# Patient Record
Sex: FEMALE | Race: BLACK OR AFRICAN AMERICAN | Employment: FULL TIME | URBAN - METROPOLITAN AREA
[De-identification: names, ages, dates, MRNs, and addresses within clinical notes are randomized per-mention and may not be internally consistent; named-entity substitution may affect disease eponyms.]

---

## 2017-07-15 ENCOUNTER — HOSPITAL ENCOUNTER (EMERGENCY)
Age: 52
Discharge: HOME | End: 2017-07-15
Attending: EMERGENCY MEDICINE
Payer: COMMERCIAL

## 2017-07-15 ENCOUNTER — APPOINTMENT (OUTPATIENT)
Dept: GENERAL RADIOLOGY | Age: 52
End: 2017-07-15
Attending: EMERGENCY MEDICINE
Payer: COMMERCIAL

## 2017-07-15 VITALS
TEMPERATURE: 98 F | HEIGHT: 60 IN | WEIGHT: 130 LBS | RESPIRATION RATE: 18 BRPM | DIASTOLIC BLOOD PRESSURE: 79 MMHG | HEART RATE: 79 BPM | OXYGEN SATURATION: 98 % | BODY MASS INDEX: 25.52 KG/M2 | SYSTOLIC BLOOD PRESSURE: 125 MMHG

## 2017-07-15 DIAGNOSIS — M19.079 ANKLE ARTHRITIS: Primary | ICD-10-CM

## 2017-07-15 LAB
BASOPHILS # BLD AUTO: 0 K/UL (ref 0–0.2)
BASOPHILS # BLD: 1 % (ref 0–2)
CRP SERPL-MCNC: 0.5 MG/DL (ref 0–0.9)
DIFFERENTIAL METHOD BLD: ABNORMAL
EOSINOPHIL # BLD: 0.4 K/UL (ref 0–0.8)
EOSINOPHIL NFR BLD: 8 % (ref 0.5–7.8)
ERYTHROCYTE [DISTWIDTH] IN BLOOD BY AUTOMATED COUNT: 13.8 % (ref 11.9–14.6)
ERYTHROCYTE [SEDIMENTATION RATE] IN BLOOD: 31 MM/HR (ref 0–30)
HCT VFR BLD AUTO: 41.5 % (ref 35.8–46.3)
HGB BLD-MCNC: 13.6 G/DL (ref 11.7–15.4)
IMM GRANULOCYTES # BLD: 0 K/UL (ref 0–0.5)
IMM GRANULOCYTES NFR BLD AUTO: 0 % (ref 0–5)
LYMPHOCYTES # BLD AUTO: 46 % (ref 13–44)
LYMPHOCYTES # BLD: 2.5 K/UL (ref 0.5–4.6)
MCH RBC QN AUTO: 27.4 PG (ref 26.1–32.9)
MCHC RBC AUTO-ENTMCNC: 32.8 G/DL (ref 31.4–35)
MCV RBC AUTO: 83.5 FL (ref 79.6–97.8)
MONOCYTES # BLD: 0.5 K/UL (ref 0.1–1.3)
MONOCYTES NFR BLD AUTO: 9 % (ref 4–12)
NEUTS SEG # BLD: 1.9 K/UL (ref 1.7–8.2)
NEUTS SEG NFR BLD AUTO: 36 % (ref 43–78)
PLATELET # BLD AUTO: 221 K/UL (ref 150–450)
PMV BLD AUTO: 10.9 FL (ref 10.8–14.1)
RBC # BLD AUTO: 4.97 M/UL (ref 4.05–5.25)
WBC # BLD AUTO: 5.4 K/UL (ref 4.3–11.1)

## 2017-07-15 PROCEDURE — 85652 RBC SED RATE AUTOMATED: CPT | Performed by: EMERGENCY MEDICINE

## 2017-07-15 PROCEDURE — 73610 X-RAY EXAM OF ANKLE: CPT

## 2017-07-15 PROCEDURE — 85025 COMPLETE CBC W/AUTO DIFF WBC: CPT | Performed by: EMERGENCY MEDICINE

## 2017-07-15 PROCEDURE — 86140 C-REACTIVE PROTEIN: CPT | Performed by: EMERGENCY MEDICINE

## 2017-07-15 PROCEDURE — 99283 EMERGENCY DEPT VISIT LOW MDM: CPT | Performed by: EMERGENCY MEDICINE

## 2017-07-15 PROCEDURE — 96374 THER/PROPH/DIAG INJ IV PUSH: CPT | Performed by: EMERGENCY MEDICINE

## 2017-07-15 PROCEDURE — 74011250636 HC RX REV CODE- 250/636: Performed by: EMERGENCY MEDICINE

## 2017-07-15 RX ORDER — KETOROLAC TROMETHAMINE 30 MG/ML
30 INJECTION, SOLUTION INTRAMUSCULAR; INTRAVENOUS
Status: COMPLETED | OUTPATIENT
Start: 2017-07-15 | End: 2017-07-15

## 2017-07-15 RX ORDER — SODIUM CHLORIDE 0.9 % (FLUSH) 0.9 %
5-10 SYRINGE (ML) INJECTION EVERY 8 HOURS
Status: DISCONTINUED | OUTPATIENT
Start: 2017-07-15 | End: 2017-07-16 | Stop reason: HOSPADM

## 2017-07-15 RX ORDER — SODIUM CHLORIDE 0.9 % (FLUSH) 0.9 %
5-10 SYRINGE (ML) INJECTION AS NEEDED
Status: DISCONTINUED | OUTPATIENT
Start: 2017-07-15 | End: 2017-07-16 | Stop reason: HOSPADM

## 2017-07-15 RX ORDER — NAPROXEN SODIUM 550 MG/1
550 TABLET ORAL 2 TIMES DAILY WITH MEALS
Qty: 10 TAB | Refills: 0 | Status: SHIPPED | OUTPATIENT
Start: 2017-07-15 | End: 2018-02-27

## 2017-07-15 RX ADMIN — KETOROLAC TROMETHAMINE 30 MG: 30 INJECTION, SOLUTION INTRAMUSCULAR at 21:59

## 2017-07-15 NOTE — LETTER
[address] [facility] EMERGENCY DEPT 
[facility] [address] [facility] [address] 
[phone] Work/School Note Date: 7/15/2017 To Whom It May concern: Livia Garcia was seen and treated today in the emergency room by the following provider(s): 
Attending Provider: Ariana Sanders MD. Livia Garcia may return to work on 7/17. Sincerely, Ariana Sanders MD

## 2017-07-16 NOTE — ED PROVIDER NOTES
Patient is a 50 y.o. female presenting with ankle pain. The history is provided by the patient. Ankle Pain    This is a new problem. The current episode started more than 1 week ago. The problem occurs constantly. The problem has been gradually worsening. Pain location: ankle, left. The pain is moderate. Pertinent negatives include no numbness, no tingling, no back pain and no neck pain. The symptoms are aggravated by movement and contact. She has tried nothing for the symptoms. There has been no history of extremity trauma. Past Medical History:   Diagnosis Date    Ill-defined condition     pancreatitis       Past Surgical History:   Procedure Laterality Date    HX ORTHOPAEDIC           History reviewed. No pertinent family history. Social History     Social History    Marital status: SINGLE     Spouse name: N/A    Number of children: N/A    Years of education: N/A     Occupational History    Not on file. Social History Main Topics    Smoking status: Former Smoker    Smokeless tobacco: Not on file    Alcohol use No    Drug use: Yes     Special: Marijuana    Sexual activity: No     Other Topics Concern    Not on file     Social History Narrative         ALLERGIES: Review of patient's allergies indicates no known allergies. Review of Systems   Constitutional: Negative for chills and fever. Gastrointestinal: Negative for nausea and vomiting. Musculoskeletal: Negative for arthralgias, back pain, myalgias and neck pain. Neurological: Negative for tingling, weakness and numbness. Vitals:    07/15/17 2045   BP: 111/77   Pulse: 82   Resp: 20   Temp: 98 °F (36.7 °C)   SpO2: 99%   Weight: 59 kg (130 lb)   Height: 5' (1.524 m)            Physical Exam   Constitutional: She appears well-developed and well-nourished. Cardiovascular: Normal rate, regular rhythm and normal heart sounds.     Pulmonary/Chest: Effort normal and breath sounds normal.   Musculoskeletal: She exhibits edema and tenderness. Neurovascularly intact left lower extremity. Mild lateral swelling of the ankle with tenderness in this area and distal fibula area. Mild warmth noted initially however the opposing ankle feels similar. No overlying erythema. Most pain is with palpation to even light touch and less so with movement of the ankle joint. Nursing note and vitals reviewed. MDM  Number of Diagnoses or Management Options  Diagnosis management comments: Swelling for over a week. History of previous open reduction and internal fixation. Patient has been on her feet at work quite a bit recently. Probably arthritis and inflammation secondary to increased activity and old injury. No fever or leukocytosis or elevated CRP or sedimentation rate to suggest septic joint. No vaginal discharge and patient has not had intercourse in over 2 years so doubt septic joint from gonorrhea.          Amount and/or Complexity of Data Reviewed  Clinical lab tests: ordered and reviewed (Results for orders placed or performed during the hospital encounter of 07/15/17  -C REACTIVE PROTEIN, QT       Result                                            Value                         Ref Range                       C-Reactive protein                                0.5                           0.0 - 0.9 mg/dL            -SED RATE, AUTOMATED       Result                                            Value                         Ref Range                       Sed rate, automated                               31 (H)                        0 - 30 mm/hr               -CBC WITH AUTOMATED DIFF       Result                                            Value                         Ref Range                       WBC                                               5.4                           4.3 - 11.1 K/uL                 RBC                                               4.97                          4.05 - 5.25 M/uL                HGB 13.6                          11.7 - 15.4 g/dL                HCT                                               41.5                          35.8 - 46.3 %                   MCV                                               83.5                          79.6 - 97.8 FL                  MCH                                               27.4                          26.1 - 32.9 PG                  MCHC                                              32.8                          31.4 - 35.0 g/dL                RDW                                               13.8                          11.9 - 14.6 %                   PLATELET                                          221                           150 - 450 K/uL                  MPV                                               10.9                          10.8 - 14.1 FL                  DF                                                AUTOMATED                                                     NEUTROPHILS                                       36 (L)                        43 - 78 %                       LYMPHOCYTES                                       46 (H)                        13 - 44 %                       MONOCYTES                                         9                             4.0 - 12.0 %                    EOSINOPHILS                                       8 (H)                         0.5 - 7.8 %                     BASOPHILS                                         1                             0.0 - 2.0 %                     IMMATURE GRANULOCYTES                             0.0                           0.0 - 5.0 %                     ABS. NEUTROPHILS                                  1.9                           1.7 - 8.2 K/UL                  ABS.  LYMPHOCYTES                                  2.5                           0.5 - 4.6 K/UL                  ABS. MONOCYTES                                    0.5 0.1 - 1.3 K/UL                  ABS. EOSINOPHILS                                  0.4                           0.0 - 0.8 K/UL                  ABS. BASOPHILS                                    0.0                           0.0 - 0.2 K/UL                  ABS. IMM. GRANS.                                  0.0                           0.0 - 0.5 K/UL           '  )  Tests in the radiology section of CPT®: ordered and reviewed (Xr Ankle Lt Min 3 V    Result Date: 7/15/2017  EXAM:  XR ANKLE LT MIN 3 V INDICATION:  pain swelling COMPARISON: None. FINDINGS: Three views of the left ankle demonstrate a prior ORIF with cortical plate and screws of the distal fibula. The fracture is healed. There is no evidence of acute fractures. There is mild lateral soft tissue swelling. .  No other osseous, articular or soft tissue abnormalities are identified. IMPRESSION: Lateral soft tissue swelling without evidence of an acute fracture. Status post ORIF of a distal fibular fracture.      )      ED Course       Procedures

## 2017-07-16 NOTE — DISCHARGE INSTRUCTIONS
Arthritis: Care Instructions  Your Care Instructions  Arthritis, also called osteoarthritis, is a breakdown of the cartilage that cushions your joints. When the cartilage wears down, your bones rub against each other. This causes pain and stiffness. Many people have some arthritis as they age. Arthritis most often affects the joints of the spine, hands, hips, knees, or feet. You can take simple measures to protect your joints, ease your pain, and help you stay active. Follow-up care is a key part of your treatment and safety. Be sure to make and go to all appointments, and call your doctor if you are having problems. It's also a good idea to know your test results and keep a list of the medicines you take. How can you care for yourself at home? · Stay at a healthy weight. Being overweight puts extra strain on your joints. · Talk to your doctor or physical therapist about exercises that will help ease joint pain. ¨ Stretch. You may enjoy gentle forms of yoga to help keep your joints and muscles flexible. ¨ Walk instead of jog. Other types of exercise that are less stressful on the joints include riding a bicycle, swimming, radha chi, or water exercise. ¨ Lift weights. Strong muscles help reduce stress on your joints. Stronger thigh muscles, for example, take some of the stress off of the knees and hips. Learn the right way to lift weights so you do not make joint pain worse. · Take your medicines exactly as prescribed. Call your doctor if you think you are having a problem with your medicine. · Take pain medicines exactly as directed. ¨ If the doctor gave you a prescription medicine for pain, take it as prescribed. ¨ If you are not taking a prescription pain medicine, ask your doctor if you can take an over-the-counter medicine. · Use a cane, crutch, walker, or another device if you need help to get around. These can help rest your joints.  You also can use other things to make life easier, such as a higher toilet seat and padded handles on kitchen utensils. · Do not sit in low chairs, which can make it hard to get up. · Put heat or cold on your sore joints as needed. Use whichever helps you most. You also can take turns with hot and cold packs. ¨ Apply heat 2 or 3 times a day for 20 to 30 minutes--using a heating pad, hot shower, or hot pack--to relieve pain and stiffness. ¨ Put ice or a cold pack on your sore joint for 10 to 20 minutes at a time. Put a thin cloth between the ice and your skin. When should you call for help? Call your doctor now or seek immediate medical care if:  · You have sudden swelling, warmth, or pain in any joint. · You have joint pain and a fever or rash. · You have such bad pain that you cannot use a joint. Watch closely for changes in your health, and be sure to contact your doctor if:  · You have mild joint symptoms that continue even with more than 6 weeks of care at home. · You have stomach pain or other problems with your medicine. Where can you learn more? Go to http://abdirashidKreditechchetna.info/. Enter Y684 in the search box to learn more about \"Arthritis: Care Instructions. \"  Current as of: November 28, 2016  Content Version: 11.3  © 2688-4915 [employer]. Care instructions adapted under license by [employer] (which disclaims liability or warranty for this information). If you have questions about a medical condition or this instruction, always ask your healthcare professional. [place] [address] any warranty or liability for your use of this information. Foot Arthritis: Exercises  Your Care Instructions  Here are some examples of exercises for foot arthritis. Start each exercise slowly. Ease off the exercise if you start to have pain. Your doctor or physical therapist will tell you when you can start these exercises and which ones will work best for you.   How to do the exercises  Calf stretch (knees straight)    1. Place a book on the floor a few inches from a wall or countertop, and put the balls of your feet on it. Your heels should be on the floor. The book needs to be thick enough so that you can feel a gentle stretch in your calf. If you are not steady on your feet, hold on to a chair, counter, or wall while you do this stretch. 2. Keep your knees straight, and lean forward until you feel a stretch in your calf. 3. To get more stretch, add another book or use a thicker book, such as a phone book, a dictionary, or an encyclopedia. 4. Hold the stretch for at least 15 to 30 seconds. 5. Repeat 2 to 4 times. Calf stretch (knees bent)    1. Place a book on the floor a few inches from a wall or countertop, and put the balls of your feet on it. Your heels should be on the floor. The book needs to be thick enough so that you can feel a gentle stretch in your calf. If you are not steady on your feet, hold on to a chair, counter, or wall while you do this stretch. 2. Bend your knees, and lean forward until you feel a stretch in your calf. 3. To get more stretch, add another book or use a thicker book, such as a phone book, a dictionary, or an encyclopedia. 4. Hold the stretch for at least 15 to 30 seconds. 5. Repeat 2 to 4 times. Great toe extension stretch    1. Sit in a chair, and put your affected foot across your other knee. 2. Grasp your heel with one hand and then slowly pull your big toe back with your other hand. Pull your toe back toward your ankle until you feel a stretch along the bottom of your foot. 3. Hold the stretch for at least 15 to 30 seconds. 4. Repeat 2 to 4 times. 5. Switch feet and repeat steps 1 through 4, even if only one foot is sore. Great toe flexion stretch    1. Sit in a chair, and put your affected foot across your other knee. 2. Grasp your heel with one hand and then slowly push your big toe down with your other hand.  Push your toe down and away from your ankle until you feel a stretch along the top of your foot. 3. Hold the stretch for at least 15 to 30 seconds. 4. Repeat 2 to 4 times. 5. Switch feet and repeat steps 1 through 4, even if only one foot is sore. Ankle alphabet    1. Sit in a chair with your feet flat on the floor. (You can also do this exercise lying on your back with your affected leg propped up on a pillow). 2. Lift the heel of your sore foot off the floor, and slowly trace the letters of the alphabet. 3. Switch feet and repeat steps 1 through 2, even if only one foot is sore. Resisted ankle dorsiflexion    1. Tie the ends of an exercise band together to form a loop. Attach one end of the loop to a secure object or shut a door on it to hold it in place. (Or you can have someone hold one end of the loop to provide resistance.)  2. While sitting on the floor or in a chair, loop the other end of the band over the top of your affected foot. 3. Keeping your knee and leg straight, slowly flex your foot to pull back on the exercise band, and then slowly relax. 4. Repeat 8 to 12 times. 5. Switch feet and repeat steps 1 through 4, even if only one foot is sore. Towel curl    1. While sitting, place your affected foot on a towel on the floor, and scrunch the towel toward you with your toes. 2. Then use your toes to push the towel away from you. 3. Repeat 8 to 12 times. 4. Switch feet and repeat steps 1 through 3, even if only one foot is sore. Resisted ankle eversion    1. Sit on the floor with your legs straight. 2. Hold both ends of an exercise band and loop the band around the outside of your affected foot. Then press your other foot against the band. 3. Keeping your leg straight, slowly push your affected foot outward against the band and away from your other foot without letting your leg rotate. Then slowly relax. 4. Repeat 8 to 12 times. 5. Switch feet and repeat steps 1 through 4, even if only one foot is sore.   Resisted ankle inversion    1. Sit on the floor with your good leg crossed over your other leg. 2. Hold both ends of an exercise band and loop the band around the inside of your affected foot. Then press your other foot against the band. 3. Keeping your legs crossed, slowly push your affected foot against the band so that foot moves away from your other foot. Then slowly relax. 4. Repeat 8 to 12 times. 5. Switch feet and repeat steps 1 through 4, even if only one foot is sore. Follow-up care is a key part of your treatment and safety. Be sure to make and go to all appointments, and call your doctor if you are having problems. It's also a good idea to know your test results and keep a list of the medicines you take. Where can you learn more? Go to http://abdirashid-chetna.info/. Enter K113 in the search box to learn more about \"Foot Arthritis: Exercises. \"  Current as of: March 21, 2017  Content Version: 11.3  © 4499-6037 [employer]. Care instructions adapted under license by [employer] (which disclaims liability or warranty for this information). If you have questions about a medical condition or this instruction, always ask your healthcare professional. [place] [address] any warranty or liability for your use of this information.

## 2018-02-27 ENCOUNTER — APPOINTMENT (OUTPATIENT)
Dept: GENERAL RADIOLOGY | Age: 53
DRG: 191 | End: 2018-02-27
Attending: EMERGENCY MEDICINE
Payer: COMMERCIAL

## 2018-02-27 ENCOUNTER — HOSPITAL ENCOUNTER (INPATIENT)
Age: 53
LOS: 3 days | Discharge: HOME | DRG: 191 | End: 2018-03-05
Attending: EMERGENCY MEDICINE | Admitting: FAMILY MEDICINE
Payer: COMMERCIAL

## 2018-02-27 DIAGNOSIS — R06.03 RESPIRATORY DISTRESS: Primary | ICD-10-CM

## 2018-02-27 DIAGNOSIS — J45.51 SEVERE PERSISTENT REACTIVE AIRWAY DISEASE WITH WHEEZING WITH ACUTE EXACERBATION: ICD-10-CM

## 2018-02-27 PROBLEM — J45.901 ASTHMA EXACERBATION: Status: ACTIVE | Noted: 2018-02-27

## 2018-02-27 LAB
ALBUMIN SERPL-MCNC: 3.6 G/DL (ref 3.5–5)
ALBUMIN/GLOB SERPL: 0.8 {RATIO} (ref 1.2–3.5)
ALP SERPL-CCNC: 125 U/L (ref 50–136)
ALT SERPL-CCNC: 21 U/L (ref 12–65)
ANION GAP SERPL CALC-SCNC: 13 MMOL/L (ref 7–16)
AST SERPL-CCNC: 26 U/L (ref 15–37)
BASOPHILS # BLD: 0 K/UL (ref 0–0.2)
BASOPHILS NFR BLD: 0 % (ref 0–2)
BILIRUB SERPL-MCNC: 0.6 MG/DL (ref 0.2–1.1)
BUN SERPL-MCNC: 7 MG/DL (ref 6–23)
CALCIUM SERPL-MCNC: 9.5 MG/DL (ref 8.3–10.4)
CHLORIDE SERPL-SCNC: 102 MMOL/L (ref 98–107)
CO2 SERPL-SCNC: 24 MMOL/L (ref 21–32)
CREAT SERPL-MCNC: 0.87 MG/DL (ref 0.6–1)
DIFFERENTIAL METHOD BLD: ABNORMAL
EOSINOPHIL # BLD: 0.5 K/UL (ref 0–0.8)
EOSINOPHIL NFR BLD: 6 % (ref 0.5–7.8)
ERYTHROCYTE [DISTWIDTH] IN BLOOD BY AUTOMATED COUNT: 13.9 % (ref 11.9–14.6)
FLUAV AG NPH QL IA: NEGATIVE
FLUBV AG NPH QL IA: NEGATIVE
GLOBULIN SER CALC-MCNC: 4.6 G/DL (ref 2.3–3.5)
GLUCOSE SERPL-MCNC: 109 MG/DL (ref 65–100)
HCT VFR BLD AUTO: 38.7 % (ref 35.8–46.3)
HGB BLD-MCNC: 12.7 G/DL (ref 11.7–15.4)
IMM GRANULOCYTES # BLD: 0 K/UL (ref 0–0.5)
IMM GRANULOCYTES NFR BLD AUTO: 0 % (ref 0–5)
LIPASE SERPL-CCNC: 173 U/L (ref 73–393)
LYMPHOCYTES # BLD: 2 K/UL (ref 0.5–4.6)
LYMPHOCYTES NFR BLD: 26 % (ref 13–44)
MCH RBC QN AUTO: 26.5 PG (ref 26.1–32.9)
MCHC RBC AUTO-ENTMCNC: 32.8 G/DL (ref 31.4–35)
MCV RBC AUTO: 80.6 FL (ref 79.6–97.8)
MONOCYTES # BLD: 0.5 K/UL (ref 0.1–1.3)
MONOCYTES NFR BLD: 7 % (ref 4–12)
NEUTS SEG # BLD: 4.8 K/UL (ref 1.7–8.2)
NEUTS SEG NFR BLD: 61 % (ref 43–78)
PLATELET # BLD AUTO: 269 K/UL (ref 150–450)
PMV BLD AUTO: 10.1 FL (ref 10.8–14.1)
POTASSIUM SERPL-SCNC: 3.6 MMOL/L (ref 3.5–5.1)
PROT SERPL-MCNC: 8.2 G/DL (ref 6.3–8.2)
RBC # BLD AUTO: 4.8 M/UL (ref 4.05–5.25)
SODIUM SERPL-SCNC: 139 MMOL/L (ref 136–145)
TROPONIN I BLD-MCNC: 0 NG/ML (ref 0.02–0.05)
WBC # BLD AUTO: 7.8 K/UL (ref 4.3–11.1)

## 2018-02-27 PROCEDURE — 96361 HYDRATE IV INFUSION ADD-ON: CPT | Performed by: EMERGENCY MEDICINE

## 2018-02-27 PROCEDURE — 84484 ASSAY OF TROPONIN QUANT: CPT

## 2018-02-27 PROCEDURE — 74011000250 HC RX REV CODE- 250: Performed by: EMERGENCY MEDICINE

## 2018-02-27 PROCEDURE — 94640 AIRWAY INHALATION TREATMENT: CPT

## 2018-02-27 PROCEDURE — 94664 DEMO&/EVAL PT USE INHALER: CPT

## 2018-02-27 PROCEDURE — 87804 INFLUENZA ASSAY W/OPTIC: CPT | Performed by: EMERGENCY MEDICINE

## 2018-02-27 PROCEDURE — 74011250636 HC RX REV CODE- 250/636: Performed by: EMERGENCY MEDICINE

## 2018-02-27 PROCEDURE — 74011000250 HC RX REV CODE- 250: Performed by: FAMILY MEDICINE

## 2018-02-27 PROCEDURE — 80053 COMPREHEN METABOLIC PANEL: CPT | Performed by: EMERGENCY MEDICINE

## 2018-02-27 PROCEDURE — 85025 COMPLETE CBC W/AUTO DIFF WBC: CPT | Performed by: EMERGENCY MEDICINE

## 2018-02-27 PROCEDURE — 93005 ELECTROCARDIOGRAM TRACING: CPT | Performed by: EMERGENCY MEDICINE

## 2018-02-27 PROCEDURE — 99285 EMERGENCY DEPT VISIT HI MDM: CPT | Performed by: EMERGENCY MEDICINE

## 2018-02-27 PROCEDURE — 99218 HC RM OBSERVATION: CPT

## 2018-02-27 PROCEDURE — 71045 X-RAY EXAM CHEST 1 VIEW: CPT

## 2018-02-27 PROCEDURE — 83690 ASSAY OF LIPASE: CPT | Performed by: EMERGENCY MEDICINE

## 2018-02-27 PROCEDURE — 96375 TX/PRO/DX INJ NEW DRUG ADDON: CPT | Performed by: EMERGENCY MEDICINE

## 2018-02-27 RX ORDER — IPRATROPIUM BROMIDE AND ALBUTEROL SULFATE 2.5; .5 MG/3ML; MG/3ML
3 SOLUTION RESPIRATORY (INHALATION)
Status: DISCONTINUED | OUTPATIENT
Start: 2018-02-28 | End: 2018-02-28

## 2018-02-27 RX ORDER — HYDROCODONE BITARTRATE AND HOMATROPINE METHYLBROMIDE 1.5; 5 MG/5ML; MG/5ML
5 SYRUP ORAL
Status: DISCONTINUED | OUTPATIENT
Start: 2018-02-27 | End: 2018-03-05 | Stop reason: HOSPADM

## 2018-02-27 RX ORDER — ONDANSETRON 2 MG/ML
4 INJECTION INTRAMUSCULAR; INTRAVENOUS
Status: DISCONTINUED | OUTPATIENT
Start: 2018-02-27 | End: 2018-02-28 | Stop reason: SDUPTHER

## 2018-02-27 RX ORDER — CALCIUM CARBONATE 200(500)MG
400 TABLET,CHEWABLE ORAL
Status: DISCONTINUED | OUTPATIENT
Start: 2018-02-27 | End: 2018-03-05 | Stop reason: HOSPADM

## 2018-02-27 RX ORDER — ACETAMINOPHEN 325 MG/1
650 TABLET ORAL
Status: DISCONTINUED | OUTPATIENT
Start: 2018-02-27 | End: 2018-02-28 | Stop reason: SDUPTHER

## 2018-02-27 RX ORDER — FLUTICASONE PROPIONATE AND SALMETEROL 250; 50 UG/1; UG/1
1 POWDER RESPIRATORY (INHALATION) EVERY 12 HOURS
COMMUNITY
End: 2018-10-29 | Stop reason: CLARIF

## 2018-02-27 RX ORDER — LORAZEPAM 2 MG/ML
0.5 INJECTION INTRAMUSCULAR
Status: COMPLETED | OUTPATIENT
Start: 2018-02-27 | End: 2018-02-27

## 2018-02-27 RX ORDER — GUAIFENESIN 600 MG/1
600 TABLET, EXTENDED RELEASE ORAL
Status: DISCONTINUED | OUTPATIENT
Start: 2018-02-27 | End: 2018-03-04

## 2018-02-27 RX ORDER — IPRATROPIUM BROMIDE AND ALBUTEROL SULFATE 2.5; .5 MG/3ML; MG/3ML
3 SOLUTION RESPIRATORY (INHALATION)
Status: COMPLETED | OUTPATIENT
Start: 2018-02-27 | End: 2018-02-27

## 2018-02-27 RX ADMIN — METHYLPREDNISOLONE SODIUM SUCCINATE 125 MG: 125 INJECTION, POWDER, FOR SOLUTION INTRAMUSCULAR; INTRAVENOUS at 19:56

## 2018-02-27 RX ADMIN — IPRATROPIUM BROMIDE AND ALBUTEROL SULFATE 3 ML: 2.5; .5 SOLUTION RESPIRATORY (INHALATION) at 23:55

## 2018-02-27 RX ADMIN — IPRATROPIUM BROMIDE AND ALBUTEROL SULFATE 3 ML: 2.5; .5 SOLUTION RESPIRATORY (INHALATION) at 19:49

## 2018-02-27 RX ADMIN — LORAZEPAM 0.5 MG: 2 INJECTION INTRAMUSCULAR; INTRAVENOUS at 19:55

## 2018-02-27 RX ADMIN — IPRATROPIUM BROMIDE AND ALBUTEROL SULFATE 3 ML: 2.5; .5 SOLUTION RESPIRATORY (INHALATION) at 19:18

## 2018-02-27 RX ADMIN — SODIUM CHLORIDE 1000 ML: 900 INJECTION, SOLUTION INTRAVENOUS at 19:56

## 2018-02-27 NOTE — LETTER
[address] [facility] EMERGENCY DEPT 
[address] [facility] [address] [place] [address] 
[phone] Work/School Note Date: 2/27/2018 To Whom It May concern: 
 
Bess Packer was in the emergency room with a family member this AM. Sincerely, Melchor Torres RN

## 2018-02-27 NOTE — IP AVS SNAPSHOT
[address] 
[phone] Patient: Keyshawn Carter MRN: [id number] :1965 About your hospitalization You were admitted on:  2018 You last received care in the:  [address] [place] [address] You were discharged on:  2018 Why you were hospitalized Your primary diagnosis was:  Copd Exacerbation (Hcc) Your diagnoses also included:  Asthma Exacerbation, Marijuana Use, Continuous, Occupational Exposure To Air Contaminants, Cough, Acute Bronchitis, Leukocytosis Follow-up Information Follow up With Details Comments Contact Info [address] [place] [address] On 3/13/2018 1:40PM [address] [facility] [address] 
[phone] Discharge Orders None A check cordelia indicates which time of day the medication should be taken. My Medications START taking these medications Instructions Each Dose to Equal  
 Morning Noon Evening Bedtime  
 azithromycin 500 mg Tab Commonly known as:  Rajani Nava Start taking on:  3/6/2018 Your next dose is:  TOMORROW Take 1 Tab by mouth daily for 2 days. Indications: COPD exacerbation 500 mg  
    
   
   
   
  
 benzonatate 100 mg capsule Commonly known as:  TESSALON Take 1 Cap by mouth three (3) times daily as needed for Cough for up to 10 days. Indications: Cough 100 mg  
    
   
   
   
  
 guaiFENesin 1,200 mg Ta12 ER tablet Commonly known as:  Andrey & Andrey Your next dose is:  TODAY Take 1 Tab by mouth every twelve (12) hours. Indications: Cough 1200 mg  
    
   
   
   
  
  
 predniSONE 10 mg tablet Commonly known as:  Aspen Better Start taking on:  3/6/2018 Taper:  4 tabs daily x 1 day, 3 tabs daily x 3 days, 2 tabs daily x 3 days, 1 tab daily x 3 days  Indications: Asthma Exacerbation CONTINUE taking these medications Instructions Each Dose to Equal  
 Morning Noon Evening Bedtime ADVAIR DISKUS 250-50 mcg/dose diskus inhaler Generic drug:  fluticasone-salmeterol Your next dose is:  TODAY Take 1 Puff by inhalation every twelve (12) hours. 1 Puff  
    
   
   
   
  
  
 albuterol 2.5 mg /3 mL (0.083 %) nebulizer solution Commonly known as:  PROVENTIL VENTOLIN  
   
 3 mL by Nebulization route every six (6) hours as needed for Wheezing or Shortness of Breath. Indications: Bronchospastic Pulmonary Disease 2.5 mg Where to Get Your Medications Information on where to get these meds will be given to you by the nurse or doctor. ! Ask your nurse or doctor about these medications  
  albuterol 2.5 mg /3 mL (0.083 %) nebulizer solution  
 azithromycin 500 mg Tab  
 benzonatate 100 mg capsule  
 guaiFENesin 1,200 mg Ta12 ER tablet  
 predniSONE 10 mg tablet Discharge Instructions None [facility] Announce[facility] We are excited to announce that we are making your provider's discharge notes available to you in [employer] You will see these notes when they are completed and signed by the physician that discharged you from your recent hospital stay. If you have any questions or concerns about any information you see in [employer] please call the Health Information Department where you were seen or reach out to your Primary Care Provider for more information about your plan of care. Introducing [facility]! Son Payne introduces [employer] patient portal. Now you can access parts of your medical record, email your doctor's office, and request medication refills online. 1. In your internet browser, go to [email or web address] 2. Click on the First Time User? Click Here link in the Sign In box. You will see the New Member Sign Up page. 3. Enter your [employer] Access Code exactly as it appears below.  You will not need to use this code after youve completed the sign-up process. If you do not sign up before the expiration date, you must request a new code. · [employer] Access Code: [email or web address] Expires: 6/3/2018  8:26 AM 
 
4. Enter the last four digits of your Social Security Number (xxxx) and Date of Birth (mm/dd/yyyy) as indicated and click Submit. You will be taken to the next sign-up page. 5. Create a [employer] ID. This will be your [employer] login ID and cannot be changed, so think of one that is secure and easy to remember. 6. Create a [employer] password. You can change your password at any time. 7. Enter your Password Reset Question and Answer. This can be used at a later time if you forget your password. 8. Enter your e-mail address. You will receive e-mail notification when new information is available in [address] 9. Click Sign Up. You can now view and download portions of your medical record. 10. Click the Download Summary menu link to download a portable copy of your medical information. If you have questions, please visit the Frequently Asked Questions section of the [employer] website. Remember, [employer] is NOT to be used for urgent needs. For medical emergencies, dial 911. Now available from your iPhone and Android! Providers Seen During Your Hospitalization Provider Specialty Primary office phone Torey Herndon MD Emergency Medicine [phone] Alejandra Forman MD [facility] [phone] Your Primary Care Physician (PCP) Primary Care Physician Office Phone Office Fax NONE ** None ** ** None ** You are allergic to the following No active allergies Recent Documentation Height Weight BMI OB Status Smoking Status 1.524 m 64.4 kg 27.73 kg/m2 Perimenopausal Former Smoker Emergency Contacts Name Discharge Info Relation Home Work Mobile [employer] [place] [1] [phone] Britton Sprague  Parent [1] [phone] [facility]  Life Partner [7] [phone] Patient Belongings The following personal items are in your possession at time of discharge: 
                   Clothing: With patient Please provide this summary of care documentation to your next provider. Signatures-by signing, you are acknowledging that this After Visit Summary has been reviewed with you and you have received a copy. Patient Signature:  ____________________________________________________________ Date:  ____________________________________________________________  
  
[facility]ennifermaria de [facility]esus [facility] Provider Signature:  ____________________________________________________________ Date:  ____________________________________________________________

## 2018-02-27 NOTE — LETTER
[address] [facility] EMERGENCY DEPT 
[address] [place] [id number] [phone] Work/School Note Date: 2/28/18 To Whom It May concern: 
 
Juliana Murphy was in the emergency room this morning. Sincerely, Liz Silverio RN

## 2018-02-27 NOTE — IP AVS SNAPSHOT
Summary of Care Report The Summary of Care report has been created to help improve care coordination. Users with access to [employer] or [address] (Web-based application) may access additional patient information including the Discharge Summary. If you are not currently a [employer] user and need more information, please call the number listed below in the [place] [address] section and ask to be connected with Medical Records. Facility Information Name Address Phone [id number] [place] [address] [address] [place] [phone] Patient Information Patient Name Sex  Samaria Szymanski ([id number]) Female 1965 Discharge Information Admitting Provider Service Area Unit Fredrick Magaña MD / [place] [address] Med Surg / [phone] Discharge Provider Discharge Date/Time Discharge Disposition Destination (none) 3/5/2018 (Pending) AHR (none) Patient Language Language ENGLISH [13] Hospital Problems as of 3/5/2018  Never Reviewed Class Noted - Resolved Last Modified POA Active Problems Asthma exacerbation  2018 - Present 3/2/2018 by Fredrick Magaña MD Yes Entered by Ruma Wheat MD  
  Marijuana use, continuous  2018 - Present 2018 by Ruma Wheat MD Yes Entered by Ruma Wheat MD  
  Occupational exposure to air contaminants  2018 - Present 2018 by Ruma Wheat MD Yes Entered by Ruma Wheat MD  
  Cough  2018 - Present 2018 by Ruma Wheat MD Yes Entered by Ruma Wheat MD  
  Acute bronchitis  2018 - Present 2018 by Olamide Hilton MD Yes Entered by Olamide Hilton MD  
  * (Principal)COPD exacerbation ([address]  2018 - Present 3/2/2018 by Fredrick Magaña MD Yes   Entered by Olamide Hilton MD  
 Leukocytosis  3/4/2018 - Present 3/4/2018 by Cristian Wesley. Divine Hennessy MD No  
  Entered by Jyothi Hennessy MD  
  
You are allergic to the following No active allergies Current Discharge Medication List  
  
START taking these medications Dose & Instructions Dispensing Information Comments  
 azithromycin 500 mg Tab Commonly known as:  Louie Haw Start taking on:  3/6/2018 Dose:  500 mg Take 1 Tab by mouth daily for 2 days. Indications: COPD exacerbation Quantity:  2 Tab Refills:  0  
   
 benzonatate 100 mg capsule Commonly known as:  TESSALON Dose:  100 mg Take 1 Cap by mouth three (3) times daily as needed for Cough for up to 10 days. Indications: Cough Quantity:  30 Cap Refills:  0  
   
 guaiFENesin 1,200 mg Ta12 ER tablet Commonly known as:  Andrey & Andrey Dose:  1200 mg Take 1 Tab by mouth every twelve (12) hours. Indications: Cough Quantity:  30 Tab Refills:  0  
   
 predniSONE 10 mg tablet Commonly known as:  Valma Belts Start taking on:  3/6/2018 Taper:  4 tabs daily x 1 day, 3 tabs daily x 3 days, 2 tabs daily x 3 days, 1 tab daily x 3 days  Indications: Asthma Exacerbation Quantity:  22 Tab Refills:  0 CONTINUE these medications which have NOT CHANGED Dose & Instructions Dispensing Information Comments ADVAIR DISKUS 250-50 mcg/dose diskus inhaler Generic drug:  fluticasone-salmeterol Dose:  1 Puff Take 1 Puff by inhalation every twelve (12) hours. Refills:  0  
   
 albuterol 2.5 mg /3 mL (0.083 %) nebulizer solution Commonly known as:  PROVENTIL VENTOLIN Dose:  2.5 mg  
3 mL by Nebulization route every six (6) hours as needed for Wheezing or Shortness of Breath. Indications: Bronchospastic Pulmonary Disease Quantity:  120 Each Refills:  0 Follow-up Information Follow up With Details Comments Contact Info [address] On 3/13/2018 1:40PM [address] [place] [address] 
[phone] Discharge Instructions None Chart Review Routing History No Routing History on File

## 2018-02-27 NOTE — LETTER
NOTIFICATION RETURN TO WORK / SCHOOL 
 
3/5/2018 8:19 AM 
 
Ms. Tavares Rodriges [address] [place] [id number] To Whom It May Concern: Tavares Rodriges is currently under the care of PASHA BARROSO. She will return to work/school on: 3/9/18 or sooner if she feels improved. If there are questions or concerns please have the patient contact our office. Sincerely, No name on file.

## 2018-02-27 NOTE — ED PROVIDER NOTES
Patient is a 48 y.o. female presenting with shortness of breath. The history is provided by the patient. Shortness of Breath   This is a recurrent problem. The average episode lasts 2 days. The problem occurs continuously. The current episode started 2 days ago. The problem has been gradually worsening. Associated symptoms include cough, sputum production, wheezing and chest pain. Pertinent negatives include no fever, no headaches, no rhinorrhea, no sore throat, no ear pain, no neck pain, no hemoptysis, no vomiting, no abdominal pain, no rash and no leg pain. It is unknown what precipitated the problem. She has tried beta-agonist inhalers for the symptoms. The treatment provided no relief. She has had prior hospitalizations. She has had prior ED visits. Associated medical issues comments: patient reports a history of reactive air disease. After an episode of bronchitis. She is actually prescribed a nebulizer and treatments. With this episode. .        Past Medical History:   Diagnosis Date    Ill-defined condition     pancreatitis       Past Surgical History:   Procedure Laterality Date    HX ORTHOPAEDIC           No family history on file. Social History     Social History    Marital status: SINGLE     Spouse name: N/A    Number of children: N/A    Years of education: N/A     Occupational History    Not on file. Social History Main Topics    Smoking status: Former Smoker    Smokeless tobacco: Not on file    Alcohol use No    Drug use: Yes     Special: Marijuana    Sexual activity: No     Other Topics Concern    Not on file     Social History Narrative         ALLERGIES: Review of patient's allergies indicates no known allergies. Review of Systems   Constitutional: Negative for chills and fever. HENT: Negative for congestion, ear pain, rhinorrhea and sore throat. Eyes: Negative for photophobia and discharge.    Respiratory: Positive for cough, sputum production, chest tightness, shortness of breath and wheezing. Negative for apnea, hemoptysis and choking. Cardiovascular: Positive for chest pain. Negative for palpitations. Gastrointestinal: Negative for abdominal pain, constipation, diarrhea and vomiting. Endocrine: Negative for cold intolerance and heat intolerance. Genitourinary: Negative for dysuria and flank pain. Musculoskeletal: Negative for arthralgias, myalgias and neck pain. Skin: Negative for rash and wound. Allergic/Immunologic: Negative for environmental allergies and food allergies. Neurological: Negative for syncope and headaches. Hematological: Negative for adenopathy. Does not bruise/bleed easily. Psychiatric/Behavioral: Negative for dysphoric mood. The patient is not nervous/anxious. All other systems reviewed and are negative. Vitals:    02/27/18 1834   BP: 138/83   Pulse: (!) 102   Resp: (!) 42   Temp: 99 °F (37.2 °C)   SpO2: 93%   Weight: 64.4 kg (142 lb)   Height: 5' (1.524 m)            Physical Exam   Constitutional: She is oriented to person, place, and time. She appears well-developed and well-nourished. She appears distressed. HENT:   Head: Normocephalic and atraumatic. Mouth/Throat: Oropharynx is clear and moist.   Eyes: Conjunctivae and EOM are normal. Pupils are equal, round, and reactive to light. Right eye exhibits no discharge. Left eye exhibits no discharge. No scleral icterus. Neck: Normal range of motion. Neck supple. No thyromegaly present. Cardiovascular: Normal rate, regular rhythm, normal heart sounds and intact distal pulses. Exam reveals no gallop and no friction rub. No murmur heard. Pulmonary/Chest: Tachypnea noted. She is in respiratory distress. She has decreased breath sounds. She has wheezes. She has no rhonchi. She has no rales. She exhibits no tenderness. Patient able to speak in short sentences. However, becomes dyspneic quite easily. Room air saturations remain greater than 94%   Abdominal: Soft. Bowel sounds are normal. She exhibits no distension. There is no hepatosplenomegaly. There is no tenderness. There is no rebound and no guarding. No hernia. Musculoskeletal: Normal range of motion. She exhibits no edema or tenderness. Neurological: She is alert and oriented to person, place, and time. No cranial nerve deficit. She exhibits normal muscle tone. Skin: Skin is warm and dry. No rash noted. She is not diaphoretic. No erythema. Psychiatric: Her speech is normal and behavior is normal. Judgment and thought content normal. Her mood appears anxious. Nursing note and vitals reviewed. MDM  Number of Diagnoses or Management Options  Respiratory distress: new and requires workup  Severe persistent reactive airway disease with wheezing with acute exacerbation: new and requires workup  Diagnosis management comments: EKG reviewed  Sinus rhythm  Normal intervals, normal axis, no ectopy  No acute ischemic changes    Differential diagnosis  Asthma exacerbation, pneumonia, bronchitis. Also, consider ACS/CHF    9:40 PM  Patient has improved after DuoNeb and steroids  Her wheezing is somewhat persistent. She still feels fairly dyspneic    Patient is tachycardic in the 130s after her nebulizer treatments    I discussed the case with the hospitalist service and they will do it for further care and monitoring. Amount and/or Complexity of Data Reviewed  Clinical lab tests: ordered and reviewed  Tests in the radiology section of CPT®: ordered and reviewed  Tests in the medicine section of CPT®: ordered and reviewed  Review and summarize past medical records: yes    Risk of Complications, Morbidity, and/or Mortality  Presenting problems: high  Diagnostic procedures: moderate  Management options: moderate  General comments: Elements of this note have been dictated via voice recognition software. Text and phrases may be limited by the accuracy of the software.   The chart has been reviewed, but errors may still be present.       Patient Progress  Patient progress: improved        ED Course       Procedures

## 2018-02-28 PROBLEM — Z57.39 OCCUPATIONAL EXPOSURE TO AIR CONTAMINANTS: Status: ACTIVE | Noted: 2018-02-28

## 2018-02-28 PROBLEM — J20.9 ACUTE BRONCHITIS: Status: ACTIVE | Noted: 2018-02-28

## 2018-02-28 PROBLEM — J44.1 COPD EXACERBATION (HCC): Status: ACTIVE | Noted: 2018-02-28

## 2018-02-28 PROBLEM — R05.9 COUGH: Status: ACTIVE | Noted: 2018-02-28

## 2018-02-28 PROBLEM — F12.90 MARIJUANA USE, CONTINUOUS: Status: ACTIVE | Noted: 2018-02-28

## 2018-02-28 LAB
ANION GAP SERPL CALC-SCNC: 17 MMOL/L (ref 7–16)
ATRIAL RATE: 89 BPM
BASOPHILS # BLD: 0 K/UL (ref 0–0.2)
BASOPHILS NFR BLD: 0 % (ref 0–2)
BUN SERPL-MCNC: 8 MG/DL (ref 6–23)
CALCIUM SERPL-MCNC: 9 MG/DL (ref 8.3–10.4)
CALCULATED P AXIS, ECG09: 76 DEGREES
CALCULATED R AXIS, ECG10: 73 DEGREES
CALCULATED T AXIS, ECG11: 78 DEGREES
CHLORIDE SERPL-SCNC: 104 MMOL/L (ref 98–107)
CO2 SERPL-SCNC: 19 MMOL/L (ref 21–32)
CREAT SERPL-MCNC: 0.9 MG/DL (ref 0.6–1)
DIAGNOSIS, 93000: NORMAL
DIFFERENTIAL METHOD BLD: ABNORMAL
EOSINOPHIL # BLD: 0 K/UL (ref 0–0.8)
EOSINOPHIL NFR BLD: 0 % (ref 0.5–7.8)
ERYTHROCYTE [DISTWIDTH] IN BLOOD BY AUTOMATED COUNT: 14.2 % (ref 11.9–14.6)
GLUCOSE SERPL-MCNC: 243 MG/DL (ref 65–100)
HCT VFR BLD AUTO: 34.6 % (ref 35.8–46.3)
HGB BLD-MCNC: 11.7 G/DL (ref 11.7–15.4)
IMM GRANULOCYTES # BLD: 0 K/UL (ref 0–0.5)
IMM GRANULOCYTES NFR BLD AUTO: 0 % (ref 0–5)
LYMPHOCYTES # BLD: 0.7 K/UL (ref 0.5–4.6)
LYMPHOCYTES NFR BLD: 10 % (ref 13–44)
MCH RBC QN AUTO: 27.3 PG (ref 26.1–32.9)
MCHC RBC AUTO-ENTMCNC: 33.8 G/DL (ref 31.4–35)
MCV RBC AUTO: 80.7 FL (ref 79.6–97.8)
MONOCYTES # BLD: 0.1 K/UL (ref 0.1–1.3)
MONOCYTES NFR BLD: 1 % (ref 4–12)
NEUTS SEG # BLD: 6.2 K/UL (ref 1.7–8.2)
NEUTS SEG NFR BLD: 89 % (ref 43–78)
P-R INTERVAL, ECG05: 140 MS
PLATELET # BLD AUTO: 270 K/UL (ref 150–450)
PMV BLD AUTO: 10.9 FL (ref 10.8–14.1)
POTASSIUM SERPL-SCNC: 3.7 MMOL/L (ref 3.5–5.1)
Q-T INTERVAL, ECG07: 362 MS
QRS DURATION, ECG06: 70 MS
QTC CALCULATION (BEZET), ECG08: 440 MS
RBC # BLD AUTO: 4.29 M/UL (ref 4.05–5.25)
SODIUM SERPL-SCNC: 140 MMOL/L (ref 136–145)
VENTRICULAR RATE, ECG03: 89 BPM
WBC # BLD AUTO: 7 K/UL (ref 4.3–11.1)

## 2018-02-28 PROCEDURE — 99218 HC RM OBSERVATION: CPT

## 2018-02-28 PROCEDURE — 77010033678 HC OXYGEN DAILY

## 2018-02-28 PROCEDURE — 96365 THER/PROPH/DIAG IV INF INIT: CPT | Performed by: EMERGENCY MEDICINE

## 2018-02-28 PROCEDURE — 74011250636 HC RX REV CODE- 250/636: Performed by: FAMILY MEDICINE

## 2018-02-28 PROCEDURE — 96361 HYDRATE IV INFUSION ADD-ON: CPT | Performed by: EMERGENCY MEDICINE

## 2018-02-28 PROCEDURE — 85025 COMPLETE CBC W/AUTO DIFF WBC: CPT | Performed by: FAMILY MEDICINE

## 2018-02-28 PROCEDURE — 74011000250 HC RX REV CODE- 250: Performed by: FAMILY MEDICINE

## 2018-02-28 PROCEDURE — 74011250637 HC RX REV CODE- 250/637: Performed by: FAMILY MEDICINE

## 2018-02-28 PROCEDURE — 96375 TX/PRO/DX INJ NEW DRUG ADDON: CPT | Performed by: EMERGENCY MEDICINE

## 2018-02-28 PROCEDURE — 94640 AIRWAY INHALATION TREATMENT: CPT

## 2018-02-28 PROCEDURE — 80048 BASIC METABOLIC PNL TOTAL CA: CPT | Performed by: FAMILY MEDICINE

## 2018-02-28 PROCEDURE — 96372 THER/PROPH/DIAG INJ SC/IM: CPT | Performed by: EMERGENCY MEDICINE

## 2018-02-28 RX ORDER — ENOXAPARIN SODIUM 100 MG/ML
40 INJECTION SUBCUTANEOUS EVERY 24 HOURS
Status: DISCONTINUED | OUTPATIENT
Start: 2018-02-28 | End: 2018-02-28 | Stop reason: SDUPTHER

## 2018-02-28 RX ORDER — IPRATROPIUM BROMIDE AND ALBUTEROL SULFATE 2.5; .5 MG/3ML; MG/3ML
3 SOLUTION RESPIRATORY (INHALATION)
Status: DISCONTINUED | OUTPATIENT
Start: 2018-03-01 | End: 2018-03-03

## 2018-02-28 RX ORDER — SODIUM CHLORIDE 9 MG/ML
75 INJECTION, SOLUTION INTRAVENOUS CONTINUOUS
Status: DISCONTINUED | OUTPATIENT
Start: 2018-02-28 | End: 2018-03-04

## 2018-02-28 RX ORDER — LEVALBUTEROL INHALATION SOLUTION 1.25 MG/3ML
1.25 SOLUTION RESPIRATORY (INHALATION)
Status: DISCONTINUED | OUTPATIENT
Start: 2018-02-28 | End: 2018-02-28 | Stop reason: SDUPTHER

## 2018-02-28 RX ORDER — FAMOTIDINE 20 MG/1
20 TABLET, FILM COATED ORAL 2 TIMES DAILY
Status: DISCONTINUED | OUTPATIENT
Start: 2018-02-28 | End: 2018-03-05 | Stop reason: HOSPADM

## 2018-02-28 RX ORDER — SODIUM CHLORIDE 0.9 % (FLUSH) 0.9 %
5-10 SYRINGE (ML) INJECTION AS NEEDED
Status: DISCONTINUED | OUTPATIENT
Start: 2018-02-28 | End: 2018-03-05 | Stop reason: HOSPADM

## 2018-02-28 RX ORDER — ADHESIVE BANDAGE
30 BANDAGE TOPICAL DAILY PRN
Status: DISCONTINUED | OUTPATIENT
Start: 2018-02-28 | End: 2018-03-05 | Stop reason: HOSPADM

## 2018-02-28 RX ORDER — IPRATROPIUM BROMIDE 0.5 MG/2.5ML
0.5 SOLUTION RESPIRATORY (INHALATION)
Status: DISCONTINUED | OUTPATIENT
Start: 2018-02-28 | End: 2018-02-28

## 2018-02-28 RX ORDER — SODIUM CHLORIDE 0.9 % (FLUSH) 0.9 %
5-10 SYRINGE (ML) INJECTION EVERY 8 HOURS
Status: DISCONTINUED | OUTPATIENT
Start: 2018-02-28 | End: 2018-03-05 | Stop reason: HOSPADM

## 2018-02-28 RX ORDER — ALBUTEROL SULFATE 0.83 MG/ML
1.25 SOLUTION RESPIRATORY (INHALATION)
Status: DISCONTINUED | OUTPATIENT
Start: 2018-02-28 | End: 2018-02-28

## 2018-02-28 RX ORDER — ENOXAPARIN SODIUM 100 MG/ML
40 INJECTION SUBCUTANEOUS EVERY 24 HOURS
Status: DISCONTINUED | OUTPATIENT
Start: 2018-02-28 | End: 2018-03-05 | Stop reason: HOSPADM

## 2018-02-28 RX ORDER — ACETAMINOPHEN 325 MG/1
650 TABLET ORAL
Status: DISCONTINUED | OUTPATIENT
Start: 2018-02-28 | End: 2018-03-05 | Stop reason: HOSPADM

## 2018-02-28 RX ORDER — ONDANSETRON 2 MG/ML
4 INJECTION INTRAMUSCULAR; INTRAVENOUS
Status: DISCONTINUED | OUTPATIENT
Start: 2018-02-28 | End: 2018-03-05 | Stop reason: HOSPADM

## 2018-02-28 RX ORDER — BUDESONIDE 0.5 MG/2ML
500 INHALANT ORAL
Status: DISCONTINUED | OUTPATIENT
Start: 2018-02-28 | End: 2018-03-05 | Stop reason: HOSPADM

## 2018-02-28 RX ADMIN — BUDESONIDE 500 MCG: 0.5 INHALANT RESPIRATORY (INHALATION) at 07:52

## 2018-02-28 RX ADMIN — IPRATROPIUM BROMIDE 0.5 MG: 0.5 SOLUTION RESPIRATORY (INHALATION) at 12:39

## 2018-02-28 RX ADMIN — ONDANSETRON 4 MG: 2 INJECTION INTRAMUSCULAR; INTRAVENOUS at 09:26

## 2018-02-28 RX ADMIN — ENOXAPARIN SODIUM 40 MG: 40 INJECTION SUBCUTANEOUS at 09:01

## 2018-02-28 RX ADMIN — ALBUTEROL SULFATE 2.5 MG: 2.5 SOLUTION RESPIRATORY (INHALATION) at 16:25

## 2018-02-28 RX ADMIN — FAMOTIDINE 20 MG: 20 TABLET, FILM COATED ORAL at 17:38

## 2018-02-28 RX ADMIN — IPRATROPIUM BROMIDE AND ALBUTEROL SULFATE 3 ML: 2.5; .5 SOLUTION RESPIRATORY (INHALATION) at 23:24

## 2018-02-28 RX ADMIN — FAMOTIDINE 20 MG: 20 TABLET, FILM COATED ORAL at 09:01

## 2018-02-28 RX ADMIN — IPRATROPIUM BROMIDE 0.5 MG: 0.5 SOLUTION RESPIRATORY (INHALATION) at 16:24

## 2018-02-28 RX ADMIN — AZITHROMYCIN DIHYDRATE 500 MG: 500 INJECTION, POWDER, LYOPHILIZED, FOR SOLUTION INTRAVENOUS at 09:19

## 2018-02-28 RX ADMIN — HYDROCODONE BITARTRATE AND HOMATROPINE METHYLBROMIDE 5 ML: 5; 1.5 SOLUTION ORAL at 18:43

## 2018-02-28 RX ADMIN — SODIUM CHLORIDE 75 ML/HR: 900 INJECTION, SOLUTION INTRAVENOUS at 23:36

## 2018-02-28 RX ADMIN — METHYLPREDNISOLONE SODIUM SUCCINATE 60 MG: 125 INJECTION, POWDER, FOR SOLUTION INTRAMUSCULAR; INTRAVENOUS at 15:15

## 2018-02-28 RX ADMIN — METHYLPREDNISOLONE SODIUM SUCCINATE 60 MG: 125 INJECTION, POWDER, FOR SOLUTION INTRAMUSCULAR; INTRAVENOUS at 09:01

## 2018-02-28 RX ADMIN — IPRATROPIUM BROMIDE 0.5 MG: 0.5 SOLUTION RESPIRATORY (INHALATION) at 19:26

## 2018-02-28 RX ADMIN — BUDESONIDE 500 MCG: 0.5 INHALANT RESPIRATORY (INHALATION) at 19:26

## 2018-02-28 RX ADMIN — IPRATROPIUM BROMIDE AND ALBUTEROL SULFATE 3 ML: 2.5; .5 SOLUTION RESPIRATORY (INHALATION) at 04:13

## 2018-02-28 RX ADMIN — SODIUM CHLORIDE 75 ML/HR: 900 INJECTION, SOLUTION INTRAVENOUS at 05:06

## 2018-02-28 RX ADMIN — METHYLPREDNISOLONE SODIUM SUCCINATE 60 MG: 125 INJECTION, POWDER, FOR SOLUTION INTRAMUSCULAR; INTRAVENOUS at 21:48

## 2018-02-28 RX ADMIN — Medication 10 ML: at 21:48

## 2018-02-28 RX ADMIN — IPRATROPIUM BROMIDE AND ALBUTEROL SULFATE 3 ML: 2.5; .5 SOLUTION RESPIRATORY (INHALATION) at 07:52

## 2018-02-28 RX ADMIN — ALBUTEROL SULFATE 2.5 MG: 2.5 SOLUTION RESPIRATORY (INHALATION) at 12:39

## 2018-02-28 RX ADMIN — ALBUTEROL SULFATE 2.5 MG: 2.5 SOLUTION RESPIRATORY (INHALATION) at 19:26

## 2018-02-28 NOTE — PROGRESS NOTES
TRANSFER - IN REPORT:    Verbal report received from Ankit Velazquez (name) on [facility]  being [facility] for routine progression of care      Report consisted of patients Situation, Background, Assessment and   Recommendations(SBAR). Information from the following report(s) SBAR, Kardex, ED Summary and MAR was reviewed with the receiving nurse. Opportunity for questions and clarification was provided. Assessment completed upon patients arrival to unit and care assumed.

## 2018-02-28 NOTE — ED NOTES
One side rail up. Bed in low position, bed brakes on, call light within reach. Pt resting in bed, resp. easy, VSS, belongings in reach.

## 2018-02-28 NOTE — ED NOTES
Consulted Dr Claudia Guardado about sinus tachycardia. Pt has had multiple albuterol treatments and Dr Claudia Guardado would like to monitor patient at this time.

## 2018-02-28 NOTE — PROGRESS NOTES
Admission Assessment. Pt. A/O X4. Respirations even and slightly labored. Lungs coarse, expiratory wheezing noted. Abd. Soft and non tender. Pt. Dyspneic with exertion. Dual skin assessment with luis merino rn. Skin intact. Pt. Has no complaints at this time. Call light within reach. Oriented to room and call light.

## 2018-02-28 NOTE — H&P
HOSPITALIST INITIAL HISTORY AND PHYSICAL    NAME:  Heather Topete   Age:  48 y.o.  :   1965   MRN:   [id number]  PCP: None  Consulting MD:  Treatment Team: Attending Provider: Ronn Kayser, MD; Primary Nurse: Sulma Melo    CHIEF COMPLAINT: wheezing, coughing    HISTORY OF PRESENT ILLNESS:   Heather Topete is a 48y.o. year-old female with a past medical history of asthma on advair/albuterol nebulizers at home who presents to the ER with complaint of about 3 days of worsening wheezing, cough, and phlegm productions. She reports having problems with shortness of breath on a routine basis, particurlarly worsened when exposed to certain chemicals used at there workplace. However, over the past 3 days she has begun having worsening head and chest congestion, cough, and wheeze that has not gotten better by using her nebulizer machine at home. Admits to subjective fever yesterday, denies chills, nausea, vomiting, constipation, diarrhea, abdominal pain. Admits to rib pain when coughing. REVIEW OF SYSTEMS: Comprehensive ROS performed and negative except as stated in HPI. Past Medical History:   Diagnosis Date    Ill-defined condition     pancreatitis        Past Surgical History:   Procedure Laterality Date    HX ORTHOPAEDIC         Prior to Admission Medications   Prescriptions Last Dose Informant Patient Reported? Taking?   fluticasone-salmeterol (ADVAIR DISKUS) 250-50 mcg/dose diskus inhaler   Yes Yes   Sig: Take 1 Puff by inhalation every twelve (12) hours. Facility-Administered Medications: None       No Known Allergies    FAMILY HISTORY: Reviewed. Negative except No family history on file.     Social History   Substance Use Topics    Smoking status: Former Smoker    Smokeless tobacco: Not on file    Alcohol use No         Objective:     Visit Vitals    /62    Pulse (!) 119    Temp 99 °F (37.2 °C)    Resp 25    Ht 5' (1.524 m)    Wt 64.4 kg (142 lb)    SpO2 95%    BMI 27.73 kg/m2 Temp (24hrs), Av °F (37.2 °C), Min:99 °F (37.2 °C), Max:99 °F (37.2 °C)    Oxygen Therapy  O2 Sat (%): 95 % (18)  Pulse via Oximetry: 110 beats per minute (18)  O2 Device: Nasal cannula (18)  O2 Flow Rate (L/min): 2 l/min (18)  Physical Exam:  General:    The patient is a pleasant middle aged female appearing older than stated age in no acute distress. Head:   Normocephalic/atraumatic. Eyes:  No palpebral pallor or scleral icterus. ENT:  External auricular and nasal exam within normal limits. Mucous membranes are moist.  Neck:  Supple, non-tender, no JVD. Lungs:   Bilateral end-expiratory wheezes present throughout all lung fields, no crackles. No respiratory distress or accessory muscle use. Heart:   Regular rate and rhythm, without murmurs, rubs, or gallops. Abdomen:   Soft, non-tender, non-distended with normoactive bowel sounds. Genitourinary: No tenderness over the bladder or bilateral CVAs. Extremities: Without clubbing, cyanosis, or edema. Skin:     Normal color, texture, and turgor. No rashes, lesions, or jaundice. Pulses: Radial and dorsalis pedis pulses present 2+ bilaterally. Capillary refill <2s. Neurologic: CN II-XII grossly intact and symmetrical.     Moving all four extremities well with no focal deficits. Psychiatric: Pleasant demeanor, appropriate affect.  Alert and oriented x 3    Data Review:   Recent Results (from the past 24 hour(s))   EKG, 12 LEAD, INITIAL    Collection Time: 18  6:48 PM   Result Value Ref Range    Ventricular Rate 89 BPM    Atrial Rate 89 BPM    P-R Interval 140 ms    QRS Duration 70 ms    Q-T Interval 362 ms    QTC Calculation (Bezet) 440 ms    Calculated P Axis 76 degrees    Calculated R Axis 73 degrees    Calculated T Axis 78 degrees    Diagnosis       Normal sinus rhythm with sinus arrhythmia  Normal ECG  No previous ECGs available     CBC WITH AUTOMATED DIFF    Collection Time: 18 6:49 PM   Result Value Ref Range    WBC 7.8 4.3 - 11.1 K/uL    RBC 4.80 4.05 - 5.25 M/uL    HGB 12.7 11.7 - 15.4 g/dL    HCT 38.7 35.8 - 46.3 %    MCV 80.6 79.6 - 97.8 FL    MCH 26.5 26.1 - 32.9 PG    MCHC 32.8 31.4 - 35.0 g/dL    RDW 13.9 11.9 - 14.6 %    PLATELET 315 187 - 663 K/uL    MPV 10.1 (L) 10.8 - 14.1 FL    DF AUTOMATED      NEUTROPHILS 61 43 - 78 %    LYMPHOCYTES 26 13 - 44 %    MONOCYTES 7 4.0 - 12.0 %    EOSINOPHILS 6 0.5 - 7.8 %    BASOPHILS 0 0.0 - 2.0 %    IMMATURE GRANULOCYTES 0 0.0 - 5.0 %    ABS. NEUTROPHILS 4.8 1.7 - 8.2 K/UL    ABS. LYMPHOCYTES 2.0 0.5 - 4.6 K/UL    ABS. MONOCYTES 0.5 0.1 - 1.3 K/UL    ABS. EOSINOPHILS 0.5 0.0 - 0.8 K/UL    ABS. BASOPHILS 0.0 0.0 - 0.2 K/UL    ABS. IMM. GRANS. 0.0 0.0 - 0.5 K/UL   METABOLIC PANEL, COMPREHENSIVE    Collection Time: 02/27/18  6:49 PM   Result Value Ref Range    Sodium 139 136 - 145 mmol/L    Potassium 3.6 3.5 - 5.1 mmol/L    Chloride 102 98 - 107 mmol/L    CO2 24 21 - 32 mmol/L    Anion gap 13 7 - 16 mmol/L    Glucose 109 (H) 65 - 100 mg/dL    BUN 7 6 - 23 MG/DL    Creatinine 0.87 0.6 - 1.0 MG/DL    GFR est AA >60 >60 ml/min/1.73m2    GFR est non-AA >60 >60 ml/min/1.73m2    Calcium 9.5 8.3 - 10.4 MG/DL    Bilirubin, total 0.6 0.2 - 1.1 MG/DL    ALT (SGPT) 21 12 - 65 U/L    AST (SGOT) 26 15 - 37 U/L    Alk.  phosphatase 125 50 - 136 U/L    Protein, total 8.2 6.3 - 8.2 g/dL    Albumin 3.6 3.5 - 5.0 g/dL    Globulin 4.6 (H) 2.3 - 3.5 g/dL    A-G Ratio 0.8 (L) 1.2 - 3.5     LIPASE    Collection Time: 02/27/18  6:49 PM   Result Value Ref Range    Lipase 173 73 - 393 U/L   POC TROPONIN-I    Collection Time: 02/27/18  6:56 PM   Result Value Ref Range    Troponin-I (POC) 0 (L) 0.02 - 0.05 ng/ml   INFLUENZA A & B AG (RAPID TEST)    Collection Time: 02/27/18 10:18 PM   Result Value Ref Range    Influenza A Ag NEGATIVE  NEG      Influenza B Ag NEGATIVE  NEG         Imaging Lavone Splinter /Studies:  Xr Chest Port    Result Date: 2/27/2018  Impression:  No significant interval change. Assessment and Plan: Active Problems:    Asthma exacerbation (2/27/2018)    May also be component of COPD given history of cigarette and marijuana smoking and workplace exposure. Duonebs, solumedrol. Consider pulmonary consult. No hypoxia currently. Marijuana use, continuous (2/28/2018)    Before every meal. Counseled on negative effects of smoking on respiratory health. Occupational exposure to air contaminants (2/28/2018)    Per above. Cough (2/28/2018)    Per above. DVT Prophylaxis: Lovenox. Code Status: FULL CODE      Disposition: Observe on medical for evaluation and treatment as per above.       Anticipated discharge: < 2 midnights     Signed By: Greg Cho MD     February 28, 2018

## 2018-02-28 NOTE — PROGRESS NOTES
Hospitalist Progress Note     Admit Date:  2018  6:39 PM   Name:  Tejas Chiu   Age:  48 y.o.  :  1965   MRN:  [id number]   PCP:  None  Treatment Team: Primary Nurse: Campos Cornejo RN    Subjective: Tejas Chiu is a 48y.o. year-old female with a past medical history of asthma on advair/albuterol nebulizers at home who presents to the ER with complaint of about 3 days of worsening wheezing, cough, and phlegm productions. She reports having problems with shortness of breath on a routine basis, particurlarly worsened when exposed to certain chemicals used at there workplace. However, over the past 3 days she has begun having worsening head and chest congestion, cough, and wheeze that has not gotten better by using her nebulizer machine at home. Admits to subjective fever yesterday, denies chills, nausea, vomiting, constipation, diarrhea, abdominal pain. Admits to rib pain when coughing.     18  boyfriend at bedside  Says sob mildly better- but still sob  cough      Objective:   Patient Vitals for the past 24 hrs:   Temp Pulse Resp BP SpO2   18 0754 - - - - 99 %   18 0730 - 99 - 129/70 99 %   18 0600 - (!) 111 27 104/67 100 %   18 0500 - (!) 119 26 104/58 100 %   18 0414 - - - - 96 %   18 0400 - (!) 104 25 118/64 96 %   18 0330 - (!) 112 (!) 31 111/58 94 %   18 0230 - (!) 123 23 109/59 100 %   18 0130 - (!) 124 26 103/57 95 %   18 0030 - (!) 127 25 97/60 96 %   18 2356 - - - - 95 %   18 2330 - (!) 116 25 106/59 93 %   18 2230 - (!) 118 22 111/59 93 %   18 2130 - (!) 129 25 108/59 (!) 83 %   18 2100 - (!) 119 25 118/62 100 %   18 - - - 118/77 99 %   18 - (!) 105 18 - 99 %   18 - - - - 100 %   18 - - - - 100 %   18 1834 99 °F (37.2 °C) (!) 102 (!) 42 138/83 93 %     Oxygen Therapy  O2 Sat (%): 99 % (18 0754)  Pulse via Oximetry: 102 beats per minute (02/28/18 0730)  O2 Device: Nasal cannula (02/28/18 0754)  O2 Flow Rate (L/min): 2 l/min (02/28/18 0750)  No intake or output data in the 24 hours ending 02/28/18 0957      General:    Well nourished. Alert. Oriented x3,mild resp distress  HEENT- normal  CV:   Tachycardia  No murmur, rub, or gallop. Lungs:   Bilateral wheezing,corse breath sounds  Abdomen:   Soft, nontender, nondistended. Cns- no focal neurological deficits  Extremities: Warm and dry. No cyanosis or edema. Skin:     No rashes or jaundice. Data Review:  I have reviewed all labs, meds, telemetry events, and studies from the last 24 hours. Recent Results (from the past 24 hour(s))   EKG, 12 LEAD, INITIAL    Collection Time: 02/27/18  6:48 PM   Result Value Ref Range    Ventricular Rate 89 BPM    Atrial Rate 89 BPM    P-R Interval 140 ms    QRS Duration 70 ms    Q-T Interval 362 ms    QTC Calculation (Bezet) 440 ms    Calculated P Axis 76 degrees    Calculated R Axis 73 degrees    Calculated T Axis 78 degrees    Diagnosis       Normal sinus rhythm with sinus arrhythmia  Normal ECG  No previous ECGs available  Confirmed by ROSALVA JASSO (), TEODORO PINEDA ([id number]) on 2/28/2018 6:40:12 AM     CBC WITH AUTOMATED DIFF    Collection Time: 02/27/18  6:49 PM   Result Value Ref Range    WBC 7.8 4.3 - 11.1 K/uL    RBC 4.80 4.05 - 5.25 M/uL    HGB 12.7 11.7 - 15.4 g/dL    HCT 38.7 35.8 - 46.3 %    MCV 80.6 79.6 - 97.8 FL    MCH 26.5 26.1 - 32.9 PG    MCHC 32.8 31.4 - 35.0 g/dL    RDW 13.9 11.9 - 14.6 %    PLATELET 079 589 - 962 K/uL    MPV 10.1 (L) 10.8 - 14.1 FL    DF AUTOMATED      NEUTROPHILS 61 43 - 78 %    LYMPHOCYTES 26 13 - 44 %    MONOCYTES 7 4.0 - 12.0 %    EOSINOPHILS 6 0.5 - 7.8 %    BASOPHILS 0 0.0 - 2.0 %    IMMATURE GRANULOCYTES 0 0.0 - 5.0 %    ABS. NEUTROPHILS 4.8 1.7 - 8.2 K/UL    ABS. LYMPHOCYTES 2.0 0.5 - 4.6 K/UL    ABS. MONOCYTES 0.5 0.1 - 1.3 K/UL    ABS. EOSINOPHILS 0.5 0.0 - 0.8 K/UL    ABS. BASOPHILS 0.0 0.0 - 0.2 K/UL    ABS. IMM. GRANS. 0.0 0.0 - 0.5 K/UL   METABOLIC PANEL, COMPREHENSIVE    Collection Time: 02/27/18  6:49 PM   Result Value Ref Range    Sodium 139 136 - 145 mmol/L    Potassium 3.6 3.5 - 5.1 mmol/L    Chloride 102 98 - 107 mmol/L    CO2 24 21 - 32 mmol/L    Anion gap 13 7 - 16 mmol/L    Glucose 109 (H) 65 - 100 mg/dL    BUN 7 6 - 23 MG/DL    Creatinine 0.87 0.6 - 1.0 MG/DL    GFR est AA >60 >60 ml/min/1.73m2    GFR est non-AA >60 >60 ml/min/1.73m2    Calcium 9.5 8.3 - 10.4 MG/DL    Bilirubin, total 0.6 0.2 - 1.1 MG/DL    ALT (SGPT) 21 12 - 65 U/L    AST (SGOT) 26 15 - 37 U/L    Alk.  phosphatase 125 50 - 136 U/L    Protein, total 8.2 6.3 - 8.2 g/dL    Albumin 3.6 3.5 - 5.0 g/dL    Globulin 4.6 (H) 2.3 - 3.5 g/dL    A-G Ratio 0.8 (L) 1.2 - 3.5     LIPASE    Collection Time: 02/27/18  6:49 PM   Result Value Ref Range    Lipase 173 73 - 393 U/L   POC TROPONIN-I    Collection Time: 02/27/18  6:56 PM   Result Value Ref Range    Troponin-I (POC) 0 (L) 0.02 - 0.05 ng/ml   INFLUENZA A & B AG (RAPID TEST)    Collection Time: 02/27/18 10:18 PM   Result Value Ref Range    Influenza A Ag NEGATIVE  NEG      Influenza B Ag NEGATIVE  NEG     METABOLIC PANEL, BASIC    Collection Time: 02/28/18  5:07 AM   Result Value Ref Range    Sodium 140 136 - 145 mmol/L    Potassium 3.7 3.5 - 5.1 mmol/L    Chloride 104 98 - 107 mmol/L    CO2 19 (L) 21 - 32 mmol/L    Anion gap 17 (H) 7 - 16 mmol/L    Glucose 243 (H) 65 - 100 mg/dL    BUN 8 6 - 23 MG/DL    Creatinine 0.90 0.6 - 1.0 MG/DL    GFR est AA >60 >60 ml/min/1.73m2    GFR est non-AA >60 >60 ml/min/1.73m2    Calcium 9.0 8.3 - 10.4 MG/DL   CBC WITH AUTOMATED DIFF    Collection Time: 02/28/18  5:07 AM   Result Value Ref Range    WBC 7.0 4.3 - 11.1 K/uL    RBC 4.29 4.05 - 5.25 M/uL    HGB 11.7 11.7 - 15.4 g/dL    HCT 34.6 (L) 35.8 - 46.3 %    MCV 80.7 79.6 - 97.8 FL    MCH 27.3 26.1 - 32.9 PG    MCHC 33.8 31.4 - 35.0 g/dL    RDW 14.2 11.9 - 14.6 %    PLATELET 264 028 - 624 K/uL    MPV 10.9 10.8 - 14.1 FL DF AUTOMATED      NEUTROPHILS 89 (H) 43 - 78 %    LYMPHOCYTES 10 (L) 13 - 44 %    MONOCYTES 1 (L) 4.0 - 12.0 %    EOSINOPHILS 0 (L) 0.5 - 7.8 %    BASOPHILS 0 0.0 - 2.0 %    IMMATURE GRANULOCYTES 0 0.0 - 5.0 %    ABS. NEUTROPHILS 6.2 1.7 - 8.2 K/UL    ABS. LYMPHOCYTES 0.7 0.5 - 4.6 K/UL    ABS. MONOCYTES 0.1 0.1 - 1.3 K/UL    ABS. EOSINOPHILS 0.0 0.0 - 0.8 K/UL    ABS. BASOPHILS 0.0 0.0 - 0.2 K/UL    ABS. IMM. GRANS. 0.0 0.0 - 0.5 K/UL        All Micro Results     Procedure Component Value Units Date/Time    INFLUENZA A & B AG (RAPID TEST) [[id number]] Collected:  02/27/18 2218    Order Status:  Completed Specimen:  Nasopharyngeal from Nasal washing Updated:  02/27/18 2248     Influenza A Ag NEGATIVE          NEGATIVE FOR THE PRESENCE OF INFLUENZA A ANTIGEN  INFECTION DUE TO INFLUENZA A CANNOT BE RULED OUT. BECAUSE THE ANTIGEN PRESENT IN THE SAMPLE MAY BE BELOW  THE DETECTION LIMIT OF THE TEST. A NEGATIVE TEST IS PRESUMPTIVE AND IT IS RECOMMENDED THAT THESE RESULTS BE CONFIRMED BY VIRAL CULTURE OR AN FDA-CLEARED INFLUENZA A AND B MOLECULAR ASSAY. Influenza B Ag NEGATIVE          NEGATIVE FOR THE PRESENCE OF INFLUENZA B ANTIGEN  INFECTION DUE TO INFLUENZA B CANNOT BE RULED OUT. BECAUSE THE ANTIGEN PRESENT IN THE SAMPLE MAY BE BELOW  THE DETECTION LIMIT OF THE TEST. A NEGATIVE TEST IS PRESUMPTIVE AND IT IS RECOMMENDED THAT THESE RESULTS BE CONFIRMED BY VIRAL CULTURE OR AN FDA-CLEARED INFLUENZA A AND B MOLECULAR ASSAY.                Current Meds:  Current Facility-Administered Medications   Medication Dose Route Frequency    sodium chloride (NS) flush 5-10 mL  5-10 mL IntraVENous Q8H    sodium chloride (NS) flush 5-10 mL  5-10 mL IntraVENous PRN    acetaminophen (TYLENOL) tablet 650 mg  650 mg Oral Q4H PRN    ondansetron (ZOFRAN) injection 4 mg  4 mg IntraVENous Q4H PRN    magnesium hydroxide (MILK OF MAGNESIA) 400 mg/5 mL oral suspension 30 mL  30 mL Oral DAILY PRN    0.9% sodium chloride infusion 75 mL/hr IntraVENous CONTINUOUS    enoxaparin (LOVENOX) injection 40 mg  40 mg SubCUTAneous Q24H    budesonide (PULMICORT) 500 mcg/2 ml nebulizer suspension  500 mcg Nebulization BID RT    azithromycin (ZITHROMAX) 500 mg in  mL  500 mg IntraVENous Q24H    famotidine (PEPCID) tablet 20 mg  20 mg Oral BID    ipratropium (ATROVENT) 0.02 % nebulizer solution 0.5 mg  0.5 mg Nebulization Q4H RT    methylPREDNISolone (PF) (SOLU-MEDROL) injection 60 mg  60 mg IntraVENous Q8H    albuterol (PROVENTIL VENTOLIN) nebulizer solution 1.25 mg  1.25 mg Nebulization Q4H RT    calcium carbonate (TUMS) chewable tablet 400 mg [elemental]  400 mg Oral TID PRN    promethazine (PHENERGAN) with saline injection 12.5 mg  12.5 mg IntraVENous Q4H PRN    guaiFENesin ER (MUCINEX) tablet 600 mg  600 mg Oral Q12H PRN    HYDROcodone-homatropine (HYCODAN) 5-1.5 mg/5 mL (5 mL) syrup 5 mL  5 mL Oral Q4H PRN     Current Outpatient Prescriptions   Medication Sig    fluticasone-salmeterol (ADVAIR DISKUS) 250-50 mcg/dose diskus inhaler Take 1 Puff by inhalation every twelve (12) hours. Other Studies (last 24 hours):  Xr Chest Port    Result Date: 2/27/2018  AP chest radiograph History: Acute shortness of breath, 53 years Female Comparison: Chest radiograph March 07, 2016 Findings:   Normal cardiomediastinal silhouette. Mild subsegmental atelectasis bilateral lung bases. No evidence of pneumothorax, pleural effusion, or air space consolidation. Visualized soft tissue and osseous structures otherwise unremarkable. Impression:  No significant interval change.        Assessment and Plan:     Hospital Problems as of 2/28/2018  Never Reviewed          Codes Class Noted - Resolved POA    Marijuana use, continuous ICD-10-CM: F12.90  ICD-9-CM: 305.21  2/28/2018 - Present Yes        Occupational exposure to air contaminants ICD-10-CM: Z57.39  ICD-9-CM: V15.89  2/28/2018 - Present Yes        Cough ICD-10-CM: R05  ICD-9-CM: 786.2 2/28/2018 - Present Yes        Acute bronchitis ICD-10-CM: J20.9  ICD-9-CM: 466.0  2/28/2018 - Present Yes        Asthma exacerbation ICD-10-CM: J45.901  ICD-9-CM: 493.92  2/27/2018 - Present Yes              PLAN:    Acute copd exa-cont nebs,increase frequency of steroids to q 8hr  Acute bronchitis-zithromax  Tachycardia- prob secondary to nebs- changed albuterol to xopenex  Marijuana use- advised on cessation.     DC planning/Dispo:    DVT ppx:  lovenox    Signed:  Olamide Hilton MD

## 2018-02-28 NOTE — ED NOTES
TRANSFER - OUT REPORT:    Verbal report given to Tina(name) on [facility]  being transferred to [facility](unit) for routine progression of care       Report consisted of patients Situation, Background, Assessment and   Recommendations(SBAR). Information from the following report(s) Kardex and ED Summary was reviewed with the receiving nurse. Lines:   Peripheral IV 02/27/18 Right Antecubital (Active)   Site Assessment Clean, dry, & intact 2/27/2018  6:45 PM   Phlebitis Assessment 0 2/27/2018  6:45 PM   Infiltration Assessment 0 2/27/2018  6:45 PM        Opportunity for questions and clarification was provided.       Patient transported with:   O2 @ 2 liters

## 2018-02-28 NOTE — ED NOTES
Pt states she started with productive cough clear sputum and wheezing yesterday. Pt states she believes she had a fever yesterday but unsure how high. Pt denies chest pain, dizziness, syncope, abdominal pain, headache, n/v/d, constipation, urinary complaints, congestion. nonlabored breathing. Wheezing upon inspiration. nontender abdomen. +bowel sounds.

## 2018-03-01 LAB
ANION GAP SERPL CALC-SCNC: 12 MMOL/L (ref 7–16)
BASOPHILS # BLD: 0 K/UL (ref 0–0.2)
BASOPHILS NFR BLD: 0 % (ref 0–2)
BUN SERPL-MCNC: 10 MG/DL (ref 6–23)
CALCIUM SERPL-MCNC: 9.5 MG/DL (ref 8.3–10.4)
CHLORIDE SERPL-SCNC: 106 MMOL/L (ref 98–107)
CO2 SERPL-SCNC: 23 MMOL/L (ref 21–32)
CREAT SERPL-MCNC: 0.86 MG/DL (ref 0.6–1)
DIFFERENTIAL METHOD BLD: ABNORMAL
EOSINOPHIL # BLD: 0 K/UL (ref 0–0.8)
EOSINOPHIL NFR BLD: 0 % (ref 0.5–7.8)
ERYTHROCYTE [DISTWIDTH] IN BLOOD BY AUTOMATED COUNT: 14.5 % (ref 11.9–14.6)
GLUCOSE SERPL-MCNC: 138 MG/DL (ref 65–100)
HCT VFR BLD AUTO: 35 % (ref 35.8–46.3)
HGB BLD-MCNC: 11.5 G/DL (ref 11.7–15.4)
IMM GRANULOCYTES # BLD: 0.1 K/UL (ref 0–0.5)
IMM GRANULOCYTES NFR BLD AUTO: 0 % (ref 0–5)
LYMPHOCYTES # BLD: 1.5 K/UL (ref 0.5–4.6)
LYMPHOCYTES NFR BLD: 8 % (ref 13–44)
MCH RBC QN AUTO: 26.4 PG (ref 26.1–32.9)
MCHC RBC AUTO-ENTMCNC: 32.9 G/DL (ref 31.4–35)
MCV RBC AUTO: 80.3 FL (ref 79.6–97.8)
MONOCYTES # BLD: 0.4 K/UL (ref 0.1–1.3)
MONOCYTES NFR BLD: 2 % (ref 4–12)
NEUTS SEG # BLD: 17.8 K/UL (ref 1.7–8.2)
NEUTS SEG NFR BLD: 90 % (ref 43–78)
PLATELET # BLD AUTO: 288 K/UL (ref 150–450)
PMV BLD AUTO: 10.4 FL (ref 10.8–14.1)
POTASSIUM SERPL-SCNC: 4.2 MMOL/L (ref 3.5–5.1)
RBC # BLD AUTO: 4.36 M/UL (ref 4.05–5.25)
SODIUM SERPL-SCNC: 141 MMOL/L (ref 136–145)
WBC # BLD AUTO: 19.8 K/UL (ref 4.3–11.1)

## 2018-03-01 PROCEDURE — 74011250637 HC RX REV CODE- 250/637: Performed by: FAMILY MEDICINE

## 2018-03-01 PROCEDURE — 74011000250 HC RX REV CODE- 250: Performed by: FAMILY MEDICINE

## 2018-03-01 PROCEDURE — 36415 COLL VENOUS BLD VENIPUNCTURE: CPT | Performed by: FAMILY MEDICINE

## 2018-03-01 PROCEDURE — 94640 AIRWAY INHALATION TREATMENT: CPT

## 2018-03-01 PROCEDURE — 94760 N-INVAS EAR/PLS OXIMETRY 1: CPT

## 2018-03-01 PROCEDURE — 80048 BASIC METABOLIC PNL TOTAL CA: CPT | Performed by: FAMILY MEDICINE

## 2018-03-01 PROCEDURE — 99218 HC RM OBSERVATION: CPT

## 2018-03-01 PROCEDURE — 85025 COMPLETE CBC W/AUTO DIFF WBC: CPT | Performed by: FAMILY MEDICINE

## 2018-03-01 PROCEDURE — 77010033678 HC OXYGEN DAILY

## 2018-03-01 PROCEDURE — 74011250637 HC RX REV CODE- 250/637: Performed by: HOSPITALIST

## 2018-03-01 PROCEDURE — 74011250636 HC RX REV CODE- 250/636: Performed by: FAMILY MEDICINE

## 2018-03-01 RX ORDER — BISACODYL 5 MG
10 TABLET, DELAYED RELEASE (ENTERIC COATED) ORAL
Status: COMPLETED | OUTPATIENT
Start: 2018-03-01 | End: 2018-03-01

## 2018-03-01 RX ORDER — DOCUSATE SODIUM 100 MG/1
100 CAPSULE, LIQUID FILLED ORAL DAILY
Status: DISCONTINUED | OUTPATIENT
Start: 2018-03-02 | End: 2018-03-05 | Stop reason: HOSPADM

## 2018-03-01 RX ADMIN — Medication 10 ML: at 06:18

## 2018-03-01 RX ADMIN — ENOXAPARIN SODIUM 40 MG: 40 INJECTION SUBCUTANEOUS at 09:28

## 2018-03-01 RX ADMIN — BISACODYL 10 MG: 5 TABLET, COATED ORAL at 22:06

## 2018-03-01 RX ADMIN — METHYLPREDNISOLONE SODIUM SUCCINATE: 125 INJECTION, POWDER, FOR SOLUTION INTRAMUSCULAR; INTRAVENOUS at 17:38

## 2018-03-01 RX ADMIN — BUDESONIDE 500 MCG: 0.5 INHALANT RESPIRATORY (INHALATION) at 07:38

## 2018-03-01 RX ADMIN — METHYLPREDNISOLONE SODIUM SUCCINATE 60 MG: 125 INJECTION, POWDER, FOR SOLUTION INTRAMUSCULAR; INTRAVENOUS at 22:06

## 2018-03-01 RX ADMIN — HYDROCODONE BITARTRATE AND HOMATROPINE METHYLBROMIDE 5 ML: 5; 1.5 SOLUTION ORAL at 06:12

## 2018-03-01 RX ADMIN — IPRATROPIUM BROMIDE AND ALBUTEROL SULFATE 3 ML: 2.5; .5 SOLUTION RESPIRATORY (INHALATION) at 15:40

## 2018-03-01 RX ADMIN — FAMOTIDINE 20 MG: 20 TABLET, FILM COATED ORAL at 17:39

## 2018-03-01 RX ADMIN — Medication 5 ML: at 17:45

## 2018-03-01 RX ADMIN — IPRATROPIUM BROMIDE AND ALBUTEROL SULFATE 3 ML: 2.5; .5 SOLUTION RESPIRATORY (INHALATION) at 11:04

## 2018-03-01 RX ADMIN — HYDROCODONE BITARTRATE AND HOMATROPINE METHYLBROMIDE 5 ML: 5; 1.5 SOLUTION ORAL at 17:45

## 2018-03-01 RX ADMIN — BUDESONIDE 500 MCG: 0.5 INHALANT RESPIRATORY (INHALATION) at 19:54

## 2018-03-01 RX ADMIN — SODIUM CHLORIDE 75 ML/HR: 900 INJECTION, SOLUTION INTRAVENOUS at 12:33

## 2018-03-01 RX ADMIN — ACETAMINOPHEN 650 MG: 325 TABLET ORAL at 06:12

## 2018-03-01 RX ADMIN — IPRATROPIUM BROMIDE AND ALBUTEROL SULFATE 3 ML: 2.5; .5 SOLUTION RESPIRATORY (INHALATION) at 07:38

## 2018-03-01 RX ADMIN — SODIUM CHLORIDE 75 ML/HR: 900 INJECTION, SOLUTION INTRAVENOUS at 12:32

## 2018-03-01 RX ADMIN — IPRATROPIUM BROMIDE AND ALBUTEROL SULFATE 3 ML: 2.5; .5 SOLUTION RESPIRATORY (INHALATION) at 03:01

## 2018-03-01 RX ADMIN — FAMOTIDINE 20 MG: 20 TABLET, FILM COATED ORAL at 09:28

## 2018-03-01 RX ADMIN — AZITHROMYCIN MONOHYDRATE 500 MG: 500 INJECTION, POWDER, LYOPHILIZED, FOR SOLUTION INTRAVENOUS at 12:32

## 2018-03-01 RX ADMIN — METHYLPREDNISOLONE SODIUM SUCCINATE 60 MG: 125 INJECTION, POWDER, FOR SOLUTION INTRAMUSCULAR; INTRAVENOUS at 06:12

## 2018-03-01 RX ADMIN — IPRATROPIUM BROMIDE AND ALBUTEROL SULFATE 3 ML: 2.5; .5 SOLUTION RESPIRATORY (INHALATION) at 23:56

## 2018-03-01 RX ADMIN — IPRATROPIUM BROMIDE AND ALBUTEROL SULFATE 3 ML: 2.5; .5 SOLUTION RESPIRATORY (INHALATION) at 19:54

## 2018-03-01 NOTE — PROGRESS NOTES
Hospitalist Progress Note     Admit Date:  2018  6:39 PM   Name:  Alyson Elias   Age:  48 y.o.  :  1965   MRN:  [id number]   PCP:  None  Treatment Team: Attending Provider: Marcie Lambert MD; Utilization Review: Jose Luis Chavez RN    Subjective: Alyson Elias is a 48y.o. year-old female with a past medical history of asthma on advair/albuterol nebulizers at home who presents to the ER with complaint of about 3 days of worsening wheezing, cough, and phlegm productions. She reports having problems with shortness of breath on a routine basis, particurlarly worsened when exposed to certain chemicals used at there workplace. However, over the past 3 days she has begun having worsening head and chest congestion, cough, and wheeze that has not gotten better by using her nebulizer machine at home. Admits to subjective fever yesterday, denies chills, nausea, vomiting, constipation, diarrhea, abdominal pain. Admits to rib pain when coughing. 18  boyfriend at bedside  Says sob mildly better- but still sob  Cough    3/1/18  Pt does feel somewhat better, but now has persistent cough. Still SOB and requiring supplemental O2.       Objective:     Patient Vitals for the past 24 hrs:   Temp Pulse Resp BP SpO2   18 1541 - - - - 99 %   18 1445 98.1 °F (36.7 °C) (!) 108 20 114/72 96 %   18 1104 - - - - 98 %   18 1100 96.5 °F (35.8 °C) (!) 107 20 119/75 99 %   18 0739 - - - - 98 %   18 0305 96.6 °F (35.9 °C) (!) 116 20 121/71 96 %   18 0248 - - - - 95 %   18 2324 - - - - 96 %   18 2232 97.3 °F (36.3 °C) (!) 103 18 118/79 97 %   18 1927 - - - - 96 %   18 1839 97.9 °F (36.6 °C) (!) 116 17 123/68 97 %   18 1627 - - - - 96 %     Oxygen Therapy  O2 Sat (%): 99 % (18)  Pulse via Oximetry: 110 beats per minute (18)  O2 Device: Nasal cannula (18)  O2 Flow Rate (L/min): 1 l/min (18)    Intake/Output Summary (Last 24 hours) at 03/01/18 1623  Last data filed at 03/01/18 0305   Gross per 24 hour   Intake                0 ml   Output              500 ml   Net             -500 ml         General:    Well nourished. Alert. Oriented x3,mild resp distress  HEENT- normal  CV:   Tachycardia  No murmur, rub, or gallop. Lungs:   Bilateral wheezing,corse breath sounds  Abdomen:   Soft, nontender, nondistended. Cns- no focal neurological deficits  Extremities: Warm and dry. No cyanosis or edema. Skin:     No rashes or jaundice. Data Review:  I have reviewed all labs, meds, telemetry events, and studies from the last 24 hours. Recent Results (from the past 24 hour(s))   METABOLIC PANEL, BASIC    Collection Time: 03/01/18  6:54 AM   Result Value Ref Range    Sodium 141 136 - 145 mmol/L    Potassium 4.2 3.5 - 5.1 mmol/L    Chloride 106 98 - 107 mmol/L    CO2 23 21 - 32 mmol/L    Anion gap 12 7 - 16 mmol/L    Glucose 138 (H) 65 - 100 mg/dL    BUN 10 6 - 23 MG/DL    Creatinine 0.86 0.6 - 1.0 MG/DL    GFR est AA >60 >60 ml/min/1.73m2    GFR est non-AA >60 >60 ml/min/1.73m2    Calcium 9.5 8.3 - 10.4 MG/DL   CBC WITH AUTOMATED DIFF    Collection Time: 03/01/18  6:54 AM   Result Value Ref Range    WBC 19.8 (H) 4.3 - 11.1 K/uL    RBC 4.36 4.05 - 5.25 M/uL    HGB 11.5 (L) 11.7 - 15.4 g/dL    HCT 35.0 (L) 35.8 - 46.3 %    MCV 80.3 79.6 - 97.8 FL    MCH 26.4 26.1 - 32.9 PG    MCHC 32.9 31.4 - 35.0 g/dL    RDW 14.5 11.9 - 14.6 %    PLATELET 259 475 - 929 K/uL    MPV 10.4 (L) 10.8 - 14.1 FL    DF AUTOMATED      NEUTROPHILS 90 (H) 43 - 78 %    LYMPHOCYTES 8 (L) 13 - 44 %    MONOCYTES 2 (L) 4.0 - 12.0 %    EOSINOPHILS 0 (L) 0.5 - 7.8 %    BASOPHILS 0 0.0 - 2.0 %    IMMATURE GRANULOCYTES 0 0.0 - 5.0 %    ABS. NEUTROPHILS 17.8 (H) 1.7 - 8.2 K/UL    ABS. LYMPHOCYTES 1.5 0.5 - 4.6 K/UL    ABS. MONOCYTES 0.4 0.1 - 1.3 K/UL    ABS. EOSINOPHILS 0.0 0.0 - 0.8 K/UL    ABS. BASOPHILS 0.0 0.0 - 0.2 K/UL    ABS. IMM.  GRANS. 0.1 0.0 - 0.5 K/UL        All Micro Results     Procedure Component Value Units Date/Time    INFLUENZA A & B AG (RAPID TEST) [[id number]] Collected:  02/27/18 2218    Order Status:  Completed Specimen:  Nasopharyngeal from Nasal washing Updated:  02/27/18 2248     Influenza A Ag NEGATIVE          NEGATIVE FOR THE PRESENCE OF INFLUENZA A ANTIGEN  INFECTION DUE TO INFLUENZA A CANNOT BE RULED OUT. BECAUSE THE ANTIGEN PRESENT IN THE SAMPLE MAY BE BELOW  THE DETECTION LIMIT OF THE TEST. A NEGATIVE TEST IS PRESUMPTIVE AND IT IS RECOMMENDED THAT THESE RESULTS BE CONFIRMED BY VIRAL CULTURE OR AN FDA-CLEARED INFLUENZA A AND B MOLECULAR ASSAY. Influenza B Ag NEGATIVE          NEGATIVE FOR THE PRESENCE OF INFLUENZA B ANTIGEN  INFECTION DUE TO INFLUENZA B CANNOT BE RULED OUT. BECAUSE THE ANTIGEN PRESENT IN THE SAMPLE MAY BE BELOW  THE DETECTION LIMIT OF THE TEST. A NEGATIVE TEST IS PRESUMPTIVE AND IT IS RECOMMENDED THAT THESE RESULTS BE CONFIRMED BY VIRAL CULTURE OR AN FDA-CLEARED INFLUENZA A AND B MOLECULAR ASSAY.                Current Meds:  Current Facility-Administered Medications   Medication Dose Route Frequency    azithromycin (ZITHROMAX) 500 mg in 0.9% sodium chloride (MBP/ADV) 250 mL  500 mg IntraVENous Q24H    sodium chloride (NS) flush 5-10 mL  5-10 mL IntraVENous Q8H    sodium chloride (NS) flush 5-10 mL  5-10 mL IntraVENous PRN    acetaminophen (TYLENOL) tablet 650 mg  650 mg Oral Q4H PRN    ondansetron (ZOFRAN) injection 4 mg  4 mg IntraVENous Q4H PRN    magnesium hydroxide (MILK OF MAGNESIA) 400 mg/5 mL oral suspension 30 mL  30 mL Oral DAILY PRN    0.9% sodium chloride infusion  75 mL/hr IntraVENous CONTINUOUS    enoxaparin (LOVENOX) injection 40 mg  40 mg SubCUTAneous Q24H    budesonide (PULMICORT) 500 mcg/2 ml nebulizer suspension  500 mcg Nebulization BID RT    famotidine (PEPCID) tablet 20 mg  20 mg Oral BID    methylPREDNISolone (PF) (SOLU-MEDROL) injection 60 mg  60 mg IntraVENous Q8H    albuterol-ipratropium (DUO-NEB) 2.5 MG-0.5 MG/3 ML  3 mL Nebulization Q4H RT    calcium carbonate (TUMS) chewable tablet 400 mg [elemental]  400 mg Oral TID PRN    promethazine (PHENERGAN) with saline injection 12.5 mg  12.5 mg IntraVENous Q4H PRN    guaiFENesin ER (MUCINEX) tablet 600 mg  600 mg Oral Q12H PRN    HYDROcodone-homatropine (HYCODAN) 5-1.5 mg/5 mL (5 mL) syrup 5 mL  5 mL Oral Q4H PRN       Other Studies (last 24 hours):  No results found. Assessment and Plan:     Hospital Problems as of 3/1/2018  Never Reviewed          Codes Class Noted - Resolved POA    Marijuana use, continuous ICD-10-CM: F12.90  ICD-9-CM: 305.21  2/28/2018 - Present Yes        Occupational exposure to air contaminants ICD-10-CM: Z57.39  ICD-9-CM: V15.89  2/28/2018 - Present Yes        Cough ICD-10-CM: R05  ICD-9-CM: 786.2  2/28/2018 - Present Yes        Acute bronchitis ICD-10-CM: J20.9  ICD-9-CM: 466.0  2/28/2018 - Present Yes        COPD exacerbation ([facility] [address] ICD-10-CM: J44.1  ICD-9-CM: 491.21  2/28/2018 - Present Yes        Asthma exacerbation ICD-10-CM: J45.901  ICD-9-CM: 493.92  2/27/2018 - Present Yes              PLAN:    Acute copd exacerbation  - Continue nebulizer treatments  - Continue steroids  - Continue azithromycin  - Will need OP PFTs once back to baseline respiratory function in 1-2 months    Tachycardia  - Likely 2/2 nebulizer treatments  - Remain in sinus rhythm  - No chest pain    Marijuana use- advised on cessation.     DC planning/Dispo:    DVT ppx:  lovenox    Signed:  Zachary Romo MD

## 2018-03-01 NOTE — PROGRESS NOTES
Shift assessment complete. Respirations present. Even and unlabored. No s/s of distress. Zero c/o pain at this time. Call light within reach. Encouraged patient to call for assistance. Patient verbalizes understanding. See Doc Flowsheet for assessment details. Patient resting in bed. Getting respiratory treatments. Saline well intact to right antecubital with fluids infusing. Short of breath on exertion.

## 2018-03-01 NOTE — PROGRESS NOTES
Shift assessment complete. Patient alert and oriented x 4. Respirations even, regular, labored with dyspnea at rest and on exertion. Lung sound coarse and expiratory wheezing bilaterally. Bowel sounds active and abdomen soft and intact. Denies pain. IV fluids infusing well and without difficulty. Bed low, locked, and call light within reach.

## 2018-03-02 PROCEDURE — 94760 N-INVAS EAR/PLS OXIMETRY 1: CPT

## 2018-03-02 PROCEDURE — 99218 HC RM OBSERVATION: CPT

## 2018-03-02 PROCEDURE — 74011250637 HC RX REV CODE- 250/637: Performed by: FAMILY MEDICINE

## 2018-03-02 PROCEDURE — 74011250637 HC RX REV CODE- 250/637: Performed by: HOSPITALIST

## 2018-03-02 PROCEDURE — 74011000250 HC RX REV CODE- 250: Performed by: FAMILY MEDICINE

## 2018-03-02 PROCEDURE — 65270000029 HC RM PRIVATE

## 2018-03-02 PROCEDURE — 77010033678 HC OXYGEN DAILY

## 2018-03-02 PROCEDURE — 74011250636 HC RX REV CODE- 250/636: Performed by: FAMILY MEDICINE

## 2018-03-02 PROCEDURE — 94640 AIRWAY INHALATION TREATMENT: CPT

## 2018-03-02 RX ADMIN — DOCUSATE SODIUM 100 MG: 100 CAPSULE, LIQUID FILLED ORAL at 08:34

## 2018-03-02 RX ADMIN — HYDROCODONE BITARTRATE AND HOMATROPINE METHYLBROMIDE 5 ML: 5; 1.5 SOLUTION ORAL at 17:44

## 2018-03-02 RX ADMIN — BUDESONIDE 500 MCG: 0.5 INHALANT RESPIRATORY (INHALATION) at 20:10

## 2018-03-02 RX ADMIN — HYDROCODONE BITARTRATE AND HOMATROPINE METHYLBROMIDE 5 ML: 5; 1.5 SOLUTION ORAL at 08:33

## 2018-03-02 RX ADMIN — AZITHROMYCIN MONOHYDRATE 500 MG: 500 INJECTION, POWDER, LYOPHILIZED, FOR SOLUTION INTRAVENOUS at 15:27

## 2018-03-02 RX ADMIN — ONDANSETRON 4 MG: 2 INJECTION INTRAMUSCULAR; INTRAVENOUS at 15:20

## 2018-03-02 RX ADMIN — ENOXAPARIN SODIUM 40 MG: 40 INJECTION SUBCUTANEOUS at 08:34

## 2018-03-02 RX ADMIN — IPRATROPIUM BROMIDE AND ALBUTEROL SULFATE 3 ML: 2.5; .5 SOLUTION RESPIRATORY (INHALATION) at 15:46

## 2018-03-02 RX ADMIN — SODIUM CHLORIDE 75 ML/HR: 900 INJECTION, SOLUTION INTRAVENOUS at 15:26

## 2018-03-02 RX ADMIN — IPRATROPIUM BROMIDE AND ALBUTEROL SULFATE 3 ML: 2.5; .5 SOLUTION RESPIRATORY (INHALATION) at 11:04

## 2018-03-02 RX ADMIN — BUDESONIDE 500 MCG: 0.5 INHALANT RESPIRATORY (INHALATION) at 08:09

## 2018-03-02 RX ADMIN — ACETAMINOPHEN 650 MG: 325 TABLET ORAL at 06:29

## 2018-03-02 RX ADMIN — IPRATROPIUM BROMIDE AND ALBUTEROL SULFATE 3 ML: 2.5; .5 SOLUTION RESPIRATORY (INHALATION) at 20:10

## 2018-03-02 RX ADMIN — FAMOTIDINE 20 MG: 20 TABLET, FILM COATED ORAL at 17:44

## 2018-03-02 RX ADMIN — HYDROCODONE BITARTRATE AND HOMATROPINE METHYLBROMIDE 5 ML: 5; 1.5 SOLUTION ORAL at 22:07

## 2018-03-02 RX ADMIN — METHYLPREDNISOLONE SODIUM SUCCINATE 60 MG: 125 INJECTION, POWDER, FOR SOLUTION INTRAMUSCULAR; INTRAVENOUS at 22:06

## 2018-03-02 RX ADMIN — FAMOTIDINE 20 MG: 20 TABLET, FILM COATED ORAL at 08:34

## 2018-03-02 RX ADMIN — METHYLPREDNISOLONE SODIUM SUCCINATE 60 MG: 125 INJECTION, POWDER, FOR SOLUTION INTRAMUSCULAR; INTRAVENOUS at 15:20

## 2018-03-02 RX ADMIN — SODIUM CHLORIDE 75 ML/HR: 900 INJECTION, SOLUTION INTRAVENOUS at 14:50

## 2018-03-02 RX ADMIN — METHYLPREDNISOLONE SODIUM SUCCINATE 60 MG: 125 INJECTION, POWDER, FOR SOLUTION INTRAMUSCULAR; INTRAVENOUS at 06:22

## 2018-03-02 RX ADMIN — IPRATROPIUM BROMIDE AND ALBUTEROL SULFATE 3 ML: 2.5; .5 SOLUTION RESPIRATORY (INHALATION) at 08:09

## 2018-03-02 RX ADMIN — IPRATROPIUM BROMIDE AND ALBUTEROL SULFATE 3 ML: 2.5; .5 SOLUTION RESPIRATORY (INHALATION) at 03:10

## 2018-03-02 RX ADMIN — HYDROCODONE BITARTRATE AND HOMATROPINE METHYLBROMIDE 5 ML: 5; 1.5 SOLUTION ORAL at 12:27

## 2018-03-02 NOTE — PROGRESS NOTES
Spiritual Care Visit, initial visit. Visited with patient at bedside. Prayed for patient's healing and health. Visit by Artemio Junior, Staff .  Mary, Remigio.B., B.A.

## 2018-03-02 NOTE — PROGRESS NOTES
Hospitalist Progress Note     Admit Date:  2018  6:39 PM   Name:  Greg Mayers   Age:  48 y.o.  :  1965   MRN:  [id number]   PCP:  None  Treatment Team: Attending Provider: Christianne Varghese MD; Utilization Review: Kriss Unger RN    Subjective: Greg Mayers is a 48y.o. year-old female with a past medical history of asthma on advair/albuterol nebulizers at home who presents to the ER with complaint of about 3 days of worsening wheezing, cough, and phlegm productions. She reports having problems with shortness of breath on a routine basis, particurlarly worsened when exposed to certain chemicals used at there workplace. However, over the past 3 days she has begun having worsening head and chest congestion, cough, and wheeze that has not gotten better by using her nebulizer machine at home. Admits to subjective fever yesterday, denies chills, nausea, vomiting, constipation, diarrhea, abdominal pain. Admits to rib pain when coughing. 18  boyfriend at bedside  Says sob mildly better- but still sob  Cough    3/1/18  Pt does feel somewhat better, but now has persistent cough. Still SOB and requiring supplemental O2.    3/2/18  Noticing improvement. Still needing supplemental O2. Still SOB. Continues to cough. Reports mild headache associated with coughing.       Objective:     Patient Vitals for the past 24 hrs:   Temp Pulse Resp BP SpO2   18 1105 - - - - 100 %   18 0812 - - - - 99 %   18 0634 97.7 °F (36.5 °C) 98 16 133/80 98 %   18 0310 - - - - 97 %   18 0300 98 °F (36.7 °C) (!) 104 16 130/70 99 %   18 2356 - - - - 98 %   18 2245 97 °F (36.1 °C) (!) 109 18 122/76 98 %   18 1955 - - - - 98 %   18 1945 98.7 °F (37.1 °C) (!) 106 16 136/82 99 %   18 1541 - - - - 99 %   18 1445 98.1 °F (36.7 °C) (!) 108 20 114/72 96 %     Oxygen Therapy  O2 Sat (%): 100 % (18 1105)  Pulse via Oximetry: 115 beats per minute (18 1105)  O2 Device: Nasal cannula (03/02/18 1105)  O2 Flow Rate (L/min): 1 l/min (03/02/18 1105)  FIO2 (%): 24 % (03/01/18 1955)    Intake/Output Summary (Last 24 hours) at 03/02/18 1117  Last data filed at 03/02/18 0300   Gross per 24 hour   Intake                0 ml   Output              625 ml   Net             -625 ml         General:    Well nourished. Alert. Oriented x3,mild resp distress  HEENT- normal  CV:   Tachycardia  No murmur, rub, or gallop. Lungs:   Bilateral wheezing,corse breath sounds  Abdomen:   Soft, nontender, nondistended. Cns- no focal neurological deficits  Extremities: Warm and dry. No cyanosis or edema. Skin:     No rashes or jaundice. Data Review:  I have reviewed all labs, meds, telemetry events, and studies from the last 24 hours. No results found for this or any previous visit (from the past 24 hour(s)). All Micro Results     Procedure Component Value Units Date/Time    INFLUENZA A & B AG (RAPID TEST) [[id number]] Collected:  02/27/18 2218    Order Status:  Completed Specimen:  Nasopharyngeal from Nasal washing Updated:  02/27/18 2248     Influenza A Ag NEGATIVE          NEGATIVE FOR THE PRESENCE OF INFLUENZA A ANTIGEN  INFECTION DUE TO INFLUENZA A CANNOT BE RULED OUT. BECAUSE THE ANTIGEN PRESENT IN THE SAMPLE MAY BE BELOW  THE DETECTION LIMIT OF THE TEST. A NEGATIVE TEST IS PRESUMPTIVE AND IT IS RECOMMENDED THAT THESE RESULTS BE CONFIRMED BY VIRAL CULTURE OR AN FDA-CLEARED INFLUENZA A AND B MOLECULAR ASSAY. Influenza B Ag NEGATIVE          NEGATIVE FOR THE PRESENCE OF INFLUENZA B ANTIGEN  INFECTION DUE TO INFLUENZA B CANNOT BE RULED OUT. BECAUSE THE ANTIGEN PRESENT IN THE SAMPLE MAY BE BELOW  THE DETECTION LIMIT OF THE TEST. A NEGATIVE TEST IS PRESUMPTIVE AND IT IS RECOMMENDED THAT THESE RESULTS BE CONFIRMED BY VIRAL CULTURE OR AN FDA-CLEARED INFLUENZA A AND B MOLECULAR ASSAY.                Current Meds:  Current Facility-Administered Medications Medication Dose Route Frequency    azithromycin (ZITHROMAX) 500 mg in 0.9% sodium chloride (MBP/ADV) 250 mL  500 mg IntraVENous Q24H    docusate sodium (COLACE) capsule 100 mg  100 mg Oral DAILY    sodium chloride (NS) flush 5-10 mL  5-10 mL IntraVENous Q8H    sodium chloride (NS) flush 5-10 mL  5-10 mL IntraVENous PRN    acetaminophen (TYLENOL) tablet 650 mg  650 mg Oral Q4H PRN    ondansetron (ZOFRAN) injection 4 mg  4 mg IntraVENous Q4H PRN    magnesium hydroxide (MILK OF MAGNESIA) 400 mg/5 mL oral suspension 30 mL  30 mL Oral DAILY PRN    0.9% sodium chloride infusion  75 mL/hr IntraVENous CONTINUOUS    enoxaparin (LOVENOX) injection 40 mg  40 mg SubCUTAneous Q24H    budesonide (PULMICORT) 500 mcg/2 ml nebulizer suspension  500 mcg Nebulization BID RT    famotidine (PEPCID) tablet 20 mg  20 mg Oral BID    methylPREDNISolone (PF) (SOLU-MEDROL) injection 60 mg  60 mg IntraVENous Q8H    albuterol-ipratropium (DUO-NEB) 2.5 MG-0.5 MG/3 ML  3 mL Nebulization Q4H RT    calcium carbonate (TUMS) chewable tablet 400 mg [elemental]  400 mg Oral TID PRN    promethazine (PHENERGAN) with saline injection 12.5 mg  12.5 mg IntraVENous Q4H PRN    guaiFENesin ER (MUCINEX) tablet 600 mg  600 mg Oral Q12H PRN    HYDROcodone-homatropine (HYCODAN) 5-1.5 mg/5 mL (5 mL) syrup 5 mL  5 mL Oral Q4H PRN       Other Studies (last 24 hours):  No results found.     Assessment and Plan:     Hospital Problems as of 3/2/2018  Never Reviewed          Codes Class Noted - Resolved POA    Marijuana use, continuous ICD-10-CM: F12.90  ICD-9-CM: 305.21  2/28/2018 - Present Yes        Occupational exposure to air contaminants ICD-10-CM: Z57.39  ICD-9-CM: V15.89  2/28/2018 - Present Yes        Cough ICD-10-CM: R05  ICD-9-CM: 786.2  2/28/2018 - Present Yes        Acute bronchitis ICD-10-CM: J20.9  ICD-9-CM: 466.0  2/28/2018 - Present Yes        * (Principal)COPD exacerbation ([facility] ICD-10-CM: J44.1  ICD-9-CM: 491.21  2/28/2018 - Present Yes        Asthma exacerbation ICD-10-CM: J45.901  ICD-9-CM: 493.92  2/27/2018 - Present Yes              PLAN:    Acute copd exacerbation  - Continue nebulizer treatments  - Continue steroids  - Continue azithromycin  - Will need OP PFTs once back to baseline respiratory function in 1-2 months    Tachycardia  - Likely 2/2 nebulizer treatments  - Remain in sinus rhythm  - No chest pain    Headache  - pain med prn    Marijuana use- advised on cessation.     DC planning/Dispo:    DVT ppx:  lovenox    Signed:  Claudeen Six, MD

## 2018-03-02 NOTE — PROGRESS NOTES
Shift assessment complete. Patient alert and oriented x 4. Respirations even, regular, and dyspnea on exertion. Lungs sounds coarse with wheezing bilaterally. Bowel sounds active and abdomen soft and intact. Denies pain . IV fluids infusing well and without difficulty. Bed low, locked, and call light within reach.

## 2018-03-02 NOTE — PROGRESS NOTES
Received bedside shift report from off going nurse Dominick Sneed RN which included SBAR, MAR, and Plan of Care. Patient is sitting up in bed and complains of headache. Will continue to follow patient's progression through hourly rounding. Will meet all requests as needed and appropriate.

## 2018-03-03 ENCOUNTER — APPOINTMENT (OUTPATIENT)
Dept: GENERAL RADIOLOGY | Age: 53
DRG: 191 | End: 2018-03-03
Attending: FAMILY MEDICINE
Payer: COMMERCIAL

## 2018-03-03 PROCEDURE — 74011000250 HC RX REV CODE- 250: Performed by: FAMILY MEDICINE

## 2018-03-03 PROCEDURE — 74011250637 HC RX REV CODE- 250/637: Performed by: HOSPITALIST

## 2018-03-03 PROCEDURE — 65270000029 HC RM PRIVATE

## 2018-03-03 PROCEDURE — 71045 X-RAY EXAM CHEST 1 VIEW: CPT

## 2018-03-03 PROCEDURE — 74011250636 HC RX REV CODE- 250/636: Performed by: FAMILY MEDICINE

## 2018-03-03 PROCEDURE — 94640 AIRWAY INHALATION TREATMENT: CPT

## 2018-03-03 PROCEDURE — 94760 N-INVAS EAR/PLS OXIMETRY 1: CPT

## 2018-03-03 PROCEDURE — 74011250637 HC RX REV CODE- 250/637: Performed by: FAMILY MEDICINE

## 2018-03-03 RX ORDER — ALBUTEROL SULFATE 0.83 MG/ML
2.5 SOLUTION RESPIRATORY (INHALATION)
Status: DISCONTINUED | OUTPATIENT
Start: 2018-03-03 | End: 2018-03-05 | Stop reason: HOSPADM

## 2018-03-03 RX ORDER — BENZONATATE 100 MG/1
100 CAPSULE ORAL
Status: DISCONTINUED | OUTPATIENT
Start: 2018-03-03 | End: 2018-03-04

## 2018-03-03 RX ORDER — IPRATROPIUM BROMIDE AND ALBUTEROL SULFATE 2.5; .5 MG/3ML; MG/3ML
3 SOLUTION RESPIRATORY (INHALATION)
Status: DISCONTINUED | OUTPATIENT
Start: 2018-03-03 | End: 2018-03-05 | Stop reason: HOSPADM

## 2018-03-03 RX ADMIN — ENOXAPARIN SODIUM 40 MG: 40 INJECTION SUBCUTANEOUS at 07:55

## 2018-03-03 RX ADMIN — DOCUSATE SODIUM 100 MG: 100 CAPSULE, LIQUID FILLED ORAL at 07:55

## 2018-03-03 RX ADMIN — METHYLPREDNISOLONE SODIUM SUCCINATE 60 MG: 125 INJECTION, POWDER, FOR SOLUTION INTRAMUSCULAR; INTRAVENOUS at 21:47

## 2018-03-03 RX ADMIN — AZITHROMYCIN MONOHYDRATE 500 MG: 500 INJECTION, POWDER, LYOPHILIZED, FOR SOLUTION INTRAVENOUS at 13:39

## 2018-03-03 RX ADMIN — METHYLPREDNISOLONE SODIUM SUCCINATE 60 MG: 125 INJECTION, POWDER, FOR SOLUTION INTRAMUSCULAR; INTRAVENOUS at 13:39

## 2018-03-03 RX ADMIN — HYDROCODONE BITARTRATE AND HOMATROPINE METHYLBROMIDE 5 ML: 5; 1.5 SOLUTION ORAL at 06:04

## 2018-03-03 RX ADMIN — IPRATROPIUM BROMIDE AND ALBUTEROL SULFATE 3 ML: 2.5; .5 SOLUTION RESPIRATORY (INHALATION) at 04:30

## 2018-03-03 RX ADMIN — IPRATROPIUM BROMIDE AND ALBUTEROL SULFATE 3 ML: 2.5; .5 SOLUTION RESPIRATORY (INHALATION) at 12:41

## 2018-03-03 RX ADMIN — BUDESONIDE 500 MCG: 0.5 INHALANT RESPIRATORY (INHALATION) at 20:50

## 2018-03-03 RX ADMIN — METHYLPREDNISOLONE SODIUM SUCCINATE 60 MG: 125 INJECTION, POWDER, FOR SOLUTION INTRAMUSCULAR; INTRAVENOUS at 06:04

## 2018-03-03 RX ADMIN — SODIUM CHLORIDE 75 ML/HR: 900 INJECTION, SOLUTION INTRAVENOUS at 17:10

## 2018-03-03 RX ADMIN — FAMOTIDINE 20 MG: 20 TABLET, FILM COATED ORAL at 17:09

## 2018-03-03 RX ADMIN — BUDESONIDE 500 MCG: 0.5 INHALANT RESPIRATORY (INHALATION) at 08:45

## 2018-03-03 RX ADMIN — IPRATROPIUM BROMIDE AND ALBUTEROL SULFATE 3 ML: 2.5; .5 SOLUTION RESPIRATORY (INHALATION) at 00:40

## 2018-03-03 RX ADMIN — IPRATROPIUM BROMIDE AND ALBUTEROL SULFATE 3 ML: 2.5; .5 SOLUTION RESPIRATORY (INHALATION) at 16:40

## 2018-03-03 RX ADMIN — IPRATROPIUM BROMIDE AND ALBUTEROL SULFATE 3 ML: 2.5; .5 SOLUTION RESPIRATORY (INHALATION) at 20:50

## 2018-03-03 RX ADMIN — SODIUM CHLORIDE 75 ML/HR: 900 INJECTION, SOLUTION INTRAVENOUS at 07:00

## 2018-03-03 RX ADMIN — IPRATROPIUM BROMIDE AND ALBUTEROL SULFATE 3 ML: 2.5; .5 SOLUTION RESPIRATORY (INHALATION) at 08:44

## 2018-03-03 RX ADMIN — FAMOTIDINE 20 MG: 20 TABLET, FILM COATED ORAL at 07:55

## 2018-03-03 NOTE — PROGRESS NOTES
Shift assessment complete. Patient alert and oriented x 4. Respiration even, regular, with dyspnea on exertion. Lung sounds expiratory wheezing bilaterally. Bowel sounds active and abdomen soft and intact. Denies pain. IV fluids infusing well and without difficulty. Bed low, locked, and call light within reach.

## 2018-03-03 NOTE — PROGRESS NOTES
Respiratory Care Services     Policy Number: -[id number]    Title: Aerosolized Medication Protocol    Effective Date: 10/1998    Revised Date: 06/2013, 03/2016    Reviewed Date: 05/2014/ 03/2015 , 06/2017       I. Policy: The Aerosolized Medication Protocol shall by implemented by Respiratory Care              Practitioners (RCP) for patients with orders to receive aerosol therapy with medication. II. Purpose: To open and maintain obstructed airways, the RCP, will utilize the following   protocol to select the indicated aerosolized medication(s) and determine the most effective method of delivery to the patient. III. Patient Type: All patients who are determined to meet aerosolized medication criteria as          outlined in this protocol. IV. Responsibility: Director, [address], registered Respiratory Care                                                     Practitioners (RCPs) with documented competency in the performance of                                     respiratory therapeutic techniques. V. Equipment needed:  A. Stethoscope  B. Pulse oximeter  C. IPPB machine and circuit  D. Aerosol nebulizer  E. MDI Inhaler     VI. Protocol:   A. The following conditions are accepted indications for aerosolized medication therapy. 1. Bronchospasm/wheezing  2. Impaired mucociliary clearance  3. Tracheobronchial mucosal congestion/and laryngeal stridor  4. Diseases which commonly require aerosolized medication therapy include, but are not limited to:  a. Asthma/reactive airway disease  b. Bronchitis/emphysema (COPD)  c. Cystic fibrosis  d. Severe laryngitis/tracheitis  e. Bronchiectasis  f. Smoke inhalation or chemical trauma to the lung or upper airway  g. Physical trauma to the upper airway  h. Laryngotracheobronchitis  i. Bronchiolitis  j. Non-specific wheezing              B. Indications for bronchodilator medications will include:  a.  Bronchospasm/ wheezing  b. Asthma/reactive airway disease  c. Chronic obstructive pulmonary disease  d. Obstructive defect on pulmonary function testing  C. Administration of medications  1. If a bronchodilator or any other type of respiratory medication is needed, a physician order must be indicated in the medication section in the patients EMR. 2. When the physician specifies the medication and dosage at the time of request, the ordered medication will be used as part of the care plan. D. The following guidelines will be utilized in the evaluation and selection of the         appropriate delivery device for indicated medication(s):  1. IPPB  a. Ventilation is inadequate (rapid shallow breathing)  b. Refractory atelectasis has developed, other forms of therapy are unsuccessful  c. Inability to clear secretions  d. Need to improve lung expansion  2. Unassisted aerosol (UA) is the preferred method of aerosol delivery and indicated if  a. Ventilation is inadequate  b. Patient demonstrates wheezing   c. patient is unable to perform MDI effectively   d. Patient preference  3. Metered Dose Inhaler (MDI)   a. Patient is alert/cooperative  b. Medication(s) available in this delivery method. c. Able to perform 3 second breath hold. d. Patient has demonstrated ability to use MDI effectively  e. Patient has used MDI therapy previously, either at home or in the hospital.  f. Note: The only approved inhalers on formulary are albuterol and Spiriva. VII. Guidelines:   Monitor patients vital signs and evaluate patients clinical status. The need to change medication and/or modality may be indicated by:  1. A pulse greater than 120 bpm, or if a pulse increase of 20 bpm occurs with bronchodilator medications. 2. Significant worsening of dyspnea or wheezing occurring during or within 30 minutes of discontinuing therapy.   3. Worsening of patients sensorium (e.g. patient becomes confused or obtunded, and unable to follow directions). 4. Worsening of patients chest x-ray. 5. Change in sputum (e.g. increased pulmonary infiltrate, which might indicate need for volume expansion therapy). 6. Patient has difficulty coughing up secretions, which might indicate need for acetylcysteine and/or bronchial hygiene therapy. 7. Call physician immediately if dyspnea worsens and is not responsive to modifications allowed by protocol. VIII. Clinical Responsibility:  A. The therapy assessment guidelines will be used to evaluate all patients receiving aerosolized medications with the exception of critical care areas. 1. RCPs will perform changes in therapy per protocol. 2. It will be the responsibility of RCP to provide instruction regarding respiratory medications, aerosol therapy and proper MDI technique, as well as, spacer usage to patients ordered MDI therapy. 3. Current therapy that is part of a patients home regimen will not be discontinued. IX. Documentation  A. Document assessment findings in the respiratory assessment section of the patients EMR. B. Document changes in therapy per protocol in the respiratory orders section and in the care plan section of the patients EMR. C. Document patient education in the patient education section of the patients EMR. X. Outcome Criteria:  A. Relief of wheezes and obstruction  B. Improved cough and sputum color and consistency  C. Improved chest x-ray  D. Improved arterial oxygen tension and or SaO2  E. Improved Peak Flow on asthmatic patients        XI. Related Protocols:  A. Respiratory Patient Care Protocols  B. Bronchial Hygiene Therapy  C.  Oxygen Protocol    Reference:                                    Respiratory Care Services     Policy Number: -[id number]    Title: Patient Care Assessment Program    Effective Date: 01/1999    Revised Date: 05/2014    Reviewed Date: 06/2013/ 03/2015, 03/2016, 06/2017     Overview  In an effort to improve quality and reduce costs of respiratory care at [facility], the Respiratory Department has developed a number of Patient Care Protocols. These protocols have been developed according to Debbie 3 and are utilized for those patients who are ordered respiratory therapy using therapeutic indications and standardized approaches for accomplishing objectives. Patient Care Protocols are intended to improve care by:   Defining the indications and standards of care agreed upon by the Pulmonary Medicine and [address] [place] [address] of [facility].  Training respiratory care practitioners to apply those criteria to individual patients and modify therapy as indicated by the protocols.  Documenting the indication and care plan as part of the initial ordering process.  Tapering or discontinuing treatments once the indication for therapy changes. The Patient Care Protocols shall be universally applied throughout the hospital as determined by   the Pulmonary Medicine and [address] [place] [address]. Rationale for Patient Care Assessment Protocols:   Continuous Quality Improvement   Cost containment   Standardization of care   Enhanced continuity of care   Utilization review   Timely intervention    The following patient care assessment protocols have been developed:   Aerosolized Medication    Bronchial Hygiene    Oxygen Weaning Protocol   Volume Expansion/Secretion Clearance Non-Vented   Ventilator Weaning Protocol   CVRU Fast Track Weaning Protocol   Asthma Treatment Protocol ER   Pediatric Asthma Treatment Protocol ER   Alpha-1 Antitrypsin Deficiency Protocol   Prone Positioning Protocol    The Director of Respiratory Care Services oversees the Patient Care Assessment Program. The Clinical/ is responsible for protocol development and training. The Supervisor is responsible for implementation and  activities. Each patient with an order for respiratory treatments will receive an evaluation. All Registered Respiratory Care Practitioners (RCPs) will perform the evaluations. The same evaluation tool will be utilized for all initial and follow-up assessments. If the patient does not meet criteria for ordered therapy, the therapy will be discontinued. If the patient demonstrates an adverse response to initially ordered therapy, the therapy will be discontinued and the physician will be contacted. Specific physician's orders that deviate from protocols and are deemed \"inappropriate\" or \"unsafe\" will be addressed with ordering physician and/or medical director as required. Patients of [facility] and [address] will not be assessed for the first 24 hours as the Medical Director of [address] [place] [address] has requested that changes in therapy should not be made during that time frame. Respiratory Patient Care Assessment Protocols                           I.  Policy: In an effort to provide quality patient care and effective utilization of services, physicians who order respiratory therapy will have their patients treated via the protocols established (see attached). All Registered [address] [place] [address] Street (RCPs) will complete the initial assessment. This assessment will indicate patient needs, and the care plan developed for the patient and will performed within 24 hours of admission. Frequency of the therapy will be set according to the results of the respiratory therapy evaluation and frequency guidelines policy. Reassessment will be continued done every 48 hours and more frequently as needed for the individual patient. II. Purpose:     A. To provide a process that will allow for ongoing assessment and care plan modification for patients receiving respiratory services based on both objective and or subjective patient responses to interventions.  This process of protocol utilization will assist in patient care progression while eliminating the need for the physician to continually update respiratory therapy orders. B. To assure continuity of respiratory care that meets [facility] Clinical Practice Guidelines. III. Initiation:  Implement Respiratory Care Protocols for patients who are ordered by physician          to receive respiratory therapy procedures or for ventilator management. IV. Protocol:  A. Upon receiving an order for therapy the RCP will review the patients EMR (electronic medical record) for all pertinent information includin. Physicians order for therapy  2. Patient history and physical examination  3. Physician progress notes  4. Diagnostic. X-rays, PFTs, arterial blood gases etc.      B. The RCP will perform a respiratory assessment in the following manner:  1. Perform hand hygiene per hospital policy utilizing Standard Precautions for all patients and following transmission-based isolation as indicated per policy. 2. Identify patient via ID bracelet verifying patient name and birth date. 3. General observations: color, pattern and effort of breathing, chest expansion, (symmetrical and bilateral), level of consciousness and the ability to ambulate. 4. The RCP will assess patients cough ability and determine if Nasotracheal suctioning is needed. If patient is unable to produce sputum, at that time, the RCP should question the patient with regard to their sputum: production, color consistency, frequency and amount. 5. Auscultation: Using a stethoscope, the RCP will listen and note quality of breath sounds and presence or absence of adventitious breath sounds in all lung fields, both anteriorly and posteriorly. 6. Upon completing the EMR review and physical assessment, the RCP will document findings in the RT Assessment section of the EMR. The score level will be provided and will be used to determine the frequency of therapy.     V. Indications:   A. Indications for Bronchial Hygiene Protocol will include:  1. Potential for or presence of atelectasis. 2. Need for hydration and removal of retained secretions. 3. Need for improvement of cough effectiveness. 4. Presence of conditions associated with disorder of pulmonary clearance:  a. Cystic fibrosis  b. Bronchiectasis  B. Indications for Aerosolized Medication(s) Protocol should include:  1. Treatment of bronchospasm/wheezing  2. Improvement of mucociliary clearance  3. Treatment of stridor  4. History of Asthma or COPD             C.  Indications for Oxygen Therapy Protocol should include:  1. Documented hypoxemia  2. Severe trauma  3. Acute myocardial infarction  4. Short-term therapy (e.g. post anesthesia recovery)    VI. Maintenance:    A. Timely patient assessment is an integral part of this protocol therefore the following will be applied:  1. All non- critical care patients will be evaluated upon receiving initial respiratory care orders within 24 hours and re-evaluated within 48 hours (or more as needed). 2. Orders requesting a Respiratory Consult will be responded to in the following manner:  a. In patient emergency situations, the RCP assigned to the floor will respond immediately to the patient, provide an initial respiratory assessment, and contact the patients physician as necessary for appropriate orders. b. In non-emergent situations, the RCP assigned to the floor will respond to the patient within 90 minutes and provide an initial respiratory assessment and contact patients physician as necessary for appropriate orders. c. An RCP will provide a comprehensive assessment as soon as possible. 3. Upon completion of an evaluation, the RCP will complete documentation in the patients EMR in the RT Assessment section. 4. The RCP who completes the assessment will document orders for therapy in the orders section of the patients EMR selecting new order.  Next, per protocol should be selected indicating it is a protocol order and sign orders should be selected to complete the process. 5. The Pharmacy and Therapeutics (P&T) Committee has mandated that the medication Xopenex may be changed to unit dose albuterol without an order, except for those patients receiving Xopenex due to cardiac arrhythmias. The dosage for these patients should be 0.63 mg. and may be changed from 1.25 mg. to 0.63 mg per P & T Committee by the RCP completing the assessment. 6. Patients who are not experiencing cardiac arrhythmias, and are ordered Xopenex and Atrovent may be changed to Duoneb. VII. Safety:        A. The following safety issues shall be monitored:  1. The RCP will perform hand hygiene per hospital policy utilizing Standard Precautions for all patients and following transmission-based isolation as indicated per hospital policy. 2. The RCP must exercise professional judgment in classifying the patient for frequency of therapy. 3. Appropriate classification of the patient will require an evaluation utilizing the Therapy Assessment Protocol Guidelines. 4. The RCP will confer with the physician concerning the care of the patient at any time questions or problems arise. 5. If during therapy, the patient exhibits no improvement or deterioration in clinical status the RCP will notify the physician and the patients nurse. VIII. Interventions:   A. The patients nurse is responsible concerning all items related to his/her care. Ongoing communication with nursing is essential to successful protocol management. B. The RCP recognizes the value of the team approach in meeting the patients needs. Nursing input regarding the patients pulmonary condition will be sought as needed. IX. Reportable conditions: The RCP will inform the physician if:  1. There are acute changes in patients respiratory status.   2. The therapist is unable to determine appropriate care plan upon assessment. 3. The patient fails to reach therapeutic objective. 4. A change or additional medication is needed. X.  Patient Education:    A. Patient will receive instruction on the followin. The treatment modality, including objectives and proper technique of therapy  2. Respiratory medications  B. Documentation shall occur in the patient education section of the patients EMR. XI. Documentation: Record all findings as described above in the patients EMR. Related Protocols: A. Aerosolized Medication Protocol  B. Bronchial Hygiene  C. Oxygen Protocol   D. Volume Expansion/Secretion Clearance  E. Ventilator Weaning Protocols    References:  N   Joint Commission Consolidated [place] Standard   L    Respiratory Care Department Policy, Procedure and Protocol Guideline Manual, , DAMARIS Jones. L  Therapist Driven Respiratory Care Protocols  A Practitioners Guide for Criteria-Based Respiratory Care by Chani Zavala M.D., and DAMARIS Brito RRT. L  The rationale for therapist-driven protocols: an update. Respiratory Care 1998; 1150 [facility] Guidelines.

## 2018-03-03 NOTE — PROGRESS NOTES
Hospitalist Progress Note     Admit Date:  2018  6:39 PM   Name:  Alok Guzman   Age:  48 y.o.  :  1965   MRN:  [id number]   PCP:  None  Treatment Team: Attending Provider: Janice Lopez MD; Utilization Review: Tillie Gottron, RN    Subjective: Alok Guzman is a 48y.o. year-old female with a past medical history of asthma on advair/albuterol nebulizers at home who presents to the ER with complaint of about 3 days of worsening wheezing, cough, and phlegm productions. She reports having problems with shortness of breath on a routine basis, particurlarly worsened when exposed to certain chemicals used at there workplace. However, over the past 3 days she has begun having worsening head and chest congestion, cough, and wheeze that has not gotten better by using her nebulizer machine at home. Admits to subjective fever yesterday, denies chills, nausea, vomiting, constipation, diarrhea, abdominal pain. Admits to rib pain when coughing. 18  boyfriend at bedside  Says sob mildly better- but still sob  Cough    3/1/18  Pt does feel somewhat better, but now has persistent cough. Still SOB and requiring supplemental O2.    3/2/18  Noticing improvement. Still needing supplemental O2. Still SOB. Continues to cough. Reports mild headache associated with coughing. 3/3/18  Cough persistent. Patient complains of an episode of nausea/vomiting. She is unsure if it was 2/2 coughing or headache.   Patient is no longer requiring supplemental oxygen on my evaluation, but coughs throughout my exam.      Objective:     Patient Vitals for the past 24 hrs:   Temp Pulse Resp BP SpO2   18 0845 - - - - 96 %   18 0720 97.3 °F (36.3 °C) 99 18 154/90 98 %   18 0430 - - - - 97 %   18 0247 98.4 °F (36.9 °C) (!) 105 16 127/75 98 %   18 0041 - - - - 98 %   18 2349 98 °F (36.7 °C) (!) 103 16 137/84 97 %   18 - - - - 97 %   18 1826 98.3 °F (36.8 °C) (!) 107 18 142/87 100 %   03/02/18 1546 - - - - 98 %   03/02/18 1512 98.6 °F (37 °C) 100 18 (!) 146/93 97 %   03/02/18 1144 97.6 °F (36.4 °C) 96 18 (!) 156/94 99 %   03/02/18 1105 - - - - 100 %     Oxygen Therapy  O2 Sat (%): 96 % (03/03/18 0845)  Pulse via Oximetry: 97 beats per minute (03/03/18 0845)  O2 Device: Room air (03/03/18 0845)  O2 Flow Rate (L/min): 0 l/min (03/02/18 2010)  FIO2 (%): 21 % (03/03/18 0845)    Intake/Output Summary (Last 24 hours) at 03/03/18 0856  Last data filed at 03/02/18 2349   Gross per 24 hour   Intake                0 ml   Output              500 ml   Net             -500 ml         General:    Well nourished. Alert. Oriented x3  CV:   Tachycardia  No murmur, rub, or gallop. Lungs:   Bilateral expiratory wheezing and course breath sounds  Abdomen:   Soft, nontender, nondistended. Extremities: Warm and dry. No cyanosis or edema. Skin:     No rashes or jaundice. Data Review:  I have reviewed all labs, meds, telemetry events, and studies from the last 24 hours. No results found for this or any previous visit (from the past 24 hour(s)). All Micro Results     Procedure Component Value Units Date/Time    INFLUENZA A & B AG (RAPID TEST) [[id number]] Collected:  02/27/18 2218    Order Status:  Completed Specimen:  Nasopharyngeal from Nasal washing Updated:  02/27/18 2248     Influenza A Ag NEGATIVE          NEGATIVE FOR THE PRESENCE OF INFLUENZA A ANTIGEN  INFECTION DUE TO INFLUENZA A CANNOT BE RULED OUT. BECAUSE THE ANTIGEN PRESENT IN THE SAMPLE MAY BE BELOW  THE DETECTION LIMIT OF THE TEST. A NEGATIVE TEST IS PRESUMPTIVE AND IT IS RECOMMENDED THAT THESE RESULTS BE CONFIRMED BY VIRAL CULTURE OR AN FDA-CLEARED INFLUENZA A AND B MOLECULAR ASSAY. Influenza B Ag NEGATIVE          NEGATIVE FOR THE PRESENCE OF INFLUENZA B ANTIGEN  INFECTION DUE TO INFLUENZA B CANNOT BE RULED OUT. BECAUSE THE ANTIGEN PRESENT IN THE SAMPLE MAY BE BELOW  THE DETECTION LIMIT OF THE TEST.   A NEGATIVE TEST IS PRESUMPTIVE AND IT IS RECOMMENDED THAT THESE RESULTS BE CONFIRMED BY VIRAL CULTURE OR AN FDA-CLEARED INFLUENZA A AND B MOLECULAR ASSAY. Current Meds:  Current Facility-Administered Medications   Medication Dose Route Frequency    azithromycin (ZITHROMAX) 500 mg in 0.9% sodium chloride (MBP/ADV) 250 mL  500 mg IntraVENous Q24H    docusate sodium (COLACE) capsule 100 mg  100 mg Oral DAILY    sodium chloride (NS) flush 5-10 mL  5-10 mL IntraVENous Q8H    sodium chloride (NS) flush 5-10 mL  5-10 mL IntraVENous PRN    acetaminophen (TYLENOL) tablet 650 mg  650 mg Oral Q4H PRN    ondansetron (ZOFRAN) injection 4 mg  4 mg IntraVENous Q4H PRN    magnesium hydroxide (MILK OF MAGNESIA) 400 mg/5 mL oral suspension 30 mL  30 mL Oral DAILY PRN    0.9% sodium chloride infusion  75 mL/hr IntraVENous CONTINUOUS    enoxaparin (LOVENOX) injection 40 mg  40 mg SubCUTAneous Q24H    budesonide (PULMICORT) 500 mcg/2 ml nebulizer suspension  500 mcg Nebulization BID RT    famotidine (PEPCID) tablet 20 mg  20 mg Oral BID    methylPREDNISolone (PF) (SOLU-MEDROL) injection 60 mg  60 mg IntraVENous Q8H    albuterol-ipratropium (DUO-NEB) 2.5 MG-0.5 MG/3 ML  3 mL Nebulization Q4H RT    calcium carbonate (TUMS) chewable tablet 400 mg [elemental]  400 mg Oral TID PRN    promethazine (PHENERGAN) with saline injection 12.5 mg  12.5 mg IntraVENous Q4H PRN    guaiFENesin ER (MUCINEX) tablet 600 mg  600 mg Oral Q12H PRN    HYDROcodone-homatropine (HYCODAN) 5-1.5 mg/5 mL (5 mL) syrup 5 mL  5 mL Oral Q4H PRN       Other Studies (last 24 hours):  No results found.     Assessment and Plan:     Hospital Problems as of 3/3/2018  Never Reviewed          Codes Class Noted - Resolved POA    Marijuana use, continuous ICD-10-CM: F12.90  ICD-9-CM: 305.21  2/28/2018 - Present Yes        Occupational exposure to air contaminants ICD-10-CM: Z57.39  ICD-9-CM: V15.89  2/28/2018 - Present Yes        Cough ICD-10-CM: R05  ICD-9-CM: 786.2  2/28/2018 - Present Yes        Acute bronchitis ICD-10-CM: J20.9  ICD-9-CM: 466.0  2/28/2018 - Present Yes        * (Principal)COPD exacerbation ([facility] [address] ICD-10-CM: J44.1  ICD-9-CM: 491.21  2/28/2018 - Present Yes        Asthma exacerbation ICD-10-CM: J45.901  ICD-9-CM: 493.92  2/27/2018 - Present Yes              PLAN:    Acute copd exacerbation  - Continue nebulizer treatments  - Continue IV steroids for now as patient is still wheezing  - Continue azithromycin  - Will need OP PFTs once back to baseline respiratory function in 1-2 months    Cough  - Common during COPD/asthma exacerbation  - Will check CXR to ensure pt has not developed overlying pneumonia    Tachycardia  - Intermittent  - Likely 2/2 nebulizer treatments  - Remain in sinus rhythm  - No chest pain    Headache  - pain med prn    Marijuana use- advised on cessation. DC planning/Dispo: Once off supplemental O2 and can transition to PO steroids, patient can be discharged back home.   DVT ppx:  lovenox    Signed:  Tony Doran MD

## 2018-03-04 PROBLEM — D72.829 LEUKOCYTOSIS: Status: ACTIVE | Noted: 2018-03-04

## 2018-03-04 PROCEDURE — 74011250636 HC RX REV CODE- 250/636: Performed by: FAMILY MEDICINE

## 2018-03-04 PROCEDURE — 65270000029 HC RM PRIVATE

## 2018-03-04 PROCEDURE — 74011250637 HC RX REV CODE- 250/637: Performed by: INTERNAL MEDICINE

## 2018-03-04 PROCEDURE — 74011000250 HC RX REV CODE- 250: Performed by: FAMILY MEDICINE

## 2018-03-04 PROCEDURE — 94760 N-INVAS EAR/PLS OXIMETRY 1: CPT

## 2018-03-04 PROCEDURE — 74011250637 HC RX REV CODE- 250/637: Performed by: HOSPITALIST

## 2018-03-04 PROCEDURE — 94640 AIRWAY INHALATION TREATMENT: CPT

## 2018-03-04 PROCEDURE — 74011250637 HC RX REV CODE- 250/637: Performed by: FAMILY MEDICINE

## 2018-03-04 PROCEDURE — 74011636637 HC RX REV CODE- 636/637: Performed by: INTERNAL MEDICINE

## 2018-03-04 RX ORDER — GUAIFENESIN 600 MG/1
1200 TABLET, EXTENDED RELEASE ORAL EVERY 12 HOURS
Status: DISCONTINUED | OUTPATIENT
Start: 2018-03-04 | End: 2018-03-05 | Stop reason: HOSPADM

## 2018-03-04 RX ORDER — BENZONATATE 100 MG/1
100 CAPSULE ORAL 3 TIMES DAILY
Status: DISCONTINUED | OUTPATIENT
Start: 2018-03-04 | End: 2018-03-05 | Stop reason: HOSPADM

## 2018-03-04 RX ORDER — FACIAL-BODY WIPES
10 EACH TOPICAL DAILY PRN
Status: DISCONTINUED | OUTPATIENT
Start: 2018-03-04 | End: 2018-03-05 | Stop reason: HOSPADM

## 2018-03-04 RX ORDER — AZITHROMYCIN 250 MG/1
500 TABLET, FILM COATED ORAL DAILY
Status: DISCONTINUED | OUTPATIENT
Start: 2018-03-04 | End: 2018-03-05 | Stop reason: HOSPADM

## 2018-03-04 RX ORDER — PREDNISONE 20 MG/1
40 TABLET ORAL
Status: DISCONTINUED | OUTPATIENT
Start: 2018-03-04 | End: 2018-03-05 | Stop reason: HOSPADM

## 2018-03-04 RX ADMIN — IPRATROPIUM BROMIDE AND ALBUTEROL SULFATE 3 ML: 2.5; .5 SOLUTION RESPIRATORY (INHALATION) at 20:30

## 2018-03-04 RX ADMIN — ALBUTEROL SULFATE 2.5 MG: 2.5 SOLUTION RESPIRATORY (INHALATION) at 03:04

## 2018-03-04 RX ADMIN — IPRATROPIUM BROMIDE AND ALBUTEROL SULFATE 3 ML: 2.5; .5 SOLUTION RESPIRATORY (INHALATION) at 12:42

## 2018-03-04 RX ADMIN — AZITHROMYCIN 500 MG: 250 TABLET, FILM COATED ORAL at 13:50

## 2018-03-04 RX ADMIN — IPRATROPIUM BROMIDE AND ALBUTEROL SULFATE 3 ML: 2.5; .5 SOLUTION RESPIRATORY (INHALATION) at 07:35

## 2018-03-04 RX ADMIN — BENZONATATE 100 MG: 100 CAPSULE ORAL at 17:16

## 2018-03-04 RX ADMIN — BUDESONIDE 500 MCG: 0.5 INHALANT RESPIRATORY (INHALATION) at 20:30

## 2018-03-04 RX ADMIN — FAMOTIDINE 20 MG: 20 TABLET, FILM COATED ORAL at 17:16

## 2018-03-04 RX ADMIN — BENZONATATE 100 MG: 100 CAPSULE ORAL at 21:18

## 2018-03-04 RX ADMIN — HYDROCODONE BITARTRATE AND HOMATROPINE METHYLBROMIDE 5 ML: 5; 1.5 SOLUTION ORAL at 03:16

## 2018-03-04 RX ADMIN — FAMOTIDINE 20 MG: 20 TABLET, FILM COATED ORAL at 08:21

## 2018-03-04 RX ADMIN — GUAIFENESIN 1200 MG: 600 TABLET, EXTENDED RELEASE ORAL at 22:51

## 2018-03-04 RX ADMIN — GUAIFENESIN 1200 MG: 600 TABLET, EXTENDED RELEASE ORAL at 13:50

## 2018-03-04 RX ADMIN — ENOXAPARIN SODIUM 40 MG: 40 INJECTION SUBCUTANEOUS at 08:21

## 2018-03-04 RX ADMIN — DOCUSATE SODIUM 100 MG: 100 CAPSULE, LIQUID FILLED ORAL at 08:21

## 2018-03-04 RX ADMIN — IPRATROPIUM BROMIDE AND ALBUTEROL SULFATE 3 ML: 2.5; .5 SOLUTION RESPIRATORY (INHALATION) at 16:12

## 2018-03-04 RX ADMIN — SODIUM CHLORIDE 75 ML/HR: 900 INJECTION, SOLUTION INTRAVENOUS at 03:13

## 2018-03-04 RX ADMIN — BISACODYL 10 MG: 10 SUPPOSITORY RECTAL at 13:34

## 2018-03-04 RX ADMIN — METHYLPREDNISOLONE SODIUM SUCCINATE 60 MG: 125 INJECTION, POWDER, FOR SOLUTION INTRAMUSCULAR; INTRAVENOUS at 06:02

## 2018-03-04 RX ADMIN — PREDNISONE 40 MG: 20 TABLET ORAL at 13:50

## 2018-03-04 RX ADMIN — BUDESONIDE 500 MCG: 0.5 INHALANT RESPIRATORY (INHALATION) at 07:35

## 2018-03-04 RX ADMIN — Medication 10 ML: at 21:19

## 2018-03-04 NOTE — PROGRESS NOTES
Am shift assessment . Pt is A/O x 4 . Bilateral upper lobes coarse and lower diminished , pt is on room air . S1&s2 auscultated , heart rate regular. Abdomen is soft and pt c/o constipation will given prn laxative . Pt is voiding without difficulty . Ambulates in the room . No c/o pain at this time .  Call light in reach

## 2018-03-04 NOTE — PROGRESS NOTES
Hospitalist Progress Note    3/4/2018  Admit Date: 2018  6:39 PM   NAME: Jeremy Dey   :  1965   MRN:  [id number]   Attending: Deepali Courtney MD  PCP:  None    SUBJECTIVE:   Jeremy Dey is a 49 yo F admitted 3/2 with COPD/asthma exacerbation. Reports she is still coughing, but not bringing much up. States her breathing is better and has been off supplemental oxygen since yesterday. She is still getting IV solumedrol. Review of Systems negative with exception of pertinent positives noted above  PHYSICAL EXAM     Visit Vitals    /79 (BP 1 Location: Right arm, BP Patient Position: At rest)    Pulse 98    Temp 98.2 °F (36.8 °C)    Resp 18    Ht 5' (1.524 m)    Wt 64.4 kg (142 lb)    SpO2 98%    BMI 27.73 kg/m2      Temp (24hrs), Av.3 °F (36.8 °C), Min:97.3 °F (36.3 °C), Max:98.8 °F (37.1 °C)    Patient Vitals for the past 24 hrs:   Temp Pulse Resp BP SpO2   18 1242 - - - - 98 %   18 1105 98.2 °F (36.8 °C) 98 18 144/79 97 %   18 0735 - - - - 97 %   18 0708 97.3 °F (36.3 °C) 98 18 129/82 96 %   18 0305 - - - - 99 %   18 0304 - (!) 105 - - -   18 0237 98.6 °F (37 °C) 95 22 141/83 99 %   18 2313 98.4 °F (36.9 °C) 100 20 144/80 97 %   18 2050 - - - - 99 %   18 1957 98.8 °F (37.1 °C) 100 18 138/82 98 %   18 1640 - - - - 99 %   18 1432 98.7 °F (37.1 °C) 100 18 155/90 99 %       Oxygen Therapy  O2 Sat (%): 98 % (18 1242)  Pulse via Oximetry: 91 beats per minute (18 124)  O2 Device: Room air (18)  O2 Flow Rate (L/min): 0 l/min (18)  FIO2 (%): 21 % (18 124)    Intake/Output Summary (Last 24 hours) at 18 1341  Last data filed at 18 0311   Gross per 24 hour   Intake              608 ml   Output              800 ml   Net             -192 ml      General: No acute distress    Lungs:  Slight occasional expiratory wheeze but lungs otherwise clear, good air movement. Heart:  Regular rate and rhythm,  No murmur, rub, or gallop  Abdomen: Soft, Non distended, Non tender, Positive bowel sounds  Extremities: No cyanosis, clubbing or edema  Neurologic:  No focal deficits    ASSESSMENT      Hospital Problems as of 3/4/2018  Never Reviewed          Codes Class Noted - Resolved POA    Leukocytosis ICD-10-CM: D72.829  ICD-9-CM: 288.60  3/4/2018 - Present No        Marijuana use, continuous ICD-10-CM: F12.90  ICD-9-CM: 305.21  2/28/2018 - Present Yes        Occupational exposure to air contaminants ICD-10-CM: Z57.39  ICD-9-CM: V15.89  2/28/2018 - Present Yes        Cough ICD-10-CM: R05  ICD-9-CM: 786.2  2/28/2018 - Present Yes        Acute bronchitis ICD-10-CM: J20.9  ICD-9-CM: 466.0  2/28/2018 - Present Yes        * (Principal)[facility]ation ([facility] ICD-10-CM: J44.1  ICD-9-CM: 491.21  2/28/2018 - Present Yes        Asthma exacerbation ICD-10-CM: J45.901  ICD-9-CM: 493.92  2/27/2018 - Present Yes            Plan:  · Transition IV solu-medrol to prednisone starting today. · Change IV azithromycin to PO azithromycin. · Schedule tessalon and mucinex. · Stop IVF. · Counseled on marijuana smoking as this could worsen asthma/COPD. · Leukocytosis- likely related to corticosteroid. Recheck tomorrow. Dispo- if she continues to do well with switching to PO prednisone and azithromycin, plan for discharge tomorrow. DVT Prophylaxis: Lovenox    Signed By: Cristian Wesley.  Divine Hennessy MD     March 4, 2018

## 2018-03-05 VITALS
DIASTOLIC BLOOD PRESSURE: 86 MMHG | RESPIRATION RATE: 20 BRPM | BODY MASS INDEX: 27.88 KG/M2 | SYSTOLIC BLOOD PRESSURE: 141 MMHG | TEMPERATURE: 98.2 F | HEART RATE: 83 BPM | OXYGEN SATURATION: 99 % | HEIGHT: 60 IN | WEIGHT: 142 LBS

## 2018-03-05 LAB
ANION GAP SERPL CALC-SCNC: 9 MMOL/L (ref 7–16)
BASOPHILS # BLD: 0 K/UL (ref 0–0.2)
BASOPHILS NFR BLD: 0 % (ref 0–2)
BUN SERPL-MCNC: 12 MG/DL (ref 6–23)
CALCIUM SERPL-MCNC: 9.2 MG/DL (ref 8.3–10.4)
CHLORIDE SERPL-SCNC: 103 MMOL/L (ref 98–107)
CO2 SERPL-SCNC: 28 MMOL/L (ref 21–32)
CREAT SERPL-MCNC: 0.81 MG/DL (ref 0.6–1)
DIFFERENTIAL METHOD BLD: ABNORMAL
EOSINOPHIL # BLD: 0 K/UL (ref 0–0.8)
EOSINOPHIL NFR BLD: 0 % (ref 0.5–7.8)
ERYTHROCYTE [DISTWIDTH] IN BLOOD BY AUTOMATED COUNT: 14.7 % (ref 11.9–14.6)
GLUCOSE SERPL-MCNC: 85 MG/DL (ref 65–100)
HCT VFR BLD AUTO: 35.5 % (ref 35.8–46.3)
HGB BLD-MCNC: 11.9 G/DL (ref 11.7–15.4)
IMM GRANULOCYTES # BLD: 0.4 K/UL (ref 0–0.5)
IMM GRANULOCYTES NFR BLD AUTO: 3 % (ref 0–5)
LYMPHOCYTES # BLD: 3.2 K/UL (ref 0.5–4.6)
LYMPHOCYTES NFR BLD: 28 % (ref 13–44)
MCH RBC QN AUTO: 27 PG (ref 26.1–32.9)
MCHC RBC AUTO-ENTMCNC: 33.5 G/DL (ref 31.4–35)
MCV RBC AUTO: 80.5 FL (ref 79.6–97.8)
MONOCYTES # BLD: 0.8 K/UL (ref 0.1–1.3)
MONOCYTES NFR BLD: 7 % (ref 4–12)
NEUTS SEG # BLD: 6.9 K/UL (ref 1.7–8.2)
NEUTS SEG NFR BLD: 62 % (ref 43–78)
PLATELET # BLD AUTO: 288 K/UL (ref 150–450)
PMV BLD AUTO: 10.5 FL (ref 10.8–14.1)
POTASSIUM SERPL-SCNC: 3.5 MMOL/L (ref 3.5–5.1)
RBC # BLD AUTO: 4.41 M/UL (ref 4.05–5.25)
SODIUM SERPL-SCNC: 140 MMOL/L (ref 136–145)
WBC # BLD AUTO: 11.2 K/UL (ref 4.3–11.1)

## 2018-03-05 PROCEDURE — 74011250637 HC RX REV CODE- 250/637: Performed by: INTERNAL MEDICINE

## 2018-03-05 PROCEDURE — 74011250636 HC RX REV CODE- 250/636: Performed by: FAMILY MEDICINE

## 2018-03-05 PROCEDURE — 74011000250 HC RX REV CODE- 250: Performed by: FAMILY MEDICINE

## 2018-03-05 PROCEDURE — 80048 BASIC METABOLIC PNL TOTAL CA: CPT | Performed by: INTERNAL MEDICINE

## 2018-03-05 PROCEDURE — 36415 COLL VENOUS BLD VENIPUNCTURE: CPT | Performed by: INTERNAL MEDICINE

## 2018-03-05 PROCEDURE — 74011250637 HC RX REV CODE- 250/637: Performed by: FAMILY MEDICINE

## 2018-03-05 PROCEDURE — 85025 COMPLETE CBC W/AUTO DIFF WBC: CPT | Performed by: INTERNAL MEDICINE

## 2018-03-05 PROCEDURE — 94640 AIRWAY INHALATION TREATMENT: CPT

## 2018-03-05 PROCEDURE — 74011250637 HC RX REV CODE- 250/637: Performed by: HOSPITALIST

## 2018-03-05 PROCEDURE — 74011636637 HC RX REV CODE- 636/637: Performed by: INTERNAL MEDICINE

## 2018-03-05 PROCEDURE — 94760 N-INVAS EAR/PLS OXIMETRY 1: CPT

## 2018-03-05 RX ORDER — BENZONATATE 100 MG/1
100 CAPSULE ORAL
Qty: 30 CAP | Refills: 0 | Status: SHIPPED | OUTPATIENT
Start: 2018-03-05 | End: 2018-03-15

## 2018-03-05 RX ORDER — PREDNISONE 10 MG/1
TABLET ORAL
Qty: 22 TAB | Refills: 0 | Status: SHIPPED | OUTPATIENT
Start: 2018-03-06 | End: 2018-08-13

## 2018-03-05 RX ORDER — ALBUTEROL SULFATE 0.83 MG/ML
2.5 SOLUTION RESPIRATORY (INHALATION)
Qty: 120 EACH | Refills: 0 | Status: SHIPPED | OUTPATIENT
Start: 2018-03-05 | End: 2018-10-29 | Stop reason: CLARIF

## 2018-03-05 RX ORDER — ALBUTEROL SULFATE 0.83 MG/ML
2.5 SOLUTION RESPIRATORY (INHALATION)
Status: ON HOLD | COMMUNITY
End: 2018-03-05

## 2018-03-05 RX ORDER — AZITHROMYCIN 500 MG/1
500 TABLET, FILM COATED ORAL DAILY
Qty: 2 TAB | Refills: 0 | Status: SHIPPED | OUTPATIENT
Start: 2018-03-06 | End: 2018-03-08

## 2018-03-05 RX ADMIN — IPRATROPIUM BROMIDE AND ALBUTEROL SULFATE 3 ML: 2.5; .5 SOLUTION RESPIRATORY (INHALATION) at 07:48

## 2018-03-05 RX ADMIN — BUDESONIDE 500 MCG: 0.5 INHALANT RESPIRATORY (INHALATION) at 07:52

## 2018-03-05 RX ADMIN — BENZONATATE 100 MG: 100 CAPSULE ORAL at 08:08

## 2018-03-05 RX ADMIN — AZITHROMYCIN 500 MG: 250 TABLET, FILM COATED ORAL at 08:08

## 2018-03-05 RX ADMIN — FAMOTIDINE 20 MG: 20 TABLET, FILM COATED ORAL at 08:08

## 2018-03-05 RX ADMIN — DOCUSATE SODIUM 100 MG: 100 CAPSULE, LIQUID FILLED ORAL at 08:08

## 2018-03-05 RX ADMIN — ENOXAPARIN SODIUM 40 MG: 40 INJECTION SUBCUTANEOUS at 08:08

## 2018-03-05 RX ADMIN — PREDNISONE 40 MG: 20 TABLET ORAL at 08:08

## 2018-03-05 NOTE — PROGRESS NOTES
[address] March 5, 2018       RE: Quique Link      To Whom It May Concern,    This is to certify that Quique Link has been in our care from 02/27/18 to 03/05/18. Vanita Yun has been here helping care for Juan Tee during this time period. Please feel free to contact my office if you have any questions or concerns. Thank you for your assistance in this matter.       Sincerely,  [employer], LIZZIE   [phone]

## 2018-03-05 NOTE — DISCHARGE INSTRUCTIONS
Learning About Asthma Triggers  What are asthma triggers? When you have asthma, certain things can make your symptoms worse. These are called triggers. Learn what triggers an asthma attack for you, and avoid the triggers when you can. Common triggers include colds, smoke, air pollution, dust, pollen, pets, stress, and cold air. How do asthma triggers affect you? Triggers can make it harder for your lungs to work as they should. They can lead to sudden breathing problems and other symptoms. When you are around a trigger, an asthma attack is more likely. If your symptoms are severe, you may need emergency treatment or have to go to the hospital for treatment. What can you do to avoid triggers? The first thing is to know your triggers. When you are having symptoms, note the things around you that might be causing them. Then look for patterns that may be triggering your symptoms. Record your triggers on a piece of paper or in an asthma diary. When you have your list of possible triggers, work with your doctor to find ways to avoid them. Avoid colds and flu. Get a pneumococcal vaccine shot. If you have had one before, ask your doctor whether you need a second dose. Get a flu vaccine every year, as soon as it's available. If you must be around people with colds or the flu, wash your hands often. Here are some ways to avoid a few common triggers. · Do not smoke or allow others to smoke around you. If you need help quitting, talk to your doctor about stop-smoking programs and medicines. These can increase your chances of quitting for good. · If there is a lot of pollution, pollen, or dust outside, stay at home and keep your windows closed. Use an air conditioner or air filter in your home. Check your local weather report or newspaper for air quality and pollen reports. What else should you know? · Take your controller medicine every day, not just when you have symptoms.  It helps prevent problems before they occur. · Your doctor may suggest that you check how well your lungs are working by measuring your peak expiratory flow (PEF) throughout the day. Your PEF may drop when you are near things that trigger symptoms. Where can you learn more? Go to http://abdirashid-chetna.info/. Enter Z101 in the search box to learn more about \"Learning About Asthma Triggers. \"  Current as of: May 12, 2017  Content Version: 11.4  © 8000-0125 [employer]. Care instructions adapted under license by [employer] (which disclaims liability or warranty for this information). If you have questions about a medical condition or this instruction, always ask your healthcare professional. [address] any warranty or liability for your use of this information. Learning About Asthma Triggers  What are asthma triggers? When you have asthma, certain things can make your symptoms worse. These are called triggers. Learn what triggers an asthma attack for you, and avoid the triggers when you can. Common triggers include colds, smoke, air pollution, dust, pollen, pets, stress, and cold air. How do asthma triggers affect you? Triggers can make it harder for your lungs to work as they should. They can lead to sudden breathing problems and other symptoms. When you are around a trigger, an asthma attack is more likely. If your symptoms are severe, you may need emergency treatment or have to go to the hospital for treatment. What can you do to avoid triggers? The first thing is to know your triggers. When you are having symptoms, note the things around you that might be causing them. Then look for patterns that may be triggering your symptoms. Record your triggers on a piece of paper or in an asthma diary. When you have your list of possible triggers, work with your doctor to find ways to avoid them. Avoid colds and flu. Get a pneumococcal vaccine shot.  If you have had one before, ask your doctor whether you need a second dose. Get a flu vaccine every year, as soon as it's available. If you must be around people with colds or the flu, wash your hands often. Here are some ways to avoid a few common triggers. · Do not smoke or allow others to smoke around you. If you need help quitting, talk to your doctor about stop-smoking programs and medicines. These can increase your chances of quitting for good. · If there is a lot of pollution, pollen, or dust outside, stay at home and keep your windows closed. Use an air conditioner or air filter in your home. Check your local weather report or newspaper for air quality and pollen reports. What else should you know? · Take your controller medicine every day, not just when you have symptoms. It helps prevent problems before they occur. · Your doctor may suggest that you check how well your lungs are working by measuring your peak expiratory flow (PEF) throughout the day. Your PEF may drop when you are near things that trigger symptoms. Where can you learn more? Go to http://abdirashid-chetna.info/. Enter W913 in the search box to learn more about \"Learning About Asthma Triggers. \"  Current as of: May 12, 2017  Content Version: 11.4  © 7201-0191 [employer]. Care instructions adapted under license by [employer] (which disclaims liability or warranty for this information). If you have questions about a medical condition or this instruction, always ask your healthcare professional. [place] [address] any warranty or liability for your use of this information.     DISCHARGE SUMMARY from Nurse    The following personal items are in your possession at time of discharge:                   Clothing: With patient                PATIENT INSTRUCTIONS:    After general anesthesia or intravenous sedation, for 24 hours or while taking prescription Narcotics:  · Limit your activities  · Do not drive and operate hazardous machinery  · Do not make important personal or business decisions  · Do  not drink alcoholic beverages  · If you have not urinated within 8 hours after discharge, please contact your surgeon on call. Report the following to your surgeon:  · Excessive pain, swelling, redness or odor of or around the surgical area  · Temperature over 100.5  · Nausea and vomiting lasting longer than 4 hours or if unable to take medications  · Any signs of decreased circulation or nerve impairment to extremity: change in color, persistent  numbness, tingling, coldness or increase pain  · Any questions        What to do at Home:  Recommended activity: Activity as tolerated, per MD    If you experience any of the following symptoms fever>101, pain unrelieved with medication, nausea/vomiting, shortness of breath, dizziness/fainting, chest pain. , please follow up with your doctor. *  Please give a list of your current medications to your Primary Care Provider. *  Please update this list whenever your medications are discontinued, doses are      changed, or new medications (including over-the-counter products) are added. *  Please carry medication information at all times in case of emergency situations. These are general instructions for a healthy lifestyle:    No smoking/ No tobacco products/ Avoid exposure to second hand smoke    Surgeon General's Warning:  Quitting smoking now greatly reduces serious risk to your health. Obesity, smoking, and sedentary lifestyle greatly increases your risk for illness    A healthy diet, regular physical exercise & weight monitoring are important for maintaining a healthy lifestyle    You may be retaining fluid if you have a history of heart failure or if you experience any of the following symptoms:  Weight gain of 3 pounds or more overnight or 5 pounds in a week, increased swelling in our hands or feet or shortness of breath while lying flat in bed. Please call your doctor as soon as you notice any of these symptoms; do not wait until your next office visit. Recognize signs and symptoms of STROKE:    F-face looks uneven    A-arms unable to move or move unevenly    S-speech slurred or non-existent    T-time-call 911 as soon as signs and symptoms begin-DO NOT go       Back to bed or wait to see if you get better-TIME IS BRAIN. Warning Signs of HEART ATTACK     Call 911 if you have these symptoms:   Chest discomfort. Most heart attacks involve discomfort in the center of the chest that lasts more than a few minutes, or that goes away and comes back. It can feel like uncomfortable pressure, squeezing, fullness, or pain.  Discomfort in other areas of the upper body. Symptoms can include pain or discomfort in one or both arms, the back, neck, jaw, or stomach.  Shortness of breath with or without chest discomfort.  Other signs may include breaking out in a cold sweat, nausea, or lightheadedness. Don't wait more than five minutes to call 911 - MINUTES MATTER! Fast action can save your life. Calling 911 is almost always the fastest way to get lifesaving treatment. Emergency Medical Services staff can begin treatment when they arrive -- up to an hour sooner than if someone gets to the hospital by car. The discharge information has been reviewed with the patient. The patient verbalized understanding. Discharge medications reviewed with the patient and appropriate educational materials and side effects teaching were provided.

## 2018-03-05 NOTE — DISCHARGE SUMMARY
Hospitalist Discharge Summary     Patient ID:  Kimberly Dumont  [id number]  8 y.o.  1965  Admit date: 2/27/2018  6:39 PM  Discharge date and time: 3/5/2018  Attending: Terell Quevedo MD  PCP:  None  Treatment Team: Attending Provider: Terell Quevedo MD; Utilization Review: Gael Whitlock RN    Principal Diagnosis COPD exacerbation [facility])   Hospital Problems as of 3/5/2018  Never Reviewed          Codes Class Noted - Resolved POA    Leukocytosis ICD-10-CM: D72.829  ICD-9-CM: 288.60  3/4/2018 - Present No        Marijuana use, continuous ICD-10-CM: F12.90  ICD-9-CM: 305.21  2/28/2018 - Present Yes        Occupational exposure to air contaminants ICD-10-CM: Z57.39  ICD-9-CM: V15.89  2/28/2018 - Present Yes        Cough ICD-10-CM: R05  ICD-9-CM: 786.2  2/28/2018 - Present Yes        Acute bronchitis ICD-10-CM: J20.9  ICD-9-CM: 466.0  2/28/2018 - Present Yes        * (Principal)COPD exacerbation ([address] ICD-10-CM: J44.1  ICD-9-CM: 491.21  2/28/2018 - Present Yes        Asthma exacerbation ICD-10-CM: J45.901  ICD-9-CM: 493.92  2/27/2018 - Present Yes                 HPI:  'Kimberly Dumont is a 48y.o. year-old female with a past medical history of asthma on advair/albuterol nebulizers at home who presents to the ER with complaint of about 3 days of worsening wheezing, cough, and phlegm productions. She reports having problems with shortness of breath on a routine basis, particurlarly worsened when exposed to certain chemicals used at there workplace. However, over the past 3 days she has begun having worsening head and chest congestion, cough, and wheeze that has not gotten better by using her nebulizer machine at home. Admits to subjective fever yesterday, denies chills, nausea, vomiting, constipation, diarrhea, abdominal pain. Admits to rib pain when coughing.'    Hospital Course:  1. Asthma/COPD exacerbation- she was treated with IV solumedrol, azithromycin, and breathing treatments/pulmonary toilet.   She was able to be weaned off oxygen and her cough improved some. She was transitioned to PO prednisone and azithromycin on 3/4 and tolerated this well. On day of discharge, she reports she is breathing well, but still complaining of some cough. She does report that prior to admission, she was not using her advair or albuterol as prescribed. She was counseled on importance of inhaler compliance. She will be discharged on prednisone taper. She will be arranged follow up to establish care with [facility] in a few weeks. Significant Diagnostic Studies:       Labs: Results:       Chemistry No results for input(s): GLU, NA, K, CL, CO2, BUN, CREA, CA, AGAP, BUCR, TBIL, GPT, AP, TP, ALB, GLOB, AGRAT in the last 72 hours. CBC w/Diff Recent Labs      03/05/18   0615   WBC  11.2*   RBC  4.41   HGB  11.9   HCT  35.5*   PLT  288   GRANS  62   LYMPH  28   EOS  0*      Cardiac Enzymes No results for input(s): CPK, CKND1, SAUL in the last 72 hours. No lab exists for component: CKRMB, TROIP   Coagulation No results for input(s): PTP, INR, APTT in the last 72 hours. No lab exists for component: INREXT    Lipid Panel No results found for: CHOL, CHOLPOCT, CHOLX, CHLST, CHOLV, 458886, HDL, LDL, LDLC, DLDLP, 258131, VLDLC, VLDL, TGLX, TRIGL, TRIGP, TGLPOCT, CHHD, CHHDX   BNP No results for input(s): BNPP in the last 72 hours. Liver Enzymes No results for input(s): TP, ALB, TBIL, AP, SGOT, GPT in the last 72 hours. No lab exists for component: DBIL   Thyroid Studies No results found for: T4, T3U, TSH, TSHEXT       Imaging:    XR CHEST SNGL V   Final Result   IMPRESSION: No acute abnormality      XR CHEST PORT   Final Result   Impression:  No significant interval change.              Discharge Exam:  Visit Vitals    /77    Pulse 92    Temp 97.3 °F (36.3 °C)    Resp 20    Ht 5' (1.524 m)    Wt 64.4 kg (142 lb)    SpO2 99%    BMI 27.73 kg/m2     General appearance: alert, cooperative, no distress, appears stated age  Lungs: mild rhonchi that clears with cough, slight expiratory wheeze  Heart: regular rate and rhythm, S1, S2 normal, no murmur, click, rub or gallop  Abdomen: soft, non-tender. Bowel sounds normal. No masses,  no organomegaly  Extremities: no cyanosis or edema  Neurologic: Grossly normal    Disposition: home  Discharge Condition: stable  Patient Instructions:   Current Discharge Medication List      START taking these medications    Details   azithromycin (ZITHROMAX) 500 mg tab Take 1 Tab by mouth daily for 2 days. Indications: COPD exacerbation  Qty: 2 Tab, Refills: 0      benzonatate (TESSALON) 100 mg capsule Take 1 Cap by mouth three (3) times daily as needed for Cough for up to 10 days. Indications: Cough  Qty: 30 Cap, Refills: 0      guaiFENesin ER (MUCINEX) 1,200 mg Ta12 ER tablet Take 1 Tab by mouth every twelve (12) hours. Indications: Cough  Qty: 30 Tab, Refills: 0      predniSONE (DELTASONE) 10 mg tablet Taper:  4 tabs daily x 1 day, 3 tabs daily x 3 days, 2 tabs daily x 3 days, 1 tab daily x 3 days  Indications: Asthma Exacerbation  Qty: 22 Tab, Refills: 0         CONTINUE these medications which have CHANGED    Details   albuterol (PROVENTIL VENTOLIN) 2.5 mg /3 mL (0.083 %) nebulizer solution 3 mL by Nebulization route every six (6) hours as needed for Wheezing or Shortness of Breath. Indications: Bronchospastic Pulmonary Disease  Qty: 120 Each, Refills: 0         CONTINUE these medications which have NOT CHANGED    Details   fluticasone-salmeterol (ADVAIR DISKUS) 250-50 mcg/dose diskus inhaler Take 1 Puff by inhalation every twelve (12) hours. Activity: Activity as tolerated- may return to work on Friday, 3/9/18, or sooner if feeling better. Diet: Regular Diet      Follow-up      Follow-up Appointments   Procedures    FOLLOW UP VISIT Appointment in: Other (Specify) 1. PCP in 3-5 days. 2. [facility] in 2-3 weeks to establish care for asthma/COPD. 1. PCP in 3-5 days.   2. [facility] in 2-3 weeks to establish care for asthma/COPD. Standing Status:   Standing     Number of Occurrences:   1     Order Specific Question:   Appointment in     Answer: Other (Specify)   ·   ·   Time spent to discharge patient 32 minutes  Signed:  El Humphreys.  Isaura Guaman MD  3/5/2018  8:08 AM

## 2018-03-05 NOTE — PROGRESS NOTES
Discharge instructions and prescriptions provided and explained to the pt. Med side effect sheet reviewed. Opportunity for questions provided. Pt wants to take a shower. Instructed to call once ready to leave.

## 2018-03-05 NOTE — PROGRESS NOTES
Pt assessment completed. Alert and oriented. Lungs coarse bilaterally with even and unlabored respirations. Heart sounds regular. Abdomen soft and non tender with active bowel sounds. Iv present with no s/sx of complications at this time. Pt denies pain needs. All needs met will continue to monitor.

## 2018-03-05 NOTE — PROGRESS NOTES
Late Note Entry:  COPD and Asthma  education complete. Patient shown videos, given booklets and information. Purse lip and diaphragmatic breathing demonstrated to patient- Patient understood and demonstrated well. Patient verbalized understanding of disease process. All questions answered.

## 2018-08-13 ENCOUNTER — HOSPITAL ENCOUNTER (EMERGENCY)
Age: 53
Discharge: HOME | End: 2018-08-13
Attending: EMERGENCY MEDICINE
Payer: COMMERCIAL

## 2018-08-13 ENCOUNTER — APPOINTMENT (OUTPATIENT)
Dept: GENERAL RADIOLOGY | Age: 53
End: 2018-08-13
Attending: EMERGENCY MEDICINE
Payer: COMMERCIAL

## 2018-08-13 VITALS
DIASTOLIC BLOOD PRESSURE: 87 MMHG | SYSTOLIC BLOOD PRESSURE: 142 MMHG | OXYGEN SATURATION: 98 % | TEMPERATURE: 97.8 F | HEIGHT: 60 IN | HEART RATE: 82 BPM | RESPIRATION RATE: 16 BRPM | WEIGHT: 160 LBS | BODY MASS INDEX: 31.41 KG/M2

## 2018-08-13 DIAGNOSIS — J44.1 COPD EXACERBATION (HCC): Primary | ICD-10-CM

## 2018-08-13 LAB
ALBUMIN SERPL-MCNC: 4 G/DL (ref 3.5–5)
ALBUMIN/GLOB SERPL: 1 {RATIO} (ref 1.2–3.5)
ALP SERPL-CCNC: 109 U/L (ref 50–136)
ALT SERPL-CCNC: 26 U/L (ref 12–65)
ANION GAP SERPL CALC-SCNC: 11 MMOL/L (ref 7–16)
AST SERPL-CCNC: 25 U/L (ref 15–37)
ATRIAL RATE: 75 BPM
BASOPHILS # BLD: 0.1 K/UL
BASOPHILS NFR BLD: 1 % (ref 0–2)
BILIRUB SERPL-MCNC: 0.7 MG/DL (ref 0.2–1.1)
BUN SERPL-MCNC: 6 MG/DL (ref 6–23)
CALCIUM SERPL-MCNC: 9.2 MG/DL (ref 8.3–10.4)
CALCULATED P AXIS, ECG09: 79 DEGREES
CALCULATED R AXIS, ECG10: 58 DEGREES
CALCULATED T AXIS, ECG11: 67 DEGREES
CHLORIDE SERPL-SCNC: 103 MMOL/L (ref 98–107)
CO2 SERPL-SCNC: 26 MMOL/L (ref 21–32)
CREAT SERPL-MCNC: 0.94 MG/DL (ref 0.6–1)
DIAGNOSIS, 93000: NORMAL
DIFFERENTIAL METHOD BLD: NORMAL
EOSINOPHIL # BLD: 0.3 K/UL
EOSINOPHIL NFR BLD: 5 % (ref 0.5–7.8)
ERYTHROCYTE [DISTWIDTH] IN BLOOD BY AUTOMATED COUNT: 14 %
GLOBULIN SER CALC-MCNC: 4.1 G/DL (ref 2.3–3.5)
GLUCOSE SERPL-MCNC: 93 MG/DL (ref 65–100)
HCT VFR BLD AUTO: 39.4 % (ref 35.8–46.3)
HGB BLD-MCNC: 12.9 G/DL (ref 11.7–15.4)
IMM GRANULOCYTES # BLD: 0 K/UL
IMM GRANULOCYTES NFR BLD AUTO: 0 % (ref 0–5)
LYMPHOCYTES # BLD: 2.2 K/UL
LYMPHOCYTES NFR BLD: 34 % (ref 13–44)
MCH RBC QN AUTO: 27.2 PG (ref 26.1–32.9)
MCHC RBC AUTO-ENTMCNC: 32.7 G/DL (ref 31.4–35)
MCV RBC AUTO: 83.1 FL (ref 79.6–97.8)
MONOCYTES # BLD: 0.4 K/UL
MONOCYTES NFR BLD: 6 % (ref 4–12)
NEUTS SEG # BLD: 3.5 K/UL
NEUTS SEG NFR BLD: 55 % (ref 43–78)
NRBC # BLD: 0 K/UL (ref 0–0.2)
P-R INTERVAL, ECG05: 138 MS
PLATELET # BLD AUTO: 236 K/UL (ref 150–450)
PMV BLD AUTO: 11.3 FL (ref 9.4–12.3)
POTASSIUM SERPL-SCNC: 3.7 MMOL/L (ref 3.5–5.1)
PROT SERPL-MCNC: 8.1 G/DL (ref 6.3–8.2)
Q-T INTERVAL, ECG07: 372 MS
QRS DURATION, ECG06: 70 MS
QTC CALCULATION (BEZET), ECG08: 415 MS
RBC # BLD AUTO: 4.74 M/UL (ref 4.05–5.2)
SODIUM SERPL-SCNC: 140 MMOL/L (ref 136–145)
TROPONIN I BLD-MCNC: 0 NG/ML (ref 0.02–0.05)
VENTRICULAR RATE, ECG03: 75 BPM
WBC # BLD AUTO: 6.4 K/UL (ref 4.3–11.1)

## 2018-08-13 PROCEDURE — 71046 X-RAY EXAM CHEST 2 VIEWS: CPT

## 2018-08-13 PROCEDURE — 99285 EMERGENCY DEPT VISIT HI MDM: CPT | Performed by: EMERGENCY MEDICINE

## 2018-08-13 PROCEDURE — 74011000250 HC RX REV CODE- 250: Performed by: EMERGENCY MEDICINE

## 2018-08-13 PROCEDURE — 84484 ASSAY OF TROPONIN QUANT: CPT

## 2018-08-13 PROCEDURE — 83605 ASSAY OF LACTIC ACID: CPT

## 2018-08-13 PROCEDURE — 74011250636 HC RX REV CODE- 250/636: Performed by: EMERGENCY MEDICINE

## 2018-08-13 PROCEDURE — 93005 ELECTROCARDIOGRAM TRACING: CPT | Performed by: EMERGENCY MEDICINE

## 2018-08-13 PROCEDURE — 94640 AIRWAY INHALATION TREATMENT: CPT

## 2018-08-13 PROCEDURE — 85025 COMPLETE CBC W/AUTO DIFF WBC: CPT

## 2018-08-13 PROCEDURE — 96374 THER/PROPH/DIAG INJ IV PUSH: CPT | Performed by: EMERGENCY MEDICINE

## 2018-08-13 PROCEDURE — 80053 COMPREHEN METABOLIC PANEL: CPT

## 2018-08-13 RX ORDER — PREDNISONE 20 MG/1
40 TABLET ORAL DAILY
Qty: 8 TAB | Refills: 0 | Status: SHIPPED | OUTPATIENT
Start: 2018-08-13 | End: 2018-08-17

## 2018-08-13 RX ORDER — IPRATROPIUM BROMIDE AND ALBUTEROL SULFATE 2.5; .5 MG/3ML; MG/3ML
3 SOLUTION RESPIRATORY (INHALATION)
Status: COMPLETED | OUTPATIENT
Start: 2018-08-13 | End: 2018-08-13

## 2018-08-13 RX ADMIN — METHYLPREDNISOLONE SODIUM SUCCINATE 125 MG: 125 INJECTION, POWDER, FOR SOLUTION INTRAMUSCULAR; INTRAVENOUS at 11:37

## 2018-08-13 RX ADMIN — IPRATROPIUM BROMIDE AND ALBUTEROL SULFATE 3 ML: 2.5; .5 SOLUTION RESPIRATORY (INHALATION) at 11:21

## 2018-08-13 RX ADMIN — IPRATROPIUM BROMIDE AND ALBUTEROL SULFATE 3 ML: 2.5; .5 SOLUTION RESPIRATORY (INHALATION) at 10:30

## 2018-08-13 NOTE — ED NOTES
I have reviewed discharge instructions with the patient. The patient verbalized understanding. Patient left ED via Discharge Method: ambulatory to Home with self and family member. Pt declined wheelchair. Opportunity for questions and clarification provided. Patient given 1 scripts. Work note provided. To continue your aftercare when you leave the hospital, you may receive an automated call from our care team to check in on how you are doing. This is a free service and part of our promise to provide the best care and service to meet your aftercare needs.  If you have questions, or wish to unsubscribe from this service please call [phone]. Thank you for Choosing our [facility].

## 2018-08-13 NOTE — ED PROVIDER NOTES
HPI Comments: Patient is a 82-year-old female with history of COPD who comes to the emergency department today complaining of chest tightness and wheezing for one day. She states chest today she lives a citronella candles on the porch to keep mosquitoes away when the fan blew a lot of fumes into the home and into her room and that caused her to have chest tightness and wheezing she tried her nebulizer and inhalers with no relief. She denies fever. Patient is a 48 y.o. female presenting with shortness of breath. The history is provided by the patient. Shortness of Breath   This is a new problem. The average episode lasts 1 day. The current episode started yesterday. The problem has not changed since onset. Associated symptoms include cough, sputum production, wheezing and chest pain. Pertinent negatives include no fever, no hemoptysis, no syncope, no vomiting, no abdominal pain and no leg swelling. The problem's precipitants include fumes and smoke. She has tried beta-agonist inhalers for the symptoms. She has had prior hospitalizations. She has had prior ED visits. She has had no prior ICU admissions. Associated medical issues include asthma and COPD. Associated medical issues do not include CAD, heart failure or past MI. Past Medical History:   Diagnosis Date    Asthma     Chronic obstructive pulmonary disease ([address]     Ill-defined condition     pancreatitis       Past Surgical History:   Procedure Laterality Date    HX ORTHOPAEDIC           History reviewed. No pertinent family history. Social History     Social History    Marital status: SINGLE     Spouse name: N/A    Number of children: N/A    Years of education: N/A     Occupational History    Not on file.      Social History Main Topics    Smoking status: Former Smoker    Smokeless tobacco: Not on file    Alcohol use No    Drug use: Yes     Special: Marijuana    Sexual activity: No     Other Topics Concern    Not on file     Social History Narrative         ALLERGIES: Latex    Review of Systems   Constitutional: Negative for fever. HENT: Negative. Eyes: Negative. Respiratory: Positive for cough, sputum production, shortness of breath and wheezing. Negative for hemoptysis. Cardiovascular: Positive for chest pain. Negative for leg swelling and syncope. Gastrointestinal: Negative for abdominal pain and vomiting. Endocrine: Negative. Genitourinary: Negative. Musculoskeletal: Negative. Skin: Negative. Neurological: Negative. Vitals:    08/13/18 1013 08/13/18 1030   BP: 152/90    Pulse: 81    Resp: 26    Temp: 97.8 °F (36.6 °C)    SpO2: 97% 97%   Weight: 72.6 kg (160 lb)    Height: 5' (1.524 m)             Physical Exam   HENT:   Head: Normocephalic and atraumatic. Mouth/Throat: Oropharynx is clear and moist.   Neck: Normal range of motion. Neck supple. Cardiovascular: Tachycardia present. Pulmonary/Chest: She has wheezes. Decreased air movement throughout. Abdominal: Soft. There is no tenderness. Musculoskeletal: Normal range of motion. She exhibits no edema or tenderness. Lymphadenopathy:     She has no cervical adenopathy. Skin: Skin is warm and dry. No rash noted. Nursing note and vitals reviewed. MDM  Number of Diagnoses or Management Options  Diagnosis management comments: EKG shows normal sinus rhythm at a rate of 75 with no acute ischemic changes    Chest x-ray is negative    Still wheezing after neb treatment we'll give a dose of IV Solu-Medrol and a second neb treatment and reassess. 12:19 PM  Wheezing is improved. Blood work is unremarkable. Patient and her family are reassured with this. She will continue with her nebulizer and inhalers. I will prescribe a short course of prednisone.        Amount and/or Complexity of Data Reviewed  Clinical lab tests: ordered and reviewed  Tests in the radiology section of CPT®: ordered and reviewed  Review and summarize past medical records: yes  Independent visualization of images, tracings, or specimens: yes    Risk of Complications, Morbidity, and/or Mortality  Presenting problems: moderate  Diagnostic procedures: moderate  Management options: moderate    Patient Progress  Patient progress: improved        ED Course   Voice dictation software was used during the making of this note. This software is not perfect and grammatical and other typographical errors may be present. This note has been proofread, but may still contain errors.   Lauro Muro MD; 8/13/2018 @12:20 PM   ===================================================================        Procedures

## 2018-08-13 NOTE — DISCHARGE INSTRUCTIONS
Chronic Obstructive Pulmonary Disease (COPD): Care Instructions  Your Care Instructions    Chronic obstructive pulmonary disease (COPD) is a general term for a group of lung diseases, including emphysema and chronic bronchitis. People with COPD have decreased airflow in and out of the lungs, which makes it hard to breathe. The airways also can get clogged with thick mucus. Cigarette smoking is a major cause of COPD. Although there is no cure for COPD, you can slow its progress. Following your treatment plan and taking care of yourself can help you feel better and live longer. Follow-up care is a key part of your treatment and safety. Be sure to make and go to all appointments, and call your doctor if you are having problems. It's also a good idea to know your test results and keep a list of the medicines you take. How can you care for yourself at home?   Staying healthy    · Do not smoke. This is the most important step you can take to prevent more damage to your lungs. If you need help quitting, talk to your doctor about stop-smoking programs and medicines. These can increase your chances of quitting for good.     · Avoid colds and flu. Get a pneumococcal vaccine shot. If you have had one before, ask your doctor whether you need a second dose. Get the flu vaccine every fall. If you must be around people with colds or the flu, wash your hands often.     · Avoid secondhand smoke, air pollution, and high altitudes. Also avoid cold, dry air and hot, humid air. Stay at home with your windows closed when air pollution is bad.    Medicines and oxygen therapy    · Take your medicines exactly as prescribed. Call your doctor if you think you are having a problem with your medicine.     · You may be taking medicines such as:  ¨ Bronchodilators. These help open your airways and make breathing easier. Bronchodilators are either short-acting (work for 6 to 9 hours) or long-acting (work for 24 hours).  You inhale most bronchodilators, so they start to act quickly. Always carry your quick-relief inhaler with you in case you need it while you are away from home. ¨ Corticosteroids (prednisone, budesonide). These reduce airway inflammation. They come in pill or inhaled form. You must take these medicines every day for them to work well.     · A spacer may help you get more inhaled medicine to your lungs. Ask your doctor or pharmacist if a spacer is right for you. If it is, ask how to use it properly.     · Do not take any vitamins, over-the-counter medicine, or herbal products without talking to your doctor first.     · If your doctor prescribed antibiotics, take them as directed. Do not stop taking them just because you feel better. You need to take the full course of antibiotics.     · Oxygen therapy boosts the amount of oxygen in your blood and helps you breathe easier. Use the flow rate your doctor has recommended, and do not change it without talking to your doctor first.   Activity    · Get regular exercise. Walking is an easy way to get exercise. Start out slowly, and walk a little more each day.     · Pay attention to your breathing. You are exercising too hard if you cannot talk while you are exercising.     · Take short rest breaks when doing household chores and other activities.     · Learn breathing methods-such as breathing through pursed lips-to help you become less short of breath.     · If your doctor has not set you up with a pulmonary rehabilitation program, talk to him or her about whether rehab is right for you. Rehab includes exercise programs, education about your disease and how to manage it, help with diet and other changes, and emotional support. Diet    · Eat regular, healthy meals. Use bronchodilators about 1 hour before you eat to make it easier to eat. Eat several small meals instead of three large ones.  Drink beverages at the end of the meal. Avoid foods that are hard to chew.     · Eat foods that contain protein so that you do not lose muscle mass.     · Talk with your doctor if you gain too much weight or if you lose weight without trying.    Mental health    · Talk to your family, friends, or a therapist about your feelings. It is normal to feel frightened, angry, hopeless, helpless, and even guilty. Talking openly about bad feelings can help you cope. If these feelings last, talk to your doctor. When should you call for help? Call 911 anytime you think you may need emergency care. For example, call if:    · You have severe trouble breathing.    Call your doctor now or seek immediate medical care if:    · You have new or worse trouble breathing.     · You cough up blood.     · You have a fever.    Watch closely for changes in your health, and be sure to contact your doctor if:    · You cough more deeply or more often, especially if you notice more mucus or a change in the color of your mucus.     · You have new or worse swelling in your legs or belly.     · You are not getting better as expected. Where can you learn more? Go to http://abdirashid-chetna.info/. Marleny Jean-Baptiste in the search box to learn more about \"Chronic Obstructive Pulmonary Disease (COPD): Care Instructions. \"  Current as of: December 6, 2017  Content Version: 11.7  © 7265-7884 [employer]. Care instructions adapted under license by [employer] (which disclaims liability or warranty for this information). If you have questions about a medical condition or this instruction, always ask your healthcare professional. [place] [address] any warranty or liability for your use of this information.

## 2018-08-13 NOTE — LETTER
[address] [facility] EMERGENCY DEPT 
[facility] [address] [facility] [id number] 
[phone] Work/School Note Date: 8/13/2018 To Whom It May concern: Aneita Sacks was seen and treated today in the emergency room by the following provider(s): 
Attending Provider: Nick Zabala MD. Aneita Sacks may return to work on 8/15/2018. Sincerely, Aida Andres RN

## 2018-08-13 NOTE — ED TRIAGE NOTES
Patient states shortness of breath since yesterday. Patient attempt inhaler pta. Patient tachypnea on arrival. Patient upper airway wheezing on arrival. Patient hx of copd and asthma.

## 2018-08-14 LAB — LACTATE BLD-SCNC: 2.1 MMOL/L (ref 0.5–1.9)

## 2018-10-29 ENCOUNTER — HOSPITAL ENCOUNTER (EMERGENCY)
Age: 53
Discharge: HOME | End: 2018-10-29
Attending: EMERGENCY MEDICINE
Payer: COMMERCIAL

## 2018-10-29 VITALS
RESPIRATION RATE: 20 BRPM | DIASTOLIC BLOOD PRESSURE: 76 MMHG | WEIGHT: 160 LBS | HEART RATE: 76 BPM | SYSTOLIC BLOOD PRESSURE: 132 MMHG | OXYGEN SATURATION: 98 % | BODY MASS INDEX: 31.41 KG/M2 | TEMPERATURE: 98 F | HEIGHT: 60 IN

## 2018-10-29 DIAGNOSIS — K85.90 ACUTE PANCREATITIS, UNSPECIFIED COMPLICATION STATUS, UNSPECIFIED PANCREATITIS TYPE: Primary | ICD-10-CM

## 2018-10-29 LAB
ALBUMIN SERPL-MCNC: 4.1 G/DL (ref 3.5–5)
ALBUMIN/GLOB SERPL: 1 {RATIO}
ALP SERPL-CCNC: 113 U/L (ref 50–136)
ALT SERPL-CCNC: 27 U/L (ref 12–65)
ANION GAP SERPL CALC-SCNC: 8 MMOL/L
AST SERPL-CCNC: 20 U/L (ref 15–37)
BASOPHILS # BLD: 0.1 K/UL (ref 0–0.2)
BASOPHILS NFR BLD: 1 % (ref 0–2)
BILIRUB SERPL-MCNC: 0.6 MG/DL (ref 0.2–1.1)
BUN SERPL-MCNC: 9 MG/DL (ref 6–23)
CALCIUM SERPL-MCNC: 9.3 MG/DL (ref 8.3–10.4)
CHLORIDE SERPL-SCNC: 102 MMOL/L (ref 98–107)
CO2 SERPL-SCNC: 29 MMOL/L (ref 21–32)
CREAT SERPL-MCNC: 0.88 MG/DL (ref 0.6–1)
DIFFERENTIAL METHOD BLD: ABNORMAL
EOSINOPHIL # BLD: 0.4 K/UL (ref 0–0.8)
EOSINOPHIL NFR BLD: 7 % (ref 0.5–7.8)
ERYTHROCYTE [DISTWIDTH] IN BLOOD BY AUTOMATED COUNT: 14.3 %
GLOBULIN SER CALC-MCNC: 4.2 G/DL (ref 2.3–3.5)
GLUCOSE SERPL-MCNC: 121 MG/DL (ref 65–100)
HCT VFR BLD AUTO: 40.8 % (ref 35.8–46.3)
HGB BLD-MCNC: 13 G/DL (ref 11.7–15.4)
IMM GRANULOCYTES # BLD: 0 K/UL (ref 0–0.5)
IMM GRANULOCYTES NFR BLD AUTO: 0 % (ref 0–5)
LIPASE SERPL-CCNC: 583 U/L (ref 73–393)
LYMPHOCYTES # BLD: 2.5 K/UL (ref 0.5–4.6)
LYMPHOCYTES NFR BLD: 46 % (ref 13–44)
MCH RBC QN AUTO: 26.8 PG (ref 26.1–32.9)
MCHC RBC AUTO-ENTMCNC: 31.9 G/DL (ref 31.4–35)
MCV RBC AUTO: 84.1 FL (ref 79.6–97.8)
MONOCYTES # BLD: 0.5 K/UL (ref 0.1–1.3)
MONOCYTES NFR BLD: 9 % (ref 4–12)
NEUTS SEG # BLD: 2 K/UL (ref 1.7–8.2)
NEUTS SEG NFR BLD: 37 % (ref 43–78)
NRBC # BLD: 0 K/UL (ref 0–0.2)
PLATELET # BLD AUTO: 243 K/UL (ref 150–450)
PMV BLD AUTO: 10.9 FL (ref 9.4–12.3)
POTASSIUM SERPL-SCNC: 3.4 MMOL/L (ref 3.5–5.1)
PROT SERPL-MCNC: 8.3 G/DL
RBC # BLD AUTO: 4.85 M/UL (ref 4.05–5.2)
SODIUM SERPL-SCNC: 139 MMOL/L (ref 136–145)
WBC # BLD AUTO: 5.5 K/UL (ref 4.3–11.1)

## 2018-10-29 PROCEDURE — 96375 TX/PRO/DX INJ NEW DRUG ADDON: CPT | Performed by: EMERGENCY MEDICINE

## 2018-10-29 PROCEDURE — 74011000250 HC RX REV CODE- 250: Performed by: EMERGENCY MEDICINE

## 2018-10-29 PROCEDURE — 74011250636 HC RX REV CODE- 250/636: Performed by: EMERGENCY MEDICINE

## 2018-10-29 PROCEDURE — 96374 THER/PROPH/DIAG INJ IV PUSH: CPT | Performed by: EMERGENCY MEDICINE

## 2018-10-29 PROCEDURE — 80053 COMPREHEN METABOLIC PANEL: CPT

## 2018-10-29 PROCEDURE — 93005 ELECTROCARDIOGRAM TRACING: CPT | Performed by: EMERGENCY MEDICINE

## 2018-10-29 PROCEDURE — C9113 INJ PANTOPRAZOLE SODIUM, VIA: HCPCS | Performed by: EMERGENCY MEDICINE

## 2018-10-29 PROCEDURE — 81003 URINALYSIS AUTO W/O SCOPE: CPT | Performed by: EMERGENCY MEDICINE

## 2018-10-29 PROCEDURE — 83690 ASSAY OF LIPASE: CPT

## 2018-10-29 PROCEDURE — 99284 EMERGENCY DEPT VISIT MOD MDM: CPT | Performed by: EMERGENCY MEDICINE

## 2018-10-29 PROCEDURE — 85025 COMPLETE CBC W/AUTO DIFF WBC: CPT

## 2018-10-29 RX ORDER — HYOSCYAMINE SULFATE 0.12 MG/1
0.25 TABLET SUBLINGUAL
Qty: 20 TAB | Refills: 0 | Status: SHIPPED | OUTPATIENT
Start: 2018-10-29 | End: 2019-04-29

## 2018-10-29 RX ORDER — SODIUM CHLORIDE 0.9 % (FLUSH) 0.9 %
5-10 SYRINGE (ML) INJECTION EVERY 8 HOURS
Status: DISCONTINUED | OUTPATIENT
Start: 2018-10-29 | End: 2018-10-30 | Stop reason: HOSPADM

## 2018-10-29 RX ORDER — PANTOPRAZOLE SODIUM 40 MG/1
40 TABLET, DELAYED RELEASE ORAL DAILY
Qty: 30 TAB | Refills: 0 | Status: SHIPPED | OUTPATIENT
Start: 2018-10-29 | End: 2018-11-28

## 2018-10-29 RX ORDER — METHOCARBAMOL 750 MG/1
1500 TABLET, FILM COATED ORAL 4 TIMES DAILY
Qty: 40 TAB | Refills: 0 | Status: SHIPPED | OUTPATIENT
Start: 2018-10-29 | End: 2019-04-29

## 2018-10-29 RX ORDER — PAROXETINE 10 MG/1
TABLET, FILM COATED ORAL DAILY
COMMUNITY
End: 2019-04-29

## 2018-10-29 RX ORDER — ONDANSETRON 2 MG/ML
4 INJECTION INTRAMUSCULAR; INTRAVENOUS
Status: COMPLETED | OUTPATIENT
Start: 2018-10-29 | End: 2018-10-29

## 2018-10-29 RX ORDER — SODIUM CHLORIDE 0.9 % (FLUSH) 0.9 %
5-10 SYRINGE (ML) INJECTION AS NEEDED
Status: DISCONTINUED | OUTPATIENT
Start: 2018-10-29 | End: 2018-10-30 | Stop reason: HOSPADM

## 2018-10-29 RX ORDER — ONDANSETRON 4 MG/1
4 TABLET, ORALLY DISINTEGRATING ORAL
Qty: 15 TAB | Refills: 0 | Status: SHIPPED | OUTPATIENT
Start: 2018-10-29 | End: 2019-01-29 | Stop reason: SDUPTHER

## 2018-10-29 RX ORDER — HYDROMORPHONE HYDROCHLORIDE 2 MG/ML
1 INJECTION, SOLUTION INTRAMUSCULAR; INTRAVENOUS; SUBCUTANEOUS
Status: COMPLETED | OUTPATIENT
Start: 2018-10-29 | End: 2018-10-29

## 2018-10-29 RX ADMIN — SODIUM CHLORIDE 1000 ML: 900 INJECTION, SOLUTION INTRAVENOUS at 20:31

## 2018-10-29 RX ADMIN — SODIUM CHLORIDE 40 MG: 9 INJECTION INTRAMUSCULAR; INTRAVENOUS; SUBCUTANEOUS at 20:31

## 2018-10-29 RX ADMIN — ONDANSETRON 4 MG: 2 INJECTION INTRAMUSCULAR; INTRAVENOUS at 20:32

## 2018-10-29 RX ADMIN — HYDROMORPHONE HYDROCHLORIDE 1 MG: 2 INJECTION, SOLUTION INTRAMUSCULAR; INTRAVENOUS; SUBCUTANEOUS at 20:31

## 2018-10-29 NOTE — LETTER
[address] [facility] EMERGENCY DEPT 
[facility] [address] [place] [id number] 
[phone] Work/School Note Date: 10/29/2018 To Whom It May concern: Pelon Andino was seen and treated today in the emergency room by the following provider(s): 
Attending Provider: Maryan Muñoz MD. Pelon Andino {Return to work 10/31/2018 Sincerely, Navdeep Carrizales RN

## 2018-10-30 LAB
ATRIAL RATE: 81 BPM
CALCULATED P AXIS, ECG09: 75 DEGREES
CALCULATED R AXIS, ECG10: 74 DEGREES
CALCULATED T AXIS, ECG11: 66 DEGREES
DIAGNOSIS, 93000: NORMAL
P-R INTERVAL, ECG05: 160 MS
Q-T INTERVAL, ECG07: 378 MS
QRS DURATION, ECG06: 78 MS
QTC CALCULATION (BEZET), ECG08: 439 MS
VENTRICULAR RATE, ECG03: 81 BPM

## 2018-10-30 NOTE — ED NOTES
I have reviewed discharge instructions with the patient. The patient verbalized understanding. Patient left ED via Discharge Method: ambulatory to Home with sister). Opportunity for questions and clarification provided. Patient given 4 scripts. To continue your aftercare when you leave the hospital, you may receive an automated call from our care team to check in on how you are doing. This is a free service and part of our promise to provide the best care and service to meet your aftercare needs.  If you have questions, or wish to unsubscribe from this service please call [phone]. Thank you for Choosing our [facility].

## 2018-10-30 NOTE — DISCHARGE INSTRUCTIONS
Pancreatitis: Care Instructions  Your Care Instructions    The pancreas is an organ behind the stomach. It makes hormones and enzymes to help your body digest food. But if these enzymes attack the pancreas, it can get inflamed. This is called pancreatitis. Most cases are caused by gallstones or by heavy alcohol use. If you take care of yourself at home, it will help you get better. It will also help you avoid more problems with your pancreas. Follow-up care is a key part of your treatment and safety. Be sure to make and go to all appointments, and call your doctor if you are having problems. It's also a good idea to know your test results and keep a list of the medicines you take. How can you care for yourself at home? · Drink clear liquids and eat bland foods until you feel better. [place] foods include rice, dry toast, and crackers. They also include bananas and applesauce. · Eat a low-fat diet until your doctor says your pancreas is healed. · Do not drink alcohol. Tell your doctor if you need help to quit. Counseling, support groups, and sometimes medicines can help you stay sober. · Be safe with medicines. Read and follow all instructions on the label. ? If the doctor gave you a prescription medicine for pain, take it as prescribed. ? If you are not taking a prescription pain medicine, ask your doctor if you can take an over-the-counter medicine. · If your doctor prescribed antibiotics, take them as directed. Do not stop taking them just because you feel better. You need to take the full course of antibiotics. · Get extra rest until you feel better. To prevent future problems with your pancreas  · Do not drink alcohol. · Tell your doctors and pharmacist that you've had pancreatitis. They can help you avoid medicines that may cause this problem again. When should you call for help? Call 911 anytime you think you may need emergency care.  For example, call if:    · You vomit blood or what looks like coffee grounds.     · Your stools are maroon or very bloody.    Call your doctor now or seek immediate medical care if:    · You have new or worse belly pain.     · Your stools are black and look like tar, or they have streaks of blood.     · You are vomiting.    Watch closely for changes in your health, and be sure to contact your doctor if:    · You do not get better as expected. Where can you learn more? Go to http://abdirashid-chetna.info/. Enter E820 in the search box to learn more about \"Pancreatitis: Care Instructions. \"  Current as of: March 28, 2018  Content Version: 11.8  © 9937-8798 [employer]. Care instructions adapted under license by [employer] (which disclaims liability or warranty for this information). If you have questions about a medical condition or this instruction, always ask your healthcare professional. [address] any warranty or liability for your use of this information.

## 2018-10-30 NOTE — ED PROVIDER NOTES
The history is provided by the patient. Abdominal Pain This is a recurrent problem. The current episode started more than 2 days ago. The problem occurs constantly. The problem has been gradually worsening. The pain is associated with diet changes. The pain is located in the epigastric region. The quality of the pain is aching and burning. The pain is moderate. Associated symptoms include nausea. Pertinent negatives include no fever, no diarrhea, no flatus, no vomiting, no constipation, no dysuria, no frequency, no hematuria, no headaches, no arthralgias, no myalgias and no chest pain. The pain is worsened by eating and activity. The pain is relieved by nothing. Her past medical history is significant for pancreatitis. The patient's surgical history non-contributory. Past Medical History:  
Diagnosis Date  Asthma  Chronic obstructive pulmonary disease ([facility] [address]  Ill-defined condition   
 pancreatitis Past Surgical History:  
Procedure Laterality Date  HX ORTHOPAEDIC History reviewed. No pertinent family history. Social History Socioeconomic History  Marital status: SINGLE Spouse name: Not on file  Number of children: Not on file  Years of education: Not on file  Highest education level: Not on file Social Needs  Financial resource strain: Not on file  Food insecurity - worry: Not on file  Food insecurity - inability: Not on file  Transportation needs - medical: Not on file  Transportation needs - non-medical: Not on file Occupational History  Not on file Tobacco Use  Smoking status: Former Smoker Substance and Sexual Activity  Alcohol use: No  
 Drug use: Yes Types: Marijuana  Sexual activity: No  
  Birth control/protection: None Other Topics Concern  Not on file Social History Narrative  Not on file ALLERGIES: Latex Review of Systems Constitutional: Negative for chills and fever. HENT: Negative for congestion, ear pain and rhinorrhea. Eyes: Negative for photophobia and discharge. Respiratory: Negative for cough and shortness of breath. Cardiovascular: Negative for chest pain and palpitations. Gastrointestinal: Positive for abdominal pain and nausea. Negative for blood in stool, constipation, diarrhea, flatus and vomiting. Endocrine: Negative for cold intolerance and heat intolerance. Genitourinary: Negative for dysuria, flank pain, frequency and hematuria. Musculoskeletal: Negative for arthralgias, myalgias and neck pain. Skin: Negative for rash and wound. Allergic/Immunologic: Negative for environmental allergies and food allergies. Neurological: Negative for syncope and headaches. Hematological: Negative for adenopathy. Does not bruise/bleed easily. Psychiatric/Behavioral: Negative for dysphoric mood. The patient is not nervous/anxious. All other systems reviewed and are negative. Vitals:  
 10/29/18 1944 BP: (!) 142/91 Pulse: 76 Resp: 16 Temp: 97.6 °F (36.4 °C) SpO2: 100% Weight: 72.6 kg (160 lb) Height: 5' (1.524 m) Physical Exam  
Constitutional: She is oriented to person, place, and time. She appears well-developed and well-nourished. She appears distressed. Patient appears uncomfortable HENT:  
Head: Normocephalic and atraumatic. Right Ear: External ear normal.  
Left Ear: External ear normal.  
Mouth/Throat: Oropharynx is clear and moist. No oropharyngeal exudate. Eyes: Conjunctivae and EOM are normal. Pupils are equal, round, and reactive to light. Neck: Normal range of motion. Neck supple. No JVD present. Cardiovascular: Normal rate, regular rhythm, normal heart sounds and intact distal pulses. Exam reveals no gallop and no friction rub. No murmur heard. Pulmonary/Chest: Effort normal and breath sounds normal. Tachypnea noted. Lungs are clear to auscultation, tachypnea, likely a pain response. Abdominal: Soft. Normal appearance and bowel sounds are normal. She exhibits no distension and no mass. There is no hepatosplenomegaly. There is tenderness in the epigastric area. There is no rigidity, no rebound and no guarding. No hernia. Musculoskeletal: Normal range of motion. She exhibits no edema or deformity. Neurological: She is alert and oriented to person, place, and time. She has normal strength. No cranial nerve deficit or sensory deficit. She displays a negative Romberg sign. Gait normal.  
Skin: Skin is warm and dry. Capillary refill takes less than 2 seconds. No rash noted. Psychiatric: She has a normal mood and affect. Her speech is normal and behavior is normal. Judgment and thought content normal. Cognition and memory are normal.  
Nursing note and vitals reviewed. MDM Number of Diagnoses or Management Options Acute pancreatitis, unspecified complication status, unspecified pancreatitis type: established and worsening Diagnosis management comments: Differential diagnosis Pancreatitis, biliary colic, hepatitis, reflux, esophageal spasm, coronary syndrome Workup tonight reveals a mild pancreatitis with otherwise normal.  Laboratory results Patient feeling better after treatment Patient will be discharged home. We have discussed outpatient plan in detail. She voices understanding and is on board with the plan. Reinforced that if the patient's symptoms worsen or she has any new symptoms, she should return immediately for further evaluation. Amount and/or Complexity of Data Reviewed Clinical lab tests: ordered and reviewed Tests in the medicine section of CPT®: ordered and reviewed Review and summarize past medical records: yes Risk of Complications, Morbidity, and/or Mortality Presenting problems: moderate Diagnostic procedures: moderate Management options: moderate General comments: Elements of this note have been dictated via voice recognition software. Text and phrases may be limited by the accuracy of the software. The chart has been reviewed, but errors may still be present. Patient Progress Patient progress: improved Procedures

## 2018-10-31 ENCOUNTER — HOSPITAL ENCOUNTER (EMERGENCY)
Age: 53
Discharge: HOME | End: 2018-10-31
Attending: EMERGENCY MEDICINE
Payer: COMMERCIAL

## 2018-10-31 VITALS
HEIGHT: 60 IN | BODY MASS INDEX: 30.43 KG/M2 | WEIGHT: 155 LBS | OXYGEN SATURATION: 98 % | RESPIRATION RATE: 16 BRPM | TEMPERATURE: 98.2 F | HEART RATE: 68 BPM | DIASTOLIC BLOOD PRESSURE: 88 MMHG | SYSTOLIC BLOOD PRESSURE: 145 MMHG

## 2018-10-31 DIAGNOSIS — M54.9 MECHANICAL BACK PAIN: Primary | ICD-10-CM

## 2018-10-31 PROCEDURE — 81003 URINALYSIS AUTO W/O SCOPE: CPT | Performed by: EMERGENCY MEDICINE

## 2018-10-31 PROCEDURE — 99283 EMERGENCY DEPT VISIT LOW MDM: CPT | Performed by: EMERGENCY MEDICINE

## 2018-10-31 PROCEDURE — 74011250637 HC RX REV CODE- 250/637: Performed by: PHYSICIAN ASSISTANT

## 2018-10-31 RX ORDER — IBUPROFEN 400 MG/1
400 TABLET ORAL
Status: COMPLETED | OUTPATIENT
Start: 2018-10-31 | End: 2018-10-31

## 2018-10-31 RX ADMIN — IBUPROFEN 400 MG: 400 TABLET, FILM COATED ORAL at 15:08

## 2018-10-31 NOTE — LETTER
[facility] EMERGENCY DEPT 
[address] [place] [id number] 
[phone] Work/School Note Date: 10/31/2018 To Whom It May concern: Anthony Rosenthal was seen and treated today in the emergency room by the following provider(s): 
Attending Provider: Sumi Russo MD 
Physician Assistant: Lalitha Cazares. Anthony Rosenthal may return to work on [phone]. Sincerely, Rony Castillo RN

## 2018-10-31 NOTE — DISCHARGE INSTRUCTIONS
Learning About Relief for Back Pain  What is back tension and strain? Back strain happens when you overstretch, or pull, a muscle in your back. You may hurt your back in an accident or when you exercise or lift something. Most back pain will get better with rest and time. You can take care of yourself at home to help your back heal.  What can you do first to relieve back pain? When you first feel back pain, try these steps:  · Walk. Take a short walk (10 to 20 minutes) on a level surface (no slopes, hills, or stairs) every 2 to 3 hours. Walk only distances you can manage without pain, especially leg pain. · Relax. Find a comfortable position for rest. Some people are comfortable on the floor or a medium-firm bed with a small pillow under their head and another under their knees. Some people prefer to lie on their side with a pillow between their knees. Don't stay in one position for too long. · Try heat or ice. Try using a heating pad on a low or medium setting, or take a warm shower, for 15 to 20 minutes every 2 to 3 hours. Or you can buy single-use heat wraps that last up to 8 hours. You can also try an ice pack for 10 to 15 minutes every 2 to 3 hours. You can use an ice pack or a bag of frozen vegetables wrapped in a thin towel. There is not strong evidence that either heat or ice will help, but you can try them to see if they help. You may also want to try switching between heat and cold. · Take pain medicine exactly as directed. ¨ If the doctor gave you a prescription medicine for pain, take it as prescribed. ¨ If you are not taking a prescription pain medicine, ask your doctor if you can take an over-the-counter medicine. What else can you do? · Stretch and exercise. Exercises that increase flexibility may relieve your pain and make it easier for your muscles to keep your spine in a good, neutral position. And don't forget to keep walking. · Do self-massage.  You can use self-massage to unwind after work or school or to energize yourself in the morning. You can easily massage your feet, hands, or neck. Self-massage works best if you are in comfortable clothes and are sitting or lying in a comfortable position. Use oil or lotion to massage bare skin. · Reduce stress. Back pain can lead to a vicious [place]: Distress about the pain tenses the muscles in your back, which in turn causes more pain. Learn how to relax your mind and your muscles to lower your stress. Where can you learn more? Go to http://abdirashid-chetna.info/. Enter I842 in the search box to learn more about \"Learning About Relief for Back Pain. \"  Current as of: March 21, 2017  Content Version: 11.5  © 0713-7832 [employer]wise, [employer]. Care instructions adapted under license by [employer] (which disclaims liability or warranty for this information). If you have questions about a medical condition or this instruction, always ask your healthcare professional. [place] [address] any warranty or liability for your use of this information.

## 2018-10-31 NOTE — ED PROVIDER NOTES
Patient presents to the ER complaining of bilateral upper back pain that waxes and wanes for the last several weeks. Pain is worse after being on her feet after working a shift. Patient states she works at a [employer] is in charge of [facility], so is she is constantly walking and pushing a handle on computer. The pain feels like \"fists in her back\". Denies chest pain, shortness of breath,, pain is not associated with food or movement. She notices the pain worse at night when she lays down, but admits laying down on her side improves the pain. Patient was seen by this ER 2 days ago for pancreatic attack which was similar to previous pancreatic attacks. States instead of going to her PCP she thought she should come back to the ER since \"they already saw me once\". Patient states she is taking the Zofran with nausea relief, is taking the Protonix but doesn't like the Robaxin, which she feels makes her drowsy. Not diabetic. Past Medical History:  
Diagnosis Date  Asthma  Chronic obstructive pulmonary disease ([facility] [address]  Ill-defined condition   
 pancreatitis Past Surgical History:  
Procedure Laterality Date  HX ORTHOPAEDIC History reviewed. No pertinent family history. Social History Socioeconomic History  Marital status: SINGLE Spouse name: Not on file  Number of children: Not on file  Years of education: Not on file  Highest education level: Not on file Social Needs  Financial resource strain: Not on file  Food insecurity - worry: Not on file  Food insecurity - inability: Not on file  Transportation needs - medical: Not on file  Transportation needs - non-medical: Not on file Occupational History  Not on file Tobacco Use  Smoking status: Former Smoker  Smokeless tobacco: Former User Substance and Sexual Activity  Alcohol use: No  
 Drug use: Yes Types: Marijuana  Sexual activity: No  
 Birth control/protection: None Other Topics Concern  Not on file Social History Narrative  Not on file ALLERGIES: Latex Review of Systems Constitutional: Negative for activity change, appetite change, chills, fatigue and fever. Musculoskeletal: Positive for arthralgias and back pain. Negative for gait problem, joint swelling and myalgias. Skin: Negative for rash and wound. Neurological: Negative for headaches. Vitals:  
 10/31/18 1330 BP: 128/86 Pulse: 90 Resp: 16 Temp: 98.1 °F (36.7 °C) SpO2: 98% Weight: 70.3 kg (155 lb) Height: 5' (1.524 m) Physical Exam  
Constitutional: She appears well-developed and well-nourished. Healthy appearing -American female in no acute distress. Ambulates and moves on and off stretcher without assistance. Mother at bedside. HENT:  
Head: Normocephalic. Cardiovascular: Normal rate, regular rhythm and normal heart sounds. Pulmonary/Chest: Effort normal and breath sounds normal.  
Abdominal: Soft. Bowel sounds are normal.  
Musculoskeletal: Normal range of motion. She exhibits tenderness. Thoracic back: She exhibits tenderness and pain. She exhibits normal range of motion, no bony tenderness, no swelling, no edema and no laceration. Back: 
 
Neurological: She has normal strength. No sensory deficit. She exhibits normal muscle tone. Reflex Scores: 
     Patellar reflexes are 2+ on the right side and 2+ on the left side. Achilles reflexes are 2+ on the right side and 2+ on the left side. Strength 3 out of 3 bilateral lower extremities. Sensorimotor intact. Nursing note and vitals reviewed. MDM Number of Diagnoses or Management Options Mechanical back pain: new and requires workup Diagnosis management comments: AAA, MI, pancreatic referred pain, mechanical low back pain. COPD Amount and/or Complexity of Data Reviewed Clinical lab tests: ordered and reviewed Risk of Complications, Morbidity, and/or Mortality Presenting problems: moderate Diagnostic procedures: moderate Management options: moderate Patient Progress Patient progress: stable Procedures Patient states she is sensitive to medication, and has had back relief pain with Motrin. Feels improvement with gentle massage and back stretches. It is amenable to trying conservative treatments at home and following up with PCP. Return precautions discussed. Patient agrees with plan.

## 2018-10-31 NOTE — LETTER
[facility] EMERGENCY DEPT 
[address] [place] [id number] 
[phone] Work/School Note Date: 10/31/2018 To Whom It May concern: Mendez Zuluaga was seen and treated today in the emergency room by the following provider(s): 
Attending Provider: Marvin Alvarenga MD 
Physician Assistant: Lalitha Serra. Mendez Zuluaga may return to work on 11/1/18. Sincerely, DARRYL Mondragon

## 2018-10-31 NOTE — ED NOTES
Pt seen here 29OCT18 for pancreatitis. She states she told staff then her back was hurting. She has been taking her medications as prescribed, but back still hurts.

## 2018-10-31 NOTE — ED NOTES
I have reviewed discharge instructions with the patient. The patient verbalized understanding. Patient left ED via Discharge Method: ambulatory to Home with herself. Opportunity for questions and clarification provided. Patient given 0 scripts. To continue your aftercare when you leave the hospital, you may receive an automated call from our care team to check in on how you are doing. This is a free service and part of our promise to provide the best care and service to meet your aftercare needs.  If you have questions, or wish to unsubscribe from this service please call [phone]. Thank you for Choosing our [facility] Emergency Department.

## 2019-01-28 ENCOUNTER — HOSPITAL ENCOUNTER (EMERGENCY)
Age: 54
Discharge: HOME | End: 2019-01-28
Attending: EMERGENCY MEDICINE
Payer: COMMERCIAL

## 2019-01-28 VITALS
HEART RATE: 120 BPM | HEIGHT: 60 IN | DIASTOLIC BLOOD PRESSURE: 84 MMHG | BODY MASS INDEX: 30.43 KG/M2 | RESPIRATION RATE: 16 BRPM | OXYGEN SATURATION: 98 % | TEMPERATURE: 99.5 F | SYSTOLIC BLOOD PRESSURE: 129 MMHG | WEIGHT: 155 LBS

## 2019-01-28 DIAGNOSIS — M79.10 MYALGIA: Primary | ICD-10-CM

## 2019-01-28 LAB
ANION GAP SERPL CALC-SCNC: 5 MMOL/L
BUN SERPL-MCNC: 8 MG/DL (ref 6–23)
CALCIUM SERPL-MCNC: 9.2 MG/DL (ref 8.3–10.4)
CHLORIDE SERPL-SCNC: 105 MMOL/L (ref 98–107)
CO2 SERPL-SCNC: 26 MMOL/L (ref 21–32)
CREAT SERPL-MCNC: 0.79 MG/DL (ref 0.6–1)
ERYTHROCYTE [DISTWIDTH] IN BLOOD BY AUTOMATED COUNT: 14.9 % (ref 11.9–14.6)
FLUAV AG NPH QL IA: NEGATIVE
FLUBV AG NPH QL IA: NEGATIVE
GLUCOSE SERPL-MCNC: 104 MG/DL (ref 65–100)
HCT VFR BLD AUTO: 38.9 % (ref 35.8–46.3)
HGB BLD-MCNC: 12.5 G/DL (ref 11.7–15.4)
MCH RBC QN AUTO: 26.7 PG (ref 26.1–32.9)
MCHC RBC AUTO-ENTMCNC: 32.1 G/DL (ref 31.4–35)
MCV RBC AUTO: 82.9 FL (ref 79.6–97.8)
NRBC # BLD: 0 K/UL (ref 0–0.2)
PLATELET # BLD AUTO: 268 K/UL (ref 150–450)
PMV BLD AUTO: 11.5 FL (ref 9.4–12.3)
POTASSIUM SERPL-SCNC: 3.7 MMOL/L (ref 3.5–5.1)
RBC # BLD AUTO: 4.69 M/UL (ref 4.05–5.2)
SERVICE CMNT-IMP: NORMAL
SODIUM SERPL-SCNC: 136 MMOL/L (ref 136–145)
SPECIMEN SOURCE: NORMAL
WBC # BLD AUTO: 7.3 K/UL (ref 4.3–11.1)

## 2019-01-28 PROCEDURE — 99283 EMERGENCY DEPT VISIT LOW MDM: CPT | Performed by: EMERGENCY MEDICINE

## 2019-01-28 PROCEDURE — 85027 COMPLETE CBC AUTOMATED: CPT

## 2019-01-28 PROCEDURE — 87804 INFLUENZA ASSAY W/OPTIC: CPT

## 2019-01-28 PROCEDURE — 80048 BASIC METABOLIC PNL TOTAL CA: CPT

## 2019-01-28 PROCEDURE — 74011250637 HC RX REV CODE- 250/637: Performed by: EMERGENCY MEDICINE

## 2019-01-28 RX ORDER — BENZONATATE 100 MG/1
100-200 CAPSULE ORAL
Qty: 14 CAP | Refills: 0 | Status: SHIPPED | OUTPATIENT
Start: 2019-01-28 | End: 2019-02-04

## 2019-01-28 RX ORDER — DICLOFENAC SODIUM 50 MG/1
50 TABLET, DELAYED RELEASE ORAL 2 TIMES DAILY
Qty: 14 TAB | Refills: 0 | Status: SHIPPED | OUTPATIENT
Start: 2019-01-28 | End: 2019-04-29

## 2019-01-28 RX ORDER — IBUPROFEN 400 MG/1
400 TABLET ORAL
Status: COMPLETED | OUTPATIENT
Start: 2019-01-28 | End: 2019-01-28

## 2019-01-28 RX ADMIN — IBUPROFEN 400 MG: 400 TABLET, FILM COATED ORAL at 11:25

## 2019-01-28 NOTE — LETTER
[facility] EMERGENCY DEPT 
[facility] [address] [place] [id number] 
[phone] Work/School Note Date: 1/28/2019 To Whom It May concern: Hilton Choudhary was seen and treated today in the emergency room by the following provider(s): 
Attending Provider: Lila Mcfarland MD. Hilton Choudhary may return to work on [phone]. Sincerely, Pedro Hughes RN

## 2019-01-28 NOTE — DISCHARGE INSTRUCTIONS
As we discussed, we did not find the exact cause of your symptoms today in the emergency department. Therefore, it is important for you to follow up with your primary care doctor for reevaluation. Please return with any fevers, difficulty breathing, worsening symptoms, or additional concerns.

## 2019-01-28 NOTE — ED NOTES
I have reviewed discharge instructions with the patient and parent. The patient and parent verbalized understanding. Patient left ED via Discharge Method: ambulatory to Home with her mother, Tera Red. Opportunity for questions and clarification provided. Patient given 2 scripts. To continue your aftercare when you leave the hospital, you may receive an automated call from our care team to check in on how you are doing. This is a free service and part of our promise to provide the best care and service to meet your aftercare needs.  If you have questions, or wish to unsubscribe from this service please call [phone]. Thank you for Choosing our [facility] Emergency Department.

## 2019-01-28 NOTE — ED TRIAGE NOTES
Per patient generalized body aches, dry cough, fever/chill x1 day.  Patient states of nausea denies v/d

## 2019-01-28 NOTE — ED NOTES
Difficult to get patient to talk to this RN just states \"help me mama\" over and over to mother at bedside, however does states all over pain and nausea starting yesterday. Patient advised that a urine sample is needed and advised of bathroom location. Patient and mother advise understanding.

## 2019-01-28 NOTE — ED PROVIDER NOTES
29-year-old lady presents with concerns about body aches, subjective fevers and chills, cough, and fatigue. Patient says the symptoms started yesterday. With that she has had some mild nausea but no vomiting. She denies any diarrhea. Patient says she did not get a flu shot. Elements of this note were created using speech recognition software. As such, errors of speech recognition may be present. Past Medical History:  
Diagnosis Date  Asthma  Chronic obstructive pulmonary disease ([facility] [address]  Ill-defined condition   
 pancreatitis Past Surgical History:  
Procedure Laterality Date  HX ORTHOPAEDIC History reviewed. No pertinent family history. Social History Socioeconomic History  Marital status: SINGLE Spouse name: Not on file  Number of children: Not on file  Years of education: Not on file  Highest education level: Not on file Social Needs  Financial resource strain: Not on file  Food insecurity - worry: Not on file  Food insecurity - inability: Not on file  Transportation needs - medical: Not on file  Transportation needs - non-medical: Not on file Occupational History  Not on file Tobacco Use  Smoking status: Former Smoker  Smokeless tobacco: Former User Substance and Sexual Activity  Alcohol use: No  
 Drug use: Yes Types: Marijuana  Sexual activity: No  
  Birth control/protection: None Other Topics Concern  Not on file Social History Narrative  Not on file ALLERGIES: Latex Review of Systems Constitutional: Positive for chills, fatigue and fever. Negative for diaphoresis. HENT: Negative for congestion, rhinorrhea and sore throat. Eyes: Negative for redness and visual disturbance. Respiratory: Positive for cough. Negative for chest tightness, shortness of breath and wheezing. Cardiovascular: Negative for chest pain and palpitations. Gastrointestinal: Negative for abdominal pain, blood in stool, diarrhea, nausea and vomiting. Endocrine: Negative for polydipsia and polyuria. Genitourinary: Negative for dysuria and hematuria. Musculoskeletal: Positive for myalgias. Negative for arthralgias and neck stiffness. Skin: Negative for rash. Allergic/Immunologic: Negative for environmental allergies and food allergies. Neurological: Negative for dizziness, weakness and headaches. Hematological: Negative for adenopathy. Does not bruise/bleed easily. Psychiatric/Behavioral: Negative for confusion and sleep disturbance. The patient is not nervous/anxious. Vitals:  
 01/28/19 1041 BP: 133/83 Pulse: (!) 120 Resp: 18 Temp: 99.7 °F (37.6 °C) SpO2: 100% Weight: 70.3 kg (155 lb) Height: 5' (1.524 m) Physical Exam  
Constitutional: She is oriented to person, place, and time. She appears well-developed and well-nourished. HENT:  
Head: Normocephalic and atraumatic. Mouth/Throat: Oropharynx is clear and moist.  
Eyes: Conjunctivae are normal. Pupils are equal, round, and reactive to light. Right eye exhibits no discharge. Left eye exhibits no discharge. Neck: No thyromegaly present. Cardiovascular: Regular rhythm and normal heart sounds. No murmur heard. tachycardic Pulmonary/Chest: Effort normal and breath sounds normal.  
Abdominal: Soft. Bowel sounds are normal. There is no tenderness. There is no rebound and no guarding. Musculoskeletal: Normal range of motion. She exhibits no edema. Neurological: She is alert and oriented to person, place, and time. She exhibits normal muscle tone. Coordination normal.  
Skin: Skin is warm and dry. Psychiatric: She has a normal mood and affect. Her behavior is normal.  
Nursing note and vitals reviewed. MDM Number of Diagnoses or Management Options Diagnosis management comments: Patient's flu test was negative. therefore I checked her basic blood work which was also negative. I do not find any urgent or emergent and I will discharge her home. Procedures

## 2019-01-29 ENCOUNTER — HOSPITAL ENCOUNTER (EMERGENCY)
Age: 54
Discharge: HOME | End: 2019-01-29
Attending: EMERGENCY MEDICINE
Payer: COMMERCIAL

## 2019-01-29 ENCOUNTER — APPOINTMENT (OUTPATIENT)
Dept: GENERAL RADIOLOGY | Age: 54
End: 2019-01-29
Attending: EMERGENCY MEDICINE
Payer: COMMERCIAL

## 2019-01-29 VITALS
TEMPERATURE: 99 F | WEIGHT: 150 LBS | HEIGHT: 60 IN | BODY MASS INDEX: 29.45 KG/M2 | OXYGEN SATURATION: 94 % | HEART RATE: 66 BPM | RESPIRATION RATE: 18 BRPM | SYSTOLIC BLOOD PRESSURE: 130 MMHG | DIASTOLIC BLOOD PRESSURE: 80 MMHG

## 2019-01-29 DIAGNOSIS — R68.89 FLU-LIKE SYMPTOMS: Primary | ICD-10-CM

## 2019-01-29 LAB
FLUAV AG NPH QL IA: NEGATIVE
FLUBV AG NPH QL IA: NEGATIVE
SPECIMEN SOURCE: NORMAL

## 2019-01-29 PROCEDURE — 87804 INFLUENZA ASSAY W/OPTIC: CPT

## 2019-01-29 PROCEDURE — 74011250637 HC RX REV CODE- 250/637: Performed by: EMERGENCY MEDICINE

## 2019-01-29 PROCEDURE — 71046 X-RAY EXAM CHEST 2 VIEWS: CPT

## 2019-01-29 PROCEDURE — 74011250636 HC RX REV CODE- 250/636: Performed by: EMERGENCY MEDICINE

## 2019-01-29 PROCEDURE — 96375 TX/PRO/DX INJ NEW DRUG ADDON: CPT | Performed by: EMERGENCY MEDICINE

## 2019-01-29 PROCEDURE — 96374 THER/PROPH/DIAG INJ IV PUSH: CPT | Performed by: EMERGENCY MEDICINE

## 2019-01-29 PROCEDURE — 99283 EMERGENCY DEPT VISIT LOW MDM: CPT | Performed by: EMERGENCY MEDICINE

## 2019-01-29 RX ORDER — ONDANSETRON 8 MG/1
4 TABLET, ORALLY DISINTEGRATING ORAL
Qty: 8 TAB | Refills: 1 | Status: SHIPPED | OUTPATIENT
Start: 2019-01-29 | End: 2019-04-29

## 2019-01-29 RX ORDER — ONDANSETRON 4 MG/1
4 TABLET, ORALLY DISINTEGRATING ORAL
Qty: 15 TAB | Refills: 0 | Status: SHIPPED | OUTPATIENT
Start: 2019-01-29 | End: 2019-04-29

## 2019-01-29 RX ORDER — KETOROLAC TROMETHAMINE 30 MG/ML
15 INJECTION, SOLUTION INTRAMUSCULAR; INTRAVENOUS
Status: COMPLETED | OUTPATIENT
Start: 2019-01-29 | End: 2019-01-29

## 2019-01-29 RX ORDER — SODIUM CHLORIDE 9 MG/ML
500 INJECTION, SOLUTION INTRAVENOUS ONCE
Status: COMPLETED | OUTPATIENT
Start: 2019-01-29 | End: 2019-01-29

## 2019-01-29 RX ORDER — ACETAMINOPHEN 500 MG
1000 TABLET ORAL
Status: COMPLETED | OUTPATIENT
Start: 2019-01-29 | End: 2019-01-29

## 2019-01-29 RX ORDER — ONDANSETRON 2 MG/ML
4 INJECTION INTRAMUSCULAR; INTRAVENOUS
Status: COMPLETED | OUTPATIENT
Start: 2019-01-29 | End: 2019-01-29

## 2019-01-29 RX ADMIN — ONDANSETRON 4 MG: 2 INJECTION INTRAMUSCULAR; INTRAVENOUS at 11:07

## 2019-01-29 RX ADMIN — KETOROLAC TROMETHAMINE 15 MG: 30 INJECTION, SOLUTION INTRAMUSCULAR at 11:06

## 2019-01-29 RX ADMIN — SODIUM CHLORIDE 500 ML/HR: 900 INJECTION, SOLUTION INTRAVENOUS at 10:58

## 2019-01-29 RX ADMIN — ACETAMINOPHEN 1000 MG: 500 TABLET, FILM COATED ORAL at 13:50

## 2019-01-29 NOTE — ED NOTES
I have reviewed discharge instructions with the patient. The patient verbalized understanding. Patient left ED via Discharge Method: ambulatory to Home with family Opportunity for questions and clarification provided. Patient given 1 scripts. To continue your aftercare when you leave the hospital, you may receive an automated call from our care team to check in on how you are doing. This is a free service and part of our promise to provide the best care and service to meet your aftercare needs.  If you have questions, or wish to unsubscribe from this service please call [phone]. Thank you for Choosing our [facility] Emergency Department.

## 2019-01-29 NOTE — LETTER
[facility] [facility] EMERGENCY DEPT [address] [id number] [phone] Work/School Note Date: 1/29/2019 To Whom It May concern: Vivienne Schwab was seen and treated today in the emergency room by the following provider(s): 
Attending Provider: Babs Casey MD. Vivienne Schwab may return to work on 1/31/19.  
 
Sincerely,

## 2019-01-29 NOTE — DISCHARGE INSTRUCTIONS
Encourage plenty of fluids  Tylenol alternated with ibuprofen every 4 hours, but absolutely important to keep a record of meds taken so as to not over take either  Nausea, only if needed: Zofran

## 2019-01-29 NOTE — ED TRIAGE NOTES
Patient arrives and states that she went to [facility] ER yesterday for flu like symptoms. Was given medication for cough and aches. Was swobbed for flu but per patient was negative. States she has not gotten any better and feels worse today. Feels as if she \"has an upper respiratory infection\". States she's achy and tired.

## 2019-01-29 NOTE — ED PROVIDER NOTES
Patient was seen yesterday with same complaints. She is here with continued discomfort in general.  He has generalized body aches, nonproductive cough, some slight nasal congestion. Given a prescription for cough medication, but persists with general malaise. Has some history of COPD. History also of pancreatitis, but not complaining of abdominal issues today. Was seen yesterday for the same as mentioned above and at that point had negative studies including negative flu swabs. The history is provided by the patient and a parent. Past Medical History:  
Diagnosis Date  Asthma  Chronic obstructive pulmonary disease ([facility] [address]  Ill-defined condition   
 pancreatitis Past Surgical History:  
Procedure Laterality Date  HX ORTHOPAEDIC No family history on file. Social History Socioeconomic History  Marital status: SINGLE Spouse name: Not on file  Number of children: Not on file  Years of education: Not on file  Highest education level: Not on file Social Needs  Financial resource strain: Not on file  Food insecurity - worry: Not on file  Food insecurity - inability: Not on file  Transportation needs - medical: Not on file  Transportation needs - non-medical: Not on file Occupational History  Not on file Tobacco Use  Smoking status: Former Smoker  Smokeless tobacco: Former User Substance and Sexual Activity  Alcohol use: No  
 Drug use: Yes Types: Marijuana  Sexual activity: No  
  Birth control/protection: None Other Topics Concern  Not on file Social History Narrative  Not on file ALLERGIES: Latex Review of Systems Constitutional: Positive for fatigue. Negative for diaphoresis and fever. Respiratory: Positive for cough. Cardiovascular: Negative for leg swelling. Gastrointestinal: Negative. Musculoskeletal: Positive for arthralgias and myalgias. All other systems reviewed and are negative. Vitals:  
 01/29/19 0825 BP: 138/84 Pulse: (!) 107 Resp: 18 Temp: 99.6 °F (37.6 °C) SpO2: 94% Weight: 68 kg (150 lb) Height: 5' (1.524 m) Physical Exam  
Constitutional: No distress. Uncomfortable but not toxic or septic HENT:  
Head: Atraumatic. Right Ear: External ear normal.  
Left Ear: External ear normal.  
Mouth/Throat: Oropharynx is clear and moist.  
Slight clear nasal drainage Eyes: No scleral icterus. Neck: Neck supple. Cardiovascular: Normal heart sounds. No murmur heard. Mild tachycardia Pulmonary/Chest: No respiratory distress. She has no wheezes. Abdominal: Soft. She exhibits no distension. There is no tenderness. Musculoskeletal: Normal range of motion. She exhibits no edema or deformity. Neurological: No sensory deficit. She exhibits normal muscle tone. Skin: Skin is warm and dry. She is not diaphoretic. Psychiatric: Her behavior is normal. Thought content normal.  
Nursing note and vitals reviewed. MDM Number of Diagnoses or Management Options Flu-like symptoms:  
Diagnosis management comments: There are once again with same symptoms. Chest x-ray shows no infiltrate. Repeat flu swabs were done before I saw her, which continue to be negative. She does have a flulike illness. She is medically stable otherwise and will receive continued symptomatic treatment Amount and/or Complexity of Data Reviewed Clinical lab tests: reviewed Tests in the radiology section of CPT®: reviewed and ordered Obtain history from someone other than the patient: yes Independent visualization of images, tracings, or specimens: yes Risk of Complications, Morbidity, and/or Mortality Presenting problems: moderate Diagnostic procedures: low Management options: moderate Patient Progress Patient progress: stable Procedures

## 2019-04-29 ENCOUNTER — APPOINTMENT (OUTPATIENT)
Dept: CT IMAGING | Age: 54
End: 2019-04-29
Attending: EMERGENCY MEDICINE
Payer: COMMERCIAL

## 2019-04-29 ENCOUNTER — HOSPITAL ENCOUNTER (EMERGENCY)
Age: 54
Discharge: HOME | End: 2019-04-29
Attending: EMERGENCY MEDICINE
Payer: COMMERCIAL

## 2019-04-29 VITALS
TEMPERATURE: 98.8 F | HEART RATE: 64 BPM | WEIGHT: 158 LBS | DIASTOLIC BLOOD PRESSURE: 73 MMHG | BODY MASS INDEX: 31.02 KG/M2 | OXYGEN SATURATION: 98 % | RESPIRATION RATE: 16 BRPM | SYSTOLIC BLOOD PRESSURE: 129 MMHG | HEIGHT: 60 IN

## 2019-04-29 DIAGNOSIS — R10.13 ABDOMINAL PAIN, EPIGASTRIC: Primary | ICD-10-CM

## 2019-04-29 LAB
ALBUMIN SERPL-MCNC: 3.7 G/DL (ref 3.5–5)
ALBUMIN/GLOB SERPL: 0.9 {RATIO}
ALP SERPL-CCNC: 101 U/L (ref 50–130)
ALT SERPL-CCNC: 22 U/L (ref 12–65)
ANION GAP SERPL CALC-SCNC: 8 MMOL/L
AST SERPL-CCNC: 27 U/L (ref 15–37)
BASOPHILS # BLD: 0.1 K/UL (ref 0–0.2)
BASOPHILS NFR BLD: 1 % (ref 0–2)
BILIRUB SERPL-MCNC: 0.6 MG/DL (ref 0.2–1.1)
BUN SERPL-MCNC: 7 MG/DL (ref 6–23)
CALCIUM SERPL-MCNC: 9.4 MG/DL (ref 8.3–10.4)
CHLORIDE SERPL-SCNC: 105 MMOL/L (ref 98–107)
CO2 SERPL-SCNC: 28 MMOL/L (ref 21–32)
CREAT SERPL-MCNC: 0.91 MG/DL (ref 0.6–1)
DIFFERENTIAL METHOD BLD: ABNORMAL
EOSINOPHIL # BLD: 0.3 K/UL (ref 0–0.8)
EOSINOPHIL NFR BLD: 5 % (ref 0.5–7.8)
ERYTHROCYTE [DISTWIDTH] IN BLOOD BY AUTOMATED COUNT: 15.1 % (ref 11.9–14.6)
GLOBULIN SER CALC-MCNC: 4.3 G/DL (ref 2.3–3.5)
GLUCOSE SERPL-MCNC: 93 MG/DL (ref 65–100)
HCT VFR BLD AUTO: 38.4 % (ref 35.8–46.3)
HGB BLD-MCNC: 12.4 G/DL (ref 11.7–15.4)
IMM GRANULOCYTES # BLD AUTO: 0 K/UL (ref 0–0.5)
IMM GRANULOCYTES NFR BLD AUTO: 0 % (ref 0–5)
LACTATE BLD-SCNC: 0.64 MMOL/L (ref 0.5–1.9)
LIPASE SERPL-CCNC: 277 U/L (ref 73–393)
LYMPHOCYTES # BLD: 2.4 K/UL (ref 0.5–4.6)
LYMPHOCYTES NFR BLD: 38 % (ref 13–44)
MCH RBC QN AUTO: 26.8 PG (ref 26.1–32.9)
MCHC RBC AUTO-ENTMCNC: 32.3 G/DL (ref 31.4–35)
MCV RBC AUTO: 82.9 FL (ref 79.6–97.8)
MONOCYTES # BLD: 0.4 K/UL (ref 0.1–1.3)
MONOCYTES NFR BLD: 7 % (ref 4–12)
NEUTS SEG # BLD: 3.1 K/UL (ref 1.7–8.2)
NEUTS SEG NFR BLD: 49 % (ref 43–78)
NRBC # BLD: 0 K/UL (ref 0–0.2)
PLATELET # BLD AUTO: 253 K/UL (ref 150–450)
PMV BLD AUTO: 10.8 FL (ref 9.4–12.3)
POTASSIUM SERPL-SCNC: 3.8 MMOL/L (ref 3.5–5.1)
PROT SERPL-MCNC: 8 G/DL
RBC # BLD AUTO: 4.63 M/UL (ref 4.05–5.2)
SODIUM SERPL-SCNC: 141 MMOL/L (ref 136–145)
WBC # BLD AUTO: 6.3 K/UL (ref 4.3–11.1)

## 2019-04-29 PROCEDURE — 96361 HYDRATE IV INFUSION ADD-ON: CPT | Performed by: EMERGENCY MEDICINE

## 2019-04-29 PROCEDURE — 83690 ASSAY OF LIPASE: CPT

## 2019-04-29 PROCEDURE — 74177 CT ABD & PELVIS W/CONTRAST: CPT

## 2019-04-29 PROCEDURE — 74011000258 HC RX REV CODE- 258: Performed by: EMERGENCY MEDICINE

## 2019-04-29 PROCEDURE — 74011000250 HC RX REV CODE- 250: Performed by: EMERGENCY MEDICINE

## 2019-04-29 PROCEDURE — 85025 COMPLETE CBC W/AUTO DIFF WBC: CPT

## 2019-04-29 PROCEDURE — 80053 COMPREHEN METABOLIC PANEL: CPT

## 2019-04-29 PROCEDURE — 74011636320 HC RX REV CODE- 636/320: Performed by: EMERGENCY MEDICINE

## 2019-04-29 PROCEDURE — 99285 EMERGENCY DEPT VISIT HI MDM: CPT | Performed by: EMERGENCY MEDICINE

## 2019-04-29 PROCEDURE — 83605 ASSAY OF LACTIC ACID: CPT

## 2019-04-29 PROCEDURE — 96374 THER/PROPH/DIAG INJ IV PUSH: CPT | Performed by: EMERGENCY MEDICINE

## 2019-04-29 PROCEDURE — 96375 TX/PRO/DX INJ NEW DRUG ADDON: CPT | Performed by: EMERGENCY MEDICINE

## 2019-04-29 PROCEDURE — C9113 INJ PANTOPRAZOLE SODIUM, VIA: HCPCS | Performed by: EMERGENCY MEDICINE

## 2019-04-29 PROCEDURE — 96376 TX/PRO/DX INJ SAME DRUG ADON: CPT | Performed by: EMERGENCY MEDICINE

## 2019-04-29 PROCEDURE — 74011250636 HC RX REV CODE- 250/636: Performed by: EMERGENCY MEDICINE

## 2019-04-29 RX ORDER — PAROXETINE HYDROCHLORIDE 20 MG/1
TABLET, FILM COATED ORAL
COMMUNITY
End: 2020-01-16

## 2019-04-29 RX ORDER — ALBUTEROL SULFATE 90 UG/1
AEROSOL, METERED RESPIRATORY (INHALATION)
COMMUNITY
Start: 2019-03-10

## 2019-04-29 RX ORDER — HYDROMORPHONE HYDROCHLORIDE 2 MG/ML
0.5 INJECTION, SOLUTION INTRAMUSCULAR; INTRAVENOUS; SUBCUTANEOUS
Status: COMPLETED | OUTPATIENT
Start: 2019-04-29 | End: 2019-04-29

## 2019-04-29 RX ORDER — ONDANSETRON 2 MG/ML
4 INJECTION INTRAMUSCULAR; INTRAVENOUS
Status: COMPLETED | OUTPATIENT
Start: 2019-04-29 | End: 2019-04-29

## 2019-04-29 RX ORDER — ALBUTEROL SULFATE 0.83 MG/ML
3 SOLUTION RESPIRATORY (INHALATION)
COMMUNITY

## 2019-04-29 RX ORDER — ONDANSETRON 4 MG/1
TABLET, ORALLY DISINTEGRATING ORAL
COMMUNITY
Start: 2019-04-03 | End: 2021-02-15

## 2019-04-29 RX ORDER — HYDROMORPHONE HYDROCHLORIDE 2 MG/ML
1 INJECTION, SOLUTION INTRAMUSCULAR; INTRAVENOUS; SUBCUTANEOUS
Status: COMPLETED | OUTPATIENT
Start: 2019-04-29 | End: 2019-04-29

## 2019-04-29 RX ORDER — PANTOPRAZOLE SODIUM 40 MG/1
40 TABLET, DELAYED RELEASE ORAL DAILY
Qty: 20 TAB | Refills: 0 | Status: SHIPPED | OUTPATIENT
Start: 2019-04-29 | End: 2019-05-19

## 2019-04-29 RX ORDER — ONDANSETRON 8 MG/1
8 TABLET, ORALLY DISINTEGRATING ORAL
Qty: 8 TAB | Refills: 1 | Status: SHIPPED | OUTPATIENT
Start: 2019-04-29 | End: 2021-02-15

## 2019-04-29 RX ORDER — LEVOTHYROXINE SODIUM 100 UG/1
TABLET ORAL
COMMUNITY
Start: 2018-12-05 | End: 2019-11-13

## 2019-04-29 RX ORDER — FLUTICASONE PROPIONATE AND SALMETEROL 250; 50 UG/1; UG/1
1 POWDER RESPIRATORY (INHALATION) 2 TIMES DAILY
COMMUNITY

## 2019-04-29 RX ORDER — HYDROCODONE BITARTRATE AND ACETAMINOPHEN 5; 325 MG/1; MG/1
1 TABLET ORAL
Qty: 8 TAB | Refills: 0 | Status: SHIPPED | OUTPATIENT
Start: 2019-04-29 | End: 2019-05-02

## 2019-04-29 RX ORDER — SODIUM CHLORIDE 0.9 % (FLUSH) 0.9 %
10 SYRINGE (ML) INJECTION
Status: COMPLETED | OUTPATIENT
Start: 2019-04-29 | End: 2019-04-29

## 2019-04-29 RX ADMIN — HYDROMORPHONE HYDROCHLORIDE 1 MG: 2 INJECTION INTRAMUSCULAR; INTRAVENOUS; SUBCUTANEOUS at 17:13

## 2019-04-29 RX ADMIN — HYDROMORPHONE HYDROCHLORIDE 0.5 MG: 2 INJECTION INTRAMUSCULAR; INTRAVENOUS; SUBCUTANEOUS at 20:17

## 2019-04-29 RX ADMIN — IOPAMIDOL 100 ML: 755 INJECTION, SOLUTION INTRAVENOUS at 17:44

## 2019-04-29 RX ADMIN — SODIUM CHLORIDE 1000 ML: 900 INJECTION, SOLUTION INTRAVENOUS at 20:17

## 2019-04-29 RX ADMIN — SODIUM CHLORIDE 100 ML: 900 INJECTION, SOLUTION INTRAVENOUS at 17:45

## 2019-04-29 RX ADMIN — SODIUM CHLORIDE 1000 ML: 900 INJECTION, SOLUTION INTRAVENOUS at 17:12

## 2019-04-29 RX ADMIN — ONDANSETRON 4 MG: 2 INJECTION INTRAMUSCULAR; INTRAVENOUS at 17:13

## 2019-04-29 RX ADMIN — SODIUM CHLORIDE 40 MG: 9 INJECTION, SOLUTION INTRAMUSCULAR; INTRAVENOUS; SUBCUTANEOUS at 17:34

## 2019-04-29 RX ADMIN — Medication 10 ML: at 17:45

## 2019-04-29 NOTE — ED TRIAGE NOTES
Pt c/o abdominal pain that is severe and it started Saturday Evening. Pt states that she is not able to eat or drink anything without extreme pain. Pt states it feels like pancreatitis when she had it in 2015. Bob Daniel

## 2019-04-30 NOTE — ED NOTES
I have reviewed discharge instructions with the patient. The patient verbalized understanding. Patient left ED via Discharge Method: ambulatory to Home with self. Opportunity for questions and clarification provided. Patient given 2 scripts. To continue your aftercare when you leave the hospital, you may receive an automated call from our care team to check in on how you are doing. This is a free service and part of our promise to provide the best care and service to meet your aftercare needs.  If you have questions, or wish to unsubscribe from this service please call [phone]. Thank you for Choosing our [facility] Emergency Department.

## 2019-04-30 NOTE — DISCHARGE INSTRUCTIONS
Patient Education        Abdominal Pain: Care Instructions  Your Care Instructions    Abdominal pain has many possible causes. Some aren't serious and get better on their own in a few days. Others need more testing and treatment. If your pain continues or gets worse, you need to be rechecked and may need more tests to find out what is wrong. You may need surgery to correct the problem. Don't ignore new symptoms, such as fever, nausea and vomiting, urination problems, pain that gets worse, and dizziness. These may be signs of a more serious problem. Your doctor may have recommended a follow-up visit in the next 8 to 12 hours. If you are not getting better, you may need more tests or treatment. The doctor has checked you carefully, but problems can develop later. If you notice any problems or new symptoms, get medical treatment right away. Follow-up care is a key part of your treatment and safety. Be sure to make and go to all appointments, and call your doctor if you are having problems. It's also a good idea to know your test results and keep a list of the medicines you take. How can you care for yourself at home? · Rest until you feel better. · To prevent dehydration, drink plenty of fluids, enough so that your urine is light yellow or clear like water. Choose water and other caffeine-free clear liquids until you feel better. If you have kidney, heart, or liver disease and have to limit fluids, talk with your doctor before you increase the amount of fluids you drink. · If your stomach is upset, eat mild foods, such as rice, dry toast or crackers, bananas, and applesauce. Try eating several small meals instead of two or three large ones. · Wait until 48 hours after all symptoms have gone away before you have spicy foods, alcohol, and drinks that contain caffeine. · Do not eat foods that are high in fat. · Avoid anti-inflammatory medicines such as aspirin, ibuprofen (Advil, Motrin), and naproxen (Aleve). These can cause stomach upset. Talk to your doctor if you take daily aspirin for another health problem. When should you call for help? Call 911 anytime you think you may need emergency care. For example, call if:    · You passed out (lost consciousness).     · You pass maroon or very bloody stools.     · You vomit blood or what looks like coffee grounds.     · You have new, severe belly pain.    Call your doctor now or seek immediate medical care if:    · Your pain gets worse, especially if it becomes focused in one area of your belly.     · You have a new or higher fever.     · Your stools are black and look like tar, or they have streaks of blood.     · You have unexpected vaginal bleeding.     · You have symptoms of a urinary tract infection. These may include:  ? Pain when you urinate. ? Urinating more often than usual.  ? Blood in your urine.     · You are dizzy or lightheaded, or you feel like you may faint.    Watch closely for changes in your health, and be sure to contact your doctor if:    · You are not getting better after 1 day (24 hours). Where can you learn more? Go to http://abdirashid-chetna.info/. Enter P536 in the search box to learn more about \"Abdominal Pain: Care Instructions. \"  Current as of: September 23, 2018  Content Version: 11.9  © 0520-3608 [email or web address]. Care instructions adapted under license by [employer] (which disclaims liability or warranty for this information). If you have questions about a medical condition or this instruction, always ask your healthcare professional. [place] [address] any warranty or liability for your use of this information. Patient Education        Learning About Acid-Reducing Medicines  What are they? Acid-reducing medicines can help relieve heartburn and other symptoms of indigestion. They can help prevent damage to your digestive system from stomach acids.  They also are used to treat reflux and ulcer symptoms. These medicines include H2 blockers and proton pump inhibitors (PPIs). They help your stomach make less acid. You can buy them over the counter. Some of them also come in prescription strengths. Antacids can also help relieve heartburn symptoms. They reduce the acid that is already in your stomach. You can buy them over the counter. Which medicine is best for you depends on what is causing your symptoms. How do they work? Acid-reducing medicines work in two ways. H2 blockers and proton pump inhibitors (PPIs) lower the amount of acid your stomach makes. They don't work on the acid that's already there. Antacids work by making stomach juices less acidic. But your heartburn may come back as your stomach makes more acid. What are some examples? Examples of acid reducers include:  H2 blockers. Tagamet  Pepcid  Axid  Zantac  Proton pump inhibitors (PPIs). Nexium  Prevacid  Prilosec  Zegerid  Protonix  Aciphex  Antacids. Gaviscon  Mylanta  Maalox  Tums  What are side effects might you have? Many people don't have side effects. And minor side effects might go away after a while. H2 blockers can cause headaches or make you dizzy. They might cause diarrhea or constipation. You may have nausea and vomiting. PPIs can cause headaches and diarrhea. Using them for a long time may raise your risk for infections or broken bones. Some antacids can cause constipation or diarrhea. The brands vary in the ingredients they use. They can have different side effects. If you use too much heartburn medicine, your body may not get enough of some minerals from your food. How can you take these medicines safely? Some H2 blockers and PPIs can affect how other medicines work. Tell your doctor if you use other medicines. He or she may change the dose or give you a different medicine. Many antacids have aspirin in them. Read the label to make sure that you don't take too much.  Too much aspirin can be harmful. Be safe with medicines. Take your medicines exactly as prescribed. If you take over-the-counter medicine, be sure to read and follow all instructions on the label. Call your doctor if you think you are having a problem with your medicine. Check with your doctor or pharmacist before you use any other medicines. This includes over-the-counter medicines. Tell your doctor about all of the medicines, vitamins, herbal products, and supplements you take. Taking some medicines together can cause problems. Follow-up care is a key part of your treatment and safety. Be sure to make and go to all appointments, and call your doctor if you are having problems. It's also a good idea to know your test results and keep a list of the medicines you take. Where can you learn more? Go to http://abdirashid-chetna.info/. Enter [phone] in the search box to learn more about \"Learning About Acid-Reducing Medicines. \"  Current as of: March 27, 2018  Content Version: 11.9  © 1243-1929 [employer]. Care instructions adapted under license by [employer] (which disclaims liability or warranty for this information). If you have questions about a medical condition or this instruction, always ask your healthcare professional. [address] any warranty or liability for your use of this information.

## 2019-04-30 NOTE — ED PROVIDER NOTES
Here with upper abdominal discomfort. She has history of pancreatitis and states that some of her pain is similar to this but not typical of her classic pain. Has no history of alcohol use. She additionally has no history of gallbladder disease and has been worked up for this in the past.  States the pain is been off-and-on since the evening of Saturday. She has tried [place] and Advil and Tums with no improvement. No history of penetrating abdominal surgeries. The history is provided by the patient. Abdominal Pain This is a new problem. Episode onset: late Saturday. The problem has not changed since onset. The pain is associated with an unknown factor. The pain is located in the epigastric region. The pain is moderate. Associated symptoms include nausea. Pertinent negatives include no fever. Nothing worsens the pain. The pain is relieved by nothing. Past Medical History:  
Diagnosis Date  Asthma  Chronic obstructive pulmonary disease ([facility] [address]  Ill-defined condition   
 pancreatitis Past Surgical History:  
Procedure Laterality Date  HX ORTHOPAEDIC    
 HX OTHER SURGICAL    
 thyroid removed History reviewed. No pertinent family history. Social History Socioeconomic History  Marital status: SINGLE Spouse name: Not on file  Number of children: Not on file  Years of education: Not on file  Highest education level: Not on file Occupational History  Not on file Social Needs  Financial resource strain: Not on file  Food insecurity:  
  Worry: Not on file Inability: Not on file  Transportation needs:  
  Medical: Not on file Non-medical: Not on file Tobacco Use  Smoking status: Former Smoker  Smokeless tobacco: Former User Substance and Sexual Activity  Alcohol use: No  
 Drug use: Yes Types: Marijuana  Sexual activity: Never Birth control/protection: None Lifestyle  Physical activity: Days per week: Not on file Minutes per session: Not on file  Stress: Not on file Relationships  Social connections:  
  Talks on phone: Not on file Gets together: Not on file Attends Anabaptist service: Not on file Active member of club or organization: Not on file Attends meetings of clubs or organizations: Not on file Relationship status: Not on file  Intimate partner violence:  
  Fear of current or ex partner: Not on file Emotionally abused: Not on file Physically abused: Not on file Forced sexual activity: Not on file Other Topics Concern  Not on file Social History Narrative  Not on file ALLERGIES: Latex Review of Systems Constitutional: Negative for chills and fever. Gastrointestinal: Positive for abdominal pain and nausea. All other systems reviewed and are negative. Vitals:  
 04/29/19 1522 04/29/19 1624 04/29/19 2000 04/29/19 2020 BP: 155/90  128/75 135/75 Pulse: 71 Resp: 22 Temp: 98.7 °F (37.1 °C) SpO2: 100% 100% 96% 97% Weight: 71.7 kg (158 lb) Height: 5' (1.524 m) Physical Exam  
Constitutional: She appears well-developed and well-nourished. No distress. Uncomfortable but not toxic or septic HENT:  
Head: Atraumatic. Eyes: No scleral icterus. Neck: Neck supple. Cardiovascular: Normal rate. Pulmonary/Chest: Effort normal. No respiratory distress. Abdominal: Soft. Normal appearance and bowel sounds are normal. There is generalized tenderness. There is no rigidity, no rebound, no guarding, no CVA tenderness, no tenderness at McBurney's point and negative Quintana's sign. No hernia. No peritoneal findings Neurological: She is alert. Skin: Skin is warm and dry. Psychiatric: She has a normal mood and affect. Thought content normal.  
Nursing note and vitals reviewed. MDM Number of Diagnoses or Management Options Abdominal pain, epigastric:  
 Diagnosis management comments: No surgical present changes. Labs stable. Discussed non-specific changes on CTwith patient and though feels better to use a low threshold to return Amount and/or Complexity of Data Reviewed Clinical lab tests: ordered and reviewed Tests in the radiology section of CPT®: reviewed and ordered Decide to obtain previous medical records or to obtain history from someone other than the patient: yes Independent visualization of images, tracings, or specimens: yes Risk of Complications, Morbidity, and/or Mortality Presenting problems: high Diagnostic procedures: low Management options: moderate Patient Progress Patient progress: improved Procedures CT ABD PELV W CONT Final Result IMPRESSION:  The retroperitoneal fat surrounding the proximal mesenteric vessels  
appears slightly high attenuation suggesting possibly some mild edema,  
nonspecific. Aortic atherosclerosis. Mild sigmoid diverticulosis. Recent Results (from the past 12 hour(s)) LIPASE Collection Time: 04/29/19  3:45 PM  
Result Value Ref Range Lipase 277 73 - 743 U/L  
METABOLIC PANEL, COMPREHENSIVE Collection Time: 04/29/19  3:45 PM  
Result Value Ref Range Sodium 141 136 - 145 mmol/L Potassium 3.8 3.5 - 5.1 mmol/L Chloride 105 98 - 107 mmol/L  
 CO2 28 21 - 32 mmol/L Anion gap 8 mmol/L Glucose 93 65 - 100 mg/dL BUN 7 6 - 23 MG/DL Creatinine 0.91 0.6 - 1.0 MG/DL  
 GFR est AA >60 >60 ml/min/1.73m2 GFR est non-AA >60 ml/min/1.73m2 Calcium 9.4 8.3 - 10.4 MG/DL Bilirubin, total 0.6 0.2 - 1.1 MG/DL  
 ALT (SGPT) 22 12 - 65 U/L  
 AST (SGOT) 27 15 - 37 U/L Alk. phosphatase 101 50 - 130 U/L Protein, total 8.0 g/dL Albumin 3.7 3.5 - 5.0 g/dL Globulin 4.3 (H) 2.3 - 3.5 g/dL A-G Ratio 0.9    
CBC WITH AUTOMATED DIFF Collection Time: 04/29/19  3:45 PM  
Result Value Ref Range WBC 6.3 4.3 - 11.1 K/uL  
 RBC 4.63 4.05 - 5.2 M/uL HGB 12.4 11.7 - 15.4 g/dL HCT 38.4 35.8 - 46.3 % MCV 82.9 79.6 - 97.8 FL  
 MCH 26.8 26.1 - 32.9 PG  
 MCHC 32.3 31.4 - 35.0 g/dL  
 RDW 15.1 (H) 11.9 - 14.6 % PLATELET 519 719 - 077 K/uL MPV 10.8 9.4 - 12.3 FL ABSOLUTE NRBC 0.00 0.0 - 0.2 K/uL  
 DF AUTOMATED NEUTROPHILS 49 43 - 78 % LYMPHOCYTES 38 13 - 44 % MONOCYTES 7 4.0 - 12.0 % EOSINOPHILS 5 0.5 - 7.8 % BASOPHILS 1 0.0 - 2.0 % IMMATURE GRANULOCYTES 0 0.0 - 5.0 %  
 ABS. NEUTROPHILS 3.1 1.7 - 8.2 K/UL  
 ABS. LYMPHOCYTES 2.4 0.5 - 4.6 K/UL  
 ABS. MONOCYTES 0.4 0.1 - 1.3 K/UL  
 ABS. EOSINOPHILS 0.3 0.0 - 0.8 K/UL  
 ABS. BASOPHILS 0.1 0.0 - 0.2 K/UL  
 ABS. IMM. GRANS. 0.0 0.0 - 0.5 K/UL POC LACTIC ACID Collection Time: 04/29/19  9:19 PM  
Result Value Ref Range  Lactic Acid (POC) 0.64 0.5 - 1.9 mmol/L

## 2019-06-07 ENCOUNTER — APPOINTMENT (OUTPATIENT)
Dept: GENERAL RADIOLOGY | Age: 54
End: 2019-06-07
Attending: EMERGENCY MEDICINE
Payer: SELF-PAY

## 2019-06-07 ENCOUNTER — HOSPITAL ENCOUNTER (EMERGENCY)
Age: 54
Discharge: HOME | End: 2019-06-07
Attending: EMERGENCY MEDICINE
Payer: SELF-PAY

## 2019-06-07 VITALS
TEMPERATURE: 98.4 F | DIASTOLIC BLOOD PRESSURE: 74 MMHG | BODY MASS INDEX: 30.82 KG/M2 | OXYGEN SATURATION: 99 % | RESPIRATION RATE: 16 BRPM | HEIGHT: 60 IN | HEART RATE: 84 BPM | SYSTOLIC BLOOD PRESSURE: 114 MMHG | WEIGHT: 157 LBS

## 2019-06-07 DIAGNOSIS — M25.472 LEFT ANKLE SWELLING: Primary | ICD-10-CM

## 2019-06-07 PROCEDURE — 99284 EMERGENCY DEPT VISIT MOD MDM: CPT | Performed by: EMERGENCY MEDICINE

## 2019-06-07 PROCEDURE — 73610 X-RAY EXAM OF ANKLE: CPT

## 2019-06-07 RX ORDER — IBUPROFEN 800 MG/1
800 TABLET ORAL
Qty: 20 TAB | Refills: 0 | Status: SHIPPED | OUTPATIENT
Start: 2019-06-07 | End: 2019-06-14

## 2019-06-08 NOTE — ED NOTES
I have reviewed discharge instructions with the patient. The patient verbalized understanding. Patient left ED via Discharge Method: ambulatory to Home with herself. Opportunity for questions and clarification provided. Patient given 1 scripts. To continue your aftercare when you leave the hospital, you may receive an automated call from our care team to check in on how you are doing. This is a free service and part of our promise to provide the best care and service to meet your aftercare needs.  If you have questions, or wish to unsubscribe from this service please call [phone]. Thank you for Choosing our [facility] Emergency Department.

## 2019-06-08 NOTE — DISCHARGE INSTRUCTIONS
Use Ace wrap, crutches. Take Motrin. Ice and elevate the ankle. Follow-up with orthopedics if not improved in the next 3-5 days.

## 2019-06-08 NOTE — ED PROVIDER NOTES
HPI:  48 female history of prior left ankle surgery with screw and plate, Is here with nontraumatic left ankle swelling and pain for the past few days. Denies any calf tenderness. No fever. No weakness tingling or numbness    ROS  Swelling of the left ankle without any trauma. No weakness tingling or numbness    Visit Vitals  /77 (BP 1 Location: Left arm, BP Patient Position: Sitting)   Pulse 97   Temp 98.4 °F (36.9 °C)   Resp 16   Ht 5' (1.524 m)   Wt 71.2 kg (157 lb)   SpO2 98%   BMI 30.66 kg/m²     Past Medical History:   Diagnosis Date    Asthma     Chronic obstructive pulmonary disease (HCC)     Ill-defined condition     pancreatitis     Past Surgical History:   Procedure Laterality Date    HX ORTHOPAEDIC      HX OTHER SURGICAL      thyroid removed     Prior to Admission Medications   Prescriptions Last Dose Informant Patient Reported? Taking? PARoxetine (PAXIL) 20 mg tablet   Yes Yes   Sig: paroxetine 20 mg tablet   Take 1 tablet every day by oral route. albuterol (PROVENTIL HFA, VENTOLIN HFA, PROAIR HFA) 90 mcg/actuation inhaler   Yes Yes   Sig: albuterol sulfate HFA 90 mcg/actuation aerosol inhaler   Inhale 2 puffs every 4 hours by inhalation route. albuterol (PROVENTIL VENTOLIN) 2.5 mg /3 mL (0.083 %) nebulizer solution   Yes Yes   Sig: 3 mL. fluticasone propion-salmeterol (ADVAIR DISKUS) 250-50 mcg/dose diskus inhaler   Yes Yes   Sig: Take 1 Puff by inhalation. levothyroxine (SYNTHROID) 100 mcg tablet   Yes Yes   Sig: levothyroxine 100 mcg tablet   ondansetron (ZOFRAN ODT) 4 mg disintegrating tablet Not Taking at Unknown time  Yes No   Sig: ondansetron 4 mg disintegrating tablet   ondansetron (ZOFRAN ODT) 8 mg disintegrating tablet   No Yes   Sig: Take 1 Tab by mouth every eight (8) hours as needed for Nausea.       Facility-Administered Medications: None         Adult Exam   General: alert, no acute distress  Head:  atraumatic  ENT: moist mucous membranes  Neck: full range of motion  Cardiovascular  Respiratory:   Gastrointestinal:   Back: non-tender, full range of motion  Musculoskeletal: Knee stable. Left lateral ankle with some mild effusion. Tenderness to palpation. No overlying cellulitis. Distal pulses intact. No tenderness or swelling of the calf  Neurological: alert and oriented x 4, no gross focal deficits; normal speech  Psychiatric: cooperative; appropriate mood and affect    MDM:  X-ray negative for any acute fracture. Low suspicion for DVT. No signs of acute neurovascular compromise. Recommended Motrin for home. Recommend follow-up with orthopedics if not improved. Xr Ankle Lt Min 3 V    Result Date: 6/7/2019  EXAM: Left ankle x-rays. INDICATION: Pain. COMPARISON: July 15, 2017. TECHNIQUE: 3 views. FINDINGS: There is mild lateral ankle soft tissue swelling. Again noted is a distal femur fixation plate. No acute fracture, dislocation, hardware complication or bone erosion is seen. Normal ankle joint alignment is maintained. IMPRESSION: Mild lateral ankle soft tissue swelling, without evidence of an acute fracture. No results found for this or any previous visit (from the past 24 hour(s)). Dragon voice recognition software was used to create this note. Although the note has been reviewed and corrected where necessary, additional errors may have been overlooked and remain in the text.

## 2019-11-13 ENCOUNTER — HOSPITAL ENCOUNTER (EMERGENCY)
Age: 54
Discharge: HOME | End: 2019-11-13
Attending: EMERGENCY MEDICINE
Payer: SELF-PAY

## 2019-11-13 ENCOUNTER — APPOINTMENT (OUTPATIENT)
Dept: CT IMAGING | Age: 54
End: 2019-11-13
Attending: EMERGENCY MEDICINE
Payer: SELF-PAY

## 2019-11-13 VITALS
RESPIRATION RATE: 17 BRPM | DIASTOLIC BLOOD PRESSURE: 63 MMHG | SYSTOLIC BLOOD PRESSURE: 103 MMHG | OXYGEN SATURATION: 97 % | WEIGHT: 160 LBS | HEIGHT: 61 IN | BODY MASS INDEX: 30.21 KG/M2 | HEART RATE: 82 BPM

## 2019-11-13 DIAGNOSIS — G51.0 BELL'S PALSY: Primary | ICD-10-CM

## 2019-11-13 LAB
ALBUMIN SERPL-MCNC: 3.9 G/DL (ref 3.5–5)
ALBUMIN/GLOB SERPL: 1 {RATIO} (ref 1.2–3.5)
ALP SERPL-CCNC: 99 U/L (ref 50–130)
ALT SERPL-CCNC: 32 U/L (ref 12–65)
ANION GAP SERPL CALC-SCNC: 6 MMOL/L (ref 7–16)
AST SERPL-CCNC: 27 U/L (ref 15–37)
BASOPHILS # BLD: 0.1 K/UL (ref 0–0.2)
BASOPHILS NFR BLD: 2 % (ref 0–2)
BILIRUB SERPL-MCNC: 0.6 MG/DL (ref 0.2–1.1)
BUN SERPL-MCNC: 10 MG/DL (ref 6–23)
CALCIUM SERPL-MCNC: 9.1 MG/DL (ref 8.3–10.4)
CHLORIDE SERPL-SCNC: 103 MMOL/L (ref 98–107)
CO2 SERPL-SCNC: 31 MMOL/L (ref 21–32)
CREAT SERPL-MCNC: 1.1 MG/DL (ref 0.6–1)
DIFFERENTIAL METHOD BLD: ABNORMAL
EOSINOPHIL # BLD: 0.3 K/UL (ref 0–0.8)
EOSINOPHIL NFR BLD: 7 % (ref 0.5–7.8)
ERYTHROCYTE [DISTWIDTH] IN BLOOD BY AUTOMATED COUNT: 14.6 % (ref 11.9–14.6)
GLOBULIN SER CALC-MCNC: 4 G/DL (ref 2.3–3.5)
GLUCOSE SERPL-MCNC: 139 MG/DL (ref 65–100)
HCT VFR BLD AUTO: 37.8 % (ref 35.8–46.3)
HGB BLD-MCNC: 12.1 G/DL (ref 11.7–15.4)
IMM GRANULOCYTES # BLD AUTO: 0 K/UL (ref 0–0.5)
IMM GRANULOCYTES NFR BLD AUTO: 0 % (ref 0–5)
LYMPHOCYTES # BLD: 1.8 K/UL (ref 0.5–4.6)
LYMPHOCYTES NFR BLD: 43 % (ref 13–44)
MAGNESIUM SERPL-MCNC: 2.4 MG/DL (ref 1.8–2.4)
MCH RBC QN AUTO: 27.9 PG (ref 26.1–32.9)
MCHC RBC AUTO-ENTMCNC: 32 G/DL (ref 31.4–35)
MCV RBC AUTO: 87.3 FL (ref 79.6–97.8)
MONOCYTES # BLD: 0.3 K/UL (ref 0.1–1.3)
MONOCYTES NFR BLD: 7 % (ref 4–12)
NEUTS SEG # BLD: 1.8 K/UL (ref 1.7–8.2)
NEUTS SEG NFR BLD: 41 % (ref 43–78)
NRBC # BLD: 0 K/UL (ref 0–0.2)
PLATELET # BLD AUTO: 213 K/UL (ref 150–450)
PLATELET COMMENTS,PCOM: ADEQUATE
PMV BLD AUTO: 10.4 FL (ref 9.4–12.3)
POTASSIUM SERPL-SCNC: 3.7 MMOL/L (ref 3.5–5.1)
PROT SERPL-MCNC: 7.9 G/DL (ref 6.3–8.2)
RBC # BLD AUTO: 4.33 M/UL (ref 4.05–5.2)
RBC MORPH BLD: ABNORMAL
RBC MORPH BLD: ABNORMAL
SODIUM SERPL-SCNC: 140 MMOL/L (ref 136–145)
WBC # BLD AUTO: 4.3 K/UL (ref 4.3–11.1)
WBC MORPH BLD: ABNORMAL

## 2019-11-13 PROCEDURE — 70450 CT HEAD/BRAIN W/O DYE: CPT

## 2019-11-13 PROCEDURE — 85025 COMPLETE CBC W/AUTO DIFF WBC: CPT

## 2019-11-13 PROCEDURE — 99285 EMERGENCY DEPT VISIT HI MDM: CPT | Performed by: EMERGENCY MEDICINE

## 2019-11-13 PROCEDURE — 83735 ASSAY OF MAGNESIUM: CPT

## 2019-11-13 PROCEDURE — 80053 COMPREHEN METABOLIC PANEL: CPT

## 2019-11-13 PROCEDURE — 93005 ELECTROCARDIOGRAM TRACING: CPT | Performed by: EMERGENCY MEDICINE

## 2019-11-13 RX ORDER — LEVOTHYROXINE SODIUM 100 UG/1
100 CAPSULE ORAL DAILY
Qty: 30 CAP | Refills: 2 | Status: SHIPPED | OUTPATIENT
Start: 2019-11-13

## 2019-11-13 RX ORDER — PREDNISONE 20 MG/1
40 TABLET ORAL DAILY
Qty: 10 TAB | Refills: 0 | Status: SHIPPED | OUTPATIENT
Start: 2019-11-13 | End: 2019-11-18

## 2019-11-13 RX ORDER — ACYCLOVIR 800 MG/1
800 TABLET ORAL 2 TIMES DAILY
Qty: 14 TAB | Refills: 0 | Status: SHIPPED | OUTPATIENT
Start: 2019-11-13 | End: 2019-11-20

## 2019-11-13 NOTE — ED PROVIDER NOTES
Patient presents the ER complaint of right-sided facial droop. Reports symptoms started as some tingling to her tongue 2 days, followed by some progressive slow facial droop noticed yesterday morning. States when she awoke this morning she noticed drooling on the right side of her mouth as well as inability to close her right eye. Denies any difficulty speaking. Denies any changes in vision. Denies any headache, numbness, tingling or weakness. The history is provided by the patient. Facial Droop   This is a new problem. The current episode started more than 2 days ago. The problem has been gradually worsening. There was right facial focality noted. Pertinent negatives include no loss of sensation, no slurred speech, no speech difficulty, no memory loss, no movement disorder, no agitation, no auditory change and no disorientation. There has been no fever. Pertinent negatives include no vomiting, no confusion and no nausea. Past Medical History:   Diagnosis Date    Asthma     Chronic obstructive pulmonary disease ([address]     Ill-defined condition     pancreatitis       Past Surgical History:   Procedure Laterality Date    HX ORTHOPAEDIC      HX OTHER SURGICAL      thyroid removed         History reviewed. No pertinent family history.     Social History     Socioeconomic History    Marital status: SINGLE     Spouse name: Not on file    Number of children: Not on file    Years of education: Not on file    Highest education level: Not on file   Occupational History    Not on file   Social Needs    Financial resource strain: Not on file    Food insecurity:     Worry: Not on file     Inability: Not on file    Transportation needs:     Medical: Not on file     Non-medical: Not on file   Tobacco Use    Smoking status: Former Smoker    Smokeless tobacco: Former User   Substance and Sexual Activity    Alcohol use: No    Drug use: Yes     Types: Marijuana    Sexual activity: Never     Birth control/protection: None   Lifestyle    Physical activity:     Days per week: Not on file     Minutes per session: Not on file    Stress: Not on file   Relationships    Social connections:     Talks on phone: Not on file     Gets together: Not on file     Attends Latter-day service: Not on file     Active member of club or organization: Not on file     Attends meetings of clubs or organizations: Not on file     Relationship status: Not on file    Intimate partner violence:     Fear of current or ex partner: Not on file     Emotionally abused: Not on file     Physically abused: Not on file     Forced sexual activity: Not on file   Other Topics Concern    Not on file   Social History Narrative    Not on file         ALLERGIES: Latex    Review of Systems   Constitutional: Negative for fatigue and fever. HENT: Negative for congestion and dental problem. Eyes: Negative for photophobia, redness and visual disturbance. Respiratory: Negative for cough and chest tightness. Cardiovascular: Negative for palpitations and leg swelling. Gastrointestinal: Negative for nausea and vomiting. Genitourinary: Negative for flank pain, frequency and urgency. Musculoskeletal: Negative for back pain and gait problem. Skin: Negative for color change and pallor. Neurological: Negative for speech difficulty, light-headedness and numbness. Hematological: Negative for adenopathy. Does not bruise/bleed easily. Psychiatric/Behavioral: Negative for agitation, confusion and memory loss. All other systems reviewed and are negative. Vitals:    11/13/19 1711   BP: 137/86   Pulse: 93   Resp: 17   SpO2: 97%   Weight: 72.6 kg (160 lb)   Height: 5' 1\" (1.549 m)            Physical Exam   Constitutional: She appears well-developed and well-nourished. HENT:   Head: Normocephalic and atraumatic. Eyes: Pupils are equal, round, and reactive to light. EOM are normal.   Cardiovascular: Normal rate and regular rhythm. Pulmonary/Chest: Effort normal.   Abdominal: Bowel sounds are normal. She exhibits no distension. Neurological: She is alert. No sensory deficit. She exhibits normal muscle tone. Coordination normal.   Facial muscle weakness of the right face noted   Nursing note and vitals reviewed. MDM  Number of Diagnoses or Management Options  Diagnosis management comments: Check basic labs as well as CT scan of head, examination history is consistent with likely Bell's palsy. 7:03 PM  CT scan of head shows no acute abnormalities. As stated, exam is consistent with likely Bell's palsy, no sparing of forehead muscles noted    Plan for treatment for Bell's palsy with steroids as well use lubricating eyedrops. Encourage follow-up PCP       Amount and/or Complexity of Data Reviewed  Clinical lab tests: ordered and reviewed  Tests in the radiology section of CPT®: ordered and reviewed    Risk of Complications, Morbidity, and/or Mortality  Presenting problems: moderate  Diagnostic procedures: moderate  Management options: moderate    Patient Progress  Patient progress: stable         Procedures        Results Include:    Recent Results (from the past 24 hour(s))   CBC WITH AUTOMATED DIFF    Collection Time: 11/13/19  5:31 PM   Result Value Ref Range    WBC 4.3 4.3 - 11.1 K/uL    RBC 4.33 4.05 - 5.2 M/uL    HGB 12.1 11.7 - 15.4 g/dL    HCT 37.8 35.8 - 46.3 %    MCV 87.3 79.6 - 97.8 FL    MCH 27.9 26.1 - 32.9 PG    MCHC 32.0 31.4 - 35.0 g/dL    RDW 14.6 11.9 - 14.6 %    PLATELET 025 400 - 943 K/uL    MPV 10.4 9.4 - 12.3 FL    ABSOLUTE NRBC 0.00 0.0 - 0.2 K/uL    NEUTROPHILS 41 (L) 43 - 78 %    LYMPHOCYTES 43 13 - 44 %    MONOCYTES 7 4.0 - 12.0 %    EOSINOPHILS 7 0.5 - 7.8 %    BASOPHILS 2 0.0 - 2.0 %    IMMATURE GRANULOCYTES 0 0.0 - 5.0 %    ABS. NEUTROPHILS 1.8 1.7 - 8.2 K/UL    ABS. LYMPHOCYTES 1.8 0.5 - 4.6 K/UL    ABS. MONOCYTES 0.3 0.1 - 1.3 K/UL    ABS. EOSINOPHILS 0.3 0.0 - 0.8 K/UL    ABS.  BASOPHILS 0.1 0.0 - 0.2 K/UL    ABS. IMM. GRANS. 0.0 0.0 - 0.5 K/UL    RBC COMMENTS OCCASIONAL  OVALOCYTES        RBC COMMENTS OCCASIONAL  ACANTHOCYTES        WBC COMMENTS Result Confirmed By Smear      PLATELET COMMENTS ADEQUATE      DF MANUAL     METABOLIC PANEL, COMPREHENSIVE    Collection Time: 11/13/19  5:31 PM   Result Value Ref Range    Sodium 140 136 - 145 mmol/L    Potassium 3.7 3.5 - 5.1 mmol/L    Chloride 103 98 - 107 mmol/L    CO2 31 21 - 32 mmol/L    Anion gap 6 (L) 7 - 16 mmol/L    Glucose 139 (H) 65 - 100 mg/dL    BUN 10 6 - 23 MG/DL    Creatinine 1.10 (H) 0.6 - 1.0 MG/DL    GFR est AA >60 >60 ml/min/1.73m2    GFR est non-AA 55 (L) >60 ml/min/1.73m2    Calcium 9.1 8.3 - 10.4 MG/DL    Bilirubin, total 0.6 0.2 - 1.1 MG/DL    ALT (SGPT) 32 12 - 65 U/L    AST (SGOT) 27 15 - 37 U/L    Alk. phosphatase 99 50 - 130 U/L    Protein, total 7.9 6.3 - 8.2 g/dL    Albumin 3.9 3.5 - 5.0 g/dL    Globulin 4.0 (H) 2.3 - 3.5 g/dL    A-G Ratio 1.0 (L) 1.2 - 3.5     MAGNESIUM    Collection Time: 11/13/19  5:31 PM   Result Value Ref Range    Magnesium 2.4 1.8 - 2.4 mg/dL     Voice dictation software was used during the making of this note. This software is not perfect and grammatical and other typographical errors may be present. This note has been proofread, but may still contain errors.   Silvia Guerra MD; 11/13/2019 @7:05 PM   ===================================================================

## 2019-11-13 NOTE — LETTER
NOTIFICATION RETURN TO WORK / SCHOOL 
 
11/13/2019 7:12 PM 
 
Ms. Tommy Atkins [address] [place] [id number] To Whom It May Concern: Tommy Atkins is currently under the care of [facility] EMERGENCY DEPT. She will return to work/school on: 11/14/19 If there are questions or concerns please have the patient contact our office. Sincerely, Cliff Callejas MD

## 2019-11-13 NOTE — ED TRIAGE NOTES
Pt is complaining of facial drooping and tongue swelling that started 1 and a half days ago. Pt drove herself here and ambulated to the bed without difficulty.

## 2019-11-14 LAB
ATRIAL RATE: 103 BPM
CALCULATED P AXIS, ECG09: 74 DEGREES
CALCULATED R AXIS, ECG10: 34 DEGREES
CALCULATED T AXIS, ECG11: 59 DEGREES
DIAGNOSIS, 93000: NORMAL
P-R INTERVAL, ECG05: 136 MS
Q-T INTERVAL, ECG07: 360 MS
QRS DURATION, ECG06: 72 MS
QTC CALCULATION (BEZET), ECG08: 471 MS
VENTRICULAR RATE, ECG03: 103 BPM

## 2019-11-14 NOTE — ED NOTES
I have reviewed discharge instructions with the patient. The patient verbalized understanding. Patient left ED via Discharge Method: ambulatory to Home with self. Opportunity for questions and clarification provided. Patient given 3 scripts. To continue your aftercare when you leave the hospital, you may receive an automated call from our care team to check in on how you are doing. This is a free service and part of our promise to provide the best care and service to meet your aftercare needs.  If you have questions, or wish to unsubscribe from this service please call [phone]. Thank you for Choosing our [address] [place] [address] Emergency Department.

## 2019-11-14 NOTE — DISCHARGE INSTRUCTIONS
Take medications as prescribed  Use over-the-counter artificial tears to help with dry  Arrange follow-up with a primary care physician  Return to the ER for any new or worsening symptoms    Bell's Palsy: Care Instructions  Your Care Instructions    Bell's palsy is paralysis or weakness of the muscles on one side of the face. Often people with Bell's palsy have a droop on one side of the mouth and have trouble completely shutting the eye on the same side. Bell's palsy can also interfere with the sense of taste. These things happen when a nerve in your face becomes inflamed. Bell's palsy is not caused by a stroke. The cause of the nerve inflammation is not known. But some experts think that a virus may cause it. Because of this, doctors sometimes prescribe antiviral medicine to treat it. You also may get medicine to reduce swelling. Bell's palsy usually gets better on its own in a few weeks or months. Follow-up care is a key part of your treatment and safety. Be sure to make and go to all appointments, and call your doctor if you are having problems. It's also a good idea to know your test results and keep a list of the medicines you take. How can you care for yourself at home? · Take your medicines exactly as prescribed. Call your doctor if you think you are having a problem with your medicine. You will get more details on the specific medicines your doctor prescribes. · Use artificial tears or ointment if your eyes are too dry. Bell's palsy can make your lower eyelid droop, causing a dry eye. · If you cannot completely close your eye, consider using an eye patch while you sleep. · Help yourself blink by using your finger to close and open your eyelid. This may help keep your eye moist.  · Wear glasses or goggles to keep dust and dirt out of your eye. · As feeling comes back to your face, massage your forehead, cheeks, and lips. Massage may make the muscles in your face stronger.   · Brush and floss your teeth often to help prevent tooth decay. Bell's palsy can dry up the spit on one side of your mouth. This increases the risk of tooth decay. When should you call for help? Call 911 anytime you think you may need emergency care. For example, call if:    · You have symptoms of a stroke. These may include:  ? Sudden numbness, tingling, weakness, or loss of movement in your face, arm, or leg, especially on only one side of your body. ? Sudden vision changes. ? Sudden trouble speaking. ? Sudden confusion or trouble understanding simple statements. ? Sudden problems with walking or balance. ? A sudden, severe headache that is different from past headaches.    Call your doctor now or seek immediate medical care if:    · You have numbness or weakness that spreads beyond one side of your face.     · You have a skin rash or eye pain or redness, or light bothers your eyes.     · You have a new or worse headache.    Watch closely for changes in your health, and be sure to contact your doctor if:    · You do not get better as expected. Where can you learn more? Go to http://abdirashid-chetna.info/. Enter P168 in the search box to learn more about \"Bell's Palsy: Care Instructions. \"  Current as of: March 28, 2019  Content Version: 12.2  © 4044-7418 [employer]. Care instructions adapted under license by [employer] (which disclaims liability or warranty for this information). If you have questions about a medical condition or this instruction, always ask your healthcare professional. [place] [address] any warranty or liability for your use of this information.

## 2020-09-25 ENCOUNTER — HOSPITAL ENCOUNTER (EMERGENCY)
Age: 55
Discharge: HOME | End: 2020-09-25
Payer: COMMERCIAL

## 2020-09-25 VITALS
WEIGHT: 150 LBS | SYSTOLIC BLOOD PRESSURE: 133 MMHG | TEMPERATURE: 97.9 F | DIASTOLIC BLOOD PRESSURE: 83 MMHG | OXYGEN SATURATION: 98 % | BODY MASS INDEX: 28.32 KG/M2 | HEIGHT: 61 IN | HEART RATE: 84 BPM | RESPIRATION RATE: 16 BRPM

## 2020-09-25 DIAGNOSIS — M79.604 BILATERAL LEG PAIN: Primary | ICD-10-CM

## 2020-09-25 DIAGNOSIS — M79.605 BILATERAL LEG PAIN: Primary | ICD-10-CM

## 2020-09-25 LAB
ALBUMIN SERPL-MCNC: 4 G/DL (ref 3.5–5)
ALBUMIN/GLOB SERPL: 0.9 {RATIO} (ref 1.2–3.5)
ALP SERPL-CCNC: 133 U/L (ref 50–136)
ALT SERPL-CCNC: 15 U/L (ref 12–65)
ANION GAP SERPL CALC-SCNC: 4 MMOL/L (ref 7–16)
AST SERPL-CCNC: 14 U/L (ref 15–37)
BASOPHILS # BLD: 0.1 K/UL (ref 0–0.2)
BASOPHILS NFR BLD: 2 % (ref 0–2)
BILIRUB SERPL-MCNC: 0.6 MG/DL (ref 0.2–1.1)
BUN SERPL-MCNC: 9 MG/DL (ref 6–23)
CALCIUM SERPL-MCNC: 9.5 MG/DL (ref 8.3–10.4)
CHLORIDE SERPL-SCNC: 103 MMOL/L (ref 98–107)
CO2 SERPL-SCNC: 30 MMOL/L (ref 21–32)
CREAT SERPL-MCNC: 0.85 MG/DL (ref 0.6–1)
DIFFERENTIAL METHOD BLD: ABNORMAL
EOSINOPHIL # BLD: 0.5 K/UL (ref 0–0.8)
EOSINOPHIL NFR BLD: 7 % (ref 0.5–7.8)
ERYTHROCYTE [DISTWIDTH] IN BLOOD BY AUTOMATED COUNT: 14 % (ref 11.9–14.6)
GLOBULIN SER CALC-MCNC: 4.6 G/DL (ref 2.3–3.5)
GLUCOSE SERPL-MCNC: 112 MG/DL (ref 65–100)
HCT VFR BLD AUTO: 39.2 % (ref 35.8–46.3)
HGB BLD-MCNC: 12.8 G/DL (ref 11.7–15.4)
IMM GRANULOCYTES # BLD AUTO: 0 K/UL (ref 0–0.5)
IMM GRANULOCYTES NFR BLD AUTO: 0 % (ref 0–5)
LYMPHOCYTES # BLD: 2.9 K/UL (ref 0.5–4.6)
LYMPHOCYTES NFR BLD: 42 % (ref 13–44)
MAGNESIUM SERPL-MCNC: 2.3 MG/DL (ref 1.8–2.4)
MCH RBC QN AUTO: 27.1 PG (ref 26.1–32.9)
MCHC RBC AUTO-ENTMCNC: 32.7 G/DL (ref 31.4–35)
MCV RBC AUTO: 83.1 FL (ref 79.6–97.8)
MONOCYTES # BLD: 0.5 K/UL (ref 0.1–1.3)
MONOCYTES NFR BLD: 7 % (ref 4–12)
NEUTS SEG # BLD: 2.9 K/UL (ref 1.7–8.2)
NEUTS SEG NFR BLD: 42 % (ref 43–78)
NRBC # BLD: 0 K/UL (ref 0–0.2)
PLATELET # BLD AUTO: 304 K/UL (ref 150–450)
PMV BLD AUTO: 9.7 FL (ref 9.4–12.3)
POTASSIUM SERPL-SCNC: 3.8 MMOL/L (ref 3.5–5.1)
PROT SERPL-MCNC: 8.6 G/DL (ref 6.3–8.2)
RBC # BLD AUTO: 4.72 M/UL (ref 4.05–5.2)
SODIUM SERPL-SCNC: 137 MMOL/L (ref 136–145)
WBC # BLD AUTO: 7 K/UL (ref 4.3–11.1)

## 2020-09-25 PROCEDURE — 99283 EMERGENCY DEPT VISIT LOW MDM: CPT

## 2020-09-25 PROCEDURE — 96374 THER/PROPH/DIAG INJ IV PUSH: CPT

## 2020-09-25 PROCEDURE — 80053 COMPREHEN METABOLIC PANEL: CPT

## 2020-09-25 PROCEDURE — 74011250636 HC RX REV CODE- 250/636

## 2020-09-25 PROCEDURE — 83735 ASSAY OF MAGNESIUM: CPT

## 2020-09-25 PROCEDURE — 85025 COMPLETE CBC W/AUTO DIFF WBC: CPT

## 2020-09-25 PROCEDURE — 74011250637 HC RX REV CODE- 250/637

## 2020-09-25 RX ORDER — CYCLOBENZAPRINE HCL 10 MG
10 TABLET ORAL
Status: COMPLETED | OUTPATIENT
Start: 2020-09-25 | End: 2020-09-25

## 2020-09-25 RX ORDER — CYCLOBENZAPRINE HCL 10 MG
10 TABLET ORAL
Qty: 12 TAB | Refills: 0 | Status: SHIPPED | OUTPATIENT
Start: 2020-09-25 | End: 2021-02-15

## 2020-09-25 RX ADMIN — METHYLPREDNISOLONE SODIUM SUCCINATE 125 MG: 125 INJECTION, POWDER, FOR SOLUTION INTRAMUSCULAR; INTRAVENOUS at 02:51

## 2020-09-25 RX ADMIN — CYCLOBENZAPRINE 10 MG: 10 TABLET, FILM COATED ORAL at 02:51

## 2020-09-25 NOTE — ED TRIAGE NOTES
Pt states she has been having leg pain for the last couple of states when you touch them the pain is worse and she cant barely walk because the pain is so back

## 2020-09-25 NOTE — DISCHARGE INSTRUCTIONS
Patient Education        Leg Pain: Care Instructions  Your Care Instructions  Many things can cause leg pain. Too much exercise or overuse can cause a muscle cramp (or charley horse). You can get leg cramps from not eating a balanced diet that has enough potassium, calcium, and other minerals. If you do not drink enough fluids or are taking certain medicines, you may develop leg cramps. Other causes of leg pain include injuries, blood flow problems, nerve damage, and twisted and enlarged veins (varicose veins). You can usually ease pain with self-care. Your doctor may recommend that you rest your leg and keep it elevated. Follow-up care is a key part of your treatment and safety. Be sure to make and go to all appointments, and call your doctor if you are having problems. It's also a good idea to know your test results and keep a list of the medicines you take. How can you care for yourself at home? · Take pain medicines exactly as directed. ? If the doctor gave you a prescription medicine for pain, take it as prescribed. ? If you are not taking a prescription pain medicine, ask your doctor if you can take an over-the-counter medicine. · Take any other medicines exactly as prescribed. Call your doctor if you think you are having a problem with your medicine. · Rest your leg while you have pain, and avoid standing for long periods of time. · Prop up your leg at or above the level of your heart when possible. · Make sure you are eating a balanced diet that is rich in calcium, potassium, and magnesium, especially if you are pregnant. · If directed by your doctor, put ice or a cold pack on the area for 10 to 20 minutes at a time. Put a thin cloth between the ice and your skin. · Your leg may be in a splint, a brace, or an elastic bandage, and you may have crutches to help you walk. Follow your doctor's directions about how long to wear supports and how to use the crutches. When should you call for help? Call 911 anytime you think you may need emergency care. For example, call if:    · You have sudden chest pain and shortness of breath, or you cough up blood.     · Your leg is cool or pale or changes color. Call your doctor now or seek immediate medical care if:    · You have increasing or severe pain.     · Your leg suddenly feels weak and you cannot move it.     · You have signs of a blood clot, such as:  ? Pain in your calf, back of the knee, thigh, or groin. ? Redness and swelling in your leg or groin.     · You have signs of infection, such as:  ? Increased pain, swelling, warmth, or redness. ? Red streaks leading from the sore area. ? Pus draining from a place on your leg. ? A fever.     · You cannot bear weight on your leg. Watch closely for changes in your health, and be sure to contact your doctor if:    · You do not get better as expected. Where can you learn more? Go to http://abdirashid-chetna.info/  Enter I373 in the search box to learn more about \"Leg Pain: Care Instructions. \"  Current as of: June 26, 2019               Content Version: 12.6  © 3497-6937 [employer]. Care instructions adapted under license by [employer] (which disclaims liability or warranty for this information). If you have questions about a medical condition or this instruction, always ask your healthcare professional. [place] [address] any warranty or liability for your use of this information.

## 2020-09-25 NOTE — ED PROVIDER NOTES
74-year-old female presents with leg pain bilaterally. Patient points to her calfs both legs is been very painful and sensitive to touch. She denies injury there is no redness or swelling and they are both equally warm with good capillary refill. The history is provided by the patient. Leg Pain    This is a new problem. The current episode started more than 2 days ago. The problem occurs constantly. The problem has not changed since onset. The pain is present in the left lower leg and right lower leg. The quality of the pain is described as aching and constant. The pain is at a severity of 10/10. The pain is severe. Pertinent negatives include no numbness, full range of motion, no stiffness, no back pain and no neck pain. The symptoms are aggravated by movement. She has tried nothing for the symptoms.         Past Medical History:   Diagnosis Date    Asthma     Bell's palsy     Chronic obstructive pulmonary disease (HCC)     Ill-defined condition     pancreatitis       Past Surgical History:   Procedure Laterality Date    HX ORTHOPAEDIC Left 2007    left ankle fracture- has a plate    HX OTHER SURGICAL  12/04/2018    thyroid removed         Family History:   Problem Relation Age of Onset    Hypertension Mother     Hypertension Father     Cancer Brother 47        brain       Social History     Socioeconomic History    Marital status: SINGLE     Spouse name: Not on file    Number of children: 3    Years of education: Not on file    Highest education level: Not on file   Occupational History    Occupation: bakery plant    Occupation:    Social Needs    Financial resource strain: Not on file    Food insecurity     Worry: Not on file     Inability: Not on file   [place] [employer] needs     Medical: Not on file     Non-medical: Not on file   Tobacco Use    Smoking status: Former Smoker     Packs/day: 0.25     Types: Cigarettes     Start date: 1992    Smokeless tobacco: Never Used Substance and Sexual Activity    Alcohol use: No    Drug use: Not Currently     Types: Marijuana    Sexual activity: Not Currently     Partners: Male     Birth control/protection: None   Lifestyle    Physical activity     Days per week: Not on file     Minutes per session: Not on file    Stress: Not on file   Relationships    Social connections     Talks on phone: Not on file     Gets together: Not on file     Attends Zoroastrian service: Not on file     Active member of club or organization: Not on file     Attends meetings of clubs or organizations: Not on file     Relationship status: Not on file    Intimate partner violence     Fear of current or ex partner: Not on file     Emotionally abused: Not on file     Physically abused: Not on file     Forced sexual activity: Not on file   Other Topics Concern    Not on file   Social History Narrative    Not on file         ALLERGIES: Latex    Review of Systems   Constitutional: Negative. Negative for activity change. HENT: Negative. Eyes: Negative. Respiratory: Negative. Cardiovascular: Negative. Gastrointestinal: Negative. Genitourinary: Negative. Musculoskeletal: Negative. Negative for back pain, neck pain and stiffness. Skin: Negative. Neurological: Negative. Negative for numbness. Psychiatric/Behavioral: Negative. All other systems reviewed and are negative. Vitals:    09/25/20 0227   BP: (!) 142/94   Pulse: 87   Resp: 18   Temp: 97.9 °F (36.6 °C)   SpO2: 97%   Weight: 68 kg (150 lb)   Height: 5' 1\" (1.549 m)            Physical Exam  Vitals signs and nursing note reviewed. Constitutional:       General: She is not in acute distress. Appearance: She is well-developed. She is not diaphoretic. HENT:      Head: Normocephalic and atraumatic. Right Ear: External ear normal.      Left Ear: External ear normal.      Nose: Nose normal.      Mouth/Throat:      Pharynx: No oropharyngeal exudate.    Eyes:      General: No scleral icterus. Right eye: No discharge. Left eye: No discharge. Conjunctiva/sclera: Conjunctivae normal.      Pupils: Pupils are equal, round, and reactive to light. Neck:      Musculoskeletal: Normal range of motion and neck supple. Vascular: No JVD. Trachea: No tracheal deviation. Cardiovascular:      Rate and Rhythm: Normal rate and regular rhythm. Pulmonary:      Effort: Pulmonary effort is normal. No respiratory distress. Breath sounds: Normal breath sounds. No stridor. No wheezing. Chest:      Chest wall: No tenderness. Abdominal:      General: Bowel sounds are normal. There is no distension. Palpations: Abdomen is soft. There is no mass. Tenderness: There is no abdominal tenderness. Musculoskeletal: Normal range of motion. General: Tenderness present. No swelling, deformity or signs of injury. Right lower leg: No edema. Left lower leg: No edema. Skin:     General: Skin is warm and dry. Coloration: Skin is not pale. Findings: No erythema or rash. Neurological:      Mental Status: She is alert and oriented to person, place, and time. Cranial Nerves: No cranial nerve deficit. Gait: Gait abnormal.   Psychiatric:         Behavior: Behavior normal.         Thought Content: Thought content normal.          MDM  Number of Diagnoses or Management Options  Diagnosis management comments: Please only normal physical exam DTRs brisk. Capillary refill normal skin is warm but sensitive to the touch. No back pain no headache no fevers chills no cough cold or flulike symptoms. No dysuria no bowel or bladder dysfunction.     Patient's complaints are bizarre and intermittent no findings tonight indicating admission necessary close follow-up with primary care today       Amount and/or Complexity of Data Reviewed  Clinical lab tests: ordered and reviewed  Tests in the medicine section of CPT®: ordered and reviewed    Risk of Complications, Morbidity, and/or Mortality  Presenting problems: high  Diagnostic procedures: high  Management options: high    Patient Progress  Patient progress: stable         Procedures

## 2020-09-25 NOTE — ED NOTES
I have reviewed discharge instructions with the patient. The patient verbalized understanding. Patient left ED via Discharge Method: ambulatory to Home with family. Opportunity for questions and clarification provided. Patient given 1 scripts. To continue your aftercare when you leave the hospital, you may receive an automated call from our care team to check in on how you are doing. This is a free service and part of our promise to provide the best care and service to meet your aftercare needs.  If you have questions, or wish to unsubscribe from this service please call [phone]. Thank you for Choosing our [facility] Emergency Department.

## 2021-02-03 ENCOUNTER — HOSPITAL ENCOUNTER (EMERGENCY)
Age: 56
Discharge: HOME | End: 2021-02-03
Attending: EMERGENCY MEDICINE
Payer: COMMERCIAL

## 2021-02-03 ENCOUNTER — APPOINTMENT (OUTPATIENT)
Dept: GENERAL RADIOLOGY | Age: 56
End: 2021-02-03
Attending: EMERGENCY MEDICINE
Payer: COMMERCIAL

## 2021-02-03 VITALS
OXYGEN SATURATION: 98 % | BODY MASS INDEX: 27.94 KG/M2 | TEMPERATURE: 97.8 F | DIASTOLIC BLOOD PRESSURE: 73 MMHG | SYSTOLIC BLOOD PRESSURE: 107 MMHG | WEIGHT: 148 LBS | HEART RATE: 62 BPM | HEIGHT: 61 IN | RESPIRATION RATE: 16 BRPM

## 2021-02-03 DIAGNOSIS — M16.9 ARTHROSIS OF HIP: Primary | ICD-10-CM

## 2021-02-03 PROCEDURE — 81003 URINALYSIS AUTO W/O SCOPE: CPT

## 2021-02-03 PROCEDURE — 73502 X-RAY EXAM HIP UNI 2-3 VIEWS: CPT

## 2021-02-03 PROCEDURE — 99283 EMERGENCY DEPT VISIT LOW MDM: CPT

## 2021-02-03 RX ORDER — METHOCARBAMOL 750 MG/1
750 TABLET, FILM COATED ORAL 3 TIMES DAILY
Qty: 18 TAB | Refills: 0 | Status: SHIPPED | OUTPATIENT
Start: 2021-02-03 | End: 2021-02-15

## 2021-02-03 RX ORDER — NAPROXEN 375 MG/1
375 TABLET ORAL 2 TIMES DAILY WITH MEALS
Qty: 14 TAB | Refills: 0 | Status: SHIPPED | OUTPATIENT
Start: 2021-02-03 | End: 2021-02-15

## 2021-02-03 NOTE — ED TRIAGE NOTES
Patient advises that she is having left sided groin pain for 2-3 weeks, patient advises that she thought it was related to pushing a box with that leg onto a pallet a couple weeks ago at work. Patient advises that now thigh is discolored on left side compared to right and she is having difficulty walking. Patient with mask on during triage. Pedal pulse present.

## 2021-02-03 NOTE — ED NOTES
I have reviewed discharge instructions with the patient. The patient verbalized understanding. Patient left ED via Discharge Method: ambulatory to Home with self. Opportunity for questions and clarification provided. Patient given 2 scripts. To continue your aftercare when you leave the hospital, you may receive an automated call from our care team to check in on how you are doing. This is a free service and part of our promise to provide the best care and service to meet your aftercare needs.  If you have questions, or wish to unsubscribe from this service please call [phone]. Thank you for Choosing our [facility] Emergency Department.

## 2021-02-03 NOTE — ED PROVIDER NOTES
Mask was worn during the entire patient examination. Charity Yoo is a 50 y.o. female who presents to the ED with a chief complaint of left-sided groin pain for the last 3 weeks. She states it started when she was using her leg to try to move a heavy box that was locked in place. She was using her foot to push it and now anytime she moves this left leg she ends up with severe pain in the left inguinal region. No abdominal pain no right-sided leg pain. No fever or chills. No vomiting or diarrhea. Patient states pain is severe with any movement but otherwise is manageable. No swelling or deformity.            Past Medical History:   Diagnosis Date    Asthma     Bell's palsy     Chronic obstructive pulmonary disease (HCC)     Ill-defined condition     pancreatitis       Past Surgical History:   Procedure Laterality Date    HX ORTHOPAEDIC Left 2007    left ankle fracture- has a plate    HX OTHER SURGICAL  12/04/2018    thyroid removed         Family History:   Problem Relation Age of Onset    Hypertension Mother     Hypertension Father     Cancer Brother 47        brain       Social History     Socioeconomic History    Marital status: SINGLE     Spouse name: Not on file    Number of children: 3    Years of education: Not on file    Highest education level: Not on file   Occupational History    Occupation: [employer]    Occupation:    Social Needs    Financial resource strain: Not on file    Food insecurity     Worry: Not on file     Inability: Not on file   Student Designed needs     Medical: Not on file     Non-medical: Not on file   Tobacco Use    Smoking status: Former Smoker     Packs/day: 0.25     Types: Cigarettes     Start date: 1992    Smokeless tobacco: Never Used   Substance and Sexual Activity    Alcohol use: No    Drug use: Not Currently     Types: Marijuana    Sexual activity: Not Currently     Partners: Male     Birth control/protection: None   Lifestyle    Physical activity     Days per week: Not on file     Minutes per session: Not on file    Stress: Not on file   Relationships    Social connections     Talks on phone: Not on file     Gets together: Not on file     Attends [employer] service: Not on file     Active member of club or organization: Not on file     Attends meetings of clubs or organizations: Not on file     Relationship status: Not on file    Intimate partner violence     Fear of current or ex partner: Not on file     Emotionally abused: Not on file     Physically abused: Not on file     Forced sexual activity: Not on file   Other Topics Concern    Not on file   Social History Narrative    Not on file         ALLERGIES: Latex    Review of Systems   Constitutional: Negative. Negative for activity change, chills, diaphoresis, fatigue and fever. Respiratory: Negative for cough, choking, wheezing and stridor. Cardiovascular: Negative for chest pain, palpitations and leg swelling. Gastrointestinal: Negative for abdominal distention, abdominal pain, diarrhea, nausea and vomiting. Musculoskeletal: Positive for gait problem. Negative for arthralgias, back pain, joint swelling, neck pain and neck stiffness. Skin: Negative for color change, pallor and wound. Neurological: Negative for weakness and numbness. All other systems reviewed and are negative. Vitals:    02/03/21 0842   BP: 120/79   Pulse: 78   Resp: 14   Temp: 97.8 °F (36.6 °C)   SpO2: 99%   Weight: 67.1 kg (148 lb)   Height: 5' 1\" (1.549 m)            Physical Exam  Vitals signs and nursing note reviewed. Constitutional:       General: She is not in acute distress. Appearance: Normal appearance. She is well-developed. She is not ill-appearing, toxic-appearing or diaphoretic. HENT:      Head: Normocephalic and atraumatic. Eyes:      General: No scleral icterus. Conjunctiva/sclera: Conjunctivae normal.   Neck:      Musculoskeletal: No neck rigidity.    Cardiovascular: Rate and Rhythm: Normal rate and regular rhythm. Comments: Strong pedal pulses bilaterally. Pulmonary:      Effort: Pulmonary effort is normal. No respiratory distress. Breath sounds: No stridor. No wheezing, rhonchi or rales. Chest:      Chest wall: No tenderness. Abdominal:      General: Abdomen is flat. There is no distension. Palpations: There is no mass. Tenderness: There is no abdominal tenderness. There is no guarding or rebound. Hernia: No hernia is present. Musculoskeletal:      Comments: Left inguinal pain with palpation. Of the left leg. The pain is in the inguinal region when I range the extremity. There is no pain in the right inguinal region. There is no palpated hernias. Skin:     Coloration: Skin is not jaundiced or pale. Comments: No ecchymosis appreciated on her legs. No signs of ischemia. Neurological:      Mental Status: She is alert. Mental status is at baseline. Psychiatric:         Mood and Affect: Mood normal.         Behavior: Behavior normal.          MDM  Number of Diagnoses or Management Options  Diagnosis management comments: Patient does have some arthrosis seen on her x-ray. I will refer her to orthopedics. I was concerned for possible tendon injury with associated pain with movement. We will try her on symptomatic control as well. No signs of hernia and benign abdomen. Edmund Ashley MD; 2/3/2021 @10:02 AM Voice dictation software was used during the making of this note. This software is not perfect and grammatical and other typographical errors may be present.   This note has not been proofread for errors.  ===================================================================          Amount and/or Complexity of Data Reviewed  Tests in the radiology section of CPT®: ordered and reviewed (Xr Hip Lt W Or Wo Pelv 2-3 Vws    Result Date: 2/3/2021  EXAMINATION: XR HIP LT W OR WO PELV 2-3 VWS 2/3/2021 9:19 AM COMPARISON: None available. INDICATION: Left hip pain for 2 to 3 weeks after trauma of pushing heavy boxes TECHNIQUE: A frontal view of the pelvis with  2 views of the left hip were obtained FINDINGS: There is no fracture or acute abnormality. Bilateral CAM morphology of the bilateral femoral head neck junctions. Moderate bilateral joint space narrowing with subchondral cyst and subchondral sclerosis. Bony mineralization is preserved. The surrounding soft tissues are normal.     1. No acute abnormality.  2. Bilateral cam morphology of the hips with moderate associated arthrosis.    )           Procedures

## 2021-02-15 ENCOUNTER — HOSPITAL ENCOUNTER (OUTPATIENT)
Dept: SURGERY | Age: 56
Discharge: HOME | DRG: 522 | End: 2021-02-15
Attending: ORTHOPAEDIC SURGERY
Payer: COMMERCIAL

## 2021-02-15 VITALS
TEMPERATURE: 97.9 F | DIASTOLIC BLOOD PRESSURE: 93 MMHG | SYSTOLIC BLOOD PRESSURE: 132 MMHG | BODY MASS INDEX: 27.9 KG/M2 | HEART RATE: 76 BPM | HEIGHT: 61 IN | WEIGHT: 147.8 LBS | OXYGEN SATURATION: 96 %

## 2021-02-15 LAB
ANION GAP SERPL CALC-SCNC: 2 MMOL/L (ref 7–16)
APTT PPP: 33.6 SEC (ref 24.1–35.1)
ATRIAL RATE: 63 BPM
BACTERIA SPEC CULT: ABNORMAL
BASOPHILS # BLD: 0.1 K/UL (ref 0–0.2)
BASOPHILS NFR BLD: 1 % (ref 0–2)
BUN SERPL-MCNC: 7 MG/DL (ref 6–23)
CALCIUM SERPL-MCNC: 9.6 MG/DL (ref 8.3–10.4)
CALCULATED P AXIS, ECG09: 72 DEGREES
CALCULATED R AXIS, ECG10: 29 DEGREES
CALCULATED T AXIS, ECG11: 54 DEGREES
CHLORIDE SERPL-SCNC: 100 MMOL/L (ref 98–107)
CO2 SERPL-SCNC: 32 MMOL/L (ref 21–32)
CREAT SERPL-MCNC: 0.98 MG/DL (ref 0.6–1)
DIAGNOSIS, 93000: NORMAL
DIFFERENTIAL METHOD BLD: ABNORMAL
EOSINOPHIL # BLD: 0.4 K/UL (ref 0–0.8)
EOSINOPHIL NFR BLD: 6 % (ref 0.5–7.8)
ERYTHROCYTE [DISTWIDTH] IN BLOOD BY AUTOMATED COUNT: 14.9 % (ref 11.9–14.6)
EST. AVERAGE GLUCOSE BLD GHB EST-MCNC: 120 MG/DL
GLUCOSE SERPL-MCNC: 82 MG/DL (ref 65–100)
HBA1C MFR BLD: 5.8 % (ref 4.2–6.3)
HCT VFR BLD AUTO: 41.2 % (ref 35.8–46.3)
HGB BLD-MCNC: 13.2 G/DL (ref 11.7–15.4)
IMM GRANULOCYTES # BLD AUTO: 0 K/UL (ref 0–0.5)
IMM GRANULOCYTES NFR BLD AUTO: 0 % (ref 0–5)
INR PPP: 0.9
LYMPHOCYTES # BLD: 2.1 K/UL (ref 0.5–4.6)
LYMPHOCYTES NFR BLD: 31 % (ref 13–44)
MCH RBC QN AUTO: 27.3 PG (ref 26.1–32.9)
MCHC RBC AUTO-ENTMCNC: 32 G/DL (ref 31.4–35)
MCV RBC AUTO: 85.3 FL (ref 79.6–97.8)
MONOCYTES # BLD: 0.4 K/UL (ref 0.1–1.3)
MONOCYTES NFR BLD: 6 % (ref 4–12)
NEUTS SEG # BLD: 3.7 K/UL (ref 1.7–8.2)
NEUTS SEG NFR BLD: 55 % (ref 43–78)
NRBC # BLD: 0 K/UL (ref 0–0.2)
P-R INTERVAL, ECG05: 146 MS
PLATELET # BLD AUTO: 290 K/UL (ref 150–450)
PMV BLD AUTO: 10.2 FL (ref 9.4–12.3)
POTASSIUM SERPL-SCNC: 4.8 MMOL/L (ref 3.5–5.1)
PROTHROMBIN TIME: 12.5 SEC (ref 12.5–14.7)
Q-T INTERVAL, ECG07: 422 MS
QRS DURATION, ECG06: 76 MS
QTC CALCULATION (BEZET), ECG08: 431 MS
RBC # BLD AUTO: 4.83 M/UL (ref 4.05–5.2)
SERVICE CMNT-IMP: ABNORMAL
SODIUM SERPL-SCNC: 134 MMOL/L (ref 136–145)
VENTRICULAR RATE, ECG03: 63 BPM
WBC # BLD AUTO: 6.6 K/UL (ref 4.3–11.1)

## 2021-02-15 PROCEDURE — 80048 BASIC METABOLIC PNL TOTAL CA: CPT

## 2021-02-15 PROCEDURE — 93005 ELECTROCARDIOGRAM TRACING: CPT | Performed by: PHYSICIAN ASSISTANT

## 2021-02-15 PROCEDURE — 94664 DEMO&/EVAL PT USE INHALER: CPT

## 2021-02-15 PROCEDURE — 77030027138 HC INCENT SPIROMETER -A

## 2021-02-15 PROCEDURE — 83036 HEMOGLOBIN GLYCOSYLATED A1C: CPT

## 2021-02-15 PROCEDURE — 85730 THROMBOPLASTIN TIME PARTIAL: CPT

## 2021-02-15 PROCEDURE — 85025 COMPLETE CBC W/AUTO DIFF WBC: CPT

## 2021-02-15 PROCEDURE — 87641 MR-STAPH DNA AMP PROBE: CPT

## 2021-02-15 PROCEDURE — 85610 PROTHROMBIN TIME: CPT

## 2021-02-15 PROCEDURE — 77030012341 HC CHMB SPCR OPTC MDI VYRM -A

## 2021-02-15 PROCEDURE — 77030020120 HC VLV RESP PEP HI -B

## 2021-02-15 RX ORDER — IBUPROFEN 200 MG
200 TABLET ORAL DAILY
COMMUNITY
End: 2021-02-20

## 2021-02-15 NOTE — PERIOP NOTES
Patient has not arrived for her 3:00 PAT appointment. Called [phone]. Unable to leave message. Called [phone] and spoke with patient's mother who says she doesn't live there. Patient currently living at a motel. Called patient's room ([phone], Room #223). No answer.

## 2021-02-15 NOTE — PROGRESS NOTES
02/15/21 1717   Oxygen Therapy   O2 Sat (%) 96 %   Pulse via Oximetry 76 beats per minute   O2 Device Room air   Pre-Treatment   Cough Productive   Sputum amount Moderate   Sputum color/odor Yellow   Sputum method obtained Cough on request   Breath Sounds Bilateral Clear  (after cough)   Pre FEV1 (liters) 1.8 liters   % Predicted 90   Procedures   $$ Demo/Evaluation Yes   Delivery Source MDI     Initial respiratory Assessment completed with pt. Pt was interviewed and evaluated in [facility] prior to surgery. Patient ID:  Alexander Mei  [id number]  49 y.o.  1965  Surgeon: Dr. Hudson Fry  Date of Surgery: 2/18/2021  Procedure: Total Left Hip Arthroplasty  Primary Care Physician: Delphine Rivera [phone]  Specialists:     Pt. IS SOMEWHAT PRACTICING SOCIAL DISTANCING AND WEARING A MASK WHEN IN PUBLIC. PT DOES NOT USE A MASK AT WORK BUT IS IN AN AREA  FORM OTHER EMPLOYEES  Pt taught proper COUGH technique  DIAPHRAGMATIC BREATHING EXERCISE INSTRUCTIONS GIVEN    History of smoking:   .25 PPD FOR 18 YEARS  CURRENTLY SMOKED 2 MARIJUANA JOINTS/DAYCURRENT SMOKER-           MARIJUANA handout given to pt. Pt taught to identify when anxiety or stress  is a trigger . Relaxation breathing technique taught to pt. Quit date:         Secondhand smoke:PARENTS    Past procedures with Oxygen desaturation or delayed awakening:DENIES    Past Medical History:   Diagnosis Date    Asthma     Bell's palsy 11/2019    Chronic obstructive pulmonary disease (HCC)     advair and albuterol prn=uses 1-2 times per week; Neb PRN=never uses; no followed by Pulmonary     Former cigarette smoker     History of positive PPD 1996    no meds     Hypothyroidism     on med for control     Ill-defined condition     pancreatitis x1; not chronic per patient     Marijuana smoker     OA (osteoarthritis)     HX OF POSITIVE PPD  COPD,ASTHMA.  HX OF PNA  Respiratory history:DENIES SOB     COPD ED DONE Respiratory meds:  PT HAS NOT BEEN USING HER RESPIRATORY MEDICATIONS  PT uses MDI PRN with PROAIR. MDI instructions given verbally & written along with spacer. PT TO START USING HER ADVAIR WHEN SHE GETS HOME TONIGHT  Pt to use home NEBULIZER WITH ALBUTEROL & ADVAIRmorning of surgery & [facility]:             NO                                                   PAST SLEEP STUDY:                         DENIES  HX OF FERNANDO:                                          DENIES  FERNANDO assessment:                                               SLEEPS ON SIDE        PHYSICAL EXAM   Body mass index is 27.93 kg/m².    Visit Vitals  BP (!) 132/93 (BP 1 Location: Left upper arm, BP Patient Position: At rest;Sitting)   Pulse 76   Temp 97.9 °F (36.6 °C)   Ht 5' 1\" (1.549 m)   Wt 67 kg (147 lb 12.8 oz)   SpO2 (P) 96%   BMI 27.93 kg/m²     Neck circumference:   35   cm    Loud snoring:                                                           DENIES  Witnessed apnea or wakening gasping or choking:        DENIES         Awakens with headaches:                                               DENIES  Morning or daytime tiredness/ sleepiness:                               TIRED- WORKS SECOND SHIFT  Dry mouth or sore throat in morning:                       DENIES                                   Resendez stage:  4                                   SACS score:1  Stop Bang   STOP-BANG  Does the patient snore loudly (louder than talking or loud enough to be heard through closed doors)?: No  Does the patient often feel tired, fatigued, or sleepy during the daytime, even after a \"good\" night's sleep?: Yes  Has anyone ever observed the patient stop breathing during their sleep? : No  Does the patient have or are they being treated for high blood pressure?: No  Is the patient's BMI greater than 35?: No  Is your neck circumference greater than 17 inches (Male) or 16 inches (Female)?: No  Is the patient older than 48?: Yes  Is the patient male?: No  FERNANDO Score: 2                            CS HS  ALBUTEROL TID                                        Referrals:    Pt. Phone Number:

## 2021-02-15 NOTE — PERIOP NOTES
How to Use Your Incentive Spirometer (10 times twice a day)       About Your Incentive Spirometer  An incentive spirometer is a device that will expand your lungs by helping you to breathe more deeply and fully. The parts of your incentive spirometer are labeled in Figure 1. Using your incentive spirometer  When youre using your incentive spirometer, make sure to breathe through your mouth. If you breathe through your nose, the incentive spirometer wont work properly. You can hold your nose if you have trouble. DO NOT BLOW INTO THE DEVICE. If you feel dizzy at any time, stop and rest. Try again at a later time. 1. Sit upright in a chair or in bed. Hold the incentive spirometer at eye level. 2. Put the mouthpiece in your mouth and close your lips tightly around it. Slowly breathe out (exhale) completely. 3. Breathe in (inhale) slowly through your mouth as deeply as you can. As you take the breath, you will see the piston rise inside the large column. While the piston rises, the indicator on the right should move upwards. It should stay in between the 2 arrows (see Figure 1). 4. Try to get the piston as high as you can, while keeping the indicator between the arrows. If the indicator doesnt stay between the arrows, youre breathing either too fast or too slow. 5. When you get it as high as you can, hold your breath for 10 seconds, or as long as possible. While youre holding your breath, the piston will slowly fall to the base of the spirometer. 6. Once the piston reaches the bottom of the spirometer, breathe out slowly through your mouth. Rest for a few seconds. 7. Repeat 10 times. Try to get the piston to the same level with each breath. 8. After each set of 10 breaths, try to cough as coughing will help loosen or clear any mucus in your lungs. 9. Put the marker at the level the piston reached on your incentive spirometer. This will be your goal next time.   Repeat these steps every hour that youre awake.  Cover the mouthpiece of the incentive spirometer when you arent using it

## 2021-02-15 NOTE — PERIOP NOTES
Patient verified name and . Order for consent found in EHR and matches case posting; patient verified. Type 3 surgery, walk in assessment complete. Labs per surgeon: CBC, BMP, PT/PTT, HGB-A1C ; results pending. Charge nurse to follow up with results tomorrow. Labs per anesthesia protocol: no additional labs needed. EKG: completed today; within anesthesia protocol. Coarse lung sounds noted today. States only uses Advair prn. Instructed to do 1 puff twice daily between now and surgery. Incentive Spirometer given and encouraged use. Also instructed not to smoke marijuana between now and surgery. Patient voiced understanding. Patient to see John Vargas (respiratory therapy) while here today. Patient COVID test date 21: Results for Nikki Patel (MRN [id number]) as of 2/15/2021 16:27   Ref. Range 2021 23:29   SARS-CoV-2, CATHERINE Latest Ref Range: Not Detected  Not Detected       MRSA/MSSA swab collected; pharmacy to review and dose antibiotic as appropriate. Hospital approved surgical skin cleanser and instructions to return bottle on DOS given per hospital policy. Patient provided with handouts including Guide to Surgery, Pain Management, Hand Hygiene, Blood Transfusion Education, and [place] [facility] Brochure. Patient answered medical/surgical history questions at their best of ability. All prior to admission medications documented in [facility]. Original medication prescription bottles NOT visualized during patient appointment. Patient instructed to hold all vitamins 3 weeks prior to surgery and NSAIDS 5 days prior to surgery. Patient teach back successful and patient demonstrates knowledge of instruction.

## 2021-02-15 NOTE — PERIOP NOTES
PLEASE CONTINUE TAKING ALL PRESCRIPTION MEDICATIONS UP TO THE DAY OF SURGERY UNLESS OTHERWISE DIRECTED BELOW. DISCONTINUE all vitamins and supplements now. DISCONTINUE Non-Steriodal Anti-Inflammatory (NSAIDS) such as Advil, Ibuprofen, Motrin, Aspirin, Naproxen, and Aleve now. Home Medications to take  the day of surgery (2/18/21)     Albuterol inhaler if needed, Albuterol Nebulizer if needed, Advair, Levothyroxine               Home Medications   to Hold           Comments   May take Tylenol up until the day before surgery if needed    Bring: inhalers, Hibiclens soap, Incentive Spirometer, Photo ID, Insurance card    [employer] policy of 1 visitor per patient discussed. Please do not bring home medications with you on the day of surgery unless otherwise directed by your nurse. If you are instructed to bring home medications, please give them to your nurse as they will be administered by the nursing staff. If you have any questions, please call [facility] [phone]. A copy of this note was provided to the patient for reference.

## 2021-02-16 NOTE — PERIOP NOTES
Lab results within limits per anesthesia protocol; OK for surgery. Recent Results (from the past 24 hour(s))   MSSA/MRSA SC BY PCR, NASAL SWAB    Collection Time: 02/15/21  3:32 PM    Specimen: Nasal swab   Result Value Ref Range    Special Requests: NO SPECIAL REQUESTS      Culture result: (A)       MRSA target DNA not detected, SA target DNA detected. A MRSA negative, SA positive test result does not preclude MRSA nasal colonization. EKG, 12 LEAD, INITIAL    Collection Time: 02/15/21  4:12 PM   Result Value Ref Range    Ventricular Rate 63 BPM    Atrial Rate 63 BPM    P-R Interval 146 ms    QRS Duration 76 ms    Q-T Interval 422 ms    QTC Calculation (Bezet) 431 ms    Calculated P Axis 72 degrees    Calculated R Axis 29 degrees    Calculated T Axis 54 degrees    Diagnosis       Normal sinus rhythm  Normal ECG  When compared with ECG of 13-NOV-2019 17:06,  Vent. rate has decreased BY  40 BPM  Confirmed by Madelin Reyes MD (), BERNARD HOLLINGSWORTH ([id number]) on 2/15/2021 9:47:25 PM     CBC WITH AUTOMATED DIFF    Collection Time: 02/15/21  4:21 PM   Result Value Ref Range    WBC 6.6 4.3 - 11.1 K/uL    RBC 4.83 4.05 - 5.2 M/uL    HGB 13.2 11.7 - 15.4 g/dL    HCT 41.2 35.8 - 46.3 %    MCV 85.3 79.6 - 97.8 FL    MCH 27.3 26.1 - 32.9 PG    MCHC 32.0 31.4 - 35.0 g/dL    RDW 14.9 (H) 11.9 - 14.6 %    PLATELET 108 901 - 292 K/uL    MPV 10.2 9.4 - 12.3 FL    ABSOLUTE NRBC 0.00 0.0 - 0.2 K/uL    DF AUTOMATED      NEUTROPHILS 55 43 - 78 %    LYMPHOCYTES 31 13 - 44 %    MONOCYTES 6 4.0 - 12.0 %    EOSINOPHILS 6 0.5 - 7.8 %    BASOPHILS 1 0.0 - 2.0 %    IMMATURE GRANULOCYTES 0 0.0 - 5.0 %    ABS. NEUTROPHILS 3.7 1.7 - 8.2 K/UL    ABS. LYMPHOCYTES 2.1 0.5 - 4.6 K/UL    ABS. MONOCYTES 0.4 0.1 - 1.3 K/UL    ABS. EOSINOPHILS 0.4 0.0 - 0.8 K/UL    ABS. BASOPHILS 0.1 0.0 - 0.2 K/UL    ABS. IMM.  GRANS. 0.0 0.0 - 0.5 K/UL   HEMOGLOBIN A1C WITH EAG    Collection Time: 02/15/21  4:21 PM   Result Value Ref Range    Hemoglobin A1c 5.8 4.20 - 6.30 %    Est. average glucose 732 mg/dL   METABOLIC PANEL, BASIC    Collection Time: 02/15/21  4:21 PM   Result Value Ref Range    Sodium 134 (L) 136 - 145 mmol/L    Potassium 4.8 3.5 - 5.1 mmol/L    Chloride 100 98 - 107 mmol/L    CO2 32 21 - 32 mmol/L    Anion gap 2 (L) 7 - 16 mmol/L    Glucose 82 65 - 100 mg/dL    BUN 7 6 - 23 MG/DL    Creatinine 0.98 0.6 - 1.0 MG/DL    GFR est AA >60 >60 ml/min/1.73m2    GFR est non-AA >60 >60 ml/min/1.73m2    Calcium 9.6 8.3 - 10.4 MG/DL   PROTHROMBIN TIME + INR    Collection Time: 02/15/21  4:21 PM   Result Value Ref Range    Prothrombin time 12.5 12.5 - 14.7 sec    INR 0.9     PTT    Collection Time: 02/15/21  4:21 PM   Result Value Ref Range    aPTT 33.6 24.1 - 35.1 SEC

## 2021-02-16 NOTE — ADVANCED PRACTICE NURSE
Total Joint Surgery Preoperative Chart Review      Patient ID:  Marika Baker  [id number]  89 y.o.  1965  Surgeon: Dr. Pau Pastor  Date of Surgery: 2/18/2021  Procedure: Total Left Hip Arthroplasty  Primary Care Physician: Aniya Hernadez [phone]  Specialty Physician(s):      Subjective: Marika Baker is a 49 y.o. BLACK OR  female who presents for preoperative evaluation for Total Left Hip arthroplasty. This is a preoperative chart review note based on data collected by the nurse at the surgical Pre-Assessment visit. Past Medical History:   Diagnosis Date    Asthma     Bell's palsy 11/2019    Chronic obstructive pulmonary disease (HCC)     advair and albuterol prn=uses 1-2 times per week; Neb PRN=never uses; no followed by Pulmonary     Former cigarette smoker     History of positive PPD 1996    no meds     Hypothyroidism     on med for control     Ill-defined condition     pancreatitis x1; not chronic per patient     Marijuana smoker     OA (osteoarthritis)       Past Surgical History:   Procedure Laterality Date    HX ORTHOPAEDIC Left 2007    left ankle fracture- has a plate    HX OTHER SURGICAL  10/09/2020    teeth removal     HX THYROIDECTOMY  12/04/2018     Family History   Problem Relation Age of Onset    Hypertension Mother     Hypertension Father     Cancer Brother 47        brain      Social History     Tobacco Use    Smoking status: Former Smoker     Packs/day: 0.25     Types: Cigarettes     Start date: 1992    Smokeless tobacco: Never Used   Substance Use Topics    Alcohol use: No       Prior to Admission medications    Medication Sig Start Date End Date Taking? Authorizing Provider   ibuprofen (AdviL) 200 mg tablet Take 200 mg by mouth daily. Yes Provider, Historical   Levothyroxine 100 mcg cap Take 100 mcg by mouth daily.  11/13/19  Yes Aj Carr MD   albuterol (PROVENTIL HFA, VENTOLIN HFA, PROAIR HFA) 90 mcg/actuation inhaler albuterol sulfate HFA 90 mcg/actuation aerosol inhaler   Inhale 2 puffs every 4 hours by inhalation route. Yes Other, MD Daisy   albuterol (PROVENTIL VENTOLIN) 2.5 mg /3 mL (0.083 %) nebulizer solution Take 3 mL by inhalation every four (4) hours as needed. Yes Other, MD Daisy   fluticasone propion-salmeterol (ADVAIR DISKUS) 250-50 mcg/dose diskus inhaler Take 1 Puff by inhalation daily as needed. Yes Other, MD Daisy     Allergies   Allergen Reactions    Latex Rash          Objective:     Physical Exam:   Patient Vitals for the past 24 hrs:   Temp Pulse BP SpO2   02/15/21 1717 -- -- -- 96 %   02/15/21 1528 97.9 °F (36.6 °C) 76 (!) 132/93 95 %       ECG:    EKG Results     Procedure 720 Value Units Date/Time    EKG, 12 LEAD, INITIAL [[id number]] Collected: 02/15/21 1612    Order Status: Completed Updated: 02/15/21 2147     Ventricular Rate 63 BPM      Atrial Rate 63 BPM      P-R Interval 146 ms      QRS Duration 76 ms      Q-T Interval 422 ms      QTC Calculation (Bezet) 431 ms      Calculated P Axis 72 degrees      Calculated R Axis 29 degrees      Calculated T Axis 54 degrees      Diagnosis --     Normal sinus rhythm  Normal ECG  When compared with ECG of 13-NOV-2019 17:06,  Vent. rate has decreased BY  40 BPM  Confirmed by [facility]  MD ()BERNARD ([id number]) on 2/15/2021 9:47:25 PM            Data Review:   Labs:   Recent Results (from the past 24 hour(s))   MSSA/MRSA SC BY PCR, NASAL SWAB    Collection Time: 02/15/21  3:32 PM    Specimen: Nasal swab   Result Value Ref Range    Special Requests: NO SPECIAL REQUESTS      Culture result: (A)       MRSA target DNA not detected, SA target DNA detected. A MRSA negative, SA positive test result does not preclude MRSA nasal colonization.    EKG, 12 LEAD, INITIAL    Collection Time: 02/15/21  4:12 PM   Result Value Ref Range    Ventricular Rate 63 BPM    Atrial Rate 63 BPM    P-R Interval 146 ms    QRS Duration 76 ms    Q-T Interval 422 ms    QTC Calculation (Bezet) 431 ms    Calculated P Axis 72 degrees    Calculated R Axis 29 degrees    Calculated T Axis 54 degrees    Diagnosis       Normal sinus rhythm  Normal ECG  When compared with ECG of 13-NOV-2019 17:06,  Vent. rate has decreased BY  40 BPM  Confirmed by [facility]  MD ()BERNARD ([id number]) on 2/15/2021 9:47:25 PM     CBC WITH AUTOMATED DIFF    Collection Time: 02/15/21  4:21 PM   Result Value Ref Range    WBC 6.6 4.3 - 11.1 K/uL    RBC 4.83 4.05 - 5.2 M/uL    HGB 13.2 11.7 - 15.4 g/dL    HCT 41.2 35.8 - 46.3 %    MCV 85.3 79.6 - 97.8 FL    MCH 27.3 26.1 - 32.9 PG    MCHC 32.0 31.4 - 35.0 g/dL    RDW 14.9 (H) 11.9 - 14.6 %    PLATELET 229 099 - 498 K/uL    MPV 10.2 9.4 - 12.3 FL    ABSOLUTE NRBC 0.00 0.0 - 0.2 K/uL    DF AUTOMATED      NEUTROPHILS 55 43 - 78 %    LYMPHOCYTES 31 13 - 44 %    MONOCYTES 6 4.0 - 12.0 %    EOSINOPHILS 6 0.5 - 7.8 %    BASOPHILS 1 0.0 - 2.0 %    IMMATURE GRANULOCYTES 0 0.0 - 5.0 %    ABS. NEUTROPHILS 3.7 1.7 - 8.2 K/UL    ABS. LYMPHOCYTES 2.1 0.5 - 4.6 K/UL    ABS. MONOCYTES 0.4 0.1 - 1.3 K/UL    ABS. EOSINOPHILS 0.4 0.0 - 0.8 K/UL    ABS. BASOPHILS 0.1 0.0 - 0.2 K/UL    ABS. IMM.  GRANS. 0.0 0.0 - 0.5 K/UL   HEMOGLOBIN A1C WITH EAG    Collection Time: 02/15/21  4:21 PM   Result Value Ref Range    Hemoglobin A1c 5.8 4.20 - 6.30 %    Est. average glucose 565 mg/dL   METABOLIC PANEL, BASIC    Collection Time: 02/15/21  4:21 PM   Result Value Ref Range    Sodium 134 (L) 136 - 145 mmol/L    Potassium 4.8 3.5 - 5.1 mmol/L    Chloride 100 98 - 107 mmol/L    CO2 32 21 - 32 mmol/L    Anion gap 2 (L) 7 - 16 mmol/L    Glucose 82 65 - 100 mg/dL    BUN 7 6 - 23 MG/DL    Creatinine 0.98 0.6 - 1.0 MG/DL    GFR est AA >60 >60 ml/min/1.73m2    GFR est non-AA >60 >60 ml/min/1.73m2    Calcium 9.6 8.3 - 10.4 MG/DL   PROTHROMBIN TIME + INR    Collection Time: 02/15/21  4:21 PM   Result Value Ref Range    Prothrombin time 12.5 12.5 - 14.7 sec    INR 0.9     PTT    Collection Time: 02/15/21  4:21 PM   Result Value Ref Range    aPTT 33.6 24.1 - 35.1 SEC         Problem List:  )  Patient Active Problem List   Diagnosis Code    Asthma exacerbation J45. 901    Marijuana use, continuous F12.90    Occupational exposure to air contaminants Z57.39    Cough R05    Acute bronchitis J20.9    COPD exacerbation (HCC) J44.1    Leukocytosis D72.829       Total Joint Surgery Pre-Assessment Recommendations:           Recommend continuous saturation monitoring hours of sleep, during hospitalization. O2 prn per respiratory protocol. Albuterol every 6 hours as need during hospitalization.      Signed By: BA Santos    February 16, 2021

## 2021-02-18 ENCOUNTER — HOME HEALTH ADMISSION (OUTPATIENT)
Dept: HOME HEALTH SERVICES | Facility: HOME HEALTH | Age: 56
End: 2021-02-18
Payer: COMMERCIAL

## 2021-02-18 ENCOUNTER — ANESTHESIA (OUTPATIENT)
Dept: SURGERY | Age: 56
DRG: 522 | End: 2021-02-18
Payer: COMMERCIAL

## 2021-02-18 ENCOUNTER — HOSPITAL ENCOUNTER (INPATIENT)
Age: 56
LOS: 2 days | Discharge: HOME HEALTH | DRG: 522 | End: 2021-02-20
Attending: ORTHOPAEDIC SURGERY | Admitting: ORTHOPAEDIC SURGERY
Payer: COMMERCIAL

## 2021-02-18 ENCOUNTER — ANESTHESIA EVENT (OUTPATIENT)
Dept: SURGERY | Age: 56
DRG: 522 | End: 2021-02-18
Payer: COMMERCIAL

## 2021-02-18 ENCOUNTER — APPOINTMENT (OUTPATIENT)
Dept: GENERAL RADIOLOGY | Age: 56
DRG: 522 | End: 2021-02-18
Attending: PHYSICIAN ASSISTANT
Payer: COMMERCIAL

## 2021-02-18 DIAGNOSIS — Z96.642 STATUS POST TOTAL HIP REPLACEMENT, LEFT: Primary | ICD-10-CM

## 2021-02-18 PROBLEM — M16.12 OSTEOARTHRITIS OF LEFT HIP: Status: ACTIVE | Noted: 2021-02-18

## 2021-02-18 LAB
GLUCOSE BLD STRIP.AUTO-MCNC: 96 MG/DL (ref 65–100)
HCG UR QL: NEGATIVE
HGB BLD-MCNC: 11.6 G/DL (ref 11.7–15.4)

## 2021-02-18 PROCEDURE — 74011000250 HC RX REV CODE- 250: Performed by: ORTHOPAEDIC SURGERY

## 2021-02-18 PROCEDURE — 72170 X-RAY EXAM OF PELVIS: CPT

## 2021-02-18 PROCEDURE — 76060000034 HC ANESTHESIA 1.5 TO 2 HR: Performed by: ORTHOPAEDIC SURGERY

## 2021-02-18 PROCEDURE — 74011250637 HC RX REV CODE- 250/637: Performed by: PHYSICIAN ASSISTANT

## 2021-02-18 PROCEDURE — 77030012935 HC DRSG AQUACEL BMS -B: Performed by: ORTHOPAEDIC SURGERY

## 2021-02-18 PROCEDURE — 77030002933 HC SUT MCRYL J&J -A: Performed by: ORTHOPAEDIC SURGERY

## 2021-02-18 PROCEDURE — 0SRB01A REPLACEMENT OF LEFT HIP JOINT WITH METAL SYNTHETIC SUBSTITUTE, UNCEMENTED, OPEN APPROACH: ICD-10-PCS | Performed by: ORTHOPAEDIC SURGERY

## 2021-02-18 PROCEDURE — 77030018673: Performed by: ORTHOPAEDIC SURGERY

## 2021-02-18 PROCEDURE — 74011250636 HC RX REV CODE- 250/636: Performed by: PHYSICIAN ASSISTANT

## 2021-02-18 PROCEDURE — 77030007880 HC KT SPN EPDRL BBMI -B: Performed by: ANESTHESIOLOGY

## 2021-02-18 PROCEDURE — 74011000250 HC RX REV CODE- 250: Performed by: ANESTHESIOLOGY

## 2021-02-18 PROCEDURE — 74011000258 HC RX REV CODE- 258: Performed by: ORTHOPAEDIC SURGERY

## 2021-02-18 PROCEDURE — 94762 N-INVAS EAR/PLS OXIMTRY CONT: CPT

## 2021-02-18 PROCEDURE — 81025 URINE PREGNANCY TEST: CPT

## 2021-02-18 PROCEDURE — 77030002966 HC SUT PDS J&J -A: Performed by: ORTHOPAEDIC SURGERY

## 2021-02-18 PROCEDURE — C1776 JOINT DEVICE (IMPLANTABLE): HCPCS | Performed by: ORTHOPAEDIC SURGERY

## 2021-02-18 PROCEDURE — 74011250636 HC RX REV CODE- 250/636: Performed by: ANESTHESIOLOGY

## 2021-02-18 PROCEDURE — 77010033678 HC OXYGEN DAILY

## 2021-02-18 PROCEDURE — 77030003665 HC NDL SPN BBMI -A: Performed by: ANESTHESIOLOGY

## 2021-02-18 PROCEDURE — 74011000250 HC RX REV CODE- 250

## 2021-02-18 PROCEDURE — 2709999900 HC NON-CHARGEABLE SUPPLY: Performed by: ORTHOPAEDIC SURGERY

## 2021-02-18 PROCEDURE — 77030018547 HC SUT ETHBND1 J&J -B: Performed by: ORTHOPAEDIC SURGERY

## 2021-02-18 PROCEDURE — 36415 COLL VENOUS BLD VENIPUNCTURE: CPT

## 2021-02-18 PROCEDURE — 65270000029 HC RM PRIVATE

## 2021-02-18 PROCEDURE — 77030020269 HC MISC IMPL: Performed by: ORTHOPAEDIC SURGERY

## 2021-02-18 PROCEDURE — 74011250637 HC RX REV CODE- 250/637: Performed by: ANESTHESIOLOGY

## 2021-02-18 PROCEDURE — 77030040922 HC BLNKT HYPOTHRM STRY -A: Performed by: ANESTHESIOLOGY

## 2021-02-18 PROCEDURE — 74011250636 HC RX REV CODE- 250/636

## 2021-02-18 PROCEDURE — 77030006835 HC BLD SAW SAG STRY -B: Performed by: ORTHOPAEDIC SURGERY

## 2021-02-18 PROCEDURE — 77030019557 HC ELECTRD VES SEAL MEDT -F: Performed by: ORTHOPAEDIC SURGERY

## 2021-02-18 PROCEDURE — 85018 HEMOGLOBIN: CPT

## 2021-02-18 PROCEDURE — 76010000162 HC OR TIME 1.5 TO 2 HR INTENSV-TIER 1: Performed by: ORTHOPAEDIC SURGERY

## 2021-02-18 PROCEDURE — 74011000250 HC RX REV CODE- 250: Performed by: PHYSICIAN ASSISTANT

## 2021-02-18 PROCEDURE — 77030018074 HC RTVR SUT ARTH4 S&N -B: Performed by: ORTHOPAEDIC SURGERY

## 2021-02-18 PROCEDURE — 82962 GLUCOSE BLOOD TEST: CPT

## 2021-02-18 PROCEDURE — 74011250636 HC RX REV CODE- 250/636: Performed by: ORTHOPAEDIC SURGERY

## 2021-02-18 PROCEDURE — 76210000001 HC OR PH I REC 2.5 TO 3 HR: Performed by: ORTHOPAEDIC SURGERY

## 2021-02-18 DEVICE — STEM FEM SZ 3 HIP STD OFFSET CLLRD CEMENTLESS 12/14 TAPR: Type: IMPLANTABLE DEVICE | Site: HIP | Status: FUNCTIONAL

## 2021-02-18 DEVICE — HEAD FEM DIA22.225MM +4MM OFFSET 12/14 TAPR HIP MTL ARTC EZ: Type: IMPLANTABLE DEVICE | Site: HIP | Status: FUNCTIONAL

## 2021-02-18 DEVICE — IMPLANTABLE DEVICE: Type: IMPLANTABLE DEVICE | Site: HIP | Status: FUNCTIONAL

## 2021-02-18 DEVICE — SCREW BNE L25MM DIA6.5MM CANC HIP S STL GRIPTION FULL THRD: Type: IMPLANTABLE DEVICE | Site: HIP | Status: FUNCTIONAL

## 2021-02-18 DEVICE — HIP H3 TOT ADV DUAL MOB IMPL CAPPED H3: Type: IMPLANTABLE DEVICE | Status: FUNCTIONAL

## 2021-02-18 RX ORDER — DIPHENHYDRAMINE HCL 25 MG
25 CAPSULE ORAL
Status: DISCONTINUED | OUTPATIENT
Start: 2021-02-18 | End: 2021-02-20 | Stop reason: HOSPADM

## 2021-02-18 RX ORDER — ALBUTEROL SULFATE 0.83 MG/ML
2.5 SOLUTION RESPIRATORY (INHALATION)
Status: DISCONTINUED | OUTPATIENT
Start: 2021-02-18 | End: 2021-02-18 | Stop reason: SDUPTHER

## 2021-02-18 RX ORDER — CELECOXIB 200 MG/1
200 CAPSULE ORAL EVERY 12 HOURS
Status: DISCONTINUED | OUTPATIENT
Start: 2021-02-18 | End: 2021-02-20 | Stop reason: HOSPADM

## 2021-02-18 RX ORDER — SODIUM CHLORIDE, SODIUM LACTATE, POTASSIUM CHLORIDE, CALCIUM CHLORIDE 600; 310; 30; 20 MG/100ML; MG/100ML; MG/100ML; MG/100ML
100 INJECTION, SOLUTION INTRAVENOUS CONTINUOUS
Status: DISCONTINUED | OUTPATIENT
Start: 2021-02-18 | End: 2021-02-18 | Stop reason: HOSPADM

## 2021-02-18 RX ORDER — DEXAMETHASONE SODIUM PHOSPHATE 4 MG/ML
INJECTION, SOLUTION INTRA-ARTICULAR; INTRALESIONAL; INTRAMUSCULAR; INTRAVENOUS; SOFT TISSUE AS NEEDED
Status: DISCONTINUED | OUTPATIENT
Start: 2021-02-18 | End: 2021-02-18 | Stop reason: HOSPADM

## 2021-02-18 RX ORDER — OXYCODONE HYDROCHLORIDE 5 MG/1
10 TABLET ORAL
Status: DISCONTINUED | OUTPATIENT
Start: 2021-02-18 | End: 2021-02-20 | Stop reason: HOSPADM

## 2021-02-18 RX ORDER — OXYCODONE HYDROCHLORIDE 5 MG/1
10 TABLET ORAL
Status: COMPLETED | OUTPATIENT
Start: 2021-02-18 | End: 2021-02-18

## 2021-02-18 RX ORDER — ROPIVACAINE HYDROCHLORIDE 2 MG/ML
INJECTION, SOLUTION EPIDURAL; INFILTRATION; PERINEURAL AS NEEDED
Status: DISCONTINUED | OUTPATIENT
Start: 2021-02-18 | End: 2021-02-18 | Stop reason: HOSPADM

## 2021-02-18 RX ORDER — CEFAZOLIN SODIUM/WATER 2 G/20 ML
2 SYRINGE (ML) INTRAVENOUS ONCE
Status: DISCONTINUED | OUTPATIENT
Start: 2021-02-18 | End: 2021-02-18 | Stop reason: SDUPTHER

## 2021-02-18 RX ORDER — ONDANSETRON 4 MG/1
4 TABLET, ORALLY DISINTEGRATING ORAL
Status: DISCONTINUED | OUTPATIENT
Start: 2021-02-18 | End: 2021-02-20 | Stop reason: HOSPADM

## 2021-02-18 RX ORDER — BUPIVACAINE HYDROCHLORIDE 7.5 MG/ML
INJECTION, SOLUTION INTRASPINAL
Status: COMPLETED | OUTPATIENT
Start: 2021-02-18 | End: 2021-02-18

## 2021-02-18 RX ORDER — ACETAMINOPHEN 325 MG/1
975 TABLET ORAL ONCE
Status: DISCONTINUED | OUTPATIENT
Start: 2021-02-18 | End: 2021-02-18 | Stop reason: SDUPTHER

## 2021-02-18 RX ORDER — LIDOCAINE HYDROCHLORIDE 10 MG/ML
0.3 INJECTION INFILTRATION; PERINEURAL ONCE
Status: DISCONTINUED | OUTPATIENT
Start: 2021-02-18 | End: 2021-02-18 | Stop reason: HOSPADM

## 2021-02-18 RX ORDER — TRANEXAMIC ACID 100 MG/ML
INJECTION, SOLUTION INTRAVENOUS AS NEEDED
Status: DISCONTINUED | OUTPATIENT
Start: 2021-02-18 | End: 2021-02-18 | Stop reason: HOSPADM

## 2021-02-18 RX ORDER — ONDANSETRON 2 MG/ML
INJECTION INTRAMUSCULAR; INTRAVENOUS AS NEEDED
Status: DISCONTINUED | OUTPATIENT
Start: 2021-02-18 | End: 2021-02-18 | Stop reason: HOSPADM

## 2021-02-18 RX ORDER — LEVOTHYROXINE SODIUM 100 UG/1
100 TABLET ORAL
Status: DISCONTINUED | OUTPATIENT
Start: 2021-02-19 | End: 2021-02-20 | Stop reason: HOSPADM

## 2021-02-18 RX ORDER — CEFAZOLIN SODIUM/WATER 2 G/20 ML
2 SYRINGE (ML) INTRAVENOUS EVERY 8 HOURS
Status: COMPLETED | OUTPATIENT
Start: 2021-02-18 | End: 2021-02-19

## 2021-02-18 RX ORDER — FENTANYL CITRATE 50 UG/ML
INJECTION, SOLUTION INTRAMUSCULAR; INTRAVENOUS AS NEEDED
Status: DISCONTINUED | OUTPATIENT
Start: 2021-02-18 | End: 2021-02-18 | Stop reason: HOSPADM

## 2021-02-18 RX ORDER — ACETAMINOPHEN 650 MG/1
650 SUPPOSITORY RECTAL ONCE
Status: DISCONTINUED | OUTPATIENT
Start: 2021-02-18 | End: 2021-02-18 | Stop reason: SDUPTHER

## 2021-02-18 RX ORDER — KETOROLAC TROMETHAMINE 30 MG/ML
INJECTION, SOLUTION INTRAMUSCULAR; INTRAVENOUS AS NEEDED
Status: DISCONTINUED | OUTPATIENT
Start: 2021-02-18 | End: 2021-02-18 | Stop reason: HOSPADM

## 2021-02-18 RX ORDER — NALOXONE HYDROCHLORIDE 0.4 MG/ML
.2-.4 INJECTION, SOLUTION INTRAMUSCULAR; INTRAVENOUS; SUBCUTANEOUS
Status: DISCONTINUED | OUTPATIENT
Start: 2021-02-18 | End: 2021-02-20 | Stop reason: HOSPADM

## 2021-02-18 RX ORDER — PROPOFOL 10 MG/ML
INJECTION, EMULSION INTRAVENOUS
Status: DISCONTINUED | OUTPATIENT
Start: 2021-02-18 | End: 2021-02-18 | Stop reason: HOSPADM

## 2021-02-18 RX ORDER — MIDAZOLAM HYDROCHLORIDE 1 MG/ML
INJECTION, SOLUTION INTRAMUSCULAR; INTRAVENOUS AS NEEDED
Status: DISCONTINUED | OUTPATIENT
Start: 2021-02-18 | End: 2021-02-18 | Stop reason: HOSPADM

## 2021-02-18 RX ORDER — SODIUM CHLORIDE 0.9 % (FLUSH) 0.9 %
5-40 SYRINGE (ML) INJECTION EVERY 8 HOURS
Status: CANCELLED | OUTPATIENT
Start: 2021-02-18

## 2021-02-18 RX ORDER — BUDESONIDE AND FORMOTEROL FUMARATE DIHYDRATE 160; 4.5 UG/1; UG/1
2 AEROSOL RESPIRATORY (INHALATION)
Status: DISCONTINUED | OUTPATIENT
Start: 2021-02-18 | End: 2021-02-20 | Stop reason: HOSPADM

## 2021-02-18 RX ORDER — CELECOXIB 200 MG/1
200 CAPSULE ORAL ONCE
Status: COMPLETED | OUTPATIENT
Start: 2021-02-18 | End: 2021-02-18

## 2021-02-18 RX ORDER — MIDAZOLAM HYDROCHLORIDE 1 MG/ML
2 INJECTION, SOLUTION INTRAMUSCULAR; INTRAVENOUS
Status: COMPLETED | OUTPATIENT
Start: 2021-02-18 | End: 2021-02-18

## 2021-02-18 RX ORDER — SODIUM CHLORIDE, SODIUM LACTATE, POTASSIUM CHLORIDE, CALCIUM CHLORIDE 600; 310; 30; 20 MG/100ML; MG/100ML; MG/100ML; MG/100ML
100 INJECTION, SOLUTION INTRAVENOUS CONTINUOUS
Status: ACTIVE | OUTPATIENT
Start: 2021-02-18 | End: 2021-02-18

## 2021-02-18 RX ORDER — SODIUM CHLORIDE 0.9 % (FLUSH) 0.9 %
5-40 SYRINGE (ML) INJECTION AS NEEDED
Status: DISCONTINUED | OUTPATIENT
Start: 2021-02-18 | End: 2021-02-20 | Stop reason: HOSPADM

## 2021-02-18 RX ORDER — DEXAMETHASONE SODIUM PHOSPHATE 100 MG/10ML
10 INJECTION INTRAMUSCULAR; INTRAVENOUS ONCE
Status: ACTIVE | OUTPATIENT
Start: 2021-02-19 | End: 2021-02-20

## 2021-02-18 RX ORDER — CEFAZOLIN SODIUM/WATER 2 G/20 ML
2 SYRINGE (ML) INTRAVENOUS ONCE
Status: COMPLETED | OUTPATIENT
Start: 2021-02-18 | End: 2021-02-18

## 2021-02-18 RX ORDER — HYDROMORPHONE HYDROCHLORIDE 1 MG/ML
1 INJECTION, SOLUTION INTRAMUSCULAR; INTRAVENOUS; SUBCUTANEOUS
Status: DISCONTINUED | OUTPATIENT
Start: 2021-02-18 | End: 2021-02-20 | Stop reason: HOSPADM

## 2021-02-18 RX ORDER — ALBUTEROL SULFATE 0.83 MG/ML
2.5 SOLUTION RESPIRATORY (INHALATION)
Status: DISCONTINUED | OUTPATIENT
Start: 2021-02-18 | End: 2021-02-20 | Stop reason: HOSPADM

## 2021-02-18 RX ORDER — EPHEDRINE SULFATE/0.9% NACL/PF 50 MG/5 ML
SYRINGE (ML) INTRAVENOUS AS NEEDED
Status: DISCONTINUED | OUTPATIENT
Start: 2021-02-18 | End: 2021-02-18 | Stop reason: HOSPADM

## 2021-02-18 RX ORDER — PROPOFOL 10 MG/ML
INJECTION, EMULSION INTRAVENOUS AS NEEDED
Status: DISCONTINUED | OUTPATIENT
Start: 2021-02-18 | End: 2021-02-18 | Stop reason: HOSPADM

## 2021-02-18 RX ORDER — ASPIRIN 81 MG/1
81 TABLET ORAL EVERY 12 HOURS
Status: DISCONTINUED | OUTPATIENT
Start: 2021-02-18 | End: 2021-02-20 | Stop reason: HOSPADM

## 2021-02-18 RX ORDER — ACETAMINOPHEN 500 MG
1000 TABLET ORAL EVERY 6 HOURS
Status: DISCONTINUED | OUTPATIENT
Start: 2021-02-19 | End: 2021-02-20 | Stop reason: HOSPADM

## 2021-02-18 RX ORDER — HYDROMORPHONE HYDROCHLORIDE 2 MG/ML
0.5 INJECTION, SOLUTION INTRAMUSCULAR; INTRAVENOUS; SUBCUTANEOUS
Status: DISCONTINUED | OUTPATIENT
Start: 2021-02-18 | End: 2021-02-18 | Stop reason: HOSPADM

## 2021-02-18 RX ORDER — SODIUM CHLORIDE 9 MG/ML
100 INJECTION, SOLUTION INTRAVENOUS CONTINUOUS
Status: DISPENSED | OUTPATIENT
Start: 2021-02-18 | End: 2021-02-20

## 2021-02-18 RX ORDER — ACETAMINOPHEN 500 MG
1000 TABLET ORAL ONCE
Status: COMPLETED | OUTPATIENT
Start: 2021-02-18 | End: 2021-02-18

## 2021-02-18 RX ORDER — AMOXICILLIN 250 MG
2 CAPSULE ORAL DAILY
Status: DISCONTINUED | OUTPATIENT
Start: 2021-02-19 | End: 2021-02-20 | Stop reason: HOSPADM

## 2021-02-18 RX ADMIN — PROPOFOL 100 MCG/KG/MIN: 10 INJECTION, EMULSION INTRAVENOUS at 12:03

## 2021-02-18 RX ADMIN — Medication 10 MG: at 13:11

## 2021-02-18 RX ADMIN — MIDAZOLAM 1 MG: 1 INJECTION INTRAMUSCULAR; INTRAVENOUS at 11:53

## 2021-02-18 RX ADMIN — BUPIVACAINE HYDROCHLORIDE IN DEXTROSE 13.5 MG: 7.5 INJECTION, SOLUTION SUBARACHNOID at 12:02

## 2021-02-18 RX ADMIN — HYDROMORPHONE HYDROCHLORIDE 0.5 MG: 2 INJECTION INTRAMUSCULAR; INTRAVENOUS; SUBCUTANEOUS at 14:05

## 2021-02-18 RX ADMIN — CELECOXIB 200 MG: 200 CAPSULE ORAL at 10:44

## 2021-02-18 RX ADMIN — HYDROMORPHONE HYDROCHLORIDE 0.5 MG: 2 INJECTION INTRAMUSCULAR; INTRAVENOUS; SUBCUTANEOUS at 14:45

## 2021-02-18 RX ADMIN — Medication 2 G: at 11:48

## 2021-02-18 RX ADMIN — Medication 10 MG: at 12:30

## 2021-02-18 RX ADMIN — ACETAMINOPHEN 1000 MG: 500 TABLET ORAL at 13:52

## 2021-02-18 RX ADMIN — SODIUM CHLORIDE, SODIUM LACTATE, POTASSIUM CHLORIDE, AND CALCIUM CHLORIDE 100 ML/HR: 600; 310; 30; 20 INJECTION, SOLUTION INTRAVENOUS at 10:43

## 2021-02-18 RX ADMIN — Medication 81 MG: at 21:28

## 2021-02-18 RX ADMIN — ONDANSETRON 4 MG: 4 TABLET, ORALLY DISINTEGRATING ORAL at 23:01

## 2021-02-18 RX ADMIN — HYDROMORPHONE HYDROCHLORIDE 0.5 MG: 2 INJECTION INTRAMUSCULAR; INTRAVENOUS; SUBCUTANEOUS at 13:51

## 2021-02-18 RX ADMIN — Medication 10 MG: at 12:56

## 2021-02-18 RX ADMIN — SODIUM CHLORIDE, SODIUM LACTATE, POTASSIUM CHLORIDE, AND CALCIUM CHLORIDE: 600; 310; 30; 20 INJECTION, SOLUTION INTRAVENOUS at 12:44

## 2021-02-18 RX ADMIN — ACETAMINOPHEN 1000 MG: 500 TABLET, FILM COATED ORAL at 23:01

## 2021-02-18 RX ADMIN — FENTANYL CITRATE 50 MCG: 50 INJECTION INTRAMUSCULAR; INTRAVENOUS at 12:17

## 2021-02-18 RX ADMIN — DEXAMETHASONE SODIUM PHOSPHATE 10 MG: 4 INJECTION, SOLUTION INTRAMUSCULAR; INTRAVENOUS at 12:15

## 2021-02-18 RX ADMIN — OXYCODONE 10 MG: 5 TABLET ORAL at 14:13

## 2021-02-18 RX ADMIN — MIDAZOLAM 1 MG: 1 INJECTION INTRAMUSCULAR; INTRAVENOUS at 11:56

## 2021-02-18 RX ADMIN — Medication 3 AMPULE: at 10:42

## 2021-02-18 RX ADMIN — HYDROMORPHONE HYDROCHLORIDE 1 MG: 1 INJECTION, SOLUTION INTRAMUSCULAR; INTRAVENOUS; SUBCUTANEOUS at 21:29

## 2021-02-18 RX ADMIN — FENTANYL CITRATE 50 MCG: 50 INJECTION INTRAMUSCULAR; INTRAVENOUS at 11:53

## 2021-02-18 RX ADMIN — ONDANSETRON 4 MG: 2 INJECTION INTRAMUSCULAR; INTRAVENOUS at 11:56

## 2021-02-18 RX ADMIN — HYDROMORPHONE HYDROCHLORIDE 0.5 MG: 2 INJECTION INTRAMUSCULAR; INTRAVENOUS; SUBCUTANEOUS at 14:30

## 2021-02-18 RX ADMIN — TRANEXAMIC ACID 1 G: 100 INJECTION, SOLUTION INTRAVENOUS at 11:56

## 2021-02-18 RX ADMIN — Medication 1 AMPULE: at 21:28

## 2021-02-18 RX ADMIN — MIDAZOLAM 2 MG: 1 INJECTION INTRAMUSCULAR; INTRAVENOUS at 11:12

## 2021-02-18 RX ADMIN — PROPOFOL 40 MG: 10 INJECTION, EMULSION INTRAVENOUS at 12:07

## 2021-02-18 RX ADMIN — CELECOXIB 200 MG: 200 CAPSULE ORAL at 21:00

## 2021-02-18 RX ADMIN — CEFAZOLIN SODIUM 2 G: 100 INJECTION, POWDER, LYOPHILIZED, FOR SOLUTION INTRAVENOUS at 21:29

## 2021-02-18 RX ADMIN — OXYCODONE 10 MG: 5 TABLET ORAL at 19:15

## 2021-02-18 RX ADMIN — PROMETHAZINE HYDROCHLORIDE 6.25 MG: 25 INJECTION INTRAMUSCULAR; INTRAVENOUS at 14:48

## 2021-02-18 NOTE — PROGRESS NOTES
TRANSFER - IN REPORT:    Verbal report received from Aminah RN (name) on Parish Tate  being received from PACU(unit) for routine post - op      Report consisted of patients Situation, Background, Assessment and   Recommendations(SBAR). Information from the following report(s) SBAR, Kardex, OR Summary, Intake/Output and MAR was reviewed with the receiving nurse. Opportunity for questions and clarification was provided.

## 2021-02-18 NOTE — H&P
[facility]  History and Physical Exam    Patient ID:  José Miguel Meredith  [id number]    41 y.o.  1965    Today: February 18, 2021    Vitals Signs: Reviewed as noted in medical record. Allergies: Allergies   Allergen Reactions    Latex Rash       CC: Left hip pain    HPI:  Pt complains of left hip pain and difficulty ambulating. Relevant PMH:   Past Medical History:   Diagnosis Date    Asthma     Bell's palsy 11/2019    Chronic obstructive pulmonary disease (HCC)     advair and albuterol prn=uses 1-2 times per week; Neb PRN=never uses; not followed by Pulmonary     Former cigarette smoker     History of positive PPD 1996    no meds     Hypothyroidism     on med for control     Ill-defined condition     pancreatitis x1; not chronic per patient     Marijuana smoker     OA (osteoarthritis)        Objective:                    HEENT: NC/AT                   Lungs:  clear                   Heart:   rrr                   Abdomen: soft                   Extremities:  Pain with rom of the left hip joint    Radiographs: reveal osteoarthritis with loss of joint space and bone spurs. Assessment: Arthritis of left hip [M16.12]  Stress fracture of shaft of left femur, initial encounter [J47.898W]    Plan:  Proceed with scheduled Procedure(s) (LRB):  LEFT TOTAL HIP ARTHORPLASTY/ DEPUY (N/A) . The patient has failed conservative treatment including NSAIDS, and injections. Due to the amount of pain the patient is experiencing we will proceed with scheduled procedure. It is also felt that the patient is high risk for postoperative complication due to history of multiple chronic medical problems.   The patient may potentially spend 2 nights in the hospital.      Signed By: DARRYL Davison  February 18, 2021

## 2021-02-18 NOTE — OP NOTES
Total Hip Procedure Note               Jacob White, [id number], 1965    Pre-operative Diagnosis: Arthritis of left hip [M16.12]  Stress fracture of shaft of left femur, initial encounter [J63.434H]      Post-operative Diagnosis: Arthritis of left hip [M16.12]  Stress fracture of shaft of left femur, initial encounter [F16.579Z]     Procedure: Left Total Hip Arthroplasty     BMI: Body mass index is 27.93 kg/m². Ernesto Munoz Location: [address] [place] [address]      Surgeon: Emilia Hector MD      Assistant: DARRYL Leyav    Anesthesia: Spinal  BMI:Body mass index is 27.93 kg/m². EBL: 250 cc     Procedure: Total Hip Arthroplasty    The complexity of total joint surgery requires the use of a skilled first assistant for positioning, retraction and assistance in closure. The patient's size and surgical complexity makes implantation and proper insertion of total joint implants more difficult requiring a skilled first assistant       Jacob White was brought to the operating room and positioned on the operating table. Anesthesia was administered. IV antibiotics were administered, along with IV transexamic acid. A time out identifying the patient, procedure, operative side and surgeon was administered and charted by the OR Nurse. Jacob White was positioned on left lateral decubitus position. The limb was prepped and draped in the usual sterile fashion. The Posterior approach was utilized to expose the hip. The incision was carried through the subcutaneous tissue and underlying fascia with hemostasis obtained using the bovie cauterization. A Charmley retractor was inserted. The short external rotators were divided at their insertion. The sciatic nerve was palpated and identified. Next, a T-shaped capsular incision was accomplished and femoral head was dislocated. The articular surface revealed loss of cartilage, exposed bone and bone spurring.  The neck was osteotomized in the appropriate position just above the lesser trochanteric region. The neck cut was measured to be 12 mm. Acetabular retractors were placed and the appropriate capsulotomy performed. Soft tissue was removed from the acetabulum. The acetabular surface revealed loss of cartilage with exposed bone. The acetabulum was sequentially reamed. Using trial components, the acetabulum was sized to a Left mm [place] acetabular cup. The acetabular component was impacted to achieve appropriate horizontal tilt, anteversion, coverage, and stability. 1 screw was  used to stabilize the cup. The metal liner was impacted into position. Utilizing the femoral retractor, the canal was prepared with appropriate lateralization with the starter rasp. The canal was then broached progressively. The broach was positioned with the appropriate anatomic femoral anteversion. A calcar planar was utilized. A trial reduction with a +4 neck length was utilized. This was found to be the most stable to flexion greater than 90 degrees and on internal and external rotation. Limb lengths were thought to be equilibrated and with appropriate stability as mentioned above. All trial components were removed. The cementless permanent size 3 std offset ACTIS  was impacted into place. A trial reduction was performed and the above neck length was selected. The permanent femoral head and poly dual mobility articular head was impacted into place. Shaheed Heath hip was reduced and stability was as mentioned above. Copious irrigation was accomplished about the surgical site. The sciatic nerve was palpated and noted to be intact. The capsule was repaired with a #1 PDS and the external rotators were reattached with a #5 Mersilene. A lavage of diluted betadine solution of 17.5 ml Betadine in 500 of 0.9% Normal Saline was allowed to soak in the wound for 3 minutes after implanting of the prosthesis. The wound was irrigated with Saline again before closure.  The joint and skin areas were sprinkled with 1 gm of vancomycin powder. Prior to the final skin closure, full strength betadine was applied to the skin surrounding the skin incision. The operative hip was injected prior to closure for post operative pain management. A #1 PDS suture was used to re-approximate the fascia. 60 cc of transexamic acid was injected under the fascia for hemostasis. Monocryl sutures were used to approximate the subcutaneous layers. Staples were used to reapproximate the skin edges and a sterile bandage was placed. The patient was then rolled to a supine position. The sponge and needle counts were correct. The patient tolerated the procedure without difficulty and left the operating room in satisfactory condition. Implants:   Implant Name Type Inv. Item Serial No.  Lot No. LRB No. Used Action   SCREW BONE FEM CANC 6. [id number] - [id number]  SCREW BONE FEM CANC 6. [id number] [facility] [employer] [facility] [id number] Left 1 Implanted   CUP ACET MH PINN 48MM GRIPTION --  - [id number]  CUP ACET MH PINN 48MM GRIPTION --  [id number] [facility] [id number] Left 1 Implanted   PINNACLE HIP SOLUTIONS DUAL MOBILITY LINER 48/41   [id number]  [id number] Left 1 Implanted   STEM FEM SZ 3 HIP STD OFFSET CLLRD CEMENTLESS 12/14 TAPR - [id number]  STEM FEM SZ 3 HIP STD OFFSET CLLRD CEMENTLESS 12/14 TAPR [id number] [facility] [id number] Left 1 Implanted   BI-MENTUM PE LINER 22,2/41   [id number]  [id number] Left 1 Implanted   HEAD FEM DIA22.225MM +4MM OFFSET 12/14 TAPR HIP MTL ARTC EZ - [id number]  HEAD FEM DIA22.225MM +4MM OFFSET 12/14 TAPR HIP MTL ARTC EZ [id number] [facility] [id number] Left 1 Implanted        By: Nessa Ruelas MD

## 2021-02-18 NOTE — PERIOP NOTES
TRANSFER - OUT REPORT:    Verbal report given to Chika SAMUEL(name) on [facility] Flight  being transferred to [facility] (unit) for routine post - op       Report consisted of patients Situation, Background, Assessment and   Recommendations(SBAR). Information from the following report(s) SBAR was reviewed with the receiving nurse. Lines:   Peripheral IV 02/18/21 Left Hand (Active)   Site Assessment Clean, dry, & intact 02/18/21 1405   Phlebitis Assessment 0 02/18/21 1405   Infiltration Assessment 0 02/18/21 1405   Dressing Status Clean, dry, & intact 02/18/21 1405   Dressing Type Tape;Transparent 02/18/21 1405   Hub Color/Line Status Infusing 02/18/21 1405   Action Taken Blood drawn 02/18/21 1041        Opportunity for questions and clarification was provided.       Patient transported with:   O2 @ 2 liters

## 2021-02-18 NOTE — ANESTHESIA PREPROCEDURE EVALUATION
Relevant Problems   No relevant active problems       Anesthetic History   No history of anesthetic complications            Review of Systems / Medical History  Patient summary reviewed and pertinent labs reviewed    Pulmonary    COPD        Asthma        Neuro/Psych   Within defined limits           Cardiovascular                  Exercise tolerance: >4 METS     GI/Hepatic/Renal  Within defined limits              Endo/Other      Hypothyroidism  Arthritis     Other Findings              Physical Exam    Airway  Mallampati: II  TM Distance: 4 - 6 cm         Cardiovascular    Rhythm: regular  Rate: normal         Dental  No notable dental hx       Pulmonary  Breath sounds clear to auscultation               Abdominal         Other Findings            Anesthetic Plan    ASA: 2  Anesthesia type: spinal          Induction: Intravenous  Anesthetic plan and risks discussed with: Patient and Family

## 2021-02-18 NOTE — ANESTHESIA PROCEDURE NOTES
Spinal Block    Start time: 2/18/2021 11:55 AM  End time: 2/18/2021 12:02 PM  Performed by: Haley Pearl MD  Authorized by: Haley Pearl MD     Pre-procedure:   Indications: primary anesthetic  Preanesthetic Checklist: patient identified, risks and benefits discussed, anesthesia consent, site marked, patient being monitored and timeout performed    Timeout Time: 11:53          Spinal Block:   Patient Position:  Seated  Prep Region:  Lumbar  Prep: chlorhexidine and patient draped      Location:  L3-4  Technique:  Single shot        Needle:   Needle Type:  Pencan  Needle Gauge:  25 G  Attempts:  1      Events: CSF confirmed, no blood with aspiration and no paresthesia        Assessment:  Insertion:  Uncomplicated  Patient tolerance:  Patient tolerated the procedure well with no immediate complications

## 2021-02-19 LAB — HGB BLD-MCNC: 10.7 G/DL (ref 11.7–15.4)

## 2021-02-19 PROCEDURE — 65270000029 HC RM PRIVATE

## 2021-02-19 PROCEDURE — 97116 GAIT TRAINING THERAPY: CPT

## 2021-02-19 PROCEDURE — 97161 PT EVAL LOW COMPLEX 20 MIN: CPT

## 2021-02-19 PROCEDURE — 85018 HEMOGLOBIN: CPT

## 2021-02-19 PROCEDURE — 94760 N-INVAS EAR/PLS OXIMETRY 1: CPT

## 2021-02-19 PROCEDURE — 97165 OT EVAL LOW COMPLEX 30 MIN: CPT

## 2021-02-19 PROCEDURE — 97110 THERAPEUTIC EXERCISES: CPT

## 2021-02-19 PROCEDURE — 97530 THERAPEUTIC ACTIVITIES: CPT

## 2021-02-19 PROCEDURE — 74011250637 HC RX REV CODE- 250/637: Performed by: PHYSICIAN ASSISTANT

## 2021-02-19 PROCEDURE — 74011250636 HC RX REV CODE- 250/636: Performed by: PHYSICIAN ASSISTANT

## 2021-02-19 PROCEDURE — 97535 SELF CARE MNGMENT TRAINING: CPT

## 2021-02-19 PROCEDURE — 74011000250 HC RX REV CODE- 250: Performed by: PHYSICIAN ASSISTANT

## 2021-02-19 PROCEDURE — 36415 COLL VENOUS BLD VENIPUNCTURE: CPT

## 2021-02-19 RX ORDER — OXYCODONE HYDROCHLORIDE 5 MG/1
5-10 TABLET ORAL
Qty: 60 TAB | Refills: 0 | Status: SHIPPED | OUTPATIENT
Start: 2021-02-19 | End: 2021-02-24

## 2021-02-19 RX ORDER — ASPIRIN 81 MG/1
81 TABLET ORAL EVERY 12 HOURS
Qty: 70 TAB | Refills: 0 | Status: SHIPPED | OUTPATIENT
Start: 2021-02-19 | End: 2021-03-26

## 2021-02-19 RX ADMIN — OXYCODONE 10 MG: 5 TABLET ORAL at 08:35

## 2021-02-19 RX ADMIN — OXYCODONE 10 MG: 5 TABLET ORAL at 02:53

## 2021-02-19 RX ADMIN — Medication 81 MG: at 08:36

## 2021-02-19 RX ADMIN — HYDROMORPHONE HYDROCHLORIDE 1 MG: 1 INJECTION, SOLUTION INTRAMUSCULAR; INTRAVENOUS; SUBCUTANEOUS at 16:05

## 2021-02-19 RX ADMIN — CEFAZOLIN SODIUM 2 G: 100 INJECTION, POWDER, LYOPHILIZED, FOR SOLUTION INTRAVENOUS at 05:55

## 2021-02-19 RX ADMIN — OXYCODONE 10 MG: 5 TABLET ORAL at 21:38

## 2021-02-19 RX ADMIN — Medication 81 MG: at 21:38

## 2021-02-19 RX ADMIN — DOCUSATE SODIUM 50MG AND SENNOSIDES 8.6MG 2 TABLET: 8.6; 5 TABLET, FILM COATED ORAL at 08:36

## 2021-02-19 RX ADMIN — OXYCODONE 10 MG: 5 TABLET ORAL at 12:11

## 2021-02-19 RX ADMIN — OXYCODONE 10 MG: 5 TABLET ORAL at 17:32

## 2021-02-19 RX ADMIN — Medication 1 AMPULE: at 11:04

## 2021-02-19 RX ADMIN — Medication 1 AMPULE: at 21:38

## 2021-02-19 RX ADMIN — CELECOXIB 200 MG: 200 CAPSULE ORAL at 21:38

## 2021-02-19 RX ADMIN — ACETAMINOPHEN 1000 MG: 500 TABLET, FILM COATED ORAL at 11:04

## 2021-02-19 RX ADMIN — ACETAMINOPHEN 1000 MG: 500 TABLET, FILM COATED ORAL at 05:55

## 2021-02-19 RX ADMIN — CELECOXIB 200 MG: 200 CAPSULE ORAL at 08:35

## 2021-02-19 RX ADMIN — ACETAMINOPHEN 1000 MG: 500 TABLET, FILM COATED ORAL at 17:32

## 2021-02-19 RX ADMIN — LEVOTHYROXINE SODIUM 100 MCG: 0.1 TABLET ORAL at 05:55

## 2021-02-19 RX ADMIN — ACETAMINOPHEN 1000 MG: 500 TABLET, FILM COATED ORAL at 23:12

## 2021-02-19 NOTE — PROGRESS NOTES
Problem: Mobility Impaired (Adult and Pediatric)  Goal: *Acute Goals and Plan of Care (Insert Text)  Outcome: Progressing Towards Goal  Note: GOALS (1-4 days):  (1.)Ms. Ruben Simms will move from supine to sit and sit to supine  in bed with STAND BY ASSIST.    (2.)Ms. Ruben Simms will transfer from bed to chair and chair to bed with STAND BY ASSIST using the least restrictive device. (3.)Ms. Ruben Simms will ambulate with STAND BY ASSIST for 100 feet with the least restrictive device. (4.)Ms. Ruben Simms will ambulate up/down 3 steps with left railing with MINIMAL ASSIST with device as needed. (5.)Ms. Ruben Simms will state/observe TATA precautions with 0 verbal cues. ________________________________________________________________________________________________    PHYSICAL THERAPY JOINT CAMP TATA: Daily Note and PM 2/19/2021  INPATIENT: Hospital Day: 2  Payor: [employer] / Plan: KAZ Lee. / Product Type: Commerical /      NAME/AGE/GENDER: Arlene Conteh is a 49 y.o. female   PRIMARY DIAGNOSIS:  Arthritis of left hip [M16.12]  Stress fracture of shaft of left femur, initial encounter [G16.971S]   Procedure(s) and Anesthesia Type:     * LEFT TOTAL HIP ARTHORPLASTY/ DEPUY - Spinal (N/A)  ICD-10: Treatment Diagnosis:    · Pain in left hip (M25.552)  · Stiffness of Left Hip, Not elsewhere classified (M25.652)  · Difficulty in walking, Not elsewhere classified (R26.2)      ASSESSMENT:     Ms. Ruben Simms presents with decreased functional mobility and gait as well as decreased rom and strength of left LE s/p left tata. She plans to go home with HHPT. Pt. Did fine this am but very anxious and slow with movements. Plans to go home tomorrow. PM-pt. In chair agreeable for therapy. She did tata exercises in the chair with cues and assistance. Reviewed safety and tata precautions. She ambulated an increased distance in the lizarraga with RW. All gait and movement is very slow but some improvement this afternoon.   She had no questions and returned to supine. This section established at most recent assessment   PROBLEM LIST (Impairments causing functional limitations):  1. Decreased Strength  2. Decreased ADL/Functional Activities  3. Decreased Transfer Abilities  4. Decreased Ambulation Ability/Technique  5. Decreased Balance  6. Increased Pain  7. Decreased Activity Tolerance  8. Decreased Flexibility/Joint Mobility  9. Decreased [place] with Home Exercise Program  10. Decreased strength   INTERVENTIONS PLANNED: (Benefits and precautions of physical therapy have been discussed with the patient.)  1. Bed Mobility  2. Cold  3. Gait Training  4. Home Exercise Program (HEP)  5. Range of Motion (ROM)  6. Therapeutic Activites  7. Therapeutic Exercise/Strengthening  8. Transfer Training     TREATMENT PLAN: Frequency/Duration: Follow patient BID for duration of hospital stay to address above goals. Rehabilitation Potential For Stated Goals: Good     RECOMMENDED REHABILITATION/EQUIPMENT: (at time of discharge pending progress): Continue Skilled Therapy and Home Health: Physical Therapy. HISTORY:   History of Present Injury/Illness (Reason for Referral):  S/p left tata  Past Medical History/Comorbidities:   Ms. Melvin Harley  has a past medical history of Asthma, Bell's palsy (11/2019), Chronic obstructive pulmonary disease ([facility] [address] Former cigarette smoker, History of positive PPD (1996), Hypothyroidism, Ill-defined condition, Marijuana smoker, and OA (osteoarthritis). She also has no past medical history of Gastrointestinal disorder. Ms. Melvin Harley  has a past surgical history that includes hx orthopaedic (Left, 2007); hx thyroidectomy (12/04/2018); and hx other surgical (10/09/2020). Social History/Living Environment:   Home Environment: Other (comment)  # Steps to Enter: 1  One/Two Story Residence: One story  Living Alone: Yes  Support Systems: Parent; Family member(s)  Patient Expects to be Discharged to[de-identified] Other (comment)  Current DME Used/Available at Home: None    Prior Level of Function/Work/Activity:  independent   Number of Personal Factors/Comorbidities that affect the Plan of Care: 1-2: MODERATE COMPLEXITY   EXAMINATION:   Most Recent Physical Functioning:   Gross Assessment: Yes  Gross Assessment  AROM: Within functional limits  Strength: Within functional limits  Coordination: Within functional limits  Tone: Normal  Sensation: Intact          LLE AROM  L Hip Flexion: 75  L Hip ABduction: 5          Bed Mobility  Supine to Sit: Contact guard assistance  Sit to Supine: Minimum assistance  Scooting: Contact guard assistance    Transfers  Sit to Stand: Contact guard assistance  Stand to Sit: Contact guard assistance  Bed to Chair: Contact guard assistance    Balance  Sitting: Intact  Standing: With support              Weight Bearing Status  Left Side Weight Bearing: As tolerated  Distance (ft): 50 Feet (ft)  Ambulation - Level of Assistance: Contact guard assistance;Stand-by assistance  Assistive Device: Walker, rolling  Speed/Kelin: Delayed  Step Length: Left shortened;Right shortened  Stance: Left decreased  Gait Abnormalities: Antalgic;Decreased step clearance  Interventions: Safety awareness training;Verbal cues     Braces/Orthotics:     Left Hip Cold  Type: Cold/ice pack      Body Structures Involved:  1. Bones  2. Joints  3. Muscles  4. Ligaments Body Functions Affected:  1. Movement Related Activities and Participation Affected:  1. Mobility   Number of elements that affect the Plan of Care: 3: MODERATE COMPLEXITY   CLINICAL PRESENTATION:   Presentation: Stable and uncomplicated: LOW COMPLEXITY   CLINICAL DECISION MAKIN [facility] [address] [address] AM-PAC 6 Clicks   Basic Mobility Inpatient Short Form  How much difficulty does the patient currently have. .. Unable A Lot A Little None   1. Turning over in bed (including adjusting bedclothes, sheets and blankets)? [] 1   [] 2   [x] 3   [] 4   2.   Sitting down on and standing up from a chair with arms ( e.g., wheelchair, bedside commode, etc.)   [] 1   [] 2   [x] 3   [] 4   3. Moving from lying on back to sitting on the side of the bed? [] 1   [] 2   [x] 3   [] 4   How much help from another person does the patient currently need. .. Total A Lot A Little None   4. Moving to and from a bed to a chair (including a wheelchair)? [] 1   [] 2   [x] 3   [] 4   5. Need to walk in hospital room? [] 1   [] 2   [x] 3   [] 4   6. Climbing 3-5 steps with a railing? [] 1   [x] 2   [] 3   [] 4   © 2007, Trustees of [address] [place] [address] [address] [address], under license to [employer]. All rights reserved     Score:  Initial: 17 Most Recent: X (Date: -- )    Interpretation of Tool:  Represents activities that are increasingly more difficult (i.e. Bed mobility, Transfers, Gait). Medical Necessity:     · Patient is expected to demonstrate progress in   · strength, range of motion, and balance  ·  to   · decrease assistance required with exercises and functional mobility  · . Reason for Services/Other Comments:  · Patient   · continues to require present interventions due to patient's inability to perform exercises and functional mobility independently  · . Use of outcome tool(s) and clinical judgement create a POC that gives a: Clear prediction of patient's progress: LOW COMPLEXITY            TREATMENT:   (In addition to Assessment/Re-Assessment sessions the following treatments were rendered)     Pre-treatment Symptoms/Complaints:  hip pain  Pain Initial:      Post Session:  did not rate     Therapeutic Exercise: (15 Minutes):  Exercises per grid below to improve mobility and strength. Required minimal visual, verbal, and manual cues to promote proper body alignment, promote proper body posture, and promote proper body mechanics. Progressed range and repetitions as indicated. Gait Training (15 Minutes):  Gait training to improve and/or restore physical functioning as related to mobility, strength and balance. Ambulated 50 Feet (ft) with Contact guard assistance;Stand-by assistance using a Walker, rolling and minimal Safety awareness training;Verbal cues related to their stance phase to promote proper body alignment, promote proper body posture and promote proper body mechanics. Therapeutic Activity: (  10 Minutes ):  Therapeutic activities including Bed transfers, Chair transfers, Ambulation on level ground and standing with RW to improve mobility, strength and balance. Required minimal Safety awareness training;Verbal cues to promote static and dynamic balance in standing. Date:  2/19 Date:   Date:     ACTIVITY/EXERCISE AM PM AM PM AM PM   GROUP THERAPY  []  []  []  []  []  []   Ankle Pumps 10a 15a       Quad Sets 10a 15a       Gluteal Sets 10a 15a       Hip ABd/ADduction 10aa 15aa       Straight Leg Raises         Knee Slides 10aa 15aa       Short Arc Quads  15aa       Long Arc Quads  15a       Chair Slides                  B = bilateral; AA = active assistive; A = active; P = passive      Treatment/Session Assessment:     Response to Treatment:  Did little better, all mobility slow as patient anxious    Education:  [x] Home Exercises  [x] Fall Precautions  [x] Hip Precautions [] D/C Instruction Review  [x] Hip Prosthesis Review  [x] Walker Management/Safety [] Adaptive Equipment as Needed       Interdisciplinary Collaboration:   o Registered Nurse    After treatment position/precautions:   o Supine in bed  o Bed/Chair-wheels locked  o Bed in low position  o Call light within reach    Compliance with Program/Exercises: Will assess as treatment progresses. No questions. Recommendations/Intent for next treatment session:  Treatment next visit will focus on increasing Ms. Sprague's independence with bed mobility, transfers, gait training, strength/ROM exercises, modalities for pain, and patient education.       Total Treatment Duration:  PT Patient Time In/Time Out  Time In: 1310  Time Out: [address] [place] [address] Lucy Bower

## 2021-02-19 NOTE — PROGRESS NOTES
Problem: Self Care Deficits Care Plan (Adult)  Goal: *Acute Goals and Plan of Care (Insert Text)  Description: GOALS:   DISCHARGE GOALS (in preparation for going home/rehab):  3 days all goals met 2/19/2021   1. Ms. Irvin Quezada will perform one lower body dressing activity with minimal assistance with adaptive equipment to demonstrate improved functional mobility and safety. 2.  Ms. Irvin Quezada will perform one lower body bathing activity with minimal  assistance with adaptive equipment to demonstrate improved functional mobility and safety. 3.  Ms. Irvin Quezada will perform toileting/toilet transfer with contact guard assistance with adaptive equipment to demonstrate improved functional mobility and safety. 4.  Ms. Irvin Quezada will perform shower transfer with contact guard assistance with adaptive equipment to demonstrate improved functional mobility and safety. 5.  Ms. Irvin Quezada will state BUCK precautions with two verbal cues to demonstrate improved functional mobility and safety. Outcome: Progressing Towards Goal       JOINT CAMP OCCUPATIONAL THERAPY BUCK: Initial Assessment, Daily Note, Discharge, and AM 2/19/2021  INPATIENT: Hospital Day: 2  Payor: BRIANNA Washington / Plan: Kailash Baker. / Product Type: Commerical /      NAME/AGE/GENDER: Alexander Mei is a 49 y.o. female   PRIMARY DIAGNOSIS:  Arthritis of left hip [M16.12]  Stress fracture of shaft of left femur, initial encounter [V34.431T]   Procedure(s) and Anesthesia Type:     * LEFT TOTAL HIP ARTHORPLASTY/ DEPUY - Spinal (N/A)  ICD-10: Treatment Diagnosis:    Pain in left hip (M25.552)  Stiffness of Left Hip, Not elsewhere classified (E88.405)      ASSESSMENT:     Ms. Irvin Quezada is s/p L BUCK and presents with decreased weight bearing on L LE and decreased independence with functional mobility and activities of daily living.   Patient completed shower and dressing as charted below in ADL grid and is ambulating with rolling walker and contact guard assist.  Patient has met 5/5 goals and plans to return home with good family support. Family able to provide patient with appropriate level of assistance at this time. OT reviewed hip precautions throughout session. Patient instructed to call for assistance when needing to get up from recliner and all needs in reach. Patient verbalized understanding of call light. This section established at most recent assessment   PROBLEM LIST (Impairments causing functional limitations):  Decreased Strength  Decreased ADL/Functional Activities  Decreased Transfer Abilities  Increased Pain  Increased Fatigue  Decreased Flexibility/Joint Mobility  Decreased Knowledge of Precautions   INTERVENTIONS PLANNED: (Benefits and precautions of occupational therapy have been discussed with the patient.)  Activities of daily living training  Adaptive equipment training  Balance training  Clothing management  Donning&doffing training  Theraputic activity     TREATMENT PLAN: Frequency/Duration: Follow patient qd to address above goals. Rehabilitation Potential For Stated Goals: Good     RECOMMENDED REHABILITATION/EQUIPMENT: (at time of discharge pending progress): Continue Skilled Therapy and Home Health: Physical Therapy. OCCUPATIONAL PROFILE AND HISTORY:   History of Present Injury/Illness (Reason for Referral): Pt presents this date s/p (L) BUCK. Past Medical History/Comorbidities:   Ms. Steve Ledezma  has a past medical history of Asthma, Bell's palsy (11/2019), Chronic obstructive pulmonary disease ([facility] [address] Former cigarette smoker, History of positive PPD (1996), Hypothyroidism, Ill-defined condition, Marijuana smoker, and OA (osteoarthritis). She also has no past medical history of Gastrointestinal disorder. Ms. Steve Ledezma  has a past surgical history that includes hx orthopaedic (Left, 2007); hx thyroidectomy (12/04/2018); and hx other surgical (10/09/2020).   Social History/Living Environment:      Prior Level of Function/Work/Activity:  independent     Number of Personal Factors/Comorbidities that affect the Plan of Care: Brief history (0):  LOW COMPLEXITY   ASSESSMENT OF OCCUPATIONAL PERFORMANCE[de-identified]   Most Recent Physical Functioning:   Balance  Sitting: Intact  Standing: With support       Gross Assessment: Yes  Gross Assessment  AROM: Within functional limits  Strength: Within functional limits  Coordination: Within functional limits  Tone: Normal  Sensation: Intact          LLE Assessment  LLE Assessment (WDL): Exception to WDL  LLE AROM  L Hip Flexion: 75  L Hip ABduction: 5      Vision  Acuity: Within Defined Limits    Mental Status  Neurologic State: Alert  Orientation Level: Oriented X4  Cognition: Follows commands  Perception: Appears intact  Perseveration: No perseveration noted  Safety/Judgement: Fall prevention                Basic ADLs (From Assessment) Complex ADLs (From Assessment)   Basic ADL  Feeding: Setup  Oral Facial Hygiene/Grooming: Setup  Bathing: Setup  Type of Bath: Chlorhexidine (CHG), Full, Shower  Upper Body Dressing: Setup  Lower Body Dressing: Setup  Toileting: Setup     Grooming/Bathing/Dressing Activities of Daily Living   Grooming  Grooming Assistance: Set-up  Position Performed: Seated in chair  Washing Face: Set-up Cognitive Retraining  Safety/Judgement: Fall prevention   Upper Body Bathing  Bathing Assistance: Set-up  Position Performed: Seated in chair  Cues: Verbal cues provided  Adaptive Equipment: Grab bar;Long handled sponge; Shower chair Feeding  Feeding Assistance: Set-up   Lower Body Bathing  Bathing Assistance: Set-up  Perineal  : Set-up  Position Performed: Seated in chair  Cues: Verbal cues provided  Adaptive Equipment: Grab bar;Long handled sponge  Lower Body : Set-up  Position Performed: Seated in chair  Cues: Verbal cues provided  Adaptive Equipment: Long handled sponge;Grab bar; Shower chair Toileting  Toileting Assistance: Set-up   Upper Body Dressing Assistance  Dressing Assistance: Set-up  Bra: Set-up  Pullover Shirt: Set-up(gown) Functional Transfers  Bathroom Mobility: Contact guard assistance  Toilet Transfer : Contact guard assistance  Shower Transfer: Contact guard assistance  Cues: Verbal cues provided  Adaptive Equipment: Bedside commode;Grab bars; Shower chair with back; Walker (comment)   Lower Body Dressing Assistance  Dressing Assistance: Minimum assistance  Underpants: Minimum assistance  Pants With Elastic Waist: Minimum assistance  Socks: Compensatory technique training( socks)  Adaptive Equipment Used: Grab bar;Long handled shoe horn;Reacher;Walker Bed/Mat Mobility  Supine to Sit: Contact guard assistance  Sit to Stand: Contact guard assistance  Stand to Sit: Contact guard assistance  Bed to Chair: Contact guard assistance  Scooting: Contact guard assistance  Cues: Verbal cues provided         Physical Skills Involved:  Balance  Strength  Activity Tolerance Cognitive Skills Affected (resulting in the inability to perform in a timely and safe manner):  none Psychosocial Skills Affected:  none   Number of elements that affect the Plan of Care: 1-3:  LOW COMPLEXITY   CLINICAL DECISION MAKIN[address] [place] [id number] AM-PAC 6 Clicks   Daily Activity Inpatient Short Form  How much help from another person does the patient currently need. .. Total A Lot A Little None   1. Putting on and taking off regular lower body clothing? [] 1   [] 2   [x] 3   [] 4   2. Bathing (including washing, rinsing, drying)? [] 1   [] 2   [] 3   [x] 4   3. Toileting, which includes using toilet, bedpan or urinal?   [] 1   [] 2   [] 3   [x] 4   4. Putting on and taking off regular upper body clothing? [] 1   [] 2   [] 3   [x] 4   5. Taking care of personal grooming such as brushing teeth? [] 1   [] 2   [] 3   [x] 4   6. Eating meals? [] 1   [] 2   [] 3   [x] 4   © , Trustees of [address] [place] [address] [address] [address], under license to [employer] of [facility].  All rights reserved     Score:  Initial: 23 Most Recent: X (Date: -- )    Interpretation of Tool:  Represents activities that are increasingly more difficult (i.e. Bed mobility, Transfers, Gait). Medical Necessity:     Patient is expected to demonstrate progress in   strength, balance, coordination, and functional technique   to   increase independence with self care and functional mobility   . Reason for Services/Other Comments:  Patient continues to require skilled intervention due to   Decrease self care and functional mobility   . Use of outcome tool(s) and clinical judgement create a POC that gives a: LOW COMPLEXITY            TREATMENT:   (In addition to Assessment/Re-Assessment sessions the following treatments were rendered)     Pre-treatment Symptoms/Complaints:  tolerated shower  Pain: Initial:   Pain Intensity 1: 5  Pain Location 1: Hip  Pain Orientation 1: Left  Pain Intervention(s) 1: Repositioned  Post Session:  5     Self Care: (35): Procedure(s) (per grid) utilized to improve and/or restore self-care/home management as related to dressing, bathing, toileting, and grooming. Required minimal verbal and   cueing to facilitate activities of daily living skills and compensatory activities. Assessment/Reassessment (10)    Treatment/Session Assessment:     Response to Treatment:  tolerated well. Education:  [] Home Exercises  [x] Fall Precautions  [x] Hip Precautions [] Going Home Video  [] Knee/Hip Prosthesis Review  [x] Walker Management/Safety [x] Adaptive Equipment as Needed       Interdisciplinary Collaboration:   Physical Therapist  Occupational Therapist  Registered Nurse    After treatment position/precautions:   Up in chair  Bed/Chair-wheels locked  Call light within reach  RN notified  LEs reclined      Compliance with Program/Exercises: Compliant all of the time. Recommendations/Intent for next treatment session: Pt doing well all goals met and will do well at home with support from family.  Patient will be discharged home with home health PT. No further Occupational Therapy warranted, will discharge Occupational Therapy services.        Total Treatment Duration:  OT Patient Time In/Time Out  Time In: 0745  Time Out: [address],

## 2021-02-19 NOTE — PROGRESS NOTES
Post op interview conducted following LEFT TOTAL HIP ARTHORPLASTY/ DEPUY:  dated 2/18/21, no anesthetic complications noted

## 2021-02-19 NOTE — PROGRESS NOTES
2021         Post Op day: 1 Day Post-Op   Admit Diagnosis: Arthritis of left hip [M16.12]; Stress fracture of shaft of left femur, initial encounter [R68.978C]; Osteoarthritis of left hip [M16.12]  LAB:    Recent Results (from the past 24 hour(s))   GLUCOSE, POC    Collection Time: 21 10:36 AM   Result Value Ref Range    Glucose (POC) 96 65 - 100 mg/dL   HCG URINE, QL. - POC    Collection Time: 21 11:19 AM   Result Value Ref Range    Pregnancy test,urine (POC) Negative NEG     HEMOGLOBIN    Collection Time: 21  7:31 PM   Result Value Ref Range    HGB 11.6 (L) 11.7 - 15.4 g/dL   HEMOGLOBIN    Collection Time: 21  5:03 AM   Result Value Ref Range    HGB 10.7 (L) 11.7 - 15.4 g/dL     Vital Signs:    Patient Vitals for the past 8 hrs:   BP Temp Pulse Resp SpO2   21 0248 130/82 97.5 °F (36.4 °C) 78 16 98 %     Temp (24hrs), Av.5 °F (36.4 °C), Min:96.3 °F (35.7 °C), Max:98.2 °F (36.8 °C)    Pain Control:   Pain Assessment  Pain Scale 1: Numeric (0 - 10)  Pain Intensity 1: 9  Pain Onset 1: post op  Pain Location 1: Hip  Pain Orientation 1: Left  Pain Description 1: Aching  Pain Intervention(s) 1: Medication (see MAR)  Subjective: Doing well, normal recovery experienced. Objective:  No Acute Distress, Alert and Oriented, Neurovascular exam is normal       Assessment:   Patient Active Problem List   Diagnosis Code    Asthma exacerbation J45. 901    Marijuana use, continuous F12.90    Occupational exposure to air contaminants Z57.39    Cough R05    Acute bronchitis J20.9    COPD exacerbation (HCC) J44.1    Leukocytosis D72.829    Osteoarthritis of left hip M16.12       Status Post Procedure(s) (LRB):  LEFT TOTAL HIP ARTHORPLASTY/ DEPUY (N/A)        Plan: Continue Physical Therapy, discharge home anticipated.    Signed By: Delvin Renteria MD

## 2021-02-19 NOTE — PROGRESS NOTES
Care Management Interventions  PCP Verified by CM: Yes  Mode of Transport at Discharge: Self  Transition of Care Consult (CM Consult): [address]: Yes  Discharge Durable Medical Equipment: Yes  Physical Therapy Consult: Yes  Occupational Therapy Consult: Yes  Current Support Network: Own Home  Discharge Location  Discharge Placement: Home with home health    Patient is a 49y.o. year old female admitted for Left BUCK . Patient plans to return home on discharge. Order received to arrange home health. Patient without preference towards agency. Referral sent to [facility]. Patient requesting we arrange a walker and bedside commode. Referral sent to [address] [place] [address]. delivered to the hospital room prior to discharge. Will follow until discharge.

## 2021-02-19 NOTE — PROGRESS NOTES
Patient received resting in bed. Patient sleepy, drowsy, answers questions appropriately. Medicated for c/o left hip pain (7/10) with roxicodone 10mg po per MD order. Will monitor.

## 2021-02-19 NOTE — PROGRESS NOTES
02/18/21 2050   Oxygen Therapy   O2 Sat (%) 98 %   Pulse via Oximetry 58 beats per minute   O2 Device Nasal cannula   O2 Flow Rate (L/min) 2 l/min   FIO2 (%) 28 %   Incentive Spirometry Treatment   Actual Volume (ml) 1500 ml   Number of Attempts 2   Pt on continuous monitor for HS. Alarm limits set. Pt working on IS. Pt stated she has her own home Advair and has taken it.

## 2021-02-19 NOTE — PROGRESS NOTES
Patient assisted x1 out of bed to bathroom. Patient voided x1 and assisted back to bed. Will monitor.

## 2021-02-19 NOTE — PROGRESS NOTES
Problem: Mobility Impaired (Adult and Pediatric)  Goal: *Acute Goals and Plan of Care (Insert Text)  Outcome: Progressing Towards Goal  Note: GOALS (1-4 days):  (1.)Ms. Zaira Rodriguez will move from supine to sit and sit to supine  in bed with STAND BY ASSIST.    (2.)Ms. Zaira Rodriguez will transfer from bed to chair and chair to bed with STAND BY ASSIST using the least restrictive device. (3.)Ms. Zaira Rodriguez will ambulate with STAND BY ASSIST for 100 feet with the least restrictive device. (4.)Ms. Zaira Rodriguez will ambulate up/down 3 steps with left railing with MINIMAL ASSIST with device as needed. (5.)Ms. Zaira Rodriguez will state/observe TATA precautions with 0 verbal cues. ________________________________________________________________________________________________    PHYSICAL THERAPY JOINT CAMP TATA: Initial Assessment and AM 2/19/2021  INPATIENT: Hospital Day: 2  Payor: Gerri Chavis / Plan: [place] [employer]. / Product Type: Commerical /      NAME/AGE/GENDER: Cody Liriano is a 49 y.o. female   PRIMARY DIAGNOSIS:  Arthritis of left hip [M16.12]  Stress fracture of shaft of left femur, initial encounter [V62.068A]   Procedure(s) and Anesthesia Type:     * LEFT TOTAL HIP ARTHORPLASTY/ DEPUY - Spinal (N/A)  ICD-10: Treatment Diagnosis:    Pain in left hip (M25.552)  Stiffness of Left Hip, Not elsewhere classified (M25.652)  Difficulty in walking, Not elsewhere classified (R26.2)      ASSESSMENT:     Ms. Zaira Rodriguez presents with decreased functional mobility and gait as well as decreased rom and strength of left LE s/p left tata. She plans to go home with HHPT. Pt. Did fine this am but very anxious and slow with movements. Plans to go home tomorrow.     This section established at most recent assessment   PROBLEM LIST (Impairments causing functional limitations):  Decreased Strength  Decreased ADL/Functional Activities  Decreased Transfer Abilities  Decreased Ambulation Ability/Technique  Decreased Balance  Increased Pain  Decreased Activity Tolerance  Decreased Flexibility/Joint Mobility  Decreased [place] with Home Exercise Program  Decreased strength   INTERVENTIONS PLANNED: (Benefits and precautions of physical therapy have been discussed with the patient.)  Bed Mobility  Cold  Gait Training  Home Exercise Program (HEP)  Range of Motion (ROM)  Therapeutic Activites  Therapeutic Exercise/Strengthening  Transfer Training     TREATMENT PLAN: Frequency/Duration: Follow patient BID for duration of hospital stay to address above goals. Rehabilitation Potential For Stated Goals: Good     RECOMMENDED REHABILITATION/EQUIPMENT: (at time of discharge pending progress): Continue Skilled Therapy and Home Health: Physical Therapy. HISTORY:   History of Present Injury/Illness (Reason for Referral):  S/p left tata  Past Medical History/Comorbidities:   Ms. Rolando Perdomo  has a past medical history of Asthma, Bell's palsy (11/2019), Chronic obstructive pulmonary disease ([facility] [address] Former cigarette smoker, History of positive PPD (1996), Hypothyroidism, Ill-defined condition, Marijuana smoker, and OA (osteoarthritis). She also has no past medical history of Gastrointestinal disorder. Ms. Rolando Perdomo  has a past surgical history that includes hx orthopaedic (Left, 2007); hx thyroidectomy (12/04/2018); and hx other surgical (10/09/2020). Social History/Living Environment:   Home Environment: Other (comment)  # Steps to Enter: 1  One/Two Story Residence: One story  Living Alone: Yes  Support Systems: Parent; Family member(s)  Patient Expects to be Discharged to[de-identified] Other (comment)  Current DME Used/Available at Home: None    Prior Level of Function/Work/Activity:  independent   Number of Personal Factors/Comorbidities that affect the Plan of Care: 1-2: MODERATE COMPLEXITY   EXAMINATION:   Most Recent Physical Functioning:   Gross Assessment: Yes  Gross Assessment  AROM: Within functional limits(right LE)  Strength: Generally decreased, functional(right LE)          LLE AROM  L Hip Flexion: 75  L Hip ABduction: 5               Transfers  Sit to Stand: Contact guard assistance  Stand to Sit: Contact guard assistance    Balance  Sitting: Intact  Standing: With support              Weight Bearing Status  Left Side Weight Bearing: As tolerated  Distance (ft): 20 Feet (ft)  Ambulation - Level of Assistance: Contact guard assistance  Assistive Device: Walker, rolling  Speed/Kelin: Delayed  Step Length: Left shortened;Right shortened  Stance: Left decreased  Gait Abnormalities: Antalgic;Decreased step clearance  Interventions: Safety awareness training;Verbal cues     Braces/Orthotics:     Left Hip Cold  Type: Cold/ice pack      Body Structures Involved:  Bones  Joints  Muscles  Ligaments Body Functions Affected: Movement Related Activities and Participation Affected: Mobility   Number of elements that affect the Plan of Care: 3: MODERATE COMPLEXITY   CLINICAL PRESENTATION:   Presentation: Stable and uncomplicated: LOW COMPLEXITY   CLINICAL DECISION MAKING:   [facility] AM-PAC 6 Clicks   Basic Mobility Inpatient Short Form  How much difficulty does the patient currently have. .. Unable A Lot A Little None   1. Turning over in bed (including adjusting bedclothes, sheets and blankets)? [] 1   [] 2   [x] 3   [] 4   2. Sitting down on and standing up from a chair with arms ( e.g., wheelchair, bedside commode, etc.)   [] 1   [] 2   [x] 3   [] 4   3. Moving from lying on back to sitting on the side of the bed? [] 1   [] 2   [x] 3   [] 4   How much help from another person does the patient currently need. .. Total A Lot A Little None   4. Moving to and from a bed to a chair (including a wheelchair)? [] 1   [] 2   [x] 3   [] 4   5. Need to walk in hospital room? [] 1   [] 2   [x] 3   [] 4   6. Climbing 3-5 steps with a railing? [] 1   [x] 2   [] 3   [] 4   © 2007, Trustees of [facility], under license to [employer].  All rights reserved     Score:  Initial: 17 Most Recent: X (Date: -- )    Interpretation of Tool:  Represents activities that are increasingly more difficult (i.e. Bed mobility, Transfers, Gait). Medical Necessity:     Patient is expected to demonstrate progress in   strength, range of motion, and balance   to   decrease assistance required with exercises and functional mobility  . Reason for Services/Other Comments:  Patient   continues to require present interventions due to patient's inability to perform exercises and functional mobility independently  . Use of outcome tool(s) and clinical judgement create a POC that gives a: Clear prediction of patient's progress: LOW COMPLEXITY            TREATMENT:   (In addition to Assessment/Re-Assessment sessions the following treatments were rendered)     Pre-treatment Symptoms/Complaints:  hip pain  Pain Initial:      Post Session:  did not rate     Therapeutic Exercise: (10 Minutes):  Exercises per grid below to improve mobility and strength. Required minimal visual, verbal, and manual cues to promote proper body alignment, promote proper body posture, and promote proper body mechanics. Progressed range and repetitions as indicated. Date:  2/19 Date:   Date:     ACTIVITY/EXERCISE AM PM AM PM AM PM   GROUP THERAPY  []  []  []  []  []  []   Ankle Pumps 10a        Quad Sets 10a        Gluteal Sets 10a        Hip ABd/ADduction 10aa        Straight Leg Raises         Knee Slides 10aa        Short Arc Quads         Long Arc Quads         Chair Slides                  B = bilateral; AA = active assistive; A = active; P = passive      Treatment/Session Assessment:     Response to Treatment:  did fine, slow with mobility.     Education:  [x] Home Exercises  [x] Fall Precautions  [x] Hip Precautions [] D/C Instruction Review  [x] Hip Prosthesis Review  [x] Walker Management/Safety [] Adaptive Equipment as Needed       Interdisciplinary Collaboration:   Registered Nurse    After treatment position/precautions:   Up in chair  Bed/Chair-wheels locked  Bed in low position  Call light within reach    Compliance with Program/Exercises: Will assess as treatment progresses. No questions. Recommendations/Intent for next treatment session:  Treatment next visit will focus on increasing Ms. Darcie's independence with bed mobility, transfers, gait training, strength/ROM exercises, modalities for pain, and patient education.       Total Treatment Duration:  PT Patient Time In/Time Out  Time In: 0930  Time Out: 201 Jim Braga, PT

## 2021-02-20 VITALS
DIASTOLIC BLOOD PRESSURE: 80 MMHG | HEIGHT: 61 IN | OXYGEN SATURATION: 95 % | WEIGHT: 147.8 LBS | TEMPERATURE: 97.7 F | SYSTOLIC BLOOD PRESSURE: 121 MMHG | RESPIRATION RATE: 16 BRPM | HEART RATE: 74 BPM | BODY MASS INDEX: 27.9 KG/M2

## 2021-02-20 PROCEDURE — 97530 THERAPEUTIC ACTIVITIES: CPT

## 2021-02-20 PROCEDURE — 74011250637 HC RX REV CODE- 250/637: Performed by: PHYSICIAN ASSISTANT

## 2021-02-20 PROCEDURE — 97110 THERAPEUTIC EXERCISES: CPT

## 2021-02-20 PROCEDURE — 97116 GAIT TRAINING THERAPY: CPT

## 2021-02-20 RX ADMIN — OXYCODONE 10 MG: 5 TABLET ORAL at 07:38

## 2021-02-20 RX ADMIN — ACETAMINOPHEN 1000 MG: 500 TABLET, FILM COATED ORAL at 12:22

## 2021-02-20 RX ADMIN — OXYCODONE 10 MG: 5 TABLET ORAL at 01:05

## 2021-02-20 RX ADMIN — Medication 81 MG: at 10:04

## 2021-02-20 RX ADMIN — ONDANSETRON 4 MG: 4 TABLET, ORALLY DISINTEGRATING ORAL at 04:43

## 2021-02-20 RX ADMIN — CELECOXIB 200 MG: 200 CAPSULE ORAL at 10:04

## 2021-02-20 RX ADMIN — OXYCODONE 10 MG: 5 TABLET ORAL at 12:22

## 2021-02-20 RX ADMIN — DOCUSATE SODIUM 50MG AND SENNOSIDES 8.6MG 2 TABLET: 8.6; 5 TABLET, FILM COATED ORAL at 10:04

## 2021-02-20 RX ADMIN — Medication 1 AMPULE: at 10:02

## 2021-02-20 RX ADMIN — LEVOTHYROXINE SODIUM 100 MCG: 0.1 TABLET ORAL at 07:38

## 2021-02-20 NOTE — PROGRESS NOTES
Problem: Mobility Impaired (Adult and Pediatric)  Goal: *Acute Goals and Plan of Care (Insert Text)  Outcome: Progressing Towards Goal  Note: GOALS (1-4 days):  (1.)Ms. Carmella Negron will move from supine to sit and sit to supine  in bed with STAND BY ASSIST.    (2.)Ms. Carmella Negron will transfer from bed to chair and chair to bed with STAND BY ASSIST using the least restrictive device. (3.)Ms. Carmella Negron will ambulate with STAND BY ASSIST for 100 feet with the least restrictive device. (4.)Ms. Carmella Negron will ambulate up/down 3 steps with left railing with MINIMAL ASSIST with device as needed. (5.)Ms. Carmella Negron will state/observe TATA precautions with 0 verbal cues. ________________________________________________________________________________________________    PHYSICAL THERAPY JOINT CAMP TATA: Daily Note and PM 2/20/2021  INPATIENT: Hospital Day: 3  Payor: [employer] / Plan: Karmen Guy. / Product Type: Commerical /      NAME/AGE/GENDER: Delgado Mcallister is a 49 y.o. female   PRIMARY DIAGNOSIS:  Arthritis of left hip [M16.12]  Stress fracture of shaft of left femur, initial encounter [A66.381E]   Procedure(s) and Anesthesia Type:     * LEFT TOTAL HIP ARTHORPLASTY/ DEPUY - Spinal (N/A)  ICD-10: Treatment Diagnosis:    · Pain in left hip (M25.552)  · Stiffness of Left Hip, Not elsewhere classified (M25.652)  · Difficulty in walking, Not elsewhere classified (R26.2)      ASSESSMENT:     Ms. Carmella Negron presents with decreased functional mobility and gait as well as decreased rom and strength of left LE s/p left tata. She plans to go home with HHPT. Pt. Did fine this am but very anxious and slow with movements. Plans to go home tomorrow. PM-pt. In chair agreeable for therapy. She did tata exercises in the chair with cues and assistance. Reviewed safety and tata precautions. She ambulated an increased distance in the lizarraga with RW. All gait and movement is very slow but some improvement this afternoon.   She had no questions and returned to supine. 2/20- feeling better this afternoon but still tired and gets a little anxious. She wanted to go to the bathroom     This section established at most recent assessment   PROBLEM LIST (Impairments causing functional limitations):  1. Decreased Strength  2. Decreased ADL/Functional Activities  3. Decreased Transfer Abilities  4. Decreased Ambulation Ability/Technique  5. Decreased Balance  6. Increased Pain  7. Decreased Activity Tolerance  8. Decreased Flexibility/Joint Mobility  9. Decreased [place] with Home Exercise Program  10. Decreased strength   INTERVENTIONS PLANNED: (Benefits and precautions of physical therapy have been discussed with the patient.)  1. Bed Mobility  2. Cold  3. Gait Training  4. Home Exercise Program (HEP)  5. Range of Motion (ROM)  6. Therapeutic Activites  7. Therapeutic Exercise/Strengthening  8. Transfer Training     TREATMENT PLAN: Frequency/Duration: Follow patient BID for duration of hospital stay to address above goals. Rehabilitation Potential For Stated Goals: Good     RECOMMENDED REHABILITATION/EQUIPMENT: (at time of discharge pending progress): Continue Skilled Therapy and Home Health: Physical Therapy. HISTORY:   History of Present Injury/Illness (Reason for Referral):  S/p left tata  Past Medical History/Comorbidities:   Ms. Ewelina Cooper  has a past medical history of Asthma, Bell's palsy (11/2019), Chronic obstructive pulmonary disease ([facility] [address] Former cigarette smoker, History of positive PPD (1996), Hypothyroidism, Ill-defined condition, Marijuana smoker, and OA (osteoarthritis). She also has no past medical history of Gastrointestinal disorder. Ms. Ewelina Cooper  has a past surgical history that includes hx orthopaedic (Left, 2007); hx thyroidectomy (12/04/2018); and hx other surgical (10/09/2020).    Social History/Living Environment:   Home Environment: Other (comment)  # Steps to Enter: 1  One/Two Story Residence: One story  Living Alone: Yes  Support Systems: Parent; Family member(s)  Patient Expects to be Discharged to[de-identified] Other (comment)  Current DME Used/Available at Home: None    Prior Level of Function/Work/Activity:  independent   Number of Personal Factors/Comorbidities that affect the Plan of Care: 1-2: MODERATE COMPLEXITY   EXAMINATION:   Most Recent Physical Functioning:                            Bed Mobility  Supine to Sit: Minimum assistance  Sit to Supine: (left up in chair)  Scooting: Contact guard assistance    Transfers  Sit to Stand: Contact guard assistance  Stand to Sit: Contact guard assistance  Bed to Chair: Contact guard assistance    Balance  Sitting: Intact  Standing: Pull to stand; With support              Weight Bearing Status  Left Side Weight Bearing: As tolerated  Distance (ft): 75 Feet (ft)  Ambulation - Level of Assistance: Stand-by assistance  Assistive Device: Walker, rolling  Speed/Kelin: Slow  Step Length: Right shortened  Stance: Left decreased  Gait Abnormalities: Antalgic;Decreased step clearance; Step to gait  Interventions: Safety awareness training;Verbal cues     Braces/Orthotics:            Body Structures Involved:  1. Bones  2. Joints  3. Muscles  4. Ligaments Body Functions Affected:  1. Movement Related Activities and Participation Affected:  1. Mobility   Number of elements that affect the Plan of Care: 3: MODERATE COMPLEXITY   CLINICAL PRESENTATION:   Presentation: Stable and uncomplicated: LOW COMPLEXITY   CLINICAL DECISION MAKIN [facility] [address] [id number] AM-PAC 6 Clicks   Basic Mobility Inpatient Short Form  How much difficulty does the patient currently have. .. Unable A Lot A Little None   1. Turning over in bed (including adjusting bedclothes, sheets and blankets)? [] 1   [] 2   [x] 3   [] 4   2. Sitting down on and standing up from a chair with arms ( e.g., wheelchair, bedside commode, etc.)   [] 1   [] 2   [x] 3   [] 4   3. Moving from lying on back to sitting on the side of the bed? [] 1   [] 2   [x] 3   [] 4   How much help from another person does the patient currently need. .. Total A Lot A Little None   4. Moving to and from a bed to a chair (including a wheelchair)? [] 1   [] 2   [x] 3   [] 4   5. Need to walk in hospital room? [] 1   [] 2   [x] 3   [] 4   6. Climbing 3-5 steps with a railing? [] 1   [x] 2   [] 3   [] 4   © 2007, Trustees of [facility], under license to [employer]. All rights reserved     Score:  Initial: 17 Most Recent: X (Date: -- )    Interpretation of Tool:  Represents activities that are increasingly more difficult (i.e. Bed mobility, Transfers, Gait). Medical Necessity:     · Patient is expected to demonstrate progress in   · strength, range of motion, and balance  ·  to   · decrease assistance required with exercises and functional mobility  · . Reason for Services/Other Comments:  · Patient   · continues to require present interventions due to patient's inability to perform exercises and functional mobility independently  · . Use of outcome tool(s) and clinical judgement create a POC that gives a: Clear prediction of patient's progress: LOW COMPLEXITY            TREATMENT:   (In addition to Assessment/Re-Assessment sessions the following treatments were rendered)     Pre-treatment Symptoms/Complaints:  hip pain  Pain Initial:      Post Session:  did not rate     Therapeutic Exercise: (15 Minutes):  Exercises per grid below to improve mobility and strength. Required minimal visual, verbal, and manual cues to promote proper body alignment, promote proper body posture, and promote proper body mechanics. Progressed range and repetitions as indicated. Gait Training (15 Minutes):  Gait training to improve and/or restore physical functioning as related to mobility, strength and balance.   Ambulated 75 Feet (ft) with Stand-by assistance using a Walker, rolling and minimal Safety awareness training;Verbal cues related to their stance phase to promote proper body alignment, promote proper body posture and promote proper body mechanics. Therapeutic Activity: (  10 Minutes ):  Therapeutic activities including Bed transfers, Chair transfers, Ambulation on level ground and standing with RW to improve mobility, strength and balance. Required minimal Safety awareness training;Verbal cues to promote static and dynamic balance in standing. Date:  2/19 Date:  2/20 Date:     ACTIVITY/EXERCISE AM PM AM PM AM PM   GROUP THERAPY  []  []  []  []  []  []   Ankle Pumps 10a 15a  20     Quad Sets 10a 15a  20     Gluteal Sets 10a 15a  20     Hip ABd/ADduction 10aa 15aa  20aa     Straight Leg Raises         Knee Slides 10aa 15aa  20aa     Short Arc Quads  15aa       Long Arc Quads  15a       Chair Slides                  B = bilateral; AA = active assistive; A = active; P = passive      Treatment/Session Assessment:     Response to Treatment:  Still gets anxious and fatigues easily but ready to go home    Education:  [x] Home Exercises  [x] Fall Precautions  [x] Hip Precautions [] D/C Instruction Review  [x] Hip Prosthesis Review  [x] Walker Management/Safety [] Adaptive Equipment as Needed       Interdisciplinary Collaboration:   o Physical Therapist  o Registered Nurse    After treatment position/precautions:   o Supine in bed  o Bed/Chair-wheels locked  o Bed in low position  o Call light within reach    Compliance with Program/Exercises: Will assess as treatment progresses. Recommendations/Intent for next treatment session:  Treatment next visit will focus on increasing Ms. Sprague's independence with bed mobility, transfers, gait training, strength/ROM exercises, modalities for pain, and patient education.       Total Treatment Duration:  PT Patient Time In/Time Out  Time In: 1250  Time Out: 23 [facility] [place],

## 2021-02-20 NOTE — REHAB NOTE
PT note: attempted therapy this morning but patient with medical status changed and RN is calling hospitalist to see patient. Will check back later on patient.    Kendy Sommer, PT

## 2021-02-20 NOTE — DISCHARGE INSTRUCTIONS
[facility], [facility]   Patient Discharge Instructions    Arnulfo Rubalcava / [id number] : 1965    Admitted 2021 Discharged: 2021     IF YOU HAVE ANY PROBLEMS ONCE YOU ARE AT HOME CALL THE FOLLOWING NUMBERS:   Main office number: [phone]    Take Home Medications   · It is important that you take the medication exactly as they are prescribed. · Keep your medication in the bottles provided by the pharmacist and keep a list of the medication names, dosages, and times to be taken in your wallet. · Do not take other medications without consulting your doctor. What to do at [address] your prehospital diet. If you have excessive nausea or vomitting call your doctor's office     Home Physical Therapy is arranged. Use rolling walker when walking. Patients who have had a joint replacement should not drive until you are seen for your follow up appointment by Dr. Magdalena Rodríguez. When to Call    - Call if you have a temperature greater then 101  - Unable to keep food down  - Loose control of your bladder or bowel function  - Are unable to bear any weight   - Need a pain medication refill     Information obtained by :  I understand that if any problems occur once I am at home I am to contact my physician. I understand and acknowledge receipt of the instructions indicated above. Physician's or R.N.'s Signature                                                                  Date/Time                                                                                                                                              Patient or Representative Signature                                                          Date/Time    Patient Education        Hip Replacement Surgery (Posterior):  What to Expect at Home  Your Recovery  Hip replacement surgery replaces the worn parts of your hip joint. When you leave the hospital, you will probably be walking with crutches or a walker. You may be able to climb a few stairs and get in and out of bed and chairs. But you will need someone to help you at home for the next few weeks or until you have more energy and can move around better. If you need more extensive rehab, you may go to a specialized rehab center for more treatment. You will go home with a bandage and stitches, staples, tissue glue, or tape strips. You can remove the bandage when your doctor tells you to. If you have stitches or staples, your doctor will remove them 10 days to 3 weeks after your surgery. Glue or tape strips will fall off on their own over time. You may still have some mild pain, and the area may be swollen for 3 to 4 months after surgery. Your doctor will give you medicine for the pain. You will continue the rehabilitation program (rehab) you started in the hospital. The better you do with your rehab exercises, the sooner you will get your strength and movement back. Most people are able to return to work 4 weeks to 4 months after surgery. This care sheet gives you a general idea about how long it will take for you to recover. But each person recovers at a different pace. Follow the steps below to get better as quickly as possible. How can you care for yourself at home? Activity    · Your doctor may not want your affected leg to cross the center of your body toward the other leg. If so, your therapist may suggest these ideas:  ? Do not cross your legs. ? Be very careful as you get in or out of bed or a car, so your leg does not cross that imaginary line in the middle of your body.     · Go slowly when you climb stairs. Make sure the lights are on. Have someone watch you, if you can. When you climb stairs:  ? Step up first with your unaffected leg. Then bring the affected leg up to the same step. Bring your crutches or cane up.   ? To go down stairs, reverse the order. First, put your crutches or cane on the lower step. Then bring the affected leg down to that step. Finally, step down with the unaffected leg.     · You can ride in a car, but stop at least once every hour to get out and walk around.     · You may want to sleep on your back. Don't reach down too far to pull up blankets when you lie in bed.     · If your doctor recommends exercises, do them as directed. You can cut back on your exercises if your muscles start to ache, but don't stop doing them.     · Rest when you feel tired. You may take a nap, but don't stay in bed all day.     · Work with your physical therapist to learn the best way to exercise. You will probably have to use crutches or a walker for at least 4 to 6 weeks.     · Your doctor may advise you to stay away from activities that put stress on the joint. This includes sports such as tennis, football, and jogging.     · Try not to sit for too long at one time. You will feel less stiff if you take a short walk about every hour. When you sit, use chairs with arms, and don't sit in low chairs.     · Do not bend over more than 90 degrees (like the angle in a letter \"L\").     · Sleep on your back with your legs slightly apart or on your side with a pillow between your knees for about 6 weeks or as your doctor tells you. Do not sleep on your stomach or affected leg.     · Ask your doctor when you can drive again.     · Most people are able to return to work 4 weeks to 4 months after surgery.     · Ask your doctor when it is okay for you to have sex. Diet    · By the time you leave the hospital, you will probably be eating your normal diet. If your stomach is upset, try bland, low-fat foods like plain rice, broiled chicken, toast, and yogurt. Your doctor may recommend that you take iron and vitamin supplements.     · Drink plenty of fluids (unless your doctor tells you not to).   · Eat healthy foods, and watch your portion sizes. Try to stay at your ideal weight. Too much weight puts more stress on your new hip joint.     · You may notice that your bowel movements are not regular right after your surgery. This is common. Try to avoid constipation and straining with bowel movements. You may want to take a fiber supplement every day. If you have not had a bowel movement after a couple of days, ask your doctor about taking a mild laxative. Medicines    · Your doctor will tell you if and when you can restart your medicines. He or she will also give you instructions about taking any new medicines.     · If you take aspirin or some other blood thinner, ask your doctor if and when to start taking it again. Make sure that you understand exactly what your doctor wants you to do.     · Your doctor may give you a blood-thinning medicine to prevent blood clots. If you take a blood thinner, be sure you get instructions about how to take your medicine safely. Blood thinners can cause serious bleeding problems. This medicine could be in pill form or as a shot (injection). If a shot is necessary, your doctor will tell you how to do this.     · Be safe with medicines. Take pain medicines exactly as directed. ? If the doctor gave you a prescription medicine for pain, take it as prescribed. ? If you are not taking a prescription pain medicine, ask your doctor if you can take an over-the-counter medicine.     · If you think your pain medicine is making you sick to your stomach:  ? Take your medicine after meals (unless your doctor has told you not to). ? Ask your doctor for a different pain medicine.     · If your doctor prescribed antibiotics, take them as directed. Do not stop taking them just because you feel better. You need to take the full course of antibiotics. Incision care    · If your doctor told you how to care for your cut (incision), follow your doctor's instructions. You will have a dressing over the cut.  A dressing helps the incision heal and protects it. Your doctor will tell you how to take care of this.     · If you did not get instructions, follow this general advice:  ? If you have strips of tape on the cut the doctor made, leave the tape on for a week or until it falls off.  ? If you have stitches or staples, your doctor will tell you when to come back to have them removed. ? If you have skin adhesive on the cut, leave it on until it falls off. Skin adhesive is also called glue or liquid stitches. ? Change the bandage every day. ? Wash the area daily with warm water, and pat it dry. Don't use hydrogen peroxide or alcohol. They can slow healing. ? You may cover the area with a gauze bandage if it oozes fluid or rubs against clothing. ? You may shower 24 to 48 hours after surgery. Pat the incision dry. Don't swim or take a bath for the first 2 weeks, or until your doctor tells you it is okay. Exercise    · Your rehab program will include a number of exercises to do. Always do them as your therapist tells you. Ice and elevation    · For pain, put ice or a cold pack on the area for 10 to 20 minutes at a time. Put a thin cloth between the ice and your skin.     · Your ankle may swell for about 3 months. Prop up your ankle when you ice it or anytime you sit or lie down. Try to keep it above the level of your heart. This will help reduce swelling. Other instructions    · Continue to wear your support stockings as your doctor says. These help to prevent blood clots. How long you'll have to wear them depends on your activity level and the amount of swelling you have. Most people wear these stockings for 4 to 6 weeks after surgery.     · Try to prevent falls. To avoid falling:  ? Arrange furniture so that you will not trip on it. ? Get rid of throw rugs, and move electrical cords out of the way. ? Walk only in areas with plenty of light. ? Put grab bars in showers and bathtubs. ? Avoid icy or snowy sidewalks.   ? Wear shoes with sturdy, flat soles. Follow-up care is a key part of your treatment and safety. Be sure to make and go to all appointments, and call your doctor if you are having problems. It's also a good idea to know your test results and keep a list of the medicines you take. When should you call for help? Call 911 anytime you think you may need emergency care. For example, call if:    · You passed out (lost consciousness).     · You have severe trouble breathing.     · You have sudden chest pain and shortness of breath, or you cough up blood. Call your doctor now or seek immediate medical care if:    · You have signs that your hip may be dislocated, including:  ? Severe pain and not being able to stand. ? A crooked leg that looks like your hip is out of position. ? Not being able to bend or straighten your leg.     · Your leg or foot is cool or pale or changes color.     · You cannot feel or move your leg.     · You have signs of a blood clot, such as:  ? Pain in your calf, back of the knee, thigh, or groin. ? Redness and swelling in your leg or groin.     · Your incision comes open and begins to bleed, or the bleeding increases.     · You feel like your heart is racing or beating irregularly.     · You have signs of infection, such as:  ? Increased pain, swelling, warmth, or redness. ? Red streaks leading from the incision. ? Pus draining from the incision. ? A fever. Watch closely for changes in your health, and be sure to contact your doctor if:    · You do not have a bowel movement after taking a laxative.     · You do not get better as expected. Where can you learn more? Go to http://www.gray.com/  Enter Q746 in the search box to learn more about \"Hip Replacement Surgery (Posterior): What to Expect at Home. \"  Current as of: March 2, 2020               Content Version: 12.6  © 1982-9182 [employer].    Care instructions adapted under license by [employer] (which disclaims liability or warranty for this information). If you have questions about a medical condition or this instruction, always ask your healthcare professional. [address] any warranty or liability for your use of this information.

## 2021-02-20 NOTE — PROGRESS NOTES
2021         Post Op day: 1 Day Post-Op   Admit Diagnosis: Arthritis of left hip [M16.12]; Stress fracture of shaft of left femur, initial encounter [Y65.618Z]; Osteoarthritis of left hip [M16.12]  LAB:    No results found for this or any previous visit (from the past 24 hour(s)). Vital Signs:    Patient Vitals for the past 8 hrs:   BP Temp Pulse Resp SpO2   21 0725 (!) 137/90 97.7 °F (36.5 °C) 78 16 97 %   21 0347 125/75 98 °F (36.7 °C) 70 16 97 %     Temp (24hrs), Av.1 °F (36.7 °C), Min:97.7 °F (36.5 °C), Max:98.3 °F (36.8 °C)    Pain Control:   Pain Assessment  Pain Scale 1: Numeric (0 - 10)  Pain Intensity 1: 6  Pain Onset 1: post op  Pain Location 1: Hip  Pain Orientation 1: Left  Pain Description 1: Aching  Pain Intervention(s) 1: Medication (see MAR)  Subjective: Doing well, normal recovery experienced. Objective:  No Acute Distress, Alert and Oriented, Neurovascular exam is normal       Assessment:   Patient Active Problem List   Diagnosis Code    Asthma exacerbation J45. 901    Marijuana use, continuous F12.90    Occupational exposure to air contaminants Z57.39    Cough R05    Acute bronchitis J20.9    COPD exacerbation ([facility]) J44.1    Leukocytosis D72.829    Osteoarthritis of left hip M16.12       Status Post Procedure(s) (LRB):  LEFT TOTAL HIP ARTHORPLASTY/ DEPUY (N/A)        Plan: Continue Physical Therapy, discharge home anticipated.    Signed By: Sherly Mitchell MD

## 2021-02-20 NOTE — PROGRESS NOTES
Problem: Falls - Risk of  Goal: *Absence of Falls  Description: Document Earma Jeremy Fall Risk and appropriate interventions in the flowsheet.   Outcome: Progressing Towards Goal  Note: Fall Risk Interventions:  Mobility Interventions: OT consult for ADLs, Patient to call before getting OOB, PT Consult for mobility concerns, PT Consult for assist device competence, Strengthening exercises (ROM-active/passive), Utilize walker, cane, or other assistive device         Medication Interventions: Patient to call before getting OOB, Teach patient to arise slowly    Elimination Interventions: Call light in reach, Elevated toilet seat, Patient to call for help with toileting needs              Problem: Patient Education: Go to Patient Education Activity  Goal: Patient/Family Education  Outcome: Progressing Towards Goal     Problem: Patient Education: Go to Patient Education Activity  Goal: Patient/Family Education  Outcome: Progressing Towards Goal     Problem: Patient Education: Go to Patient Education Activity  Goal: Patient/Family Education  Outcome: Progressing Towards Goal     Problem: Patient Education: Go to Patient Education Activity  Goal: Patient/Family Education  Outcome: Progressing Towards Goal     Problem: Hip Replacement: Day of Surgery/Unit  Goal: Off Pathway (Use only if patient is Off Pathway)  Outcome: Progressing Towards Goal  Goal: Activity/Safety  Outcome: Progressing Towards Goal  Goal: Consults, if ordered  Outcome: Progressing Towards Goal  Goal: Diagnostic Test/Procedures  Outcome: Progressing Towards Goal  Goal: Nutrition/Diet  Outcome: Progressing Towards Goal  Goal: Medications  Outcome: Progressing Towards Goal  Goal: Respiratory  Outcome: Progressing Towards Goal  Goal: Treatments/Interventions/Procedures  Outcome: Progressing Towards Goal  Goal: Psychosocial  Outcome: Progressing Towards Goal  Goal: *Initiate mobility  Outcome: Progressing Towards Goal  Goal: *Optimal pain control at patient's stated goal  Outcome: Progressing Towards Goal  Goal: *Hemodynamically stable  Outcome: Progressing Towards Goal     Problem: Hip Replacement: Post Op Day 1  Goal: Off Pathway (Use only if patient is Off Pathway)  Outcome: Progressing Towards Goal  Goal: Activity/Safety  Outcome: Progressing Towards Goal  Goal: Diagnostic Test/Procedures  Outcome: Progressing Towards Goal  Goal: Nutrition/Diet  Outcome: Progressing Towards Goal  Goal: Medications  Outcome: Progressing Towards Goal  Goal: Respiratory  Outcome: Progressing Towards Goal  Goal: Treatments/Interventions/Procedures  Outcome: Progressing Towards Goal  Goal: Psychosocial  Outcome: Progressing Towards Goal  Goal: Discharge Planning  Outcome: Progressing Towards Goal  Goal: *Demonstrates progressive activity  Outcome: Progressing Towards Goal  Goal: *Optimal pain control at patient's stated goal  Outcome: Progressing Towards Goal  Goal: *Hemodynamically stable  Outcome: Progressing Towards Goal  Goal: *Discharge plan identified  Outcome: Progressing Towards Goal     Problem: Hip Replacement: Post-Op Day 2  Goal: Off Pathway (Use only if patient is Off Pathway)  Outcome: Progressing Towards Goal  Goal: Activity/Safety  Outcome: Progressing Towards Goal  Goal: Diagnostic Test/Procedures  Outcome: Progressing Towards Goal  Goal: Nutrition/Diet  Outcome: Progressing Towards Goal  Goal: Medications  Outcome: Progressing Towards Goal  Goal: Respiratory  Outcome: Progressing Towards Goal  Goal: Treatments/Interventions/Procedures  Outcome: Progressing Towards Goal  Goal: Psychosocial  Outcome: Progressing Towards Goal  Goal: *Met physical therapy criteria for discharge to the next level of care  Outcome: Progressing Towards Goal  Goal: *Optimal pain control with oral analgesia  Outcome: Progressing Towards Goal  Goal: *Hemodynamically stable  Outcome: Progressing Towards Goal  Goal: *Tolerating diet  Outcome: Progressing Towards Goal  Goal: *Verbalizes understanding of any indicated hip precautions  Outcome: Progressing Towards Goal  Goal: *Patient verbalizes understanding of discharge instructions  Outcome: Progressing Towards Goal

## 2021-02-21 NOTE — PROGRESS NOTES
Care Management Interventions  PCP Verified by CM:  Yes  Mode of Transport at Discharge: Self  Transition of Care Consult (CM Consult): [address]: Yes  Discharge Durable Medical Equipment: Yes  Physical Therapy Consult: Yes  Occupational Therapy Consult: Yes  Current Support Network: Own Home  Discharge Location  Discharge Placement: Home with home health

## 2021-02-21 NOTE — PROGRESS NOTES
Pt was resting quietly but when starting talking to her she became anxious again and was staring. Had another nurse assess and speak to pt. Vss. Pt is alert and oriented. She says she has had this happen before. Spent some time talking with pt and had her eat some apple sauce which is all she would eat and prayed with her. She says she feels much better.

## 2021-02-21 NOTE — PROGRESS NOTES
Pt feels much better, is smiling and talking and has eaten a little more. She does want to go home   Will let therapy know to come and work with her.

## 2021-02-21 NOTE — PROGRESS NOTES
Pt mom is here to pick her up. Reviewed discharge instructions with pt and mother, removed iv,  Opportunity was provided for questions. Pt verbalized understanding. Assisted pt to get dressed.

## 2021-02-21 NOTE — PROGRESS NOTES
Pt is very anxious and did vomit small amount. She says she had been in pain and was afraid. Cleaned pt up and she did not want nausea medicine just her pain med.

## 2021-02-22 ENCOUNTER — HOME CARE VISIT (OUTPATIENT)
Dept: SCHEDULING | Facility: HOME HEALTH | Age: 56
End: 2021-02-22
Payer: COMMERCIAL

## 2021-02-22 VITALS
TEMPERATURE: 99.3 F | HEART RATE: 86 BPM | DIASTOLIC BLOOD PRESSURE: 86 MMHG | OXYGEN SATURATION: 96 % | RESPIRATION RATE: 18 BRPM | SYSTOLIC BLOOD PRESSURE: 134 MMHG

## 2021-02-22 PROCEDURE — G0151 HHCP-SERV OF PT,EA 15 MIN: HCPCS

## 2021-02-22 PROCEDURE — 400013 HH SOC

## 2021-02-23 ENCOUNTER — HOME CARE VISIT (OUTPATIENT)
Dept: SCHEDULING | Facility: HOME HEALTH | Age: 56
End: 2021-02-23
Payer: COMMERCIAL

## 2021-02-23 VITALS — DIASTOLIC BLOOD PRESSURE: 80 MMHG | HEART RATE: 88 BPM | TEMPERATURE: 98 F | SYSTOLIC BLOOD PRESSURE: 130 MMHG

## 2021-02-23 PROCEDURE — G0151 HHCP-SERV OF PT,EA 15 MIN: HCPCS

## 2021-02-26 ENCOUNTER — HOME CARE VISIT (OUTPATIENT)
Dept: SCHEDULING | Facility: HOME HEALTH | Age: 56
End: 2021-02-26
Payer: COMMERCIAL

## 2021-02-26 VITALS
DIASTOLIC BLOOD PRESSURE: 68 MMHG | OXYGEN SATURATION: 96 % | TEMPERATURE: 99.3 F | RESPIRATION RATE: 18 BRPM | SYSTOLIC BLOOD PRESSURE: 118 MMHG | HEART RATE: 96 BPM

## 2021-02-26 PROCEDURE — G0151 HHCP-SERV OF PT,EA 15 MIN: HCPCS

## 2021-03-01 ENCOUNTER — HOME CARE VISIT (OUTPATIENT)
Dept: SCHEDULING | Facility: HOME HEALTH | Age: 56
End: 2021-03-01
Payer: COMMERCIAL

## 2021-03-01 VITALS
RESPIRATION RATE: 17 BRPM | SYSTOLIC BLOOD PRESSURE: 118 MMHG | DIASTOLIC BLOOD PRESSURE: 70 MMHG | HEART RATE: 96 BPM | TEMPERATURE: 99 F

## 2021-03-01 PROBLEM — M25.552 LEFT HIP PAIN: Status: ACTIVE | Noted: 2021-03-01

## 2021-03-01 PROCEDURE — G0157 HHC PT ASSISTANT EA 15: HCPCS

## 2021-03-04 ENCOUNTER — HOME CARE VISIT (OUTPATIENT)
Dept: SCHEDULING | Facility: HOME HEALTH | Age: 56
End: 2021-03-04
Payer: COMMERCIAL

## 2021-03-04 VITALS
SYSTOLIC BLOOD PRESSURE: 128 MMHG | RESPIRATION RATE: 17 BRPM | TEMPERATURE: 98.7 F | DIASTOLIC BLOOD PRESSURE: 72 MMHG | HEART RATE: 80 BPM

## 2021-03-04 PROCEDURE — G0157 HHC PT ASSISTANT EA 15: HCPCS

## 2021-03-09 ENCOUNTER — HOME CARE VISIT (OUTPATIENT)
Dept: SCHEDULING | Facility: HOME HEALTH | Age: 56
End: 2021-03-09
Payer: COMMERCIAL

## 2021-03-09 VITALS
TEMPERATURE: 98.7 F | HEART RATE: 84 BPM | OXYGEN SATURATION: 99 % | DIASTOLIC BLOOD PRESSURE: 76 MMHG | SYSTOLIC BLOOD PRESSURE: 118 MMHG | RESPIRATION RATE: 17 BRPM

## 2021-03-09 PROCEDURE — G0157 HHC PT ASSISTANT EA 15: HCPCS

## 2021-03-11 ENCOUNTER — HOME CARE VISIT (OUTPATIENT)
Dept: HOME HEALTH SERVICES | Facility: HOME HEALTH | Age: 56
End: 2021-03-11
Payer: COMMERCIAL

## 2021-03-11 ENCOUNTER — HOME CARE VISIT (OUTPATIENT)
Dept: SCHEDULING | Facility: HOME HEALTH | Age: 56
End: 2021-03-11
Payer: COMMERCIAL

## 2021-03-11 VITALS
TEMPERATURE: 98.6 F | DIASTOLIC BLOOD PRESSURE: 80 MMHG | HEART RATE: 84 BPM | SYSTOLIC BLOOD PRESSURE: 120 MMHG | RESPIRATION RATE: 17 BRPM

## 2021-03-11 PROCEDURE — G0157 HHC PT ASSISTANT EA 15: HCPCS

## 2021-03-16 ENCOUNTER — HOME CARE VISIT (OUTPATIENT)
Dept: HOME HEALTH SERVICES | Facility: HOME HEALTH | Age: 56
End: 2021-03-16
Payer: COMMERCIAL

## 2021-03-16 ENCOUNTER — HOME CARE VISIT (OUTPATIENT)
Dept: SCHEDULING | Facility: HOME HEALTH | Age: 56
End: 2021-03-16
Payer: COMMERCIAL

## 2021-03-16 VITALS
DIASTOLIC BLOOD PRESSURE: 84 MMHG | HEART RATE: 88 BPM | SYSTOLIC BLOOD PRESSURE: 130 MMHG | RESPIRATION RATE: 17 BRPM | TEMPERATURE: 98 F | OXYGEN SATURATION: 97 %

## 2021-03-16 PROCEDURE — G0157 HHC PT ASSISTANT EA 15: HCPCS

## 2021-03-19 ENCOUNTER — HOME CARE VISIT (OUTPATIENT)
Dept: SCHEDULING | Facility: HOME HEALTH | Age: 56
End: 2021-03-19
Payer: COMMERCIAL

## 2021-03-19 PROCEDURE — G0151 HHCP-SERV OF PT,EA 15 MIN: HCPCS

## 2021-03-20 VITALS
SYSTOLIC BLOOD PRESSURE: 132 MMHG | HEART RATE: 96 BPM | TEMPERATURE: 99.7 F | OXYGEN SATURATION: 97 % | RESPIRATION RATE: 18 BRPM | DIASTOLIC BLOOD PRESSURE: 70 MMHG

## 2021-06-10 PROBLEM — Z96.642 HISTORY OF TOTAL LEFT HIP REPLACEMENT: Status: ACTIVE | Noted: 2021-06-10

## 2021-06-10 PROBLEM — Z74.09 DECREASED FUNCTIONAL MOBILITY AND ENDURANCE: Status: ACTIVE | Noted: 2021-06-10

## 2021-06-10 PROBLEM — R26.2 DIFFICULTY WALKING: Status: ACTIVE | Noted: 2021-06-10

## 2021-06-10 PROBLEM — M62.81 GENERALIZED MUSCLE WEAKNESS: Status: ACTIVE | Noted: 2021-06-10

## 2021-06-10 PROBLEM — M25.452 EFFUSION OF LEFT HIP: Status: ACTIVE | Noted: 2021-06-10

## 2021-06-10 PROBLEM — Z78.9 IMPAIRED MOBILITY AND ADLS: Status: ACTIVE | Noted: 2021-06-10

## 2022-01-05 ENCOUNTER — HOSPITAL ENCOUNTER (EMERGENCY)
Age: 57
Discharge: HOME | End: 2022-01-05
Attending: EMERGENCY MEDICINE
Payer: COMMERCIAL

## 2022-01-05 VITALS
HEIGHT: 61 IN | HEART RATE: 91 BPM | SYSTOLIC BLOOD PRESSURE: 141 MMHG | DIASTOLIC BLOOD PRESSURE: 92 MMHG | TEMPERATURE: 99.6 F | RESPIRATION RATE: 18 BRPM | WEIGHT: 147 LBS | OXYGEN SATURATION: 98 % | BODY MASS INDEX: 27.75 KG/M2

## 2022-01-05 DIAGNOSIS — Z20.822 PERSON UNDER INVESTIGATION FOR COVID-19: Primary | ICD-10-CM

## 2022-01-05 LAB
SARS-COV-2, COV2: DETECTED
SARS-COV-2, COV2: NORMAL
SPECIMEN SOURCE, FCOV2M: ABNORMAL

## 2022-01-05 PROCEDURE — U0005 INFEC AGEN DETEC AMPLI PROBE: HCPCS

## 2022-01-05 PROCEDURE — 99282 EMERGENCY DEPT VISIT SF MDM: CPT

## 2022-01-05 NOTE — ED PROVIDER NOTES
61-year-old female who presents to the emergency department today with complaints of body aches and cough. She states she feels like she has chest congestion but cannot get it up. She states symptoms began this morning. She does not know if she has had a fever. She denies vomiting, diarrhea, chest pain, abdominal pain, or shortness of breath. She did not get vaccinated for COVID. She denies any treatment prior to arrival today. The history is provided by the patient. Cough  This is a new problem. Pertinent negatives include no chest pain, no abdominal pain and no shortness of breath. Nothing aggravates the symptoms. Nothing relieves the symptoms. She has tried nothing for the symptoms.         Past Medical History:   Diagnosis Date    Asthma     Bell's palsy 11/2019    Chronic obstructive pulmonary disease (HCC)     advair and albuterol prn=uses 1-2 times per week; Neb PRN=never uses; not followed by Pulmonary     Former cigarette smoker     History of positive PPD 1996    no meds     Hypothyroidism     on med for control     Ill-defined condition     pancreatitis x1; not chronic per patient     Marijuana smoker     OA (osteoarthritis)        Past Surgical History:   Procedure Laterality Date    HX ORTHOPAEDIC Left 2007    left ankle fracture- has a plate    HX OTHER SURGICAL  10/09/2020    teeth removal     HX THYROIDECTOMY  12/04/2018         Family History:   Problem Relation Age of Onset    Hypertension Mother     Hypertension Father     Cancer Brother 47        brain       Social History     Socioeconomic History    Marital status: SINGLE     Spouse name: Not on file    Number of children: 3    Years of education: Not on file    Highest education level: Not on file   Occupational History    Occupation: bakery plant    Occupation:    Tobacco Use    Smoking status: Former Smoker     Packs/day: 0.25     Types: Cigarettes     Start date: 1992    Smokeless tobacco: Never Used Vaping Use    Vaping Use: Never used   Substance and Sexual Activity    Alcohol use: No    Drug use: Yes     Types: Marijuana    Sexual activity: Not Currently     Partners: Male     Birth control/protection: None   Other Topics Concern    Not on file   Social History Narrative    Not on file     Social Determinants of Health     Financial Resource Strain:     Difficulty of Paying Living Expenses: Not on file   Food Insecurity:     Worried About Running Out of Food in the Last Year: Not on file    Agusto of Food in the Last Year: Not on file   Transportation Needs:     Lack of Transportation (Medical): Not on file    Lack of Transportation (Non-Medical): Not on file   Physical Activity:     Days of Exercise per Week: Not on file    Minutes of Exercise per Session: Not on file   Stress:     Feeling of Stress : Not on file   Social Connections:     Frequency of Communication with Friends and Family: Not on file    Frequency of Social Gatherings with Friends and Family: Not on file    Attends Temple Services: Not on file    Active Member of [address] [place] [address] or Organizations: Not on file    Attends Club or Organization Meetings: Not on file    Marital Status: Not on file   Intimate Partner Violence:     Fear of Current or Ex-Partner: Not on file    Emotionally Abused: Not on file    Physically Abused: Not on file    Sexually Abused: Not on file   Housing Stability:     Unable to Pay for Housing in the Last Year: Not on file    Number of [place] in the Last Year: Not on file    Unstable Housing in the Last Year: Not on file         ALLERGIES: Latex    Review of Systems   Constitutional: Positive for chills and fever. HENT: Positive for congestion. Respiratory: Positive for cough. Negative for shortness of breath. Cardiovascular: Negative for chest pain. Gastrointestinal: Negative for abdominal pain, diarrhea, nausea and vomiting. Musculoskeletal: Positive for myalgias.    All other systems reviewed and are negative. Vitals:    01/05/22 1318 01/05/22 1330   BP: (!) 141/92    Pulse: 91    Resp: 18    Temp: 99.6 °F (37.6 °C)    SpO2: 98% 98%   Weight: 66.7 kg (147 lb)    Height: 5' 1\" (1.549 m)             Physical Exam  Vitals and nursing note reviewed. Constitutional:       General: She is not in acute distress. Appearance: Normal appearance. She is not ill-appearing, toxic-appearing or diaphoretic. HENT:      Head: Normocephalic and atraumatic. Right Ear: External ear normal.      Left Ear: External ear normal.      Nose: Congestion and rhinorrhea present. Mouth/Throat:      Mouth: Mucous membranes are moist.      Pharynx: Oropharynx is clear. Eyes:      General: No scleral icterus. Extraocular Movements: Extraocular movements intact. Conjunctiva/sclera: Conjunctivae normal.   Cardiovascular:      Rate and Rhythm: Normal rate. Heart sounds: Normal heart sounds. Pulmonary:      Effort: Pulmonary effort is normal. No respiratory distress. Breath sounds: Normal breath sounds. Abdominal:      General: Abdomen is flat. There is no distension. Musculoskeletal:         General: Normal range of motion. Cervical back: Normal range of motion and neck supple. No rigidity. Skin:     General: Skin is warm and dry. Capillary Refill: Capillary refill takes less than 2 seconds. Neurological:      General: No focal deficit present. Mental Status: She is alert and oriented to person, place, and time. Psychiatric:         Mood and Affect: Mood normal.         Behavior: Behavior normal.         Thought Content: Thought content normal.         Judgment: Judgment normal.          MDM  Number of Diagnoses or Management Options  Person under investigation for COVID-19: new and requires workup  Diagnosis management comments: 61-year-old female presents emergency department today with request to be checked for Covid.   She states she has had body aches and a cough. She denies any shortness of breath. Physical exam is unremarkable. Lung sounds are clear. SPO2 is 98% on room air with exertion. Covid PCR swab sent. Supportive treatment discussed and encouraged. Encouraged her to check MyChart for results. I have discussed the results of all labs, procedures, radiographs, and/or treatments with the patient and available family members. Deanna Toscano is agreed upon by the patient and the patient is ready for discharge.  Questions about treatment in the ED and differential diagnosis of presenting condition were answered. Dimitrios Bird was given verbal discharge instructions including, but not limited to, importance of returning to the emergency department for any concern of worsening or continued symptoms.  Instructions were given to follow up with a primary care provider or specialist within 1-2 days. Maryann Safer effects of medications, if prescribed, were discussed and patient was advised to refrain from significant physical activity until followed up by primary care physician and to not drive or operate heavy machinery after taking any sedating substances.      During patient's visit to the emergency department in addition to providing emergency care I conducted a COVID-19 counseling visit. She is aware that he needs immediate isolation away from others and that she is to inform any close contacts about her condition. I have also reviewed the signs and symptoms of COVID-19 including the potential for progression of the disease including return precautions and an outpatient plan. We have discussed ways to be successful in the quarantine process and provided instructions about when it is safe to stop strict isolation. She has been informed that the public health department should be contacting her. Ulises Mcginnis NP; 1/5/2022 @8:24 PM Voice dictation software was used during the making of  this note.  This software is not perfect and grammatical and other typographical errors  may be present. This note has not been proofread for errors.       Risk of Complications, Morbidity, and/or Mortality  Presenting problems: low  Diagnostic procedures: low  Management options: low    Patient Progress  Patient progress: improved         Procedures

## 2022-01-05 NOTE — ED NOTES
I have reviewed discharge instructions with the patient. The patient verbalized understanding. Patient left ED via private vehicle. Discharge Method: ambulatory to Home with self. Opportunity for questions and clarification provided. Patient given 0 scripts. To continue your aftercare when you leave the hospital, you may receive an automated call from our care team to check in on how you are doing. This is a free service and part of our promise to provide the best care and service to meet your aftercare needs.  If you have questions, or wish to unsubscribe from this service please call [phone]. Thank you for Choosing our [facility] Emergency Department.

## 2022-01-05 NOTE — Clinical Note
[id number] [address] EMERGENCY DEPT  [address] [address] [phone]    Work/School Note    Date: 1/5/2022     To Whom It May concern: Tavares Stein was evaluated by the following provider(s):  No providers found. COVID19 virus is suspected. Per the CDC guidelines we recommend home isolation until the following conditions are all met:    1. At least five days have passed since symptoms first appeared and/or had a close exposure,   2. After home isolation for five days, wearing a mask around others for the next five days,  3. At least 24 have passed since last fever without the use of fever-reducing medications and  4.  Symptoms (eg cough, shortness of breath) have improved      Sincerely,          Nina Hu NP

## 2022-01-05 NOTE — DISCHARGE INSTRUCTIONS
Take Tylenol or Motrin if needed for fever, body aches, or headache. Take Mucinex DM to help with chest congestion and cough. Increase oral hydration at home. Return to the emergency department for any new, worsening, or concerning symptoms.

## 2022-01-09 ENCOUNTER — APPOINTMENT (OUTPATIENT)
Dept: GENERAL RADIOLOGY | Age: 57
End: 2022-01-09
Attending: EMERGENCY MEDICINE
Payer: COMMERCIAL

## 2022-01-09 ENCOUNTER — HOSPITAL ENCOUNTER (EMERGENCY)
Age: 57
Discharge: HOME | End: 2022-01-10
Payer: COMMERCIAL

## 2022-01-09 VITALS
OXYGEN SATURATION: 98 % | WEIGHT: 147 LBS | RESPIRATION RATE: 16 BRPM | TEMPERATURE: 99.2 F | DIASTOLIC BLOOD PRESSURE: 82 MMHG | HEART RATE: 89 BPM | BODY MASS INDEX: 27.75 KG/M2 | SYSTOLIC BLOOD PRESSURE: 133 MMHG | HEIGHT: 61 IN

## 2022-01-09 DIAGNOSIS — J12.82 PNEUMONIA DUE TO COVID-19 VIRUS: Primary | ICD-10-CM

## 2022-01-09 DIAGNOSIS — U07.1 PNEUMONIA DUE TO COVID-19 VIRUS: Primary | ICD-10-CM

## 2022-01-09 DIAGNOSIS — M94.0 COSTOCHONDRITIS: ICD-10-CM

## 2022-01-09 LAB
ALBUMIN SERPL-MCNC: 3.7 G/DL (ref 3.5–5)
ALBUMIN/GLOB SERPL: 0.9 {RATIO} (ref 1.2–3.5)
ALP SERPL-CCNC: 97 U/L (ref 50–136)
ALT SERPL-CCNC: 26 U/L (ref 12–65)
ANION GAP SERPL CALC-SCNC: 4 MMOL/L (ref 7–16)
AST SERPL-CCNC: 24 U/L (ref 15–37)
BASOPHILS # BLD: 0 K/UL (ref 0–0.2)
BASOPHILS NFR BLD: 1 % (ref 0–2)
BILIRUB SERPL-MCNC: 0.8 MG/DL (ref 0.2–1.1)
BUN SERPL-MCNC: 7 MG/DL (ref 6–23)
CALCIUM SERPL-MCNC: 9.1 MG/DL (ref 8.3–10.4)
CHLORIDE SERPL-SCNC: 104 MMOL/L (ref 98–107)
CO2 SERPL-SCNC: 28 MMOL/L (ref 21–32)
CREAT SERPL-MCNC: 0.83 MG/DL (ref 0.6–1)
DIFFERENTIAL METHOD BLD: ABNORMAL
EOSINOPHIL # BLD: 0.1 K/UL (ref 0–0.8)
EOSINOPHIL NFR BLD: 1 % (ref 0.5–7.8)
ERYTHROCYTE [DISTWIDTH] IN BLOOD BY AUTOMATED COUNT: 15.2 % (ref 11.9–14.6)
GLOBULIN SER CALC-MCNC: 4.3 G/DL (ref 2.3–3.5)
GLUCOSE SERPL-MCNC: 117 MG/DL (ref 65–100)
HCT VFR BLD AUTO: 39.2 % (ref 35.8–46.3)
HGB BLD-MCNC: 12.7 G/DL (ref 11.7–15.4)
IMM GRANULOCYTES # BLD AUTO: 0 K/UL (ref 0–0.5)
IMM GRANULOCYTES NFR BLD AUTO: 0 % (ref 0–5)
LYMPHOCYTES # BLD: 1 K/UL (ref 0.5–4.6)
LYMPHOCYTES NFR BLD: 21 % (ref 13–44)
MCH RBC QN AUTO: 27.4 PG (ref 26.1–32.9)
MCHC RBC AUTO-ENTMCNC: 32.4 G/DL (ref 31.4–35)
MCV RBC AUTO: 84.5 FL (ref 79.6–97.8)
MONOCYTES # BLD: 0.4 K/UL (ref 0.1–1.3)
MONOCYTES NFR BLD: 7 % (ref 4–12)
NEUTS SEG # BLD: 3.5 K/UL (ref 1.7–8.2)
NEUTS SEG NFR BLD: 70 % (ref 43–78)
NRBC # BLD: 0 K/UL (ref 0–0.2)
PLATELET # BLD AUTO: 244 K/UL (ref 150–450)
PMV BLD AUTO: 9.9 FL (ref 9.4–12.3)
POTASSIUM SERPL-SCNC: 3.7 MMOL/L (ref 3.5–5.1)
PROT SERPL-MCNC: 8 G/DL (ref 6.3–8.2)
RBC # BLD AUTO: 4.64 M/UL (ref 4.05–5.2)
SODIUM SERPL-SCNC: 136 MMOL/L (ref 136–145)
WBC # BLD AUTO: 5 K/UL (ref 4.3–11.1)

## 2022-01-09 PROCEDURE — 71046 X-RAY EXAM CHEST 2 VIEWS: CPT

## 2022-01-09 PROCEDURE — 85025 COMPLETE CBC W/AUTO DIFF WBC: CPT

## 2022-01-09 PROCEDURE — 72100 X-RAY EXAM L-S SPINE 2/3 VWS: CPT

## 2022-01-09 PROCEDURE — 81003 URINALYSIS AUTO W/O SCOPE: CPT

## 2022-01-09 PROCEDURE — 80053 COMPREHEN METABOLIC PANEL: CPT

## 2022-01-09 PROCEDURE — 96374 THER/PROPH/DIAG INJ IV PUSH: CPT

## 2022-01-09 PROCEDURE — 99284 EMERGENCY DEPT VISIT MOD MDM: CPT

## 2022-01-10 PROCEDURE — 74011250637 HC RX REV CODE- 250/637

## 2022-01-10 PROCEDURE — 74011250636 HC RX REV CODE- 250/636

## 2022-01-10 RX ORDER — HYDROCODONE BITARTRATE AND HOMATROPINE METHYLBROMIDE ORAL SOLUTION 5; 1.5 MG/5ML; MG/5ML
5 LIQUID ORAL
Qty: 45 ML | Refills: 0 | Status: SHIPPED | OUTPATIENT
Start: 2022-01-10 | End: 2022-01-13

## 2022-01-10 RX ORDER — DEXAMETHASONE 6 MG/1
TABLET ORAL
Qty: 6 TABLET | Refills: 0 | Status: SHIPPED | OUTPATIENT
Start: 2022-01-10

## 2022-01-10 RX ORDER — HYDROCODONE BITARTRATE AND HOMATROPINE METHYLBROMIDE 1.5; 5 MG/5ML; MG/5ML
5 SYRUP ORAL
Status: COMPLETED | OUTPATIENT
Start: 2022-01-10 | End: 2022-01-10

## 2022-01-10 RX ORDER — HYDROCODONE BITARTRATE AND HOMATROPINE METHYLBROMIDE 1.5; 5 MG/5ML; MG/5ML
5 SYRUP ORAL
Status: DISCONTINUED | OUTPATIENT
Start: 2022-01-10 | End: 2022-01-10

## 2022-01-10 RX ORDER — DEXAMETHASONE SODIUM PHOSPHATE 100 MG/10ML
10 INJECTION INTRAMUSCULAR; INTRAVENOUS
Status: COMPLETED | OUTPATIENT
Start: 2022-01-10 | End: 2022-01-10

## 2022-01-10 RX ADMIN — HYDROCODONE BITARTRATE AND HOMATROPINE METHYLBROMIDE 5 ML: 5; 1.5 SOLUTION ORAL at 02:22

## 2022-01-10 RX ADMIN — DEXAMETHASONE SODIUM PHOSPHATE 10 MG: 10 INJECTION INTRAMUSCULAR; INTRAVENOUS at 02:22

## 2022-01-10 NOTE — ED NOTES
49 y.o. F with CC of worsening COVID symptoms. She reports body aches, cough, and dizziness. She states dizziness caused her to fall. She is having left lower back pain after the fall. She is ambulatory with steady, but slow gait to triage today. Patient evaluated initially in triage. Rapid Medical Evaluation was conducted and necessary orders have been placed. I have performed a medical screening exam.  Care will now be transferred to the provider in the back of the emergency department.   Dhruv Gil NP 7:34 PM

## 2022-01-10 NOTE — ED PROVIDER NOTES
61-year-old female complaining of cough congestion weakness lightheadedness. Patient states that she was diagnosed with Covid today told to go home and take Tylenol Motrin she been doing that not feeling better her cough is gotten worse. She got lightheaded and fell in line on her left hip which is then replaced in the past.      Fall  The accident occurred more than 2 days ago. The fall occurred while standing. She fell from a height of ground level. She landed on hard floor. The point of impact was the left hip. The pain is present in the head and left hip. The pain is at a severity of 6/10. The pain is moderate. Associated symptoms include nausea and vomiting. Pertinent negatives include no visual change, no numbness, no extremity weakness, no loss of consciousness, no tingling and no laceration. Dizziness  Pertinent negatives include no visual change. Associated symptoms include vomiting and nausea.         Past Medical History:   Diagnosis Date    Asthma     Bell's palsy 11/2019    Chronic obstructive pulmonary disease (HCC)     advair and albuterol prn=uses 1-2 times per week; Neb PRN=never uses; not followed by Pulmonary     Former cigarette smoker     History of positive PPD 1996    no meds     Hypothyroidism     on med for control     Ill-defined condition     pancreatitis x1; not chronic per patient     Marijuana smoker     OA (osteoarthritis)        Past Surgical History:   Procedure Laterality Date    HX ORTHOPAEDIC Left 2007    left ankle fracture- has a plate    HX OTHER SURGICAL  10/09/2020    teeth removal     HX THYROIDECTOMY  12/04/2018         Family History:   Problem Relation Age of Onset    Hypertension Mother     Hypertension Father     Cancer Brother 47        brain       Social History     Socioeconomic History    Marital status: SINGLE     Spouse name: Not on file    Number of children: 3    Years of education: Not on file    Highest education level: Not on file Occupational History    Occupation: bakery plant    Occupation:    Tobacco Use    Smoking status: Former Smoker     Packs/day: 0.25     Types: Cigarettes     Start date: 1992    Smokeless tobacco: Never Used   Vaping Use    Vaping Use: Never used   Substance and Sexual Activity    Alcohol use: No    Drug use: Yes     Types: Marijuana    Sexual activity: Not Currently     Partners: Male     Birth control/protection: None   Other Topics Concern    Not on file   Social History Narrative    Not on file     Social Determinants of Health     Financial Resource Strain:     Difficulty of Paying Living Expenses: Not on file   Food Insecurity:     Worried About [address] [employer] in the Last Year: Not on file    Agusto of Food in the Last Year: Not on file   Transportation Needs:     Lack of Transportation (Medical): Not on file    Lack of Transportation (Non-Medical): Not on file   Physical Activity:     Days of Exercise per Week: Not on file    Minutes of Exercise per Session: Not on file   Stress:     Feeling of Stress : Not on file   Social Connections:     Frequency of Communication with Friends and Family: Not on file    Frequency of Social Gatherings with Friends and Family: Not on file    Attends Adventism Services: Not on file    Active Member of [address] [place] [address] [employer] or Organizations: Not on file    Attends Club or Organization Meetings: Not on file    Marital Status: Not on file   Intimate Partner Violence:     Fear of Current or Ex-Partner: Not on file    Emotionally Abused: Not on file    Physically Abused: Not on file    Sexually Abused: Not on file   Housing Stability:     Unable to Pay for Housing in the Last Year: Not on file    Number of [place] in the Last Year: Not on file    Unstable Housing in the Last Year: Not on file         ALLERGIES: Latex    Review of Systems   Constitutional: Negative. Negative for activity change. HENT: Negative. Eyes: Negative. Respiratory: Negative. Cardiovascular: Negative. Gastrointestinal: Positive for nausea and vomiting. Genitourinary: Negative. Musculoskeletal: Negative. Negative for extremity weakness. Skin: Negative. Neurological: Positive for dizziness. Negative for tingling, loss of consciousness and numbness. Psychiatric/Behavioral: Negative. All other systems reviewed and are negative. Vitals:    01/09/22 1934   BP: 133/82   Pulse: 89   Resp: 16   Temp: 99.2 °F (37.3 °C)   SpO2: 98%   Weight: 66.7 kg (147 lb)   Height: 5' 1\" (1.549 m)            Physical Exam  Vitals and nursing note reviewed. Constitutional:       General: She is not in acute distress. Appearance: She is well-developed. HENT:      Head: Normocephalic and atraumatic. Right Ear: External ear normal.      Left Ear: External ear normal.      Nose: Congestion and rhinorrhea present. Mouth/Throat:      Pharynx: Posterior oropharyngeal erythema present. No pharyngeal swelling, oropharyngeal exudate or uvula swelling. Eyes:      General: No scleral icterus. Right eye: No discharge. Left eye: No discharge. Conjunctiva/sclera: Conjunctivae normal.      Pupils: Pupils are equal, round, and reactive to light. Cardiovascular:      Rate and Rhythm: Regular rhythm. Pulmonary:      Effort: Pulmonary effort is normal. No respiratory distress. Breath sounds: Normal breath sounds. No stridor. No wheezing or rales. Abdominal:      General: Bowel sounds are normal. There is no distension. Palpations: Abdomen is soft. Tenderness: There is no abdominal tenderness. Musculoskeletal:         General: Normal range of motion. Cervical back: Normal range of motion. Skin:     General: Skin is warm and dry. Findings: No laceration or rash. Neurological:      Mental Status: She is alert and oriented to person, place, and time. Motor: No abnormal muscle tone.       Coordination: Coordination normal.   Psychiatric:         Behavior: Behavior normal.          MDM  Number of Diagnoses or Management Options  Diagnosis management comments: 53-year-old female with Covid having Covid related symptoms of chest discomfort costochondritis and cough.        Amount and/or Complexity of Data Reviewed  Clinical lab tests: ordered and reviewed  Tests in the radiology section of CPT®: ordered and reviewed  Tests in the medicine section of CPT®: ordered and reviewed  Decide to obtain previous medical records or to obtain history from someone other than the patient: yes  Review and summarize past medical records: yes  Independent visualization of images, tracings, or specimens: yes    Risk of Complications, Morbidity, and/or Mortality  Presenting problems: high  Diagnostic procedures: high  Management options: high           Procedures

## 2022-01-10 NOTE — ED NOTES
Did pulse ox teaching with pt. Pt demonstrated understanding. I have reviewed discharge instructions with the patient. The patient verbalized understanding. Patient left ED via Discharge Method: ambulatory to Home with friend. Opportunity for questions and clarification provided. Patient given 2 scripts. To continue your aftercare when you leave the hospital, you may receive an automated call from our care team to check in on how you are doing. This is a free service and part of our promise to provide the best care and service to meet your aftercare needs.  If you have questions, or wish to unsubscribe from this service please call [phone]. Thank you for Choosing our [facility] Emergency Department.

## 2022-01-14 ENCOUNTER — HOSPITAL ENCOUNTER (EMERGENCY)
Age: 57
Discharge: HOME | End: 2022-01-14
Attending: EMERGENCY MEDICINE
Payer: COMMERCIAL

## 2022-01-14 VITALS
SYSTOLIC BLOOD PRESSURE: 136 MMHG | DIASTOLIC BLOOD PRESSURE: 90 MMHG | OXYGEN SATURATION: 97 % | RESPIRATION RATE: 16 BRPM | HEART RATE: 86 BPM | TEMPERATURE: 98.7 F

## 2022-01-14 DIAGNOSIS — U07.1 COVID-19: Primary | ICD-10-CM

## 2022-01-14 DIAGNOSIS — J45.909 MODERATE ASTHMA, UNSPECIFIED WHETHER COMPLICATED, UNSPECIFIED WHETHER PERSISTENT: ICD-10-CM

## 2022-01-14 PROCEDURE — 74011250636 HC RX REV CODE- 250/636: Performed by: EMERGENCY MEDICINE

## 2022-01-14 PROCEDURE — 94664 DEMO&/EVAL PT USE INHALER: CPT

## 2022-01-14 PROCEDURE — 99284 EMERGENCY DEPT VISIT MOD MDM: CPT

## 2022-01-14 PROCEDURE — 94640 AIRWAY INHALATION TREATMENT: CPT

## 2022-01-14 PROCEDURE — 74011000250 HC RX REV CODE- 250: Performed by: EMERGENCY MEDICINE

## 2022-01-14 RX ORDER — SODIUM CHLORIDE 0.9 % (FLUSH) 0.9 %
5-40 SYRINGE (ML) INJECTION EVERY 8 HOURS
Status: DISCONTINUED | OUTPATIENT
Start: 2022-01-14 | End: 2022-01-14 | Stop reason: HOSPADM

## 2022-01-14 RX ORDER — SODIUM CHLORIDE 0.9 % (FLUSH) 0.9 %
5-40 SYRINGE (ML) INJECTION AS NEEDED
Status: DISCONTINUED | OUTPATIENT
Start: 2022-01-14 | End: 2022-01-14 | Stop reason: HOSPADM

## 2022-01-14 RX ORDER — ALBUTEROL SULFATE 0.83 MG/ML
5 SOLUTION RESPIRATORY (INHALATION)
Status: COMPLETED | OUTPATIENT
Start: 2022-01-14 | End: 2022-01-14

## 2022-01-14 RX ADMIN — SODIUM CHLORIDE 1000 ML: 9 INJECTION, SOLUTION INTRAVENOUS at 01:08

## 2022-01-14 RX ADMIN — ALBUTEROL SULFATE 5 MG: 2.5 SOLUTION RESPIRATORY (INHALATION) at 01:39

## 2022-01-14 NOTE — ED TRIAGE NOTES
Pt arrived via EMS from home. Dx COVID 1/5. Dx COVID PNA 1/9. Sent home with prescriptions. C/o SOB and generalized weakness. /90. HR 68. 98% on RA. . 99.3 oral. Masked.

## 2022-01-14 NOTE — DISCHARGE INSTRUCTIONS
Follow up with one of the doctors listed. Return to the ER if your symptoms worsen.     [address] [facility] [address]  [phone]    [facility]  [phone]

## 2022-01-14 NOTE — ED PROVIDER NOTES
Patient is a 53-year-old female with a history of asthma/COPD and hypothyroidism who was diagnosed with COVID-19 on January 5. She came back on January 9 to the ER, states she was diagnosed with COVID-pneumonia. She has been having severe fatigue and weakness along with a cough and shortness of breath. She has asthma, has been using her nebulizer and albuterol inhalers at home with minimal improvement. She denies any vomiting or diarrhea. She denies a fever but has not checked her temperature. She has been feeling hot and cold at times.            Past Medical History:   Diagnosis Date    Asthma     Bell's palsy 11/2019    Chronic obstructive pulmonary disease (HCC)     advair and albuterol prn=uses 1-2 times per week; Neb PRN=never uses; not followed by Pulmonary     Former cigarette smoker     History of positive PPD 1996    no meds     Hypothyroidism     on med for control     Ill-defined condition     pancreatitis x1; not chronic per patient     Marijuana smoker     OA (osteoarthritis)        Past Surgical History:   Procedure Laterality Date    HX ORTHOPAEDIC Left 2007    left ankle fracture- has a plate    HX OTHER SURGICAL  10/09/2020    teeth removal     HX THYROIDECTOMY  12/04/2018         Family History:   Problem Relation Age of Onset    Hypertension Mother     Hypertension Father     Cancer Brother 47        brain       Social History     Socioeconomic History    Marital status: SINGLE     Spouse name: Not on file    Number of children: 3    Years of education: Not on file    Highest education level: Not on file   Occupational History    Occupation: [employer] plant    Occupation:    Tobacco Use    Smoking status: Former Smoker     Packs/day: 0.25     Types: Cigarettes     Start date: 1992    Smokeless tobacco: Never Used   Vaping Use    Vaping Use: Never used   Substance and Sexual Activity    Alcohol use: No    Drug use: Yes     Types: Marijuana    Sexual activity: Not Currently     Partners: Male     Birth control/protection: None   Other Topics Concern    Not on file   Social History Narrative    Not on file     Social Determinants of Health     Financial Resource Strain:     Difficulty of Paying Living Expenses: Not on file   Food Insecurity:     Worried About Running Out of Food in the Last Year: Not on file    Agusto of Food in the Last Year: Not on file   Transportation Needs:     Lack of Transportation (Medical): Not on file    Lack of Transportation (Non-Medical): Not on file   Physical Activity:     Days of Exercise per Week: Not on file    Minutes of Exercise per Session: Not on file   Stress:     Feeling of Stress : Not on file   Social Connections:     Frequency of Communication with Friends and Family: Not on file    Frequency of Social Gatherings with Friends and Family: Not on file    Attends Scientologist Services: Not on file    Active Member of [address] [place] [address] [employer] or Organizations: Not on file    Attends Club or Organization Meetings: Not on file    Marital Status: Not on file   Intimate Partner Violence:     Fear of Current or Ex-Partner: Not on file    Emotionally Abused: Not on file    Physically Abused: Not on file    Sexually Abused: Not on file   Housing Stability:     Unable to Pay for Housing in the Last Year: Not on file    Number of [place] in the Last Year: Not on file    Unstable Housing in the Last Year: Not on file         ALLERGIES: Latex    Review of Systems   Constitutional: Positive for activity change, appetite change, chills and fatigue. Negative for fever. Respiratory: Positive for cough and shortness of breath. Gastrointestinal: Negative for diarrhea, nausea and vomiting. All other systems reviewed and are negative.       Vitals:    01/14/22 0013 01/14/22 0032 01/14/22 0035   BP: (!) 178/90     Pulse:  63    Resp:  22    Temp:   98.7 °F (37.1 °C)   SpO2: 100%              Physical Exam  Vitals and nursing note reviewed. Constitutional:       Appearance: Normal appearance. She is well-developed. HENT:      Head: Normocephalic and atraumatic. Eyes:      Conjunctiva/sclera: Conjunctivae normal.      Pupils: Pupils are equal, round, and reactive to light. Cardiovascular:      Rate and Rhythm: Normal rate and regular rhythm. Pulmonary:      Effort: Pulmonary effort is normal. No respiratory distress. Breath sounds: Wheezing present. Musculoskeletal:         General: No tenderness. Cervical back: Normal range of motion and neck supple. Right lower leg: No edema. Left lower leg: No edema. Skin:     General: Skin is warm and dry. Neurological:      Mental Status: She is alert and oriented to person, place, and time. Psychiatric:         Mood and Affect: Mood normal.         Behavior: Behavior normal.          MDM  Number of Diagnoses or Management Options  COVID-19: new and does not require workup  Moderate asthma, unspecified whether complicated, unspecified whether persistent: new and does not require workup  Diagnosis management comments: 3:54 AM patient appears much more comfortable, wheezing has improved. She appears comfortable and in no distress.     Risk of Complications, Morbidity, and/or Mortality  Presenting problems: moderate  Diagnostic procedures: moderate  Management options: moderate    Patient Progress  Patient progress: improved         Procedures

## 2022-01-14 NOTE — ED NOTES
I have reviewed discharge instructions with the patient. The patient verbalized understanding. Patient left ED via Discharge Method: ambulatory to Home with  self). Opportunity for questions and clarification provided. Patient given 0 scripts. To continue your aftercare when you leave the hospital, you may receive an automated call from our care team to check in on how you are doing. This is a free service and part of our promise to provide the best care and service to meet your aftercare needs.  If you have questions, or wish to unsubscribe from this service please call [phone]. Thank you for Choosing our [facility] Emergency Department.

## 2022-03-18 PROBLEM — R05.9 COUGH: Status: ACTIVE | Noted: 2018-02-28

## 2022-03-18 PROBLEM — F12.90 MARIJUANA USE, CONTINUOUS: Status: ACTIVE | Noted: 2018-02-28

## 2022-03-19 PROBLEM — M25.452 EFFUSION OF LEFT HIP: Status: ACTIVE | Noted: 2021-06-10

## 2022-03-19 PROBLEM — R26.2 DIFFICULTY WALKING: Status: ACTIVE | Noted: 2021-06-10

## 2022-03-19 PROBLEM — M25.552 LEFT HIP PAIN: Status: ACTIVE | Noted: 2021-03-01

## 2022-03-19 PROBLEM — D72.829 LEUKOCYTOSIS: Status: ACTIVE | Noted: 2018-03-04

## 2022-03-19 PROBLEM — J45.901 ASTHMA EXACERBATION: Status: ACTIVE | Noted: 2018-02-27

## 2022-03-19 PROBLEM — J20.9 ACUTE BRONCHITIS: Status: ACTIVE | Noted: 2018-02-28

## 2022-03-19 PROBLEM — J44.1 COPD EXACERBATION (HCC): Status: ACTIVE | Noted: 2018-02-28

## 2022-03-19 PROBLEM — Z57.39 OCCUPATIONAL EXPOSURE TO AIR CONTAMINANTS: Status: ACTIVE | Noted: 2018-02-28

## 2022-03-19 PROBLEM — Z96.642 HISTORY OF TOTAL LEFT HIP REPLACEMENT: Status: ACTIVE | Noted: 2021-06-10

## 2022-03-19 PROBLEM — M62.81 GENERALIZED MUSCLE WEAKNESS: Status: ACTIVE | Noted: 2021-06-10

## 2022-03-19 PROBLEM — M16.12 OSTEOARTHRITIS OF LEFT HIP: Status: ACTIVE | Noted: 2021-02-18

## 2022-03-20 PROBLEM — Z74.09 DECREASED FUNCTIONAL MOBILITY AND ENDURANCE: Status: ACTIVE | Noted: 2021-06-10

## 2022-06-09 ENCOUNTER — TELEPHONE (OUTPATIENT)
Dept: ORTHOPEDIC SURGERY | Age: 57
End: 2022-06-09

## 2022-07-09 ENCOUNTER — HOSPITAL ENCOUNTER (EMERGENCY)
Age: 57
Discharge: HOME | End: 2022-07-09
Attending: STUDENT IN AN ORGANIZED HEALTH CARE EDUCATION/TRAINING PROGRAM
Payer: COMMERCIAL

## 2022-07-09 ENCOUNTER — HOSPITAL ENCOUNTER (EMERGENCY)
Dept: GENERAL RADIOLOGY | Age: 57
Discharge: HOME | End: 2022-07-12
Payer: COMMERCIAL

## 2022-07-09 VITALS
SYSTOLIC BLOOD PRESSURE: 108 MMHG | TEMPERATURE: 98.3 F | BODY MASS INDEX: 26.9 KG/M2 | HEART RATE: 95 BPM | WEIGHT: 137 LBS | HEIGHT: 60 IN | DIASTOLIC BLOOD PRESSURE: 73 MMHG | OXYGEN SATURATION: 99 % | RESPIRATION RATE: 18 BRPM

## 2022-07-09 DIAGNOSIS — M25.572 ACUTE LEFT ANKLE PAIN: Primary | ICD-10-CM

## 2022-07-09 DIAGNOSIS — M77.52 LEFT ANKLE TENDONITIS: ICD-10-CM

## 2022-07-09 PROCEDURE — 73610 X-RAY EXAM OF ANKLE: CPT

## 2022-07-09 PROCEDURE — 99284 EMERGENCY DEPT VISIT MOD MDM: CPT

## 2022-07-09 PROCEDURE — 6360000002 HC RX W HCPCS: Performed by: PHYSICIAN ASSISTANT

## 2022-07-09 PROCEDURE — 96372 THER/PROPH/DIAG INJ SC/IM: CPT

## 2022-07-09 RX ORDER — DICLOFENAC SODIUM 75 MG/1
75 TABLET, DELAYED RELEASE ORAL 2 TIMES DAILY
Qty: 20 TABLET | Refills: 0 | Status: SHIPPED | OUTPATIENT
Start: 2022-07-09 | End: 2022-07-19

## 2022-07-09 RX ORDER — KETOROLAC TROMETHAMINE 30 MG/ML
30 INJECTION, SOLUTION INTRAMUSCULAR; INTRAVENOUS
Status: COMPLETED | OUTPATIENT
Start: 2022-07-09 | End: 2022-07-09

## 2022-07-09 RX ADMIN — KETOROLAC TROMETHAMINE 30 MG: 30 INJECTION, SOLUTION INTRAMUSCULAR at 10:34

## 2022-07-09 ASSESSMENT — PAIN DESCRIPTION - DESCRIPTORS
DESCRIPTORS: SHOOTING;SHARP;PRESSURE
DESCRIPTORS: SHARP;POUNDING

## 2022-07-09 ASSESSMENT — PAIN DESCRIPTION - ORIENTATION: ORIENTATION: LEFT

## 2022-07-09 ASSESSMENT — PAIN DESCRIPTION - LOCATION: LOCATION: ANKLE

## 2022-07-09 ASSESSMENT — PAIN SCALES - GENERAL
PAINLEVEL_OUTOF10: 8
PAINLEVEL_OUTOF10: 8

## 2022-07-09 NOTE — ED PROVIDER NOTES
[facility] Emergency Department Provider Note                   PCP:                None Provider               Age: 56 y.o. Sex: female       ICD-10-CM    1. Acute left ankle pain  M25.572    2. Left ankle tendonitis  M77.52        DISPOSITION Decision To Discharge 07/09/2022 10:30:38 AM       MDM  Number of Diagnoses or Management Options  Acute left ankle pain  Left ankle tendonitis  Diagnosis management comments: Differential diagnoses: Tendinitis of ankle, osteoarthritis, ankle sprain, septic arthritis, hardware failure       Amount and/or Complexity of Data Reviewed  Tests in the radiology section of CPT®: reviewed    Risk of Complications, Morbidity, and/or Mortality  Presenting problems: low  Diagnostic procedures: low  Management options: low    Patient Progress  Patient progress: improved       Orders Placed This Encounter   Procedures    XR ANKLE LEFT (MIN 3 VIEWS)    Apply ace wrap    Post-op shoe        Darryn Landrum is a 56 y.o. female who presents to the Emergency Department with chief complaint of    Chief Complaint   Patient presents with    Ankle Pain      Patient presents to the emergency department stating that over the last 2 days she has developed some pain and swelling along the lateral aspect of her left ankle. She does not recall any particular injury but stands for prolonged periods of time at her job. Patient has previously had ORIF of the left ankle from previous injury. Her surgery was in [facility] around 2009. She denies any complications from the surgery. All other systems reviewed and are negative. Review of Systems   Constitutional: Negative for chills and fever. Musculoskeletal:        Left ankle pain and swelling   All other systems reviewed and are negative.       Past Medical History:   Diagnosis Date    Asthma     Bell's palsy 11/2019    Chronic obstructive pulmonary disease (HCC)     advair and albuterol prn=uses 1-2 times per week; Neb PRN=never uses; range of motion and neck supple. Comments: Patient has mild soft tissue swelling to the left ankle with tenderness to palpation of the left lateral malleolus. Surgical scar noted in this region. There is mild warmth and erythema overlying the lateral aspect of the left ankle. Normal range of motion in the ankle and no evidence of septic arthritis   Skin:     General: Skin is warm and dry. Capillary Refill: Capillary refill takes less than 2 seconds. Neurological:      General: No focal deficit present. Mental Status: She is alert. Psychiatric:         Mood and Affect: Mood normal.         Behavior: Behavior normal.         Thought Content: Thought content normal.          Procedures      Labs Reviewed - No data to display     XR ANKLE LEFT (MIN 3 VIEWS)   Final Result   1. No acute osseous abnormality. 2.  Soft tissue swelling along the lateral ankle. 3.  ORIF hardware along the distal fibula without acute complication. Voice dictation software was used during the making of this note. This software is not perfect and grammatical and other typographical errors may be present. This note has not been completely proofread for errors.         Owen Millan, [place]  07/09/22 1159

## 2022-07-09 NOTE — ED NOTES
I have reviewed discharge instructions with the patient. The patient verbalized understanding. Patient left ED via Discharge Method: ambulatory to Home with family/friend. Opportunity for questions and clarification provided. Patient given 1 scripts. To continue your aftercare when you leave the hospital, you may receive an automated call from our care team to check in on how you are doing. This is a free service and part of our promise to provide the best care and service to meet your aftercare needs.  If you have questions, or wish to unsubscribe from this service please call [phone]. Thank you for Choosing our [facility] Emergency Department.           Michelle Cao RN  07/09/22 4817

## 2022-07-09 NOTE — ED TRIAGE NOTES
Pt states that she's had pain and swelling in her left ankle for the past 2 days. Pt has had previous surgery on that ankle but denies new trauma or injury. Pt ambulatory in triage.

## 2023-01-10 ENCOUNTER — HOSPITAL ENCOUNTER (EMERGENCY)
Age: 58
Discharge: HOME | End: 2023-01-10
Attending: EMERGENCY MEDICINE
Payer: COMMERCIAL

## 2023-01-10 ENCOUNTER — HOSPITAL ENCOUNTER (EMERGENCY)
Dept: GENERAL RADIOLOGY | Age: 58
Discharge: HOME | End: 2023-01-13
Payer: COMMERCIAL

## 2023-01-10 VITALS
OXYGEN SATURATION: 100 % | HEIGHT: 60 IN | WEIGHT: 130 LBS | BODY MASS INDEX: 25.52 KG/M2 | DIASTOLIC BLOOD PRESSURE: 88 MMHG | SYSTOLIC BLOOD PRESSURE: 138 MMHG | TEMPERATURE: 97.7 F | RESPIRATION RATE: 18 BRPM | HEART RATE: 81 BPM

## 2023-01-10 DIAGNOSIS — J06.9 URI WITH COUGH AND CONGESTION: Primary | ICD-10-CM

## 2023-01-10 DIAGNOSIS — E89.0 POSTSURGICAL HYPOTHYROIDISM: ICD-10-CM

## 2023-01-10 DIAGNOSIS — M62.830 SPASM OF MUSCLE OF LOWER BACK: ICD-10-CM

## 2023-01-10 DIAGNOSIS — S39.012A STRAIN OF LUMBAR REGION, INITIAL ENCOUNTER: ICD-10-CM

## 2023-01-10 PROBLEM — M18.11 ARTHRITIS OF CARPOMETACARPAL (CMC) JOINT OF RIGHT THUMB: Status: ACTIVE | Noted: 2022-04-01

## 2023-01-10 PROBLEM — F12.10 CANNABIS ABUSE: Status: ACTIVE | Noted: 2018-02-28

## 2023-01-10 LAB
FLUAV AG NPH QL IA: NEGATIVE
FLUBV AG NPH QL IA: NEGATIVE
SARS-COV-2 RDRP RESP QL NAA+PROBE: NOT DETECTED
SOURCE: NORMAL
SPECIMEN SOURCE: NORMAL

## 2023-01-10 PROCEDURE — 71045 X-RAY EXAM CHEST 1 VIEW: CPT

## 2023-01-10 PROCEDURE — 87804 INFLUENZA ASSAY W/OPTIC: CPT

## 2023-01-10 PROCEDURE — 99284 EMERGENCY DEPT VISIT MOD MDM: CPT

## 2023-01-10 PROCEDURE — 87635 SARS-COV-2 COVID-19 AMP PRB: CPT

## 2023-01-10 PROCEDURE — 6360000002 HC RX W HCPCS

## 2023-01-10 PROCEDURE — 96372 THER/PROPH/DIAG INJ SC/IM: CPT

## 2023-01-10 RX ORDER — MELOXICAM 7.5 MG/1
7.5 TABLET ORAL DAILY
Qty: 7 TABLET | Refills: 0 | Status: SHIPPED | OUTPATIENT
Start: 2023-01-10 | End: 2023-01-17

## 2023-01-10 RX ORDER — LEVOTHYROXINE SODIUM 0.1 MG/1
100 TABLET ORAL DAILY
Qty: 30 TABLET | Refills: 2 | Status: SHIPPED | OUTPATIENT
Start: 2023-01-10 | End: 2023-04-10

## 2023-01-10 RX ORDER — PREDNISONE 20 MG/1
20 TABLET ORAL 2 TIMES DAILY
Qty: 10 TABLET | Refills: 0 | Status: SHIPPED | OUTPATIENT
Start: 2023-01-10 | End: 2023-01-15

## 2023-01-10 RX ORDER — DEXAMETHASONE SODIUM PHOSPHATE 10 MG/ML
8 INJECTION INTRAMUSCULAR; INTRAVENOUS ONCE
Status: COMPLETED | OUTPATIENT
Start: 2023-01-10 | End: 2023-01-10

## 2023-01-10 RX ORDER — KETOROLAC TROMETHAMINE 30 MG/ML
30 INJECTION, SOLUTION INTRAMUSCULAR; INTRAVENOUS ONCE
Status: COMPLETED | OUTPATIENT
Start: 2023-01-10 | End: 2023-01-10

## 2023-01-10 RX ORDER — METHOCARBAMOL 750 MG/1
750 TABLET, FILM COATED ORAL 4 TIMES DAILY
Qty: 40 TABLET | Refills: 0 | Status: SHIPPED | OUTPATIENT
Start: 2023-01-10 | End: 2023-01-20

## 2023-01-10 RX ADMIN — DEXAMETHASONE SODIUM PHOSPHATE 8 MG: 10 INJECTION, SOLUTION INTRAMUSCULAR; INTRAVENOUS at 12:07

## 2023-01-10 RX ADMIN — KETOROLAC TROMETHAMINE 30 MG: 30 INJECTION, SOLUTION INTRAMUSCULAR at 12:07

## 2023-01-10 ASSESSMENT — ENCOUNTER SYMPTOMS
DIARRHEA: 0
RHINORRHEA: 0
SHORTNESS OF BREATH: 0
NAUSEA: 0
ABDOMINAL PAIN: 0
COUGH: 1
BACK PAIN: 1
WHEEZING: 1
VOMITING: 0
SORE THROAT: 0
COLOR CHANGE: 0

## 2023-01-10 NOTE — ED NOTES
I have reviewed discharge instructions with the patient. The patient verbalized understanding. Patient left ED via Discharge Method: ambulatory to Home. Opportunity for questions and clarification provided. Patient given 4 scripts. To continue your aftercare when you leave the hospital, you may receive an automated call from our care team to check in on how you are doing. This is a free service and part of our promise to provide the best care and service to meet your aftercare needs.  If you have questions, or wish to unsubscribe from this service please call [phone]. Thank you for Choosing our [facility] Emergency Department.         Ervin Nathan RN  01/10/23 [id number]

## 2023-01-10 NOTE — ED PROVIDER NOTES
Emergency Department Provider Note                   PCP:                On File Not (Inactive)               Age: 56 y.o. Sex: female       ICD-10-CM    1. URI with cough and congestion  J06.9 predniSONE (DELTASONE) 20 MG tablet      2. Strain of lumbar region, initial encounter  S39.012A predniSONE (DELTASONE) 20 MG tablet     meloxicam (MOBIC) 7.5 MG tablet      3. Spasm of muscle of lower back  M62.830 methocarbamol (ROBAXIN-750) 750 MG tablet      4. Postsurgical hypothyroidism  E89.0 levothyroxine (SYNTHROID) 100 MCG tablet          DISPOSITION Decision To Discharge 01/10/2023 11:20:31 AM        Medical Decision Making  Vital signs reviewed, patient stable, NAD, afebrile, nontoxic in appearance  O2 saturation 100% on room air  No tachycardia    For COVID-19, rapid influenza, single view chest obtained out of triage    Rapid COVID-19 negative  Rapid influenza negative  2 view chest x-ray negative for pulmonary edema, pleural effusion, pneumonia, no acute cardiopulmonary abnormality    57-year-old female with history of arthritis and stated history of arthritic changes in lumbar spine as well as replacement of left hip presents to the emergency department today with chief complaint of feeling like she may be wheezing, congestion, cough, states that she had a headache over the weekend but it resolved. Patient states she is also been having some left-sided low back pain for the past 2 to 3 days without known injury. Denies any saddle anesthesia or bladder or bowel incontinence, no lower extremity weakness. Patient states she uses an albuterol inhaler and has a DuoNeb machine at home and she has been using this which relieves her wheezing. Patient states she does not feel that she needs a breathing treatment here in the emergency department. Patient states she also has hypothyroidism due to removal of her thyroid.   States that she has been out of her levothyroxine for approximately 3 weeks and is not going to be able to see her primary care doctor for the next week. Would like a refill. Physical exam is very reassuring. Lungs are clear to auscultation bilaterally, negative egophony. No tenderness to palpation along midline lumbar spine, no saddle anesthesia, no bladder or bowel incontinence, bilateral lower extremity sensation intact and equal, bilateral lower extremity motor strength 5+ and equal.  Oropharynx is clear. No cervical adenopathy. I discussed with patient treatment options. Her physical exam appears consistent with spasm and strain of left side low back. Discussed with patient treating her with a course of steroids for both arthritic pain as well as her asthma in the setting of what is likely a viral upper respiratory infection. She stated that she was agreeable with this. Discussed that I could also give her an injection of Toradol here in the emergency department to help with her pain and we could change her NSAID medication to Mobic which she can try. Patient states she is agreeable to this as well. I also discussed with patient use of Robaxin as a muscle relaxer to help with spasming of the left side low back. Patient states she is agreeable to this as well. Cautions regarding these medications were given both verbally and in her discharge paperwork. Patient engaged in shared decision-making. Based on history, physical exam, lab work and imaging today, I do not feel additional lab work or imaging is warranted at this time. No urgent or emergent findings. Patient is stable and can be discharged home with follow-up to her primary care. No red flag signs with low back pain, no indication of cauda equina. No indication of asthma exacerbation. Patient states she does not need a refill on her albuterol or DuoNeb. States that she she does need a refill of her levothyroxine which I will refill at 100 mcg.     I discussed physical exam findings, imaging findings, lab work, treatment and follow-up with the patient. Answered any questions that she had. I discussed signs and symptoms that would warrant a prompt return to the emergency department and included these in discharge paperwork as well. History provided by the patient. Reviewed patient's chart for problem list, past medical history, past surgical history, allergies. No indication for EKG at this time. Problems Addressed:  Postsurgical hypothyroidism: chronic illness or injury     Details: Needs refill  Spasm of muscle of lower back: acute illness or injury  Strain of lumbar region, initial encounter: acute illness or injury  URI with cough and congestion: acute illness or injury    Amount and/or Complexity of Data Reviewed  External Data Reviewed: notes. Labs: ordered. Decision-making details documented in ED Course. Radiology: ordered and independent interpretation performed. Decision-making details documented in ED Course. ECG/medicine tests:      Details: no indication    Risk  OTC drugs. Prescription drug management.                           ED Course as of 01/10/23 1145   Tue Rafal 10, 2023   1103 Patient is postmenopausal       [JG]   1103 And states that she does not need a breathing treatment at this time [JG]      ED Course User Index  [JG] LILIAN Cuellar        Orders Placed This Encounter   Procedures    COVID-19, Rapid    Rapid influenza A/B antigens    XR CHEST PORTABLE        Medications   dexamethasone (DECADRON) injection 8 mg (has no administration in time range)   ketorolac (TORADOL) injection 30 mg (has no administration in time range)       New Prescriptions    LEVOTHYROXINE (SYNTHROID) 100 MCG TABLET    Take 1 tablet by mouth daily    MELOXICAM (MOBIC) 7.5 MG TABLET    Take 1 tablet by mouth daily for 7 days    METHOCARBAMOL (ROBAXIN-750) 750 MG TABLET    Take 1 tablet by mouth 4 times daily for 10 days    PREDNISONE (DELTASONE) 20 MG TABLET    Take 1 tablet by mouth 2 times daily for 5 days        Nohelia Ace is a 56 y.o. female who presents to the Emergency Department with chief complaint of    Chief Complaint   Patient presents with    Cough    Generalized Body Aches      61-year-old female with history of marijuana use, replacement of left hip, asthma, hypothyroidism presents to the emergency department today with chief complaint of cough, congestion for the past 4 days. Patient states she is also been having some left side low back pain that began yesterday. Patient states she is unsure if she strained a muscle. Denies any urinary frequency, fevers, ear pain, sore throat, nausea, vomiting, diarrhea, abdominal pain, dysuria. Patient states she has been using her asthma albuterol inhaler and her DuoNeb inhaler and that improves her cough and her wheezing. Patient states that she has been out of her thyroid medication and needs a refill. The history is provided by the patient. No  was used. Review of Systems   Constitutional:  Negative for chills, fatigue and fever. HENT:  Positive for congestion. Negative for rhinorrhea and sore throat. Respiratory:  Positive for cough and wheezing. Negative for shortness of breath. Cardiovascular:  Negative for chest pain and palpitations. Gastrointestinal:  Negative for abdominal pain, diarrhea, nausea and vomiting. Genitourinary:  Negative for dysuria and hematuria. Musculoskeletal:  Positive for back pain. Negative for gait problem. Skin:  Negative for color change. Neurological:  Negative for weakness and headaches. All other systems reviewed and are negative.     Past Medical History:   Diagnosis Date    Asthma     Bell's palsy 11/2019    Chronic obstructive pulmonary disease (HCC)     advair and albuterol prn=uses 1-2 times per week; Neb PRN=never uses; not followed by Pulmonary     Former cigarette smoker     History of positive PPD 1996    no meds     Hypothyroidism     on med for control Ill-defined condition     pancreatitis x1; not chronic per patient     Marijuana smoker     OA (osteoarthritis)         Past Surgical History:   Procedure Laterality Date    ORTHOPEDIC SURGERY Left 2007    left ankle fracture- has a plate    OTHER SURGICAL HISTORY  10/09/2020    teeth removal     THYROIDECTOMY  12/04/2018        Family History   Problem Relation Age of Onset    Hypertension Father     Cancer Brother 47        brain    Hypertension Mother         Social History     Socioeconomic History    Marital status: Single     Spouse name: None    Number of children: None    Years of education: None    Highest education level: None   Tobacco Use    Smoking status: Former     Packs/day: 0.25     Types: Cigarettes     Start date: 1/1/1992    Smokeless tobacco: Never   Substance and Sexual Activity    Alcohol use: No    Drug use: Yes     Types: Marijuana (Weed)         Latex     Previous Medications    ALBUTEROL (PROVENTIL) (2.5 MG/3ML) 0.083% NEBULIZER SOLUTION    Inhale 3 mLs into the lungs every 4 hours as needed    ALBUTEROL SULFATE  (90 BASE) MCG/ACT INHALER    The details of the medication are not available because there are pending changes by a home health clinician. FLUTICASONE-SALMETEROL (ADVAIR) 250-50 MCG/ACT AEPB DISKUS INHALER    Inhale 1 puff into the lungs 2 times daily    HYDROCODONE HOMATROPINE (HYCODAN) 5-1.5 MG/5ML SOLUTION    Hycodan (with homatropine) 5 mg-1.5 mg/5 mL oral syrup TAKE 5 ML BY MOUTH THREE TIMES A DAY AS NEEDED FOR COUGH FOR UP TO 3 DAYS    POLYETHYLENE GLYCOL (GLYCOLAX) 17 GM/SCOOP POWDER    Take 17 g by mouth daily    POLYETHYLENE GLYCOL-ELECTROLYTES (NULYTELY) 420 G SOLUTION    Peg-electrolyte solution 420 gram oral solution        Vitals signs and nursing note reviewed. Patient Vitals for the past 4 hrs:   Temp Pulse Resp BP SpO2   01/10/23 0924 97.7 °F (36.5 °C) 81 18 138/88 100 %          Physical Exam  Vitals and nursing note reviewed.    Constitutional: General: She is not in acute distress. Appearance: Normal appearance. She is obese. She is not ill-appearing, toxic-appearing or diaphoretic. HENT:      Head: Normocephalic and atraumatic. Right Ear: Tympanic membrane, ear canal and external ear normal.      Left Ear: Tympanic membrane, ear canal and external ear normal.      Nose: Congestion present. Mouth/Throat:      Lips: Pink. Mouth: Mucous membranes are moist.      Tongue: No lesions. Tongue does not deviate from midline. Palate: No mass and lesions. Pharynx: Oropharynx is clear. Uvula midline. No pharyngeal swelling, oropharyngeal exudate, posterior oropharyngeal erythema or uvula swelling. Tonsils: No tonsillar exudate or tonsillar abscesses. 0 on the right. 0 on the left. Eyes:      General: No scleral icterus. Extraocular Movements: Extraocular movements intact. Conjunctiva/sclera: Conjunctivae normal.   Cardiovascular:      Rate and Rhythm: Normal rate. Pulses: Normal pulses. Heart sounds: Normal heart sounds. Pulmonary:      Effort: Pulmonary effort is normal.      Breath sounds: Normal breath sounds. Comments: Lungs are clear to auscultation bilaterally, no wheezing appreciated  Negative egophony  Abdominal:      General: Bowel sounds are normal.      Palpations: Abdomen is soft. Tenderness: There is no abdominal tenderness. There is no guarding or rebound. Musculoskeletal:         General: Normal range of motion. Cervical back: Normal and normal range of motion. Thoracic back: Normal.      Lumbar back: Spasms (Left lower back) and tenderness (Left lower back) present. No bony tenderness. Negative left straight leg raise test.        Back:       Right lower leg: No edema. Left lower leg: No edema. Comments: No tenderness to palpation along midline T-spine, L-spine, no bony crepitus, no step-off   Skin:     General: Skin is warm and dry.       Capillary Refill: Capillary refill takes less than 2 seconds. Coloration: Skin is not jaundiced or pale. Findings: No bruising, erythema, lesion or rash. Neurological:      General: No focal deficit present. Mental Status: She is alert and oriented to person, place, and time. Sensory: No sensory deficit. Motor: No weakness. Comments: No saddle anesthesia, no bladder or bowel incontinence  Bilateral lower extremity sensation intact  Bilateral lower extremity motor strength 5+ and equal   Psychiatric:         Mood and Affect: Mood normal.         Behavior: Behavior normal.         Thought Content: Thought content normal.         Judgment: Judgment normal.        Procedures    Results for orders placed or performed during the hospital encounter of 01/10/23   COVID-19, Rapid    Specimen: Nasopharyngeal   Result Value Ref Range    Source Nasopharyngeal      SARS-CoV-2, Rapid Not detected NOTD     Rapid influenza A/B antigens    Specimen: Nasal Washing   Result Value Ref Range    Influenza A Ag Negative NEG      Influenza B Ag Negative NEG      Source Nasopharyngeal     XR CHEST PORTABLE    Narrative    CHEST X-RAY, one view. INDICATION:  Cough, chest congestion and body aches. Asthma. Wheezing. TECHNIQUE: PA upright view. COMPARISON: January 2022. FINDINGS:   Lungs: are clear. Small linear scar or band of atelectasis left base, stable. Costophrenic angles: are sharp. Heart size: is normal.   Pulmonary vasculature: is unremarkable. Aorta: Arch calcifications. Included portion of the upper abdomen: is unremarkable. Bones: No gross bony lesions. Other: None. Impression    Negative for acute change. XR CHEST PORTABLE   Final Result   Negative for acute change. Voice dictation software was used during the making of this note. This software is not perfect and grammatical and other typographical errors may be present.   This note has not been completely proofread for errors.      LILIAN Sorto  01/10/23 1147

## 2023-01-10 NOTE — DISCHARGE INSTRUCTIONS
You were evaluated in the emergency department today for upper respiratory symptoms and low back pain    Negative for COVID-19  Negative for influenza  No sign of pneumonia on your chest x-ray. Physical exam appears consistent with a low back strain and spasm    You received a shot of a steroid here in the emergency department called Decadron  You received a shot of an anti-inflammatory called Toradol    I have written you prescription for Robaxin. This is a muscle relaxer. Take caution until you understand of this medication affects you. Do not drink alcohol or take other sedating medications while on this medicine. I have written you prescription for prednisone which is a steroid to be started tomorrow    I have written you prescription for Mobic. This is an anti-inflammatory. Do not take ibuprofen, Motrin, Aleve while taking this medication. I have refilled your levothyroxine medication.   You have 2 refills as well    Contact your primary care provider to schedule follow-up within the next week    Return to the emergency department if you have shortness of breath, chest pain, signs and symptoms of stroke, loss of bladder or bowel function, loss of sensation around your pelvis, lower extremity loss of sensation, general worsening of your condition

## 2023-01-10 NOTE — ED TRIAGE NOTES
Pt states that over the weekend, she started having cough, congestion, and body aches. Pt also states that she has asthma and has been feeling wheezy.

## 2023-01-10 NOTE — Clinical Note
Vinod Carrion was seen and treated in our emergency department on 1/10/2023. She may return to work on 01/11/2023. If you have any questions or concerns, please don't hesitate to call.       LILIAN Jacome

## 2023-01-25 ENCOUNTER — OFFICE VISIT (OUTPATIENT)
Dept: ORTHOPEDIC SURGERY | Age: 58
End: 2023-01-25
Payer: COMMERCIAL

## 2023-01-25 VITALS — HEIGHT: 63 IN | BODY MASS INDEX: 23.74 KG/M2 | WEIGHT: 134 LBS

## 2023-01-25 DIAGNOSIS — M16.11 PRIMARY OSTEOARTHRITIS OF RIGHT HIP: ICD-10-CM

## 2023-01-25 DIAGNOSIS — M47.816 LUMBAR FACET ARTHROPATHY: Primary | ICD-10-CM

## 2023-01-25 PROCEDURE — 99204 OFFICE O/P NEW MOD 45 MIN: CPT | Performed by: NURSE PRACTITIONER

## 2023-01-25 RX ORDER — DICLOFENAC POTASSIUM 50 MG/1
50 TABLET, FILM COATED ORAL 2 TIMES DAILY PRN
Qty: 60 TABLET | Refills: 0 | Status: SHIPPED | OUTPATIENT
Start: 2023-01-25

## 2023-01-25 RX ORDER — TIZANIDINE 4 MG/1
4 TABLET ORAL 3 TIMES DAILY PRN
Qty: 30 TABLET | Refills: 0 | Status: SHIPPED | OUTPATIENT
Start: 2023-01-25

## 2023-01-25 NOTE — PROGRESS NOTES
Name: Tyson Riley  YOB: 1965  Gender: female  MRN: [id number]    CC: Ear lower back pain, increasing right groin pain    HPI: This is a 56y.o. year old female who had a left total hip arthroplasty 2 years ago for left hip OA and cystic change. She has had on and off complaints of low back pain for quite some time. Its been significant over the past 6 weeks. Its constant. She has difficulty standing upright. Is across the lower lumbar spine. She is also now getting right groin pain. She has no radiculopathy. She was treated in the emergency room recently with meloxicam, Robaxin and prednisone with no relief. This patient  has not had lumbar surgery in the past.     Thus far, the patient has tried : Home guided exercise program for the past 3 months with no improvement, NSAIDs, muscle relaxers, prednisone. Current pain level: 8  Activities limited by pain: all     No flowsheet data found. ROS/Meds/PSH/PMH/FH/SH: I personally reviewed the patient's collected intake data.      Allergies   Allergen Reactions    Latex Rash         Current Outpatient Medications:     diclofenac (CATAFLAM) 50 MG tablet, Take 1 tablet by mouth 2 times daily as needed for Pain, Disp: 60 tablet, Rfl: 0    tiZANidine (ZANAFLEX) 4 MG tablet, Take 1 tablet by mouth 3 times daily as needed (muscle spasm), Disp: 30 tablet, Rfl: 0    levothyroxine (SYNTHROID) 100 MCG tablet, Take 1 tablet by mouth daily, Disp: 30 tablet, Rfl: 2    albuterol sulfate  (90 Base) MCG/ACT inhaler, The details of the medication are not available because there are pending changes by a home health clinician., Disp: , Rfl:     albuterol (PROVENTIL) (2.5 MG/3ML) 0.083% nebulizer solution, Inhale 3 mLs into the lungs every 4 hours as needed, Disp: , Rfl:     fluticasone-salmeterol (ADVAIR) 250-50 MCG/ACT AEPB diskus inhaler, Inhale 1 puff into the lungs 2 times daily, Disp: , Rfl:     HYDROcodone homatropine (HYCODAN) 5-1.5 MG/5ML solution, Hycodan (with homatropine) 5 mg-1.5 mg/5 mL oral syrup TAKE 5 ML BY MOUTH THREE TIMES A DAY AS NEEDED FOR COUGH FOR UP TO 3 DAYS, Disp: , Rfl:     polyethylene glycol-electrolytes (NULYTELY) 420 g solution, Peg-electrolyte solution 420 gram oral solution, Disp: , Rfl:     polyethylene glycol (GLYCOLAX) 17 GM/SCOOP powder, Take 17 g by mouth daily, Disp: , Rfl:     meloxicam (MOBIC) 7.5 MG tablet, Take 1 tablet by mouth daily for 7 days, Disp: 7 tablet, Rfl: 0    Past Surgical History:   Procedure Laterality Date    ORTHOPEDIC SURGERY Left 2007    left ankle fracture- has a plate    OTHER SURGICAL HISTORY  10/09/2020    teeth removal     THYROIDECTOMY  12/04/2018       Patient Active Problem List   Diagnosis    Marijuana use, continuous    Cough    History of total left hip replacement    Asthma exacerbation    Difficulty walking    Generalized muscle weakness    Effusion of left hip    Acute bronchitis    Osteoarthritis of left hip    Leukocytosis    COPD exacerbation (HCC)    Left hip pain    Occupational exposure to air contaminants    Decreased functional mobility and endurance    Cannabis abuse    Arthritis of carpometacarpal (CMC) joint of right thumb    Postsurgical hypothyroidism         Tobacco:  reports that she has quit smoking. She started smoking about 31 years ago. She smoked an average of .25 packs per day. She has never used smokeless tobacco.  Alcohol:   Social History     Substance and Sexual Activity   Alcohol Use No        Physical Exam:   Body mass index is 23.74 kg/m². GENERAL:  Adult in no acute distress, well developed, well nourished Patient is appropriately conversant  MSK:  Examination of the lumbar spine reveals paraspinal tenderness , facet tenderness   There is moderate tenderness to palpation along the spinous processes and paraspinal musculature. The patient ambulates with a unsteady and forward flexed gait. ROM of right hip(s) reveals moderate irritability.    NEURO: Cranial nerves grossly intact. No motor deficits. Straight leg testing is negative bilateral  Sensory testing reveals intact sensation to light touch and in the distribution of the L3-S1 dermatomes bilaterally  Ankle jerk is negative for clonus    Reflexes   Right Left   Quadriceps (L4) 2 2   Achilles (S1) 2 2     Strength testing in the lower extremity reveals the following based on the 5 point grading scale:     HF (L2) H Ab (L5) KE (L3/4) ADF (L4) EHL (L5) A Ev (S1) APF (S1)   Right 5 5 5 5 5 5 5   Left 5 5 5 5 5 5 5     PSYCH:  Alert and oriented X 3. Appropriate affect. Intact judgment and insight. Radiographic Studies:     AP, lateral and spot views of the lumbar spine: There is definite lower lumbar facet arthropathy primarily at L4-5 and L5-S1. There is evidence of a total hip arthroplasty. I do see degenerative cystic change of the right hip. Intervertebral disc spaces for the most part are well-preserved. Interpretation: Lower lumbar advanced facet arthropathy. Assessment/Plan:       Diagnosis Orders   1. Lumbar facet arthropathy  MRI LUMBAR SPINE WO CONTRAST      2. Primary osteoarthritis of right hip  MRI LUMBAR SPINE WO CONTRAST          This patient's clinical history and physical exam is consistent with spondylitic  and facetogenic back pain. It is obviously symptomatic of her facet arthropathy. At this point I would recommend an MRI of the lumbar spine to see the severity of this and possibly discussed interventional management. However as she does have some positive hip findings on exam with increasing right groin pain. She does have a lot of cystic change in that hip. I will refer her back to Dr. Katia Romano for this. I discussed with her the natural history of this condition in that most episodes are typically self limited. However, the symptoms can last for several months if not even longer.  What I currently recommend is that she continues with conservative treatments to help cope with the symptoms and avoid having back surgery at this time. She understands that conservative treatments typically include activity modification, NSAIDs and physical therapy. Oral and/or epidural steroids could be considered in resistant scenarios. Also, she  may want to explore chiropractic care or acupuncture. I advised to avoid any prolonged bedrest and to try to maintain ADLs as much as possible. The patient was counseled to follow up me should she  develop any neurologic symptoms such as leg pain. - NSAID: The patient is agreeable to a trial of nonsteroidal anti-inflammatory drugs (NSAIDs). We discussed risks associated with their use, including GI tract or other bleeding, and also some cardiac risk. Instructions were given to discontinue the NSAID if there is any sign of GI bleed, chest pain, or shortness of breath. Continued use of NSAIDS is recommended to be managed by PCP to monitor kidney and liver function.  - Muscle Relaxant: The patient was prescribed a muscle relaxant. The patient understands that this is a temporary measure to bring acute spasm under control.    - A MRI was ordered to delineate anatomy, confirm the diagnosis and assess the severity.  - Referral to a hip/knee specialist to evaluate the possibility of an extraspinal source for the patients pain. Orders Placed This Encounter   Medications    diclofenac (CATAFLAM) 50 MG tablet     Sig: Take 1 tablet by mouth 2 times daily as needed for Pain     Dispense:  60 tablet     Refill:  0    tiZANidine (ZANAFLEX) 4 MG tablet     Sig: Take 1 tablet by mouth 3 times daily as needed (muscle spasm)     Dispense:  30 tablet     Refill:  0        Orders Placed This Encounter   Procedures    MRI LUMBAR SPINE WO CONTRAST        4 This is a undiagnosed new problem with uncertain prognosis      Return for MRI results.      MISAEL Godinez - CNP  01/25/23      Elements of this note were created using speech recognition software. As such, errors of speech recognition may be present.

## 2023-02-06 ENCOUNTER — OFFICE VISIT (OUTPATIENT)
Dept: ORTHOPEDIC SURGERY | Age: 58
End: 2023-02-06
Payer: COMMERCIAL

## 2023-02-06 DIAGNOSIS — M47.816 LUMBAR FACET ARTHROPATHY: Primary | ICD-10-CM

## 2023-02-06 DIAGNOSIS — M16.11 PRIMARY OSTEOARTHRITIS OF RIGHT HIP: ICD-10-CM

## 2023-02-06 PROCEDURE — 99214 OFFICE O/P EST MOD 30 MIN: CPT | Performed by: NURSE PRACTITIONER

## 2023-02-06 NOTE — PROGRESS NOTES
Name: Darl Mohs  YOB: 1965  Gender: female  MRN: [id number]    CC: Acute on chronic low back pain    HPI: This is a 56y.o. year old female who has a history of a left total hip arthroplasty. She starting to get a similar hip OA symptoms on the right. She does have known right hip osteoarthritis. But presented to me with increasing complaints of lower back pain.'s been ongoing for greater than 6 weeks. Is constant in nature. Some days are better than others. She has difficulty standing upright. It goes from the mid lumbar spine down to the lower. It is worse with bending and twisting. She has tried meloxicam, Robaxin, prednisone and a home exercise program.  Patient rates her pain anywhere from an 8 to a 12 out of 10. The tizanidine I gave her is somewhat beneficial.  X-rays revealed lower lumbar spondylosis. At last visit I referred her for an MRI of the lumbar spine. Thus far, the patient has tried NSAIDS, muscle relaxers, oral steroids, and physician directed home exercise program.    Current pain level: 8-12/10  Activities limited by pain:        No flowsheet data found.          Allergies   Allergen Reactions    Latex Rash       Current Outpatient Medications:     diclofenac (CATAFLAM) 50 MG tablet, Take 1 tablet by mouth 2 times daily as needed for Pain, Disp: 60 tablet, Rfl: 0    tiZANidine (ZANAFLEX) 4 MG tablet, Take 1 tablet by mouth 3 times daily as needed (muscle spasm), Disp: 30 tablet, Rfl: 0    meloxicam (MOBIC) 7.5 MG tablet, Take 1 tablet by mouth daily for 7 days, Disp: 7 tablet, Rfl: 0    levothyroxine (SYNTHROID) 100 MCG tablet, Take 1 tablet by mouth daily, Disp: 30 tablet, Rfl: 2    albuterol sulfate  (90 Base) MCG/ACT inhaler, The details of the medication are not available because there are pending changes by a home health clinician., Disp: , Rfl:     albuterol (PROVENTIL) (2.5 MG/3ML) 0.083% nebulizer solution, Inhale 3 mLs into the lungs every 4 hours as needed, Disp: , Rfl:     fluticasone-salmeterol (ADVAIR) 250-50 MCG/ACT AEPB diskus inhaler, Inhale 1 puff into the lungs 2 times daily, Disp: , Rfl:     HYDROcodone homatropine (HYCODAN) 5-1.5 MG/5ML solution, Hycodan (with homatropine) 5 mg-1.5 mg/5 mL oral syrup TAKE 5 ML BY MOUTH THREE TIMES A DAY AS NEEDED FOR COUGH FOR UP TO 3 DAYS, Disp: , Rfl:     polyethylene glycol-electrolytes (NULYTELY) 420 g solution, Peg-electrolyte solution 420 gram oral solution, Disp: , Rfl:     polyethylene glycol (GLYCOLAX) 17 GM/SCOOP powder, Take 17 g by mouth daily, Disp: , Rfl:   Past Medical History:   Diagnosis Date    Asthma     Bell's palsy 11/2019    Chronic obstructive pulmonary disease (HCC)     advair and albuterol prn=uses 1-2 times per week; Neb PRN=never uses; not followed by Pulmonary     Former cigarette smoker     History of positive PPD 1996    no meds     Hypothyroidism     on med for control     Ill-defined condition     pancreatitis x1; not chronic per patient     Marijuana smoker     OA (osteoarthritis)      Tobacco:  reports that she has quit smoking. She started smoking about 31 years ago. She smoked an average of .25 packs per day. She has never used smokeless tobacco.  Alcohol:   Social History     Substance and Sexual Activity   Alcohol Use No          Radiographic Studies:       MRI Results (most recent): MRI Result (most recent): MRI LUMBAR SPINE WO CONTRAST 02/01/2023    Narrative  STUDY: MRI LUMBAR SPINE WO CONTRAST [id number]    STUDY DATE: 2/1/2023 12:05 PM    HISTORY: Spondylosis without myelopathy or radiculopathy, lumbar region;  Unilateral primary osteoarthritis, right hip    COMPARISON:  Radiographs performed January 9, 2022    TECHNIQUE: Multisequence, multiplanar MR images were obtained of the lumbar  spine without the administration of intravenous contrast.      CONTRAST: None. FINDINGS:    SPINAL CORD: Normal morphology.  The conus medullaris terminates at the L1  vertebral body level. No focal abnormality of the cauda equina. NUMBERING: Last fully formed disc space is designatedL5/S1. BONES: Mild diffuse T1 marrow signal. Vertebral body stature maintained. No  findings of acute fracture. Degenerative endplate marrow signal changes are  noted at T11, T12, L3 and L4. No marrow edema like signal.    DISCS:  Disc desiccation at L5-S1 without significant intervertebral disc height  loss. ALIGNMENT: Minimal retrolisthesis at L5-S1. SOFT TISSUES: The aorta is normal in course and caliber. No suspicious  incidental soft tissue abnormality. DEGENERATIVE:    T12-L1: No spinal canal or neural foraminal narrowing. L1-L2: No spinal canal or neural foraminal narrowing. L2-L3: No spinal canal or neural foraminal narrowing. L3-L4: Moderate bilateral facet arthropathy. Circumferential disc bulge. No  spinal canal narrowing. No significant neural foraminal narrowing. L4-L5: Moderate bilateral facet arthropathy and ligamentum flavum hypertrophy. Shallow circumferential disc bulge. No significant spinal canal narrowing. Mild  right and no significant left neural foraminal narrowing. L5-S1: Moderate bilateral facet arthropathy. Circumferential disc bulge. No  spinal canal narrowing. Mild bilateral lateral recess narrowing. Disc osteophyte  complex extends into each neural foramen, contributing to moderate left and mild  right neural foraminal narrowing. Impression  Multilevel lumbar spondylosis, as detailed above, most pronounced at L5-S1,  where there is moderate left and mild right neural foraminal narrowing. No  significant spinal canal stenosis. Assessment/Plan:        ICD-10-CM    1. Lumbar facet arthropathy  M47.816       2. Primary osteoarthritis of right hip  M16.11            Patient has pain extending up to the mid lumbar spine. She does have pathology at the L5-S1 level but I feel she is more symptomatic of her facet arthropathy. Pain is worse with extension. Therefore I will set her up with bilateral L3-L5 intra-articular facet injections. I will refer her also to Dr. Roni Martinez or Dr. Homa Kendall for further evaluation and treatment. At the current time I would not recommend any kind of surgery. If she does not benefit from the facet injections and L5-S1 CINDY may be beneficial.  I also told her to watch her hip symptoms she may need a hip replacement sooner than later on the right. - Injection: The patient will be referred for a lumbar steroid injection to help reduce the symptoms. The patient understands the risks including hyperglycemia, immunosuppression, meningitis, cerebrospinal fluid leak, epidural hematoma, and reaction to medication. The patient may benefit from additional injections depending on the response. The injection should be a bilateral L3-L5 intra-articular facet injections      No orders of the defined types were placed in this encounter. No orders of the defined types were placed in this encounter. 4 This is a chronic illness/condition with exacerbation and progression      Return for refer to Papo/Tamir. MISAEL Skinner - CNP  02/06/23      Elements of this note were created using speech recognition software. As such, errors of speech recognition may be present.

## 2023-02-09 ENCOUNTER — OFFICE VISIT (OUTPATIENT)
Dept: ORTHOPEDIC SURGERY | Age: 58
End: 2023-02-09

## 2023-02-09 DIAGNOSIS — M47.816 LUMBAR FACET ARTHROPATHY: Primary | ICD-10-CM

## 2023-02-09 RX ORDER — TRIAMCINOLONE ACETONIDE 40 MG/ML
40 INJECTION, SUSPENSION INTRA-ARTICULAR; INTRAMUSCULAR ONCE
Status: COMPLETED | OUTPATIENT
Start: 2023-02-09 | End: 2023-02-09

## 2023-02-09 RX ADMIN — TRIAMCINOLONE ACETONIDE 40 MG: 40 INJECTION, SUSPENSION INTRA-ARTICULAR; INTRAMUSCULAR at 09:47

## 2023-02-09 NOTE — PROGRESS NOTES
Name: Katia Tripp  YOB: 1965  Gender: female  MRN: [id number]    Procedure: Bilateral  L3-L4 and L4-L5 facet joint injections     Precautions: Katia Tripp deniesprior sensitivity to steroid, local anesthetic, iodine, or shellfish. The procedure was discussed at length with her and informed consent was signed and placed in the chart. She was placed in a prone position on the fluoroscopy table and the skin was prepped and draped in a routine sterile fashion. The areas to be injected were each anesthetized with 1% lidocaine. Next, a 25-gauge 3.5 inch spinal needle was carefully advanced under fluoroscopic guidance to the rightL3 - L4 facet joint. Aspiration was negative. Once proper placement was confirmed, 1 ml of 0.25% Marcaine and  0.5 mL 40 mg/mL kenalog were injected through the spinal needle. The above procedure was then repeated through the rightL4 - L5, and left L3 - L4, and leftL4 - L5 facet joints. Fluoroscopic guidance was used intermittently over a 10-minute period to insure proper needle placement and her safety. A hard copy of the fluoroscopic images has been placed in her chart and is saved on the C-arm hard drive. She was monitored for 30 minutes after the procedure and discharged home in a stable fashion with a routine follow up.      Procedural Diagnosis:     ICD-10-CM    1. Lumbar facet arthropathy  M47.816 FL INJ LUMB/SAC FACET SINGLE LEVEL     XR INJ FACET LUMB SACRAL 2ND LVL     triamcinolone acetonide (KENALOG-40) injection 40 mg           Hari Fuentes MD  02/09/23

## 2023-03-03 ENCOUNTER — OFFICE VISIT (OUTPATIENT)
Dept: ORTHOPEDIC SURGERY | Age: 58
End: 2023-03-03

## 2023-03-03 DIAGNOSIS — M25.551 BILATERAL HIP PAIN: ICD-10-CM

## 2023-03-03 DIAGNOSIS — M25.552 BILATERAL HIP PAIN: ICD-10-CM

## 2023-03-03 DIAGNOSIS — M16.11 PRIMARY OSTEOARTHRITIS OF RIGHT HIP: Primary | ICD-10-CM

## 2023-03-03 RX ORDER — TRIAMCINOLONE ACETONIDE 40 MG/ML
40 INJECTION, SUSPENSION INTRA-ARTICULAR; INTRAMUSCULAR ONCE
Status: COMPLETED | OUTPATIENT
Start: 2023-03-03 | End: 2023-03-03

## 2023-03-03 RX ADMIN — TRIAMCINOLONE ACETONIDE 40 MG: 40 INJECTION, SUSPENSION INTRA-ARTICULAR; INTRAMUSCULAR at 09:00

## 2023-03-03 NOTE — PROGRESS NOTES
Name: Boris Haji  YOB: 1965  Gender: female  MRN: [id number]    CC:   Chief Complaint   Patient presents with    Hip Pain         HPI:   The right hip pain has been present for many months and is becoming worse. It hurts at night when sleeping. There was not an acute injury to the hip. The pain is located over the hip. It hurts to walk and pain worsens with increased distance. The pain does not radiate down the leg. Numbness and tingling are not noted. The patient is now having difficulty putting socks and shoes on. Treatment so far has been anti-inflammatory medications. She is post left total hip arthroplasty in 2021    Review of Systems  As per HPI. Pertinent positives and negatives are addressed with the patient, particularly those related to musculoskeletal concerns. Non-orthopaedic concerns were referred back to the primary care physician.       [address]:    Current Outpatient Medications:     diclofenac (CATAFLAM) 50 MG tablet, Take 1 tablet by mouth 2 times daily as needed for Pain, Disp: 60 tablet, Rfl: 0    tiZANidine (ZANAFLEX) 4 MG tablet, Take 1 tablet by mouth 3 times daily as needed (muscle spasm), Disp: 30 tablet, Rfl: 0    meloxicam (MOBIC) 7.5 MG tablet, Take 1 tablet by mouth daily for 7 days, Disp: 7 tablet, Rfl: 0    levothyroxine (SYNTHROID) 100 MCG tablet, Take 1 tablet by mouth daily, Disp: 30 tablet, Rfl: 2    albuterol sulfate  (90 Base) MCG/ACT inhaler, The details of the medication are not available because there are pending changes by a home health clinician., Disp: , Rfl:     albuterol (PROVENTIL) (2.5 MG/3ML) 0.083% nebulizer solution, Inhale 3 mLs into the lungs every 4 hours as needed, Disp: , Rfl:     fluticasone-salmeterol (ADVAIR) 250-50 MCG/ACT AEPB diskus inhaler, Inhale 1 puff into the lungs 2 times daily, Disp: , Rfl:     HYDROcodone homatropine (HYCODAN) 5-1.5 MG/5ML solution, Hycodan (with homatropine) 5 mg-1.5 mg/5 mL oral syrup TAKE 5 ML BY MOUTH THREE TIMES A DAY AS NEEDED FOR COUGH FOR UP TO 3 DAYS, Disp: , Rfl:     polyethylene glycol-electrolytes (NULYTELY) 420 g solution, Peg-electrolyte solution 420 gram oral solution, Disp: , Rfl:     polyethylene glycol (GLYCOLAX) 17 GM/SCOOP powder, Take 17 g by mouth daily, Disp: , Rfl:   Allergies   Allergen Reactions    Latex Rash     Past Medical History:   Diagnosis Date    Asthma     Bell's palsy 11/2019    Chronic obstructive pulmonary disease (HCC)     advair and albuterol prn=uses 1-2 times per week; Neb PRN=never uses; not followed by Pulmonary     Former cigarette smoker     History of positive PPD 1996    no meds     Hypothyroidism     on med for control     Ill-defined condition     pancreatitis x1; not chronic per patient     Marijuana smoker     OA (osteoarthritis)      . pmh  Past Surgical History:   Procedure Laterality Date    ORTHOPEDIC SURGERY Left 2007    left ankle fracture- has a plate    OTHER SURGICAL HISTORY  10/09/2020    teeth removal     THYROIDECTOMY  12/04/2018     Family History   Problem Relation Age of Onset    Hypertension Father     Cancer Brother 47        brain    Hypertension Mother      Social History     Socioeconomic History    Marital status: Single     Spouse name: Not on file    Number of children: Not on file    Years of education: Not on file    Highest education level: Not on file   Occupational History    Not on file   Tobacco Use    Smoking status: Former     Packs/day: 0.25     Types: Cigarettes     Start date: 1/1/1992    Smokeless tobacco: Never   Substance and Sexual Activity    Alcohol use: No    Drug use: Yes     Types: Marijuana Lujean )    Sexual activity: Not on file   Other Topics Concern    Not on file   Social History Narrative    Not on file     Social Determinants of Health     Financial Resource Strain: Not on file   Food Insecurity: Not on file   Transportation Needs: Not on file   Physical Activity: Not on file   Stress: Not on file   Social Connections: Not on file   Intimate Partner Violence: Not on file   Housing Stability: Not on file                  PHYSICAL EXAMINATION:   The patient is alert and oriented, in no distress. The lower extremities are as described below. Circulation is normal with palpable pedal pulses bilaterally and no edema. Skin of the leg is normal with no chronic stasis disease bilaterally. Sensation is intact to light touch bilaterally. Gait is noted to be with a slight trendelenburg and antalgia.   right hip range of motion is 0-90 degrees of flexion and 20 degrees on abduction and adduction. There is 15 degrees internal rotation and 25 degrees of external rotation with pain in groin     Limb lengths are equal.  Straight leg test is negative bilaterally. Stability is normal bilaterally. Muscular strength is intact bilaterally. There is no lymphadenopathy in the groin or popliteal regions bilaterally. Their judgment and insight are normal.  They are oriented to time, place and person. Their memory is good and the mood and affect appropriate. XRAYS:   AP pelvis and lateral views of right hip are reviewed. There is joint space loss, eburnated bone and osteophyte formation of the hip. X-ray impression: Osteoarthritis of the right hip    IMPRESSION:     Diagnosis Orders   1. Bilateral hip pain  XR HIP 3-4 VW W PELVIS BILATERAL          RECOMMENDATION:    X-rays were reviewed with the patient today. The concerns with functional limitations are increased and they no longer responding to conservative measures. Due to deterioration in their quality of life the decision has been made to perform total hip replacement. We discussed specific risks associated with hip replacement. This includes a risk of infection, dislocation, or general medical risks of surgery such as stroke, heart attack, and blood clot. AGUSTÍN - Today we discussed right hip replacement surgery.   They are aware of the 1% risk of dislocation and 1% risk of infection. They were also informed of the possibility of stroke, heart attack and blood clot-any of these which could result in further hospitalization or death. and Procedure Note: After alcohol prep, I placed 40 mg of Kenalog and 2mL of 0.5 % Marcaine into the right hip joint under fluoroscopy.     Relief was noted

## 2023-03-08 ENCOUNTER — TELEPHONE (OUTPATIENT)
Dept: ORTHOPEDIC SURGERY | Age: 58
End: 2023-03-08

## 2023-03-13 ENCOUNTER — TELEPHONE (OUTPATIENT)
Dept: ORTHOPEDIC SURGERY | Age: 58
End: 2023-03-13

## 2023-03-27 ENCOUNTER — APPOINTMENT (OUTPATIENT)
Dept: GENERAL RADIOLOGY | Age: 58
End: 2023-03-27
Payer: COMMERCIAL

## 2023-03-27 ENCOUNTER — HOSPITAL ENCOUNTER (EMERGENCY)
Age: 58
Discharge: HOME | End: 2023-03-27
Attending: EMERGENCY MEDICINE
Payer: COMMERCIAL

## 2023-03-27 VITALS
DIASTOLIC BLOOD PRESSURE: 79 MMHG | RESPIRATION RATE: 20 BRPM | HEART RATE: 89 BPM | OXYGEN SATURATION: 96 % | HEIGHT: 60 IN | TEMPERATURE: 99.7 F | WEIGHT: 125 LBS | SYSTOLIC BLOOD PRESSURE: 117 MMHG | BODY MASS INDEX: 24.54 KG/M2

## 2023-03-27 DIAGNOSIS — U07.1 COVID: Primary | ICD-10-CM

## 2023-03-27 LAB
ALBUMIN SERPL-MCNC: 3.8 G/DL (ref 3.5–5)
ALBUMIN/GLOB SERPL: 1.2 (ref 0.4–1.6)
ALP SERPL-CCNC: 74 U/L (ref 50–130)
ALT SERPL-CCNC: 29 U/L (ref 12–65)
ANION GAP SERPL CALC-SCNC: 8 MMOL/L (ref 2–11)
AST SERPL-CCNC: 21 U/L (ref 15–37)
BASOPHILS # BLD: 0.1 K/UL (ref 0–0.2)
BASOPHILS NFR BLD: 2 % (ref 0–2)
BILIRUB SERPL-MCNC: 0.9 MG/DL (ref 0.2–1.1)
BUN SERPL-MCNC: 6 MG/DL (ref 6–23)
CALCIUM SERPL-MCNC: 8.8 MG/DL (ref 8.3–10.4)
CHLORIDE SERPL-SCNC: 106 MMOL/L (ref 101–110)
CO2 SERPL-SCNC: 23 MMOL/L (ref 21–32)
CREAT SERPL-MCNC: 0.72 MG/DL (ref 0.6–1)
D DIMER PPP FEU-MCNC: 0.43 UG/ML(FEU)
DIFFERENTIAL METHOD BLD: ABNORMAL
EKG ATRIAL RATE: 79 BPM
EKG DIAGNOSIS: NORMAL
EKG P AXIS: 84 DEGREES
EKG P-R INTERVAL: 136 MS
EKG Q-T INTERVAL: 374 MS
EKG QRS DURATION: 72 MS
EKG QTC CALCULATION (BAZETT): 428 MS
EKG R AXIS: 48 DEGREES
EKG T AXIS: 64 DEGREES
EKG VENTRICULAR RATE: 79 BPM
EOSINOPHIL # BLD: 0 K/UL (ref 0–0.8)
EOSINOPHIL NFR BLD: 1 % (ref 0.5–7.8)
ERYTHROCYTE [DISTWIDTH] IN BLOOD BY AUTOMATED COUNT: 15.7 % (ref 11.9–14.6)
FLUAV RNA SPEC QL NAA+PROBE: NOT DETECTED
FLUBV RNA SPEC QL NAA+PROBE: NOT DETECTED
GLOBULIN SER CALC-MCNC: 3.1 G/DL (ref 2.8–4.5)
GLUCOSE SERPL-MCNC: 105 MG/DL (ref 65–100)
HCT VFR BLD AUTO: 36 % (ref 35.8–46.3)
HGB BLD-MCNC: 12.1 G/DL (ref 11.7–15.4)
IMM GRANULOCYTES # BLD AUTO: 0 K/UL (ref 0–0.5)
IMM GRANULOCYTES NFR BLD AUTO: 0 % (ref 0–5)
LYMPHOCYTES # BLD: 1 K/UL (ref 0.5–4.6)
LYMPHOCYTES NFR BLD: 22 % (ref 13–44)
MAGNESIUM SERPL-MCNC: 2.1 MG/DL (ref 1.8–2.4)
MCH RBC QN AUTO: 28.5 PG (ref 26.1–32.9)
MCHC RBC AUTO-ENTMCNC: 33.6 G/DL (ref 31.4–35)
MCV RBC AUTO: 84.9 FL (ref 82–102)
MONOCYTES # BLD: 0.5 K/UL (ref 0.1–1.3)
MONOCYTES NFR BLD: 10 % (ref 4–12)
NEUTS SEG # BLD: 3 K/UL (ref 1.7–8.2)
NEUTS SEG NFR BLD: 66 % (ref 43–78)
NRBC # BLD: 0 K/UL (ref 0–0.2)
PLATELET # BLD AUTO: 201 K/UL (ref 150–450)
PMV BLD AUTO: 10.4 FL (ref 9.4–12.3)
POTASSIUM SERPL-SCNC: 3.8 MMOL/L (ref 3.5–5.1)
PROT SERPL-MCNC: 6.9 G/DL (ref 6.3–8.2)
RBC # BLD AUTO: 4.24 M/UL (ref 4.05–5.2)
SARS-COV-2 RDRP RESP QL NAA+PROBE: DETECTED
SODIUM SERPL-SCNC: 137 MMOL/L (ref 133–143)
SOURCE: ABNORMAL
TROPONIN I SERPL HS-MCNC: 5.1 PG/ML (ref 0–14)
TROPONIN I SERPL HS-MCNC: 5.6 PG/ML (ref 0–14)
WBC # BLD AUTO: 4.5 K/UL (ref 4.3–11.1)

## 2023-03-27 PROCEDURE — 93005 ELECTROCARDIOGRAM TRACING: CPT

## 2023-03-27 PROCEDURE — 85025 COMPLETE CBC W/AUTO DIFF WBC: CPT

## 2023-03-27 PROCEDURE — 80053 COMPREHEN METABOLIC PANEL: CPT

## 2023-03-27 PROCEDURE — 84484 ASSAY OF TROPONIN QUANT: CPT

## 2023-03-27 PROCEDURE — 6370000000 HC RX 637 (ALT 250 FOR IP)

## 2023-03-27 PROCEDURE — 87635 SARS-COV-2 COVID-19 AMP PRB: CPT

## 2023-03-27 PROCEDURE — 85379 FIBRIN DEGRADATION QUANT: CPT

## 2023-03-27 PROCEDURE — 83735 ASSAY OF MAGNESIUM: CPT

## 2023-03-27 PROCEDURE — 99285 EMERGENCY DEPT VISIT HI MDM: CPT

## 2023-03-27 PROCEDURE — 87502 INFLUENZA DNA AMP PROBE: CPT

## 2023-03-27 PROCEDURE — 71045 X-RAY EXAM CHEST 1 VIEW: CPT

## 2023-03-27 RX ORDER — ALBUTEROL SULFATE 90 UG/1
2 AEROSOL, METERED RESPIRATORY (INHALATION) EVERY 6 HOURS PRN
Status: DISCONTINUED | OUTPATIENT
Start: 2023-03-27 | End: 2023-03-27 | Stop reason: HOSPADM

## 2023-03-27 RX ORDER — ACETAMINOPHEN 500 MG
1000 TABLET ORAL
Status: COMPLETED | OUTPATIENT
Start: 2023-03-27 | End: 2023-03-27

## 2023-03-27 RX ORDER — IPRATROPIUM BROMIDE AND ALBUTEROL SULFATE 2.5; .5 MG/3ML; MG/3ML
1 SOLUTION RESPIRATORY (INHALATION)
Status: DISCONTINUED | OUTPATIENT
Start: 2023-03-27 | End: 2023-03-27

## 2023-03-27 RX ADMIN — ACETAMINOPHEN 1000 MG: 500 TABLET, FILM COATED ORAL at 12:24

## 2023-03-27 ASSESSMENT — ENCOUNTER SYMPTOMS
SINUS PRESSURE: 0
ABDOMINAL PAIN: 0
SINUS PAIN: 0
SHORTNESS OF BREATH: 1
CHEST TIGHTNESS: 0
RHINORRHEA: 0
ABDOMINAL DISTENTION: 0
EYE PAIN: 0
NAUSEA: 0
EYE REDNESS: 0
VOMITING: 0
COLOR CHANGE: 0
BACK PAIN: 0
DIARRHEA: 0
WHEEZING: 0
ANAL BLEEDING: 0
APNEA: 0
COUGH: 1
SORE THROAT: 1
EYE ITCHING: 0
EYE DISCHARGE: 0

## 2023-03-27 NOTE — DISCHARGE INSTRUCTIONS
Please continue to use your inhalers as previously prescribed. If you have any worsening shortness of breath or chest pain or anything else concerning to you, please return here for further evaluation. You may use Tylenol and ibuprofen as needed to help with your COVID-19.   Please increase fluids over the next several days and get plenty of rest.

## 2023-03-27 NOTE — ED NOTES
I have reviewed discharge instructions with the patient. The patient verbalized understanding. Patient left ED via Discharge Method: ambulatory to Home with (insert name of family/friend, self, transport self). Opportunity for questions and clarification provided. Patient given  scripts. To continue your aftercare when you leave the hospital, you may receive an automated call from our care team to check in on how you are doing. This is a free service and part of our promise to provide the best care and service to meet your aftercare needs.  If you have questions, or wish to unsubscribe from this service please call [phone]. Thank you for Choosing our [facility] Emergency Department.         Hema Candelaria RN  03/27/23 [id number]

## 2023-03-27 NOTE — ED TRIAGE NOTES
Pt states that she's been having cough, congestion, body aches, shortness of breath, and sore throat since Friday. Pt also endorses nausea but denies vomiting.

## 2023-03-27 NOTE — Clinical Note
Tyson Riley was seen and treated in our emergency department on 3/27/2023. She may return to work on 03/29/2023. If you have any questions or concerns, please don't hesitate to call.       LILIAN Whipple

## 2023-03-27 NOTE — ED PROVIDER NOTES
is within normal limits. Troponin ordered as part of the shortness of breath bundle. CBC is unremarkable. CMP, troponin, and magnesium are pending. EKG shows normal sinus rhythm. [KS]   5166 Initial and secondary troponins are within normal limits. D-dimer is within normal limits. CBC and CMP are unremarkable. Chest x-ray shows no acute consolidation. EKG shows normal sinus rhythm. At this time, I will discharge the patient with strict return precautions.  [KS]      ED Course User Index  [KS] LILIAN Aguiar        Orders Placed This Encounter   Procedures    COVID-19, Rapid    Influenza A/B, Molecular    XR CHEST PORTABLE    Comprehensive Metabolic Panel    D-Dimer, Quantitative    CBC with Auto Differential    Magnesium    Troponin    Cardiac Monitor - ED Only    Initiate RT Inhaler-Nebulizer Bronchodilator Protocol    EKG 12 Lead    Saline lock IV        Medications   albuterol sulfate HFA (PROVENTIL;VENTOLIN;PROAIR) 108 (90 Base) MCG/ACT inhaler 2 puff (has no administration in time range)   acetaminophen (TYLENOL) tablet 1,000 mg (1,000 mg Oral Given 3/27/23 1224)       New Prescriptions    No medications on file        Past Medical History:   Diagnosis Date    Asthma     Bell's palsy 11/2019    Chronic obstructive pulmonary disease (HCC)     advair and albuterol prn=uses 1-2 times per week; Neb PRN=never uses; not followed by Pulmonary     Former cigarette smoker     History of positive PPD 1996    no meds     Hypothyroidism     on med for control     Ill-defined condition     pancreatitis x1; not chronic per patient     Marijuana smoker     OA (osteoarthritis)         Past Surgical History:   Procedure Laterality Date    ORTHOPEDIC SURGERY Left 2007    left ankle fracture- has a plate    OTHER SURGICAL HISTORY  10/09/2020    teeth removal     THYROIDECTOMY  12/04/2018        Family History   Problem Relation Age of Onset    Hypertension Father     Cancer Brother 47        brain    Hypertension Mother

## 2023-03-28 ENCOUNTER — CARE COORDINATION (OUTPATIENT)
Dept: CARE COORDINATION | Facility: CLINIC | Age: 58
End: 2023-03-28

## 2023-03-28 NOTE — CARE COORDINATION
Guidelines. Patient was given an opportunity to verbalize any questions and concerns and agrees to contact LPN CC or health care provider for questions related to their healthcare. Reviewed and educated patient on any new and changed medications related to discharge diagnosis     Was patient discharged with a pulse oximeter? no      LPN CC provided contact information. Plan for follow-up call in 5-7 days based on severity of symptoms and risk factors.

## 2023-04-04 ENCOUNTER — CARE COORDINATION (OUTPATIENT)
Dept: CARE COORDINATION | Facility: CLINIC | Age: 58
End: 2023-04-04

## 2023-04-04 NOTE — CARE COORDINATION
Date/Time:  4/4/2023 12:09 PM  Attempted to reach patient by telephone. Call within 2 business days of discharge: Yes Left HIPPA compliant message requesting a return call. Unable to reach patient for second subsequent covid concerns outreach. Episode closed. Will reopen episode if return call is received.

## 2023-05-09 ENCOUNTER — HOSPITAL ENCOUNTER (OUTPATIENT)
Dept: REHABILITATION | Age: 58
Discharge: HOME | End: 2023-05-12
Payer: COMMERCIAL

## 2023-05-09 ENCOUNTER — PREP FOR PROCEDURE (OUTPATIENT)
Dept: ORTHOPEDIC SURGERY | Age: 58
End: 2023-05-09

## 2023-05-09 ENCOUNTER — HOSPITAL ENCOUNTER (OUTPATIENT)
Dept: SURGERY | Age: 58
Discharge: HOME | End: 2023-05-12
Payer: COMMERCIAL

## 2023-05-09 VITALS
HEIGHT: 60 IN | SYSTOLIC BLOOD PRESSURE: 136 MMHG | WEIGHT: 124.3 LBS | DIASTOLIC BLOOD PRESSURE: 89 MMHG | HEART RATE: 63 BPM | OXYGEN SATURATION: 100 % | TEMPERATURE: 97.3 F | BODY MASS INDEX: 24.4 KG/M2

## 2023-05-09 DIAGNOSIS — Z01.818 PREOP EXAMINATION: Primary | ICD-10-CM

## 2023-05-09 LAB
ANION GAP SERPL CALC-SCNC: 3 MMOL/L (ref 2–11)
BACTERIA SPEC CULT: ABNORMAL
BUN SERPL-MCNC: 8 MG/DL (ref 6–23)
CALCIUM SERPL-MCNC: 9 MG/DL (ref 8.3–10.4)
CHLORIDE SERPL-SCNC: 105 MMOL/L (ref 101–110)
CO2 SERPL-SCNC: 31 MMOL/L (ref 21–32)
CREAT SERPL-MCNC: 0.79 MG/DL (ref 0.6–1)
ERYTHROCYTE [DISTWIDTH] IN BLOOD BY AUTOMATED COUNT: 15.4 % (ref 11.9–14.6)
GLUCOSE SERPL-MCNC: 78 MG/DL (ref 65–100)
HCT VFR BLD AUTO: 37 % (ref 35.8–46.3)
HGB BLD-MCNC: 11.8 G/DL (ref 11.7–15.4)
MCH RBC QN AUTO: 28.2 PG (ref 26.1–32.9)
MCHC RBC AUTO-ENTMCNC: 31.9 G/DL (ref 31.4–35)
MCV RBC AUTO: 88.5 FL (ref 82–102)
NRBC # BLD: 0 K/UL (ref 0–0.2)
PLATELET # BLD AUTO: 261 K/UL (ref 150–450)
PMV BLD AUTO: 10.8 FL (ref 9.4–12.3)
POTASSIUM SERPL-SCNC: 3.5 MMOL/L (ref 3.5–5.1)
RBC # BLD AUTO: 4.18 M/UL (ref 4.05–5.2)
SERVICE CMNT-IMP: ABNORMAL
SODIUM SERPL-SCNC: 139 MMOL/L (ref 133–143)
WBC # BLD AUTO: 3.8 K/UL (ref 4.3–11.1)

## 2023-05-09 PROCEDURE — 87641 MR-STAPH DNA AMP PROBE: CPT

## 2023-05-09 PROCEDURE — 80048 BASIC METABOLIC PNL TOTAL CA: CPT

## 2023-05-09 PROCEDURE — 97161 PT EVAL LOW COMPLEX 20 MIN: CPT

## 2023-05-09 PROCEDURE — 85027 COMPLETE CBC AUTOMATED: CPT

## 2023-05-09 RX ORDER — SODIUM CHLORIDE 0.9 % (FLUSH) 0.9 %
5-40 SYRINGE (ML) INJECTION EVERY 12 HOURS SCHEDULED
Status: CANCELLED | OUTPATIENT
Start: 2023-05-09

## 2023-05-09 RX ORDER — SODIUM CHLORIDE 9 MG/ML
INJECTION, SOLUTION INTRAVENOUS PRN
Status: CANCELLED | OUTPATIENT
Start: 2023-05-09

## 2023-05-09 RX ORDER — SODIUM CHLORIDE 0.9 % (FLUSH) 0.9 %
5-40 SYRINGE (ML) INJECTION PRN
Status: CANCELLED | OUTPATIENT
Start: 2023-05-09

## 2023-05-09 ASSESSMENT — HOOS JR
HOOS JR TOTAL INTERVAL SCORE: 43.335
GOING UP OR DOWN STAIRS: 3
LYING IN BED (TURNING OVER, MAINTAINING HIP POSITION): 2
RISING FROM SITTING: 3
WALKING ON UNEVEN SURFACE: 3
HOOS JR RAW SCORE: 15
BENDING TO THE FLOOR TO PICK UP OBJECT: 2
SITTING: 2
HOOS JR RAW SCORE: 15

## 2023-05-09 ASSESSMENT — PAIN SCALES - GENERAL: PAINLEVEL_OUTOF10: 8

## 2023-05-09 ASSESSMENT — PAIN DESCRIPTION - DESCRIPTORS: DESCRIPTORS: ACHING;GNAWING;SHARP

## 2023-05-09 ASSESSMENT — PAIN DESCRIPTION - ORIENTATION: ORIENTATION: RIGHT;ANTERIOR;INNER

## 2023-05-09 ASSESSMENT — PAIN DESCRIPTION - LOCATION: LOCATION: HIP

## 2023-05-09 NOTE — PERIOP NOTE
Patient verified name and . Order for consent IS NOT found in EHR ; patient verified. Type 3 surgery, joint camp assessment complete. Labs per surgeon: MRSA swab ; results processing. No other orders in EHR at time of assessment  Labs per anesthesia protocol: CBC, BMP; results processing  EKG: dated 3/27/23 in EHR is within anesthesia protocols. MRSA/MSSA swab collected; pharmacy to review and dose antibiotic as appropriate. Hospital approved surgical skin cleanser and instructions to return bottle on DOS given per hospital policy. Patient provided with handouts including Guide to Surgery, Pain Management, Hand Hygiene, Blood Transfusion Education, and Institute Anesthesia Brochure. Patient answered medical/surgical history questions at their best of ability. All prior to admission medications documented in [facility]. Original medication prescription bottles were not visualized during patient appointment. Patient instructed to hold all vitamins 3 weeks prior to surgery and NSAIDS 5 days prior to surgery. Patient teach back successful and patient demonstrates knowledge of instruction.

## 2023-05-09 NOTE — PERIOP NOTE
PLEASE CONTINUE TAKING ALL PRESCRIPTION MEDICATIONS UP TO THE DAY OF SURGERY UNLESS OTHERWISE DIRECTED BELOW. DISCONTINUE all vitamins, herbals and supplements 3 weeks prior to surgery. DISCONTINUE Non-Steriodal Anti-Inflammatory (NSAIDS) such as Advil, Ibuprofen, Motrin, Naproxen and Aleve 21 days prior to surgery. Home Medications to take  the day of surgery    Albuterol inhaler if needed, Levothyroxine           Home Medications   to Hold   Hold Advil for 3 weeks before surgery. Comments   On the day before surgery please take Acetaminophen 2 tablets in the morning and then 2 tablets before bed. Drink Ensure Pre-Surgery 2 bottles the night before surgery and 1 bottles   2 hours prior to your arrival time. Bring Dynahex wash, Incentive Spirometer, inhalers with you to hospital on the day of surgery. Please do not bring home medications with you on the day of surgery unless otherwise directed by your nurse. If you are instructed to bring home medications, please give them to your nurse as they will be administered by the nursing staff. If you have any questions, please call [address] [place] [address] St [phone]. A copy of this note was provided to the patient for reference.

## 2023-05-09 NOTE — PERIOP NOTE
Lab results within anesthesia guidelines, no follow-up required. Labs automatically routed to ordering provider via Epic documentation.      Latest Reference Range & Units 05/09/23 09:25   Sodium 133 - 143 mmol/L 139   Potassium 3.5 - 5.1 mmol/L 3.5   Chloride 101 - 110 mmol/L 105   CO2 21 - 32 mmol/L 31   BUN,BUNPL 6 - 23 MG/DL 8   Creatinine 0.6 - 1.0 MG/DL 0.79   Anion Gap 2 - 11 mmol/L 3   Est, Glom Filt Rate >60 ml/min/1.73m2 >60   Glucose, Random 65 - 100 mg/dL 78   CALCIUM, SERUM, 279389 8.3 - 10.4 MG/DL 9.0   WBC 4.3 - 11.1 K/uL 3.8 (L)   RBC 4.05 - 5.2 M/uL 4.18   Hemoglobin Quant 11.7 - 15.4 g/dL 11.8   Hematocrit 35.8 - 46.3 % 37.0   MCV 82.0 - 102.0 FL 88.5   MCH 26.1 - 32.9 PG 28.2   MCHC 31.4 - 35.0 g/dL 31.9   MPV 9.4 - 12.3 FL 10.8   RDW 11.9 - 14.6 % 15.4 (H)   Platelet Count 641 - 450 K/uL 261   Nucleated Red Blood Cells 0.0 - 0.2 K/uL 0.00   MSSA/MRSA SCREEN BY PCR  Rpt   (L): Data is abnormally low  (H): Data is abnormally high  Rpt: View report in Results Review for more information

## 2023-05-09 NOTE — PROGRESS NOTES
Ben Schmitt  : 1965  Primary: [employer]  Secondary:  Joint Camp at [facility]  [address], [address]  [phone]        Physical Therapy Prehab Evaluation Summary:2023   Time In/Out   PT Charge Capture  Episode     MEDICAL/REFERRING DIAGNOSIS: Unilateral primary osteoarthritis, right hip [M16.11]  REFERRING PHYSICIAN: Tristian Davis,*    ICD-10: Treatment Diagnosis:   Pain in Right Hip (M25.551)  Stiffness of Right Hip, Not elsewhere classified (M25.651)    DATE OF SURGERY: 23  Assessment:   COMMENTS:  She is here alone and is having a R AGUSTÍN. She has had a L AGUSTÍN in . She is planning on staying overnight before DC. She will go home and have her partner's assist at DC. She did not save her DME from her prior surgery, so she is going to need to acquire another RW. PROBLEM LIST:   (Impacting functional limitations):  Ms. Lopez presents with the following lower extremity(s) problems:  Strength  Range of Motion  Home Exercise Program  Pain INTERVENTIONS PLANNED:   (Benefits and precautions of physical therapy have been discussed with the patient.)  Home Exercise Program  Educational Discussion       GOALS: (Goals have been discussed and agreed upon with patient.)  Discharge Goals: Time Frame: 1 Day  Patient will demonstrate independence with a home exercise program designed to increase strength, range of motion, and pain control to minimize functional deficits and optimize patient for total joint replacement. Subjective:   Past Medical History/Comorbidities:   Ms. Lopez  has a past medical history of Asthma, Bell's palsy, Chronic obstructive pulmonary disease ([facility] [address] Former cigarette smoker, History of positive PPD, Hypothyroidism, Ill-defined condition, Marijuana smoker, and OA (osteoarthritis). Ms. Lopez  has a past surgical history that includes other surgical history (10/09/2020);  Thyroidectomy (2018); and orthopedic

## 2023-05-09 NOTE — PROGRESS NOTES
Total Joint Surgery Preoperative Chart Review      Patient ID:  Kaylie Mendoza  [id number]  85 y.o.  1965  Surgeon: Dr. Stephanie Weeks  Date of Surgery: 5/30/2023  Procedure: Total Right Hip Arthroplasty  Primary Care Physician: On File Not (Inactive) None  Specialty Physician(s):      Subjective: Kaylie Mendoza is a 66 y.o. Black / [place] American female who presents for preoperative evaluation for Total Right Hip arthroplasty. This is a preoperative chart review note based on data collected by the nurse at the surgical Pre-Assessment visit. Past Medical History:   Diagnosis Date    Asthma     inhaler and nebs prn    Bell's palsy 11/2019    Chronic obstructive pulmonary disease (HCC)     advair and albuterol prn=uses 1-2 times per week; Neb PRN=never uses; not followed by Pulmonary     Former cigarette smoker     History of positive PPD 1996    no meds     Hypothyroidism     on med for control     Ill-defined condition     pancreatitis x1; not chronic per patient     Marijuana smoker     OA (osteoarthritis)       Past Surgical History:   Procedure Laterality Date    ORTHOPEDIC SURGERY Left 2007    left ankle fracture- has a plate    OTHER SURGICAL HISTORY  10/09/2020    teeth removal     THYROIDECTOMY  12/04/2018     Family History   Problem Relation Age of Onset    Hypertension Father     Cancer Brother 47        brain    Hypertension Mother       Social History     Tobacco Use    Smoking status: Every Day     Packs/day: 0.25     Types: Cigarettes     Start date: 1/1/1992    Smokeless tobacco: Never   Substance Use Topics    Alcohol use: No       Prior to Admission medications    Medication Sig Start Date End Date Taking?  Authorizing Provider   diclofenac (CATAFLAM) 50 MG tablet Take 1 tablet by mouth 2 times daily as needed for Pain  Patient not taking: Reported on 5/9/2023 1/25/23   MISAEL Persaud - CNP   tiZANidine (ZANAFLEX) 4 MG tablet Take 1 tablet by mouth 3 times daily as needed (muscle

## 2023-05-12 ENCOUNTER — TELEPHONE (OUTPATIENT)
Dept: ORTHOPEDIC SURGERY | Age: 58
End: 2023-05-12

## 2023-05-12 NOTE — TELEPHONE ENCOUNTER
Received [employer] fmla form from [facility] (paid) put in box for completion 5/12/23,MB

## 2023-05-19 ENCOUNTER — CLINICAL DOCUMENTATION (OUTPATIENT)
Dept: ORTHOPEDIC SURGERY | Age: 58
End: 2023-05-19

## 2023-05-22 ENCOUNTER — OFFICE VISIT (OUTPATIENT)
Dept: ORTHOPEDIC SURGERY | Age: 58
End: 2023-05-22

## 2023-05-22 DIAGNOSIS — M16.11 PRIMARY OSTEOARTHRITIS OF RIGHT HIP: Primary | ICD-10-CM

## 2023-05-22 PROCEDURE — PREOPEXAM PRE-OP EXAM: Performed by: PHYSICIAN ASSISTANT

## 2023-05-22 NOTE — PROGRESS NOTES
Name: Del Hayes  YOB: 1965  Gender: female  MRN: [id number]      Current Outpatient Medications:     diclofenac (CATAFLAM) 50 MG tablet, Take 1 tablet by mouth 2 times daily as needed for Pain (Patient not taking: Reported on 5/9/2023), Disp: 60 tablet, Rfl: 0    tiZANidine (ZANAFLEX) 4 MG tablet, Take 1 tablet by mouth 3 times daily as needed (muscle spasm), Disp: 30 tablet, Rfl: 0    meloxicam (MOBIC) 7.5 MG tablet, Take 1 tablet by mouth daily for 7 days, Disp: 7 tablet, Rfl: 0    levothyroxine (SYNTHROID) 100 MCG tablet, Take 1 tablet by mouth daily, Disp: 30 tablet, Rfl: 2    albuterol sulfate  (90 Base) MCG/ACT inhaler, The details of the medication are not available because there are pending changes by a home health clinician., Disp: , Rfl:     albuterol (PROVENTIL) (2.5 MG/3ML) 0.083% nebulizer solution, Inhale 3 mLs into the lungs every 4 hours as needed, Disp: , Rfl:     fluticasone-salmeterol (ADVAIR) 250-50 MCG/ACT AEPB diskus inhaler, Inhale 1 puff into the lungs 2 times daily, Disp: , Rfl:     HYDROcodone homatropine (HYCODAN) 5-1.5 MG/5ML solution, Hycodan (with homatropine) 5 mg-1.5 mg/5 mL oral syrup TAKE 5 ML BY MOUTH THREE TIMES A DAY AS NEEDED FOR COUGH FOR UP TO 3 DAYS (Patient not taking: Reported on 5/9/2023), Disp: , Rfl:     polyethylene glycol-electrolytes (NULYTELY) 420 g solution, Peg-electrolyte solution 420 gram oral solution, Disp: , Rfl:     polyethylene glycol (GLYCOLAX) 17 GM/SCOOP powder, Take 17 g by mouth daily, Disp: , Rfl:   Allergies   Allergen Reactions    Latex Rash       Pre-op  exam Right AGUSTÍN  CC:  right hip pain    History:  Patient returns today for pre-op exam prior to Right AGUSTÍN. See formal H&P in Epic chart.

## 2023-05-29 ENCOUNTER — ANESTHESIA EVENT (OUTPATIENT)
Dept: SURGERY | Age: 58
End: 2023-05-29
Payer: COMMERCIAL

## 2023-05-30 ENCOUNTER — HOME HEALTH ADMISSION (OUTPATIENT)
Dept: HOME HEALTH SERVICES | Facility: HOME HEALTH | Age: 58
End: 2023-05-30
Payer: COMMERCIAL

## 2023-05-30 ENCOUNTER — ANESTHESIA (OUTPATIENT)
Dept: SURGERY | Age: 58
End: 2023-05-30
Payer: COMMERCIAL

## 2023-05-30 ENCOUNTER — APPOINTMENT (OUTPATIENT)
Dept: GENERAL RADIOLOGY | Age: 58
End: 2023-05-30
Attending: ORTHOPAEDIC SURGERY
Payer: COMMERCIAL

## 2023-05-30 ENCOUNTER — HOSPITAL ENCOUNTER (OUTPATIENT)
Age: 58
Discharge: HOME | End: 2023-05-31
Attending: ORTHOPAEDIC SURGERY | Admitting: ORTHOPAEDIC SURGERY
Payer: COMMERCIAL

## 2023-05-30 DIAGNOSIS — M16.11 PRIMARY OSTEOARTHRITIS OF RIGHT HIP: ICD-10-CM

## 2023-05-30 LAB
HCT VFR BLD AUTO: 34.9 % (ref 35.8–46.3)
HGB BLD-MCNC: 11.1 G/DL (ref 11.7–15.4)

## 2023-05-30 PROCEDURE — 2580000003 HC RX 258: Performed by: REGISTERED NURSE

## 2023-05-30 PROCEDURE — 2580000003 HC RX 258: Performed by: PHYSICIAN ASSISTANT

## 2023-05-30 PROCEDURE — 6360000002 HC RX W HCPCS: Performed by: REGISTERED NURSE

## 2023-05-30 PROCEDURE — 97535 SELF CARE MNGMENT TRAINING: CPT

## 2023-05-30 PROCEDURE — 72170 X-RAY EXAM OF PELVIS: CPT

## 2023-05-30 PROCEDURE — 97165 OT EVAL LOW COMPLEX 30 MIN: CPT

## 2023-05-30 PROCEDURE — 7100000000 HC PACU RECOVERY - FIRST 15 MIN: Performed by: ORTHOPAEDIC SURGERY

## 2023-05-30 PROCEDURE — 7100000001 HC PACU RECOVERY - ADDTL 15 MIN: Performed by: ORTHOPAEDIC SURGERY

## 2023-05-30 PROCEDURE — 2580000003 HC RX 258: Performed by: ANESTHESIOLOGY

## 2023-05-30 PROCEDURE — 3600000015 HC SURGERY LEVEL 5 ADDTL 15MIN: Performed by: ORTHOPAEDIC SURGERY

## 2023-05-30 PROCEDURE — 97530 THERAPEUTIC ACTIVITIES: CPT

## 2023-05-30 PROCEDURE — 6370000000 HC RX 637 (ALT 250 FOR IP): Performed by: PHYSICIAN ASSISTANT

## 2023-05-30 PROCEDURE — 36415 COLL VENOUS BLD VENIPUNCTURE: CPT

## 2023-05-30 PROCEDURE — 6360000002 HC RX W HCPCS: Performed by: PHYSICIAN ASSISTANT

## 2023-05-30 PROCEDURE — 2500000003 HC RX 250 WO HCPCS: Performed by: REGISTERED NURSE

## 2023-05-30 PROCEDURE — 94761 N-INVAS EAR/PLS OXIMETRY MLT: CPT

## 2023-05-30 PROCEDURE — 6360000002 HC RX W HCPCS: Performed by: ORTHOPAEDIC SURGERY

## 2023-05-30 PROCEDURE — 85014 HEMATOCRIT: CPT

## 2023-05-30 PROCEDURE — 3700000000 HC ANESTHESIA ATTENDED CARE: Performed by: ORTHOPAEDIC SURGERY

## 2023-05-30 PROCEDURE — 94760 N-INVAS EAR/PLS OXIMETRY 1: CPT

## 2023-05-30 PROCEDURE — 85018 HEMOGLOBIN: CPT

## 2023-05-30 PROCEDURE — 3600000005 HC SURGERY LEVEL 5 BASE: Performed by: ORTHOPAEDIC SURGERY

## 2023-05-30 PROCEDURE — 2500000003 HC RX 250 WO HCPCS: Performed by: ORTHOPAEDIC SURGERY

## 2023-05-30 PROCEDURE — 2720000010 HC SURG SUPPLY STERILE: Performed by: ORTHOPAEDIC SURGERY

## 2023-05-30 PROCEDURE — C1776 JOINT DEVICE (IMPLANTABLE): HCPCS | Performed by: ORTHOPAEDIC SURGERY

## 2023-05-30 PROCEDURE — 2709999900 HC NON-CHARGEABLE SUPPLY: Performed by: ORTHOPAEDIC SURGERY

## 2023-05-30 PROCEDURE — 3700000001 HC ADD 15 MINUTES (ANESTHESIA): Performed by: ORTHOPAEDIC SURGERY

## 2023-05-30 PROCEDURE — 97161 PT EVAL LOW COMPLEX 20 MIN: CPT

## 2023-05-30 PROCEDURE — 6360000002 HC RX W HCPCS: Performed by: ANESTHESIOLOGY

## 2023-05-30 PROCEDURE — 2580000003 HC RX 258: Performed by: ORTHOPAEDIC SURGERY

## 2023-05-30 DEVICE — BI MENTUM PFR PE LINER 41MM22MM: Type: IMPLANTABLE DEVICE | Site: HIP | Status: FUNCTIONAL

## 2023-05-30 DEVICE — PINNACLE HIP SOLUTIONS DUAL MOBILITY LINER PINNACLE ACETABULAR SHELL BI-MENTUM PE LINER 48/41 48MM 41/22.2
Type: IMPLANTABLE DEVICE | Site: HIP | Status: FUNCTIONAL
Brand: PINNACLE HIP SOLUTIONS

## 2023-05-30 DEVICE — ARTICUL/EZE FEMORAL HEAD DIAMETER 22.225MM +4 12/14 TAPER
Type: IMPLANTABLE DEVICE | Site: HIP | Status: FUNCTIONAL
Brand: ARTICUL/EZE

## 2023-05-30 DEVICE — PINNACLE GRIPTION ACETABULAR SHELL SECTOR 48MM OD
Type: IMPLANTABLE DEVICE | Site: HIP | Status: FUNCTIONAL
Brand: PINNACLE GRIPTION

## 2023-05-30 DEVICE — HIP H3 TOT ADV DUAL MOB IMPL CAPPED H3: Type: IMPLANTABLE DEVICE | Site: HIP | Status: FUNCTIONAL

## 2023-05-30 DEVICE — ACTIS DUOFIX HIP PROSTHESIS (FEMORAL STEM 12/14 TAPER CEMENTLESS SIZE 3 STD COLLAR)  CE
Type: IMPLANTABLE DEVICE | Site: HIP | Status: FUNCTIONAL
Brand: ACTIS

## 2023-05-30 DEVICE — PINNACLE CANCELLOUS BONE SCREW 6.5MM X 25MM
Type: IMPLANTABLE DEVICE | Site: HIP | Status: FUNCTIONAL
Brand: PINNACLE

## 2023-05-30 RX ORDER — SODIUM CHLORIDE 0.9 % (FLUSH) 0.9 %
5-40 SYRINGE (ML) INJECTION PRN
Status: DISCONTINUED | OUTPATIENT
Start: 2023-05-30 | End: 2023-05-30 | Stop reason: HOSPADM

## 2023-05-30 RX ORDER — SODIUM CHLORIDE 0.9 % (FLUSH) 0.9 %
5-40 SYRINGE (ML) INJECTION PRN
Status: DISCONTINUED | OUTPATIENT
Start: 2023-05-30 | End: 2023-05-31 | Stop reason: HOSPADM

## 2023-05-30 RX ORDER — OXYCODONE HYDROCHLORIDE 5 MG/1
5 TABLET ORAL PRN
Status: DISCONTINUED | OUTPATIENT
Start: 2023-05-30 | End: 2023-05-30 | Stop reason: HOSPADM

## 2023-05-30 RX ORDER — ACETAMINOPHEN 325 MG/1
650 TABLET ORAL EVERY 6 HOURS
Status: DISCONTINUED | OUTPATIENT
Start: 2023-05-30 | End: 2023-05-31 | Stop reason: HOSPADM

## 2023-05-30 RX ORDER — ONDANSETRON 2 MG/ML
4 INJECTION INTRAMUSCULAR; INTRAVENOUS
Status: DISCONTINUED | OUTPATIENT
Start: 2023-05-30 | End: 2023-05-30 | Stop reason: HOSPADM

## 2023-05-30 RX ORDER — DIPHENHYDRAMINE HYDROCHLORIDE 50 MG/ML
25 INJECTION INTRAMUSCULAR; INTRAVENOUS EVERY 6 HOURS PRN
Status: DISCONTINUED | OUTPATIENT
Start: 2023-05-30 | End: 2023-05-31 | Stop reason: HOSPADM

## 2023-05-30 RX ORDER — SODIUM CHLORIDE 0.9 % (FLUSH) 0.9 %
5-40 SYRINGE (ML) INJECTION EVERY 12 HOURS SCHEDULED
Status: DISCONTINUED | OUTPATIENT
Start: 2023-05-30 | End: 2023-05-30 | Stop reason: HOSPADM

## 2023-05-30 RX ORDER — PROPOFOL 10 MG/ML
INJECTION, EMULSION INTRAVENOUS PRN
Status: DISCONTINUED | OUTPATIENT
Start: 2023-05-30 | End: 2023-05-30 | Stop reason: SDUPTHER

## 2023-05-30 RX ORDER — HYDROMORPHONE HYDROCHLORIDE 1 MG/ML
1 INJECTION, SOLUTION INTRAMUSCULAR; INTRAVENOUS; SUBCUTANEOUS
Status: DISCONTINUED | OUTPATIENT
Start: 2023-05-30 | End: 2023-05-31 | Stop reason: HOSPADM

## 2023-05-30 RX ORDER — METHOCARBAMOL 750 MG/1
750 TABLET, FILM COATED ORAL 4 TIMES DAILY PRN
Status: DISCONTINUED | OUTPATIENT
Start: 2023-05-30 | End: 2023-05-31 | Stop reason: HOSPADM

## 2023-05-30 RX ORDER — VANCOMYCIN HYDROCHLORIDE 1 G/20ML
INJECTION, POWDER, LYOPHILIZED, FOR SOLUTION INTRAVENOUS PRN
Status: DISCONTINUED | OUTPATIENT
Start: 2023-05-30 | End: 2023-05-30 | Stop reason: ALTCHOICE

## 2023-05-30 RX ORDER — SODIUM CHLORIDE, SODIUM LACTATE, POTASSIUM CHLORIDE, CALCIUM CHLORIDE 600; 310; 30; 20 MG/100ML; MG/100ML; MG/100ML; MG/100ML
INJECTION, SOLUTION INTRAVENOUS CONTINUOUS
Status: DISCONTINUED | OUTPATIENT
Start: 2023-05-30 | End: 2023-05-31 | Stop reason: HOSPADM

## 2023-05-30 RX ORDER — OXYCODONE HYDROCHLORIDE 5 MG/1
10 TABLET ORAL PRN
Status: DISCONTINUED | OUTPATIENT
Start: 2023-05-30 | End: 2023-05-30 | Stop reason: HOSPADM

## 2023-05-30 RX ORDER — SENNA AND DOCUSATE SODIUM 50; 8.6 MG/1; MG/1
1 TABLET, FILM COATED ORAL 2 TIMES DAILY
Status: DISCONTINUED | OUTPATIENT
Start: 2023-05-30 | End: 2023-05-31 | Stop reason: HOSPADM

## 2023-05-30 RX ORDER — ONDANSETRON 2 MG/ML
4 INJECTION INTRAMUSCULAR; INTRAVENOUS EVERY 6 HOURS PRN
Status: DISCONTINUED | OUTPATIENT
Start: 2023-05-30 | End: 2023-05-31 | Stop reason: HOSPADM

## 2023-05-30 RX ORDER — KETOROLAC TROMETHAMINE 30 MG/ML
INJECTION, SOLUTION INTRAMUSCULAR; INTRAVENOUS PRN
Status: DISCONTINUED | OUTPATIENT
Start: 2023-05-30 | End: 2023-05-30 | Stop reason: ALTCHOICE

## 2023-05-30 RX ORDER — DIPHENHYDRAMINE HCL 25 MG
25 CAPSULE ORAL EVERY 6 HOURS PRN
Status: DISCONTINUED | OUTPATIENT
Start: 2023-05-30 | End: 2023-05-31 | Stop reason: HOSPADM

## 2023-05-30 RX ORDER — SODIUM CHLORIDE 0.9 % (FLUSH) 0.9 %
5-40 SYRINGE (ML) INJECTION EVERY 12 HOURS SCHEDULED
Status: DISCONTINUED | OUTPATIENT
Start: 2023-05-30 | End: 2023-05-31 | Stop reason: HOSPADM

## 2023-05-30 RX ORDER — SODIUM CHLORIDE 9 MG/ML
INJECTION, SOLUTION INTRAVENOUS PRN
Status: DISCONTINUED | OUTPATIENT
Start: 2023-05-30 | End: 2023-05-31 | Stop reason: HOSPADM

## 2023-05-30 RX ORDER — SODIUM CHLORIDE 9 MG/ML
INJECTION, SOLUTION INTRAVENOUS PRN
Status: DISCONTINUED | OUTPATIENT
Start: 2023-05-30 | End: 2023-05-30 | Stop reason: HOSPADM

## 2023-05-30 RX ORDER — ACETAMINOPHEN 500 MG
1000 TABLET ORAL ONCE
Status: DISCONTINUED | OUTPATIENT
Start: 2023-05-30 | End: 2023-05-30 | Stop reason: HOSPADM

## 2023-05-30 RX ORDER — ONDANSETRON 4 MG/1
4 TABLET, ORALLY DISINTEGRATING ORAL EVERY 8 HOURS PRN
Status: DISCONTINUED | OUTPATIENT
Start: 2023-05-30 | End: 2023-05-31 | Stop reason: HOSPADM

## 2023-05-30 RX ORDER — DIPHENHYDRAMINE HYDROCHLORIDE 50 MG/ML
12.5 INJECTION INTRAMUSCULAR; INTRAVENOUS
Status: DISCONTINUED | OUTPATIENT
Start: 2023-05-30 | End: 2023-05-30 | Stop reason: HOSPADM

## 2023-05-30 RX ORDER — ALBUTEROL SULFATE 2.5 MG/3ML
2.5 SOLUTION RESPIRATORY (INHALATION) EVERY 6 HOURS PRN
Status: DISCONTINUED | OUTPATIENT
Start: 2023-05-30 | End: 2023-05-31 | Stop reason: HOSPADM

## 2023-05-30 RX ORDER — ONDANSETRON 2 MG/ML
INJECTION INTRAMUSCULAR; INTRAVENOUS PRN
Status: DISCONTINUED | OUTPATIENT
Start: 2023-05-30 | End: 2023-05-30 | Stop reason: SDUPTHER

## 2023-05-30 RX ORDER — ROPIVACAINE HYDROCHLORIDE 2 MG/ML
INJECTION, SOLUTION EPIDURAL; INFILTRATION; PERINEURAL PRN
Status: DISCONTINUED | OUTPATIENT
Start: 2023-05-30 | End: 2023-05-30 | Stop reason: ALTCHOICE

## 2023-05-30 RX ORDER — SODIUM CHLORIDE, SODIUM LACTATE, POTASSIUM CHLORIDE, CALCIUM CHLORIDE 600; 310; 30; 20 MG/100ML; MG/100ML; MG/100ML; MG/100ML
INJECTION, SOLUTION INTRAVENOUS CONTINUOUS PRN
Status: DISCONTINUED | OUTPATIENT
Start: 2023-05-30 | End: 2023-05-30 | Stop reason: SDUPTHER

## 2023-05-30 RX ORDER — LIDOCAINE HYDROCHLORIDE 10 MG/ML
1 INJECTION, SOLUTION INFILTRATION; PERINEURAL
Status: DISCONTINUED | OUTPATIENT
Start: 2023-05-30 | End: 2023-05-30 | Stop reason: HOSPADM

## 2023-05-30 RX ORDER — OXYCODONE HYDROCHLORIDE 5 MG/1
10 TABLET ORAL EVERY 4 HOURS PRN
Status: DISCONTINUED | OUTPATIENT
Start: 2023-05-30 | End: 2023-05-31 | Stop reason: HOSPADM

## 2023-05-30 RX ORDER — PROCHLORPERAZINE EDISYLATE 5 MG/ML
5 INJECTION INTRAMUSCULAR; INTRAVENOUS
Status: DISCONTINUED | OUTPATIENT
Start: 2023-05-30 | End: 2023-05-30 | Stop reason: HOSPADM

## 2023-05-30 RX ORDER — MIDAZOLAM HYDROCHLORIDE 2 MG/2ML
2 INJECTION, SOLUTION INTRAMUSCULAR; INTRAVENOUS
Status: COMPLETED | OUTPATIENT
Start: 2023-05-30 | End: 2023-05-30

## 2023-05-30 RX ORDER — DEXAMETHASONE SODIUM PHOSPHATE 10 MG/ML
INJECTION INTRAMUSCULAR; INTRAVENOUS PRN
Status: DISCONTINUED | OUTPATIENT
Start: 2023-05-30 | End: 2023-05-30 | Stop reason: SDUPTHER

## 2023-05-30 RX ORDER — ASPIRIN 81 MG/1
81 TABLET ORAL 2 TIMES DAILY
Status: DISCONTINUED | OUTPATIENT
Start: 2023-05-30 | End: 2023-05-31 | Stop reason: HOSPADM

## 2023-05-30 RX ORDER — LIDOCAINE HYDROCHLORIDE 20 MG/ML
INJECTION, SOLUTION EPIDURAL; INFILTRATION; INTRACAUDAL; PERINEURAL PRN
Status: DISCONTINUED | OUTPATIENT
Start: 2023-05-30 | End: 2023-05-30 | Stop reason: SDUPTHER

## 2023-05-30 RX ADMIN — Medication 1000 MG: at 08:15

## 2023-05-30 RX ADMIN — PROPOFOL 80 MG: 10 INJECTION, EMULSION INTRAVENOUS at 08:21

## 2023-05-30 RX ADMIN — LIDOCAINE HYDROCHLORIDE 30 MG: 20 INJECTION, SOLUTION EPIDURAL; INFILTRATION; INTRACAUDAL; PERINEURAL at 08:21

## 2023-05-30 RX ADMIN — PHENYLEPHRINE HYDROCHLORIDE 50 MCG: 10 INJECTION INTRAVENOUS at 08:45

## 2023-05-30 RX ADMIN — CEFAZOLIN SODIUM 2000 MG: 100 INJECTION, POWDER, LYOPHILIZED, FOR SOLUTION INTRAVENOUS at 14:43

## 2023-05-30 RX ADMIN — MIDAZOLAM 2 MG: 1 INJECTION INTRAMUSCULAR; INTRAVENOUS at 07:45

## 2023-05-30 RX ADMIN — ONDANSETRON 4 MG: 4 TABLET, ORALLY DISINTEGRATING ORAL at 11:40

## 2023-05-30 RX ADMIN — DEXAMETHASONE SODIUM PHOSPHATE 10 MG: 10 INJECTION INTRAMUSCULAR; INTRAVENOUS at 08:31

## 2023-05-30 RX ADMIN — OXYCODONE HYDROCHLORIDE 5 MG: 5 TABLET ORAL at 23:18

## 2023-05-30 RX ADMIN — PHENYLEPHRINE HYDROCHLORIDE 50 MCG: 10 INJECTION INTRAVENOUS at 09:20

## 2023-05-30 RX ADMIN — ACETAMINOPHEN 650 MG: 325 TABLET, FILM COATED ORAL at 11:40

## 2023-05-30 RX ADMIN — ONDANSETRON 4 MG: 2 INJECTION INTRAMUSCULAR; INTRAVENOUS at 08:31

## 2023-05-30 RX ADMIN — PROPOFOL 75 MCG/KG/MIN: 10 INJECTION, EMULSION INTRAVENOUS at 08:22

## 2023-05-30 RX ADMIN — HYDROMORPHONE HYDROCHLORIDE 1 MG: 1 INJECTION, SOLUTION INTRAMUSCULAR; INTRAVENOUS; SUBCUTANEOUS at 11:41

## 2023-05-30 RX ADMIN — ASPIRIN 81 MG: 81 TABLET, COATED ORAL at 19:22

## 2023-05-30 RX ADMIN — MEPIVACAINE HYDROCHLORIDE 50 MG: 20 INJECTION, SOLUTION EPIDURAL; INFILTRATION at 08:13

## 2023-05-30 RX ADMIN — CEFAZOLIN SODIUM 2000 MG: 100 INJECTION, POWDER, LYOPHILIZED, FOR SOLUTION INTRAVENOUS at 23:18

## 2023-05-30 RX ADMIN — ACETAMINOPHEN 650 MG: 325 TABLET, FILM COATED ORAL at 23:18

## 2023-05-30 RX ADMIN — DOCUSATE SODIUM 50MG AND SENNOSIDES 8.6MG 1 TABLET: 8.6; 5 TABLET, FILM COATED ORAL at 19:22

## 2023-05-30 RX ADMIN — PHENYLEPHRINE HYDROCHLORIDE 50 MCG: 10 INJECTION INTRAVENOUS at 09:00

## 2023-05-30 RX ADMIN — ACETAMINOPHEN 650 MG: 325 TABLET, FILM COATED ORAL at 18:37

## 2023-05-30 RX ADMIN — SODIUM CHLORIDE, POTASSIUM CHLORIDE, SODIUM LACTATE AND CALCIUM CHLORIDE: 600; 310; 30; 20 INJECTION, SOLUTION INTRAVENOUS at 07:43

## 2023-05-30 RX ADMIN — SODIUM CHLORIDE, SODIUM LACTATE, POTASSIUM CHLORIDE, AND CALCIUM CHLORIDE: 600; 310; 30; 20 INJECTION, SOLUTION INTRAVENOUS at 09:12

## 2023-05-30 RX ADMIN — METHOCARBAMOL 750 MG: 750 TABLET ORAL at 19:22

## 2023-05-30 RX ADMIN — OXYCODONE HYDROCHLORIDE 10 MG: 5 TABLET ORAL at 14:43

## 2023-05-30 RX ADMIN — SODIUM CHLORIDE, SODIUM LACTATE, POTASSIUM CHLORIDE, AND CALCIUM CHLORIDE: 600; 310; 30; 20 INJECTION, SOLUTION INTRAVENOUS at 08:05

## 2023-05-30 RX ADMIN — PHENYLEPHRINE HYDROCHLORIDE 50 MCG: 10 INJECTION INTRAVENOUS at 09:12

## 2023-05-30 RX ADMIN — OXYCODONE HYDROCHLORIDE 10 MG: 5 TABLET ORAL at 18:37

## 2023-05-30 RX ADMIN — SODIUM CHLORIDE, PRESERVATIVE FREE 10 ML: 5 INJECTION INTRAVENOUS at 19:22

## 2023-05-30 RX ADMIN — Medication 2 G: at 08:10

## 2023-05-30 ASSESSMENT — PAIN - FUNCTIONAL ASSESSMENT: PAIN_FUNCTIONAL_ASSESSMENT: 0-10

## 2023-05-30 ASSESSMENT — LIFESTYLE VARIABLES
SMOKING_STATUS: 1
HOW MANY STANDARD DRINKS CONTAINING ALCOHOL DO YOU HAVE ON A TYPICAL DAY: PATIENT DOES NOT DRINK
HOW OFTEN DO YOU HAVE A DRINK CONTAINING ALCOHOL: NEVER

## 2023-05-30 ASSESSMENT — PAIN DESCRIPTION - DESCRIPTORS: DESCRIPTORS: ACHING;THROBBING

## 2023-05-30 ASSESSMENT — COPD QUESTIONNAIRES: CAT_SEVERITY: MODERATE

## 2023-05-30 NOTE — PLAN OF CARE
Problem: Safety - Adult  Goal: Free from fall injury  5/30/2023 1210 by Joel Álvarez RN  Outcome: Progressing  5/30/2023 1209 by Joel Álvarez RN  Outcome: Progressing     Problem: Pain  Goal: Verbalizes/displays adequate comfort level or baseline comfort level  5/30/2023 1210 by Joel Álvarez RN  Outcome: Progressing  5/30/2023 1209 by Joel Álvarez RN  Outcome: Progressing     Problem: Discharge Planning  Goal: Discharge to home or other facility with appropriate resources  5/30/2023 1210 by Joel Álvarez RN  Outcome: Progressing  5/30/2023 1209 by Joel Álvarez RN  Outcome: Progressing

## 2023-05-30 NOTE — H&P
The patient has end stage arthritis of the right hip. The patient was see and examined and there are no changes to the patient's orthopedic condition. They have tried conservative treatment for this condition; including antiinflammatories and lifestyle modifications and have failed. The necessity for the joint replacement is still present, and the H&P from the office is still current. The patient is admitted today forProcedure(s) (LRB):  HIP TOTAL ARTHROPLASTY/ SDD (Right) .

## 2023-05-30 NOTE — PERIOP NOTE
TRANSFER - OUT REPORT:    Verbal report given to RN Addie Gordillo on Serina Obando  being transferred to [address] [place] [address] for routine post-op       Report consisted of patient's Situation, Background, Assessment and   Recommendations(SBAR). Information from the following report(s) Nurse Handoff Report, Index, Adult Overview, Surgery Report, Intake/Output, and MAR was reviewed with the receiving nurse. [place] Assessment: No data recorded  Lines:   Peripheral IV 05/30/23 Left;Posterior Forearm (Active)        Opportunity for questions and clarification was provided.       Patient transported with:  O2 @ 2lpm

## 2023-05-30 NOTE — CARE COORDINATION
Patient is a 66y.o. year old female admitted for Right AGUSTÍN. Patient plans to discharge to [address], [address], [place], [address] Order received to arrange home health. Patient without preference towards agency. Referral sent to [facility]; CM notified liaison of updated d/c address. Patient requesting we arrange a walker and bedside commode. Pt without preference towards provider. [facility] [address] [place] [place] [address] delivered to the hospital room prior to discharge. Will follow until discharge.

## 2023-05-30 NOTE — ANESTHESIA PROCEDURE NOTES
Spinal Block    Patient location during procedure: OR  End time: 5/30/2023 8:18 AM  Reason for block: primary anesthetic  Staffing  Performed: anesthesiologist   Anesthesiologist: Romario Baca MD  Spinal Block  Patient position: sitting  Prep: ChloraPrep  Patient monitoring: cardiac monitor, continuous pulse ox, frequent blood pressure checks and oxygen  Approach: midline  Location: L4/L5  Provider prep: mask and sterile gloves  Needle  Needle type: pencil-tip   Needle gauge: 25 G  Needle length: 3.5 in  Assessment  Sensory level: T8  Swirl obtained: Yes  CSF: clear  Attempts: 1  Hemodynamics: stable  Additional Notes  Time out at [place] [address]  Completed: patient identified, IV checked, risks and benefits discussed, surgical/procedural consents, equipment checked, pre-op evaluation, timeout performed, anesthesia consent given, oxygen available and monitors applied/VS acknowledged

## 2023-05-30 NOTE — ANESTHESIA PRE PROCEDURE
Department of Anesthesiology  Preprocedure Note       Name:  Silvina Mario   Age:  66 y.o.  :  1965                                          MRN:  [id number]         Date:  2023      Surgeon: Dao Black):  Rakesh Sepulveda MD    Procedure: Procedure(s):  HIP TOTAL ARTHROPLASTY/ SDD    Medications prior to admission:   Prior to Admission medications    Medication Sig Start Date End Date Taking? Authorizing Provider   diclofenac (CATAFLAM) 50 MG tablet Take 1 tablet by mouth 2 times daily as needed for Pain  Patient not taking: Reported on 2023   MISAEL Araujo CNP   tiZANidine (ZANAFLEX) 4 MG tablet Take 1 tablet by mouth 3 times daily as needed (muscle spasm)  Patient not taking: Reported on 2023   MISAEL Araujo CNP   meloxicam (MOBIC) 7.5 MG tablet Take 1 tablet by mouth daily for 7 days 1/10/23 1/17/23  LILIAN Oropeza   levothyroxine (SYNTHROID) 100 MCG tablet Take 1 tablet by mouth daily 1/10/23 5/30/23  LILIAN Oropeza   albuterol sulfate  (90 Base) MCG/ACT inhaler The details of the medication are not available because there are pending changes by a home health clinician.  3/10/19   Ar Automatic Reconciliation   albuterol (PROVENTIL) (2.5 MG/3ML) 0.083% nebulizer solution Inhale 3 mLs into the lungs every 4 hours as needed    Ar Automatic Reconciliation   fluticasone-salmeterol (ADVAIR) 250-50 MCG/ACT AEPB diskus inhaler Inhale 1 puff into the lungs 2 times daily    Ar Automatic Reconciliation   HYDROcodone homatropine (HYCODAN) 5-1.5 MG/5ML solution Hycodan (with homatropine) 5 mg-1.5 mg/5 mL oral syrup TAKE 5 ML BY MOUTH THREE TIMES A DAY AS NEEDED FOR COUGH FOR UP TO 3 DAYS  Patient not taking: Reported on 2023    Ar Automatic Reconciliation   polyethylene glycol-electrolytes (NULYTELY) 420 g solution Peg-electrolyte solution 420 gram oral solution 22   Ar Automatic Reconciliation   polyethylene glycol (GLYCOLAX) 17

## 2023-05-30 NOTE — OP NOTE
Total Hip Procedure Note               Janiya Fletcher, [id number], 1965    Pre-operative Diagnosis: Hip Arthritis Primary osteoarthritis of right hip [M16.11]       Post-operative Diagnosis: Same     Procedure: Total Hip Arthroplasty     BMI: Body mass index is 24.28 kg/m². Malinda Subramanian Location: [address] [place] [address]      Surgeon: Natalia Kumar MD       Anesthesia: Spinal  BMI:Body mass index is 24.28 kg/m². EBL: 150 cc     Procedure: Total Hip Arthroplasty    The complexity of the total joint surgery requires the use of a first assistant for positioning, retraction and assistance in closure. The patient's size and surgical complexity makes implantation and proper insertion of total joint implants more difficult requiring a skilled first assistant       Janiya Fletcher was brought to the operating room and positioned on the operating table. Anesthesia was administered. IV antibiotics were administered, along with IV transexamic acid. A time out identifying the patient, procedure, operative side and surgeon was administered and charted by the OR Nurse. Janiya Fletcher was positioned in the lateral decubitus position. The limb was prepped and draped in the usual sterile fashion. The Posterior approach was utilized to expose the hip. The incision was carried through the subcutaneous tissue and underlying fascia with hemostasis obtained using the bovie cauterization. A Charmley retractor was inserted. The short external rotators were divided at their insertion. The sciatic nerve was palpated and identified. Next, a T-shaped capsular incision was accomplished and femoral head was dislocated. The articular surface revealed loss of cartilage, exposed bone and bone spurring. The neck was osteotomized in the appropriate position just above the lesser trochanteric region. The neck cut was measured to be 11 mm. Acetabular retractors were placed and the appropriate capsulotomy performed.  Soft tissue

## 2023-05-30 NOTE — ANESTHESIA POSTPROCEDURE EVALUATION
Department of Anesthesiology  Postprocedure Note    Patient: Stacy Kendall  MRN: [id number]  YOB: 1965  Date of evaluation: 5/30/2023      Procedure Summary     Date: 05/30/23 Room / Location: [facility] OR 05 / E MAIN OR    Anesthesia Start: 0801 Anesthesia Stop: 5230    Procedure: HIP TOTAL ARTHROPLASTY/ SDD (Right: Hip) Diagnosis:       Primary osteoarthritis of right hip      (Primary osteoarthritis of right hip [M16.11])    Surgeons: Randell Fuchs MD Responsible Provider: Devon Ma MD    Anesthesia Type: spinal ASA Status: 2          Anesthesia Type: No value filed.     Joselo Phase I: Joselo Score: 9    Joselo Phase II:        Anesthesia Post Evaluation    Patient location during evaluation: PACU  Patient participation: complete - patient participated  Level of consciousness: awake and alert  Airway patency: patent  Nausea & Vomiting: no nausea and no vomiting  Complications: no  Cardiovascular status: hemodynamically stable  Respiratory status: acceptable, nonlabored ventilation and spontaneous ventilation  Hydration status: euvolemic  Comments: /78   Pulse 75   Temp 98 °F (36.7 °C) (Temporal)   Resp 14   Ht 5' (1.524 m)   Wt 124 lb 4.8 oz (56.4 kg)   SpO2 99%   BMI 24.28 kg/m²     Multimodal analgesia pain management approach

## 2023-05-30 NOTE — OR NURSING
Betadine Lavage:  Betadine Solution 17.5 cc lot # [id number] exp. 09/30/2025 mixed with NS 0.9 % 500 cc lot # [id number] exp. 04/2026- used to wash out incision prior to closing- per MD protocol.

## 2023-05-31 VITALS
DIASTOLIC BLOOD PRESSURE: 79 MMHG | TEMPERATURE: 97.2 F | OXYGEN SATURATION: 97 % | RESPIRATION RATE: 18 BRPM | BODY MASS INDEX: 24.4 KG/M2 | HEIGHT: 60 IN | WEIGHT: 124.3 LBS | HEART RATE: 73 BPM | SYSTOLIC BLOOD PRESSURE: 126 MMHG

## 2023-05-31 LAB
HCT VFR BLD AUTO: 31.8 % (ref 35.8–46.3)
HGB BLD-MCNC: 10.4 G/DL (ref 11.7–15.4)

## 2023-05-31 PROCEDURE — 36415 COLL VENOUS BLD VENIPUNCTURE: CPT

## 2023-05-31 PROCEDURE — 6370000000 HC RX 637 (ALT 250 FOR IP): Performed by: PHYSICIAN ASSISTANT

## 2023-05-31 PROCEDURE — 85014 HEMATOCRIT: CPT

## 2023-05-31 PROCEDURE — 97535 SELF CARE MNGMENT TRAINING: CPT

## 2023-05-31 PROCEDURE — 97530 THERAPEUTIC ACTIVITIES: CPT

## 2023-05-31 PROCEDURE — 2580000003 HC RX 258: Performed by: PHYSICIAN ASSISTANT

## 2023-05-31 PROCEDURE — 85018 HEMOGLOBIN: CPT

## 2023-05-31 RX ORDER — ONDANSETRON 4 MG/1
4 TABLET, ORALLY DISINTEGRATING ORAL EVERY 8 HOURS PRN
Qty: 20 TABLET | Refills: 0 | Status: SHIPPED | OUTPATIENT
Start: 2023-05-31 | End: 2023-05-31 | Stop reason: SDUPTHER

## 2023-05-31 RX ORDER — OXYCODONE HYDROCHLORIDE 5 MG/1
5-10 TABLET ORAL EVERY 4 HOURS PRN
Qty: 60 TABLET | Refills: 0 | Status: SHIPPED | OUTPATIENT
Start: 2023-05-31 | End: 2023-05-31 | Stop reason: SDUPTHER

## 2023-05-31 RX ORDER — ASPIRIN 81 MG/1
81 TABLET ORAL 2 TIMES DAILY
Qty: 70 TABLET | Refills: 0 | Status: SHIPPED | OUTPATIENT
Start: 2023-05-31 | End: 2023-06-18

## 2023-05-31 RX ORDER — CELECOXIB 100 MG/1
100 CAPSULE ORAL 2 TIMES DAILY
Qty: 60 CAPSULE | Refills: 0 | Status: SHIPPED | OUTPATIENT
Start: 2023-05-31 | End: 2023-05-31 | Stop reason: SDUPTHER

## 2023-05-31 RX ORDER — METHOCARBAMOL 750 MG/1
750 TABLET, FILM COATED ORAL 4 TIMES DAILY PRN
Qty: 30 TABLET | Refills: 0 | Status: SHIPPED | OUTPATIENT
Start: 2023-05-31 | End: 2023-05-31 | Stop reason: SDUPTHER

## 2023-05-31 RX ORDER — CELECOXIB 100 MG/1
100 CAPSULE ORAL 2 TIMES DAILY
Qty: 60 CAPSULE | Refills: 0 | Status: SHIPPED | OUTPATIENT
Start: 2023-05-31

## 2023-05-31 RX ORDER — CELECOXIB 100 MG/1
100 CAPSULE ORAL 2 TIMES DAILY
Status: DISCONTINUED | OUTPATIENT
Start: 2023-05-31 | End: 2023-05-31 | Stop reason: HOSPADM

## 2023-05-31 RX ORDER — OXYCODONE HYDROCHLORIDE 5 MG/1
5-10 TABLET ORAL EVERY 4 HOURS PRN
Qty: 60 TABLET | Refills: 0 | Status: SHIPPED | OUTPATIENT
Start: 2023-05-31 | End: 2023-06-05

## 2023-05-31 RX ORDER — METHOCARBAMOL 750 MG/1
750 TABLET, FILM COATED ORAL 4 TIMES DAILY PRN
Qty: 30 TABLET | Refills: 0 | Status: SHIPPED | OUTPATIENT
Start: 2023-05-31 | End: 2023-06-10

## 2023-05-31 RX ORDER — ONDANSETRON 4 MG/1
4 TABLET, ORALLY DISINTEGRATING ORAL EVERY 8 HOURS PRN
Qty: 20 TABLET | Refills: 0 | Status: SHIPPED | OUTPATIENT
Start: 2023-05-31

## 2023-05-31 RX ORDER — ASPIRIN 81 MG/1
81 TABLET ORAL 2 TIMES DAILY
Qty: 70 TABLET | Refills: 0 | Status: SHIPPED | OUTPATIENT
Start: 2023-05-31 | End: 2023-05-31 | Stop reason: SDUPTHER

## 2023-05-31 RX ADMIN — ASPIRIN 81 MG: 81 TABLET, COATED ORAL at 07:44

## 2023-05-31 RX ADMIN — SODIUM CHLORIDE, PRESERVATIVE FREE 10 ML: 5 INJECTION INTRAVENOUS at 07:47

## 2023-05-31 RX ADMIN — OXYCODONE HYDROCHLORIDE 10 MG: 5 TABLET ORAL at 03:08

## 2023-05-31 RX ADMIN — CELECOXIB 100 MG: 100 CAPSULE ORAL at 07:44

## 2023-05-31 RX ADMIN — ONDANSETRON 4 MG: 4 TABLET, ORALLY DISINTEGRATING ORAL at 05:58

## 2023-05-31 RX ADMIN — OXYCODONE HYDROCHLORIDE 10 MG: 5 TABLET ORAL at 07:44

## 2023-05-31 RX ADMIN — ACETAMINOPHEN 650 MG: 325 TABLET, FILM COATED ORAL at 05:57

## 2023-05-31 RX ADMIN — DOCUSATE SODIUM 50MG AND SENNOSIDES 8.6MG 1 TABLET: 8.6; 5 TABLET, FILM COATED ORAL at 07:43

## 2023-05-31 ASSESSMENT — PAIN SCALES - GENERAL
PAINLEVEL_OUTOF10: 4
PAINLEVEL_OUTOF10: 7

## 2023-05-31 ASSESSMENT — PAIN DESCRIPTION - ORIENTATION: ORIENTATION: RIGHT

## 2023-05-31 ASSESSMENT — PAIN DESCRIPTION - LOCATION: LOCATION: INCISION

## 2023-05-31 ASSESSMENT — PAIN DESCRIPTION - DESCRIPTORS: DESCRIPTORS: ACHING;BURNING

## 2023-05-31 NOTE — DISCHARGE INSTRUCTIONS
as:  Increased pain, swelling, warmth, or redness. Red streaks leading from the incision. Pus draining from the incision. A fever. Watch closely for changes in your health, and be sure to contact your doctor if:    You do not have a bowel movement after taking a laxative. You do not get better as expected. Where can you learn more? Go to http://www.woods.com/ and enter Q746 to learn more about \"Hip Replacement Surgery (Posterior): What to Expect at Home. \"  Current as of: November 9, 2022               Content Version: 13.6  © 6689-3584 [employer]. Care instructions adapted under license by [facility] ([facility]). If you have questions about a medical condition or this instruction, always ask your healthcare professional. [address] any warranty or liability for your use of this information. Information obtained by :  I understand that if any problems occur once I am at home I am to contact my physician. I understand and acknowledge receipt of the instructions indicated above.                                                                                                                                            Physician's or R.N.'s Signature                                                                  Date/Time                                                                                                                                              Patient or Representative Signature                                                          Date/Time

## 2023-05-31 NOTE — PROGRESS NOTES
05/30/23 1442   Oxygen Therapy/Pulse Ox   O2 Therapy Oxygen   O2 Device Nasal cannula   O2 Flow Rate (L/min) 2 L/min   Pulse 74   SpO2 98 %     Patient achieved 1500 MI/sec on IS. Patient encouraged to do 10 breaths every hour while awake-patient agreed and demonstrated. No shortness of breath or distress noted. BS are clear b/l. Joint Camp notes reviewed- continuous Sat monitor order noted HS, pt has home inhalers.
05/30/23 2150   Oxygen Therapy/Pulse Ox   O2 Therapy Oxygen   O2 Device None (Room air)   FiO2  21 %   Pulse 65   Respirations 18   SpO2 98 %   Pulse Oximeter Device Mode Continuous   Pulse Oximeter Device Location Left;Finger   Pulse Oximeter Low SpO2 Alarm 89   $Pulse Oximeter $Spot check (multiple/continuous)     Pt on continuous monitor for tonight. Alarm limits set. Pt working on IS.
ACUTE PHYSICAL THERAPY GOALS:   (Developed with and agreed upon by patient and/or caregiver.)  GOALS (1-4 days):  (1.)Ms. Ara Cui will move from supine to sit and sit to supine  in bed with STAND BY ASSIST.    (2.)Ms. Ara Cui will transfer from bed to chair and chair to bed with STAND BY ASSIST using the least restrictive device. (3.)Ms. Ara Cui will ambulate with STAND BY ASSIST for 300 feet with the least restrictive device. (4.)Ms. Ara Cui will state/observe AGUSTÍN precautions with 0 verbal cues.   ________________________________________________________________________________________________     PHYSICAL THERAPY JOINT CAMP: TOTAL HIP ARTHROPLASTY Initial Assessment and PM  (Link to Caseload Tracking: PT Visit Days : 1  Acknowledge Orders  Time In/Out  PT Charge Capture  Rehab Caseload Tracker  Episode   Guillermina Chacon is a 66 y.o. female   PRIMARY DIAGNOSIS: Primary osteoarthritis of right hip  Primary osteoarthritis of right hip [M16.11]  Osteoarthritis of right hip, unspecified osteoarthritis type [M16.11]  Procedure(s) (LRB):  HIP TOTAL ARTHROPLASTY/ SDD (Right)  Day of Surgery  Reason for Referral: Pain in Right Hip (M25.551)  Stiffness of Right Hip, Not elsewhere classified (M25.651)  Difficulty in walking, Not elsewhere classified (R26.2)  Outpatient in a bed: Payor: [employer] / Plan: [employer] / Product Type: *No Product type* /     REHAB RECOMMENDATIONS:   Recommendation to date pending progress:  Setting:  Home Health Therapy    Equipment:    To Be Determined     GAIT: I Mod I S SBA CGA Min Mod Max Total  NT x2 Comments:   Level of Assistance [] [] [] [] [x] [] [] [] [] [] []    Weightbearing Status  wbat     Distance  15 (twice) feet    Gait Quality Antalgic, Decreased margaret , Decreased step clearance, Decreased step length, Decreased stance, and Shuffling     DME Rolling Walker     Stairs      Ramp     I=Independent, Mod I=Modified Independent, S=Supervision,
ACUTE PHYSICAL THERAPY GOALS:   (Developed with and agreed upon by patient and/or caregiver.)  GOALS (1-4 days):  (1.)Ms. Lopez will move from supine to sit and sit to supine  in bed with STAND BY ASSIST.    (2.)Ms. Lopez will transfer from bed to chair and chair to bed with STAND BY ASSIST using the least restrictive device. (3.)Ms. Lopez will ambulate with STAND BY ASSIST for 300 feet with the least restrictive device. (4.)Ms. Lopez will state/observe AGUSTÍN precautions with 0 verbal cues.  Review them with pt and family   ________________________________________________________________________________________________     PHYSICAL THERAPY JOINT CAMP: TOTAL HIP ARTHROPLASTY Daily Note and AM  (Link to Caseload Tracking: PT Visit Days : 2  Acknowledge Orders  Time In/Out  PT Charge Capture  Rehab Caseload Tracker  Anika Schmitt is a 66 y.o. female   PRIMARY DIAGNOSIS: Primary osteoarthritis of right hip  Primary osteoarthritis of right hip [M16.11]  Osteoarthritis of right hip, unspecified osteoarthritis type [M16.11]  Procedure(s) (LRB):  HIP TOTAL ARTHROPLASTY/ SDD (Right)  1 Day Post-Op  Reason for Referral: Pain in Right Hip (M25.551)  Stiffness of Right Hip, Not elsewhere classified (M25.651)  Difficulty in walking, Not elsewhere classified (R26.2)  Outpatient in a bed: Payor: [employer] / Plan: [employer] / Product Type: *No Product type* /     REHAB RECOMMENDATIONS:   Recommendation to date pending progress:  Setting:  Home Health Therapy    Equipment:    To Be Determined     GAIT: I Mod I S SBA CGA Min Mod Max Total  NT x2 Comments:   Level of Assistance [] [] [] [] [x] [] [] [] [] [] []    Weightbearing Status  wbat     Distance  20 (+ 20, + 10) feet    Gait Quality Antalgic, Decreased margaret , Decreased step clearance, Decreased step length, Decreased stance, and Shuffling     DME Rolling Walker     Stairs      Ramp     I=Independent, Mod I=Modified
Anesthesia Post-op Rounding Note:    Patient seen Post-Op day 1. Patient states no issues from anesthesia. Patient tolerated anesthesia well with no complaints of pain, nausea, or any other anesthesia-related issues.
May 31, 2023         Post Op day: 1 Day Post-Op   Admit Diagnosis: Primary osteoarthritis of right hip [M16.11]  Osteoarthritis of right hip, unspecified osteoarthritis type [M16.11]  LAB:    Recent Results (from the past 24 hour(s))   Hemoglobin and Hematocrit    Collection Time: 23  6:08 PM   Result Value Ref Range    Hemoglobin 11.1 (L) 11.7 - 15.4 g/dL    Hematocrit 34.9 (L) 35.8 - 46.3 %   Hemoglobin and Hematocrit    Collection Time: 23  4:08 AM   Result Value Ref Range    Hemoglobin 10.4 (L) 11.7 - 15.4 g/dL    Hematocrit 31.8 (L) 35.8 - 46.3 %     Vital Signs:    Patient Vitals for the past 8 hrs:   BP Temp Temp src Pulse Resp SpO2   23 0725 126/79 97.2 °F (36.2 °C) Axillary 73 18 97 %   23 0338 -- -- -- -- 17 --     Temp (24hrs), Av.1 °F (36.7 °C), Min:97.2 °F (36.2 °C), Max:98.8 °F (37.1 °C)    Pain Control:      Subjective: Doing well, normal recovery experienced. Objective:  No Acute Distress, Alert and Oriented, Neurovascular exam is normal       Assessment:   Patient Active Problem List   Diagnosis    Marijuana use, continuous    Cough    History of total left hip replacement    Asthma exacerbation    Difficulty walking    Generalized muscle weakness    Effusion of left hip    Acute bronchitis    Osteoarthritis of left hip    Leukocytosis    COPD exacerbation (HCC)    Left hip pain    Occupational exposure to air contaminants    Decreased functional mobility and endurance    Cannabis abuse    Arthritis of carpometacarpal (CMC) joint of right thumb    Postsurgical hypothyroidism    Primary osteoarthritis of right hip    Osteoarthritis of right hip, unspecified osteoarthritis type       Status Post Procedure(s) (LRB):  HIP TOTAL ARTHROPLASTY/ SDD (Right)        Plan: Continue Physical Therapy, discharge home anticipated.    Signed By: Evelyne Ro MD
OCCUPATIONAL THERAPY Initial Assessment and Daily Note      (Link to Caseload Tracking: OT Visit Days: 1  OT Orders   Time  OT Charge Capture  Rehab Caseload Tracker  Episode     Angela Berg is a 66 y.o. female   PRIMARY DIAGNOSIS: Primary osteoarthritis of right hip  Primary osteoarthritis of right hip [M16.11]  Osteoarthritis of right hip, unspecified osteoarthritis type [M16.11]  Procedure(s) (LRB):  HIP TOTAL ARTHROPLASTY/ SDD (Right)  Day of Surgery  Reason for Referral: Pain in Right Hip (M25.551)  Stiffness of Right Hip, Not elsewhere classified (M25.651)  Outpatient in a bed: Payor: [employer] / Plan: [employer] / Product Type: *No Product type* /     ASSESSMENT:     REHAB RECOMMENDATIONS:   Recommendation to date pending progress:  Setting:  Home Health Therapy    Equipment:    None     ASSESSMENT:  Ms. Barbara Finn is s/p right AGUSTÍN and presents with decreased independence with functional mobility and activities of daily living as compared to baseline level of function and safety. Patient would benefit from skilled Occupational Therapy to maximize independence and safety with self-care task and functional mobility. Patient able to complete  toileting and grooming at bathroom with contact guard assist. Patient educated on hip precautions and safety with functional transfers. Mobilized from hospital bed to bathroom to recliner using a rolling walker with assist. Patient is hopeful to spend the night to better prepare for safe discharge home.        [employer] AM-PAC 6 Clicks Daily Activity Inpatient Short Form:    AM-PAC Daily Activity - Inpatient   How much help is needed for putting on and taking off regular lower body clothing?: A Lot  How much help is needed for bathing (which includes washing, rinsing, drying)?: A Little  How much help is needed for toileting (which includes using toilet, bedpan, or urinal)?: A Little  How much help is needed for putting on
Pt discharge summary and home medication sheet reviewed with pt and signed. All goals met. Pt leaving hospital via w/c with staff member and family.
[facility] OR[facility]O  [facility]  [place] [address]  Phone: [phone]             May 31, 2023    Patient: Janiya Fletcher   YOB: 1965   Date of Visit: 5/30/2023       To Whom It May Concern: Yesi Vidal was seen and treated in our facility  beginning 5/30/2023 until 05/31/2023. She was hospitalized and unable to make it to her traffic court date. Please feel free to call with any questions @ [phone].       Sincerely,       Juan Cesar RN   3rd Ortho Supervisor        Signature:__________________________________
Review  [x] Walker Management/Safety  [x] Adaptive Equipment as Needed  [] Therapeutic Resting Position of Joint       TOTAL TREATMENT DURATION AND TIME:  Time In: 1112  Time Out: 1150  Minutes: [facility],

## 2023-06-01 ENCOUNTER — HOME CARE VISIT (OUTPATIENT)
Dept: SCHEDULING | Facility: HOME HEALTH | Age: 58
End: 2023-06-01
Payer: COMMERCIAL

## 2023-06-01 VITALS
TEMPERATURE: 98.8 F | OXYGEN SATURATION: 98 % | HEART RATE: 65 BPM | SYSTOLIC BLOOD PRESSURE: 128 MMHG | DIASTOLIC BLOOD PRESSURE: 70 MMHG | RESPIRATION RATE: 17 BRPM

## 2023-06-01 PROCEDURE — G0151 HHCP-SERV OF PT,EA 15 MIN: HCPCS

## 2023-06-01 ASSESSMENT — ENCOUNTER SYMPTOMS
PAIN LOCATION - PAIN QUALITY: DULL, ACHY
DYSPNEA ACTIVITY LEVEL: AFTER AMBULATING MORE THAN 20 FT

## 2023-06-02 ENCOUNTER — TELEPHONE (OUTPATIENT)
Dept: ORTHOPEDICS UNIT | Age: 58
End: 2023-06-02

## 2023-06-02 NOTE — TELEPHONE ENCOUNTER
Called patient per request of [facility] PT in  regards to medication questions. Reviewed ASA dose. Is taking OTC ASA. Has oxycodone in the home but  no other medications. Advised that prescriptions for celebrex, zofran and robaxin were sent to pharmacy. Will check with pharmacy on status of these medications being filled.

## 2023-06-05 ENCOUNTER — HOME CARE VISIT (OUTPATIENT)
Dept: SCHEDULING | Facility: HOME HEALTH | Age: 58
End: 2023-06-05
Payer: COMMERCIAL

## 2023-06-05 VITALS
OXYGEN SATURATION: 98 % | TEMPERATURE: 98.3 F | HEART RATE: 84 BPM | SYSTOLIC BLOOD PRESSURE: 118 MMHG | DIASTOLIC BLOOD PRESSURE: 72 MMHG | RESPIRATION RATE: 17 BRPM

## 2023-06-05 PROCEDURE — G0157 HHC PT ASSISTANT EA 15: HCPCS

## 2023-06-06 ENCOUNTER — HOME CARE VISIT (OUTPATIENT)
Dept: HOME HEALTH SERVICES | Facility: HOME HEALTH | Age: 58
End: 2023-06-06
Payer: COMMERCIAL

## 2023-06-06 DIAGNOSIS — M16.11 PRIMARY OSTEOARTHRITIS OF RIGHT HIP: Primary | ICD-10-CM

## 2023-06-07 ENCOUNTER — HOME CARE VISIT (OUTPATIENT)
Dept: HOME HEALTH SERVICES | Facility: HOME HEALTH | Age: 58
End: 2023-06-07
Payer: COMMERCIAL

## 2023-06-07 ENCOUNTER — HOME CARE VISIT (OUTPATIENT)
Dept: SCHEDULING | Facility: HOME HEALTH | Age: 58
End: 2023-06-07
Payer: COMMERCIAL

## 2023-06-07 ENCOUNTER — TELEPHONE (OUTPATIENT)
Dept: ORTHOPEDICS UNIT | Age: 58
End: 2023-06-07

## 2023-06-07 ENCOUNTER — TELEPHONE (OUTPATIENT)
Dept: ORTHOPEDIC SURGERY | Age: 58
End: 2023-06-07

## 2023-06-07 VITALS
DIASTOLIC BLOOD PRESSURE: 78 MMHG | RESPIRATION RATE: 17 BRPM | TEMPERATURE: 97.8 F | HEART RATE: 84 BPM | SYSTOLIC BLOOD PRESSURE: 120 MMHG | OXYGEN SATURATION: 95 %

## 2023-06-07 DIAGNOSIS — M16.11 PRIMARY OSTEOARTHRITIS OF RIGHT HIP: Primary | ICD-10-CM

## 2023-06-07 PROCEDURE — G0157 HHC PT ASSISTANT EA 15: HCPCS

## 2023-06-07 RX ORDER — OXYCODONE HYDROCHLORIDE 5 MG/1
5 TABLET ORAL EVERY 6 HOURS PRN
Qty: 20 TABLET | Refills: 0 | Status: SHIPPED | OUTPATIENT
Start: 2023-06-07 | End: 2023-06-12

## 2023-06-07 NOTE — TELEPHONE ENCOUNTER
Received e-mail from [facility] PTA to report patient has not had a BM post op. Is 8 days s/p AGUSTÍN. Has not taken miralax. Declines to take stool softeners because \" they cause gas. \"  Advised to start Miralax TID until BM. Once bowels have moved decrease dose of Miralax to QD while taking narcotics.

## 2023-06-09 ENCOUNTER — HOME CARE VISIT (OUTPATIENT)
Dept: SCHEDULING | Facility: HOME HEALTH | Age: 58
End: 2023-06-09
Payer: COMMERCIAL

## 2023-06-09 VITALS
DIASTOLIC BLOOD PRESSURE: 68 MMHG | OXYGEN SATURATION: 96 % | SYSTOLIC BLOOD PRESSURE: 112 MMHG | RESPIRATION RATE: 17 BRPM | TEMPERATURE: 98 F | HEART RATE: 80 BPM

## 2023-06-09 PROCEDURE — G0157 HHC PT ASSISTANT EA 15: HCPCS

## 2023-06-09 ASSESSMENT — ENCOUNTER SYMPTOMS: PAIN LOCATION - PAIN QUALITY: SORE, ACHY

## 2023-06-19 ENCOUNTER — HOME CARE VISIT (OUTPATIENT)
Dept: SCHEDULING | Facility: HOME HEALTH | Age: 58
End: 2023-06-19
Payer: COMMERCIAL

## 2023-06-19 VITALS
HEART RATE: 72 BPM | OXYGEN SATURATION: 98 % | RESPIRATION RATE: 17 BRPM | TEMPERATURE: 98 F | SYSTOLIC BLOOD PRESSURE: 120 MMHG | DIASTOLIC BLOOD PRESSURE: 76 MMHG

## 2023-06-19 PROCEDURE — G0157 HHC PT ASSISTANT EA 15: HCPCS

## 2023-06-22 ENCOUNTER — HOME CARE VISIT (OUTPATIENT)
Dept: SCHEDULING | Facility: HOME HEALTH | Age: 58
End: 2023-06-22
Payer: COMMERCIAL

## 2023-06-22 VITALS
RESPIRATION RATE: 18 BRPM | DIASTOLIC BLOOD PRESSURE: 80 MMHG | OXYGEN SATURATION: 98 % | TEMPERATURE: 98 F | SYSTOLIC BLOOD PRESSURE: 132 MMHG | HEART RATE: 72 BPM

## 2023-06-22 PROCEDURE — G0151 HHCP-SERV OF PT,EA 15 MIN: HCPCS

## 2023-06-22 ASSESSMENT — ENCOUNTER SYMPTOMS: PAIN LOCATION - PAIN QUALITY: ACHE

## 2023-06-26 ENCOUNTER — OFFICE VISIT (OUTPATIENT)
Dept: ORTHOPEDIC SURGERY | Age: 58
End: 2023-06-26

## 2023-06-26 DIAGNOSIS — Z96.641 STATUS POST RIGHT HIP REPLACEMENT: Primary | ICD-10-CM

## 2023-06-26 PROCEDURE — 99024 POSTOP FOLLOW-UP VISIT: CPT | Performed by: PHYSICIAN ASSISTANT

## 2023-06-26 RX ORDER — METHOCARBAMOL 750 MG/1
750 TABLET, FILM COATED ORAL 4 TIMES DAILY PRN
Qty: 40 TABLET | Refills: 0 | Status: SHIPPED | OUTPATIENT
Start: 2023-06-26 | End: 2023-07-06

## 2023-07-05 ENCOUNTER — EVALUATION (OUTPATIENT)
Age: 58
End: 2023-07-05
Payer: COMMERCIAL

## 2023-07-05 DIAGNOSIS — Z74.09 IMPAIRED FUNCTIONAL MOBILITY, BALANCE, GAIT, AND ENDURANCE: ICD-10-CM

## 2023-07-05 DIAGNOSIS — M16.11 PRIMARY OSTEOARTHRITIS OF RIGHT HIP: ICD-10-CM

## 2023-07-05 DIAGNOSIS — M25.651 IMPAIRED RANGE OF MOTION OF RIGHT HIP: ICD-10-CM

## 2023-07-05 DIAGNOSIS — R29.898 IMPAIRED STRENGTH OF HIP MUSCLES: ICD-10-CM

## 2023-07-05 DIAGNOSIS — M25.551 PAIN OF RIGHT HIP: Primary | ICD-10-CM

## 2023-07-05 DIAGNOSIS — R68.89 DECREASED ACTIVITY TOLERANCE: ICD-10-CM

## 2023-07-05 DIAGNOSIS — Z78.9 DECREASED ACTIVITIES OF DAILY LIVING (ADL): ICD-10-CM

## 2023-07-05 PROCEDURE — 97162 PT EVAL MOD COMPLEX 30 MIN: CPT | Performed by: PHYSICAL THERAPIST

## 2023-07-05 PROCEDURE — 97110 THERAPEUTIC EXERCISES: CPT | Performed by: PHYSICAL THERAPIST

## 2023-07-05 NOTE — PROGRESS NOTES
[facility]TH[facility]  [address] [place] [address] [phone]  Dept: [phone]      PHYSICAL THERAPY INITIAL EVALUATION     PT SESSION 1 of 19    TOTAL TIMED PROCEDURE CODES: 21 MIN. TOTAL TIME: 40 MIN. REFERRING PROVIDER: Aretha Pompa,Lenin  DIAGNOSIS:     ICD-10-CM    1. Pain of right hip  M25.551       2. Impaired range of motion of right hip  M25.651       3. Impaired strength of hip muscles  R29.898       4. Impaired functional mobility, balance, gait, and endurance  Z74.09       5. Decreased activities of daily living (ADL)  Z78.9       6. Decreased activity tolerance  R68.89          SURGERY: R AGUSTÍN DATE: 5/30/23  THERAPY PRECAUTIONS:Fall risk, LATEX ALLERGY, and Hip precautions. CO - MORBIDITIES AFFECTING PLAN OF CARE: Smoker.     PERTINENT MEDICAL HISTORY     PAST MEDICAL AND SURGICAL HISTORY:   Past Medical History:   Diagnosis Date    Asthma     inhaler and nebs prn    Bell's palsy 11/2019    Chronic obstructive pulmonary disease (HCC)     advair and albuterol prn=uses 1-2 times per week; Neb PRN=never uses; not followed by Pulmonary     Former cigarette smoker     History of positive PPD 1996    no meds     Hypothyroidism     on med for control     Ill-defined condition     pancreatitis x1; not chronic per patient     Marijuana smoker     OA (osteoarthritis)       Past Surgical History:   Procedure Laterality Date    ORTHOPEDIC SURGERY Left 2007    left ankle fracture- has a plate    OTHER SURGICAL HISTORY  10/09/2020    teeth removal     THYROIDECTOMY  12/04/2018    TOTAL HIP ARTHROPLASTY Right 5/30/2023    HIP TOTAL ARTHROPLASTY/ SDD performed by Aretha Pompa MD at [address] [place] [address]: reviewed in chart   ALLERGIES:   Allergies   Allergen Reactions    Latex Rash        SUBJECTIVE     CHIEF COMPLAINTS: 1.) R hip pain; 2.) allodynia over scar; 3.) limited R hip ROM; 4.) R hip/LE weakness; 5.) impaired

## 2023-07-13 ENCOUNTER — TREATMENT (OUTPATIENT)
Age: 58
End: 2023-07-13
Payer: COMMERCIAL

## 2023-07-13 DIAGNOSIS — Z74.09 IMPAIRED FUNCTIONAL MOBILITY, BALANCE, GAIT, AND ENDURANCE: ICD-10-CM

## 2023-07-13 DIAGNOSIS — M25.651 IMPAIRED RANGE OF MOTION OF RIGHT HIP: ICD-10-CM

## 2023-07-13 DIAGNOSIS — M25.551 PAIN OF RIGHT HIP: Primary | ICD-10-CM

## 2023-07-13 DIAGNOSIS — R68.89 DECREASED ACTIVITY TOLERANCE: ICD-10-CM

## 2023-07-13 DIAGNOSIS — R29.898 IMPAIRED STRENGTH OF HIP MUSCLES: ICD-10-CM

## 2023-07-13 DIAGNOSIS — Z78.9 DECREASED ACTIVITIES OF DAILY LIVING (ADL): ICD-10-CM

## 2023-07-13 PROCEDURE — 97140 MANUAL THERAPY 1/> REGIONS: CPT | Performed by: PHYSICAL THERAPIST

## 2023-07-13 PROCEDURE — 97110 THERAPEUTIC EXERCISES: CPT | Performed by: PHYSICAL THERAPIST

## 2023-07-19 ENCOUNTER — CLINICAL DOCUMENTATION (OUTPATIENT)
Dept: ORTHOPEDIC SURGERY | Age: 58
End: 2023-07-19

## 2023-07-19 ENCOUNTER — TREATMENT (OUTPATIENT)
Age: 58
End: 2023-07-19
Payer: COMMERCIAL

## 2023-07-19 DIAGNOSIS — R29.898 IMPAIRED STRENGTH OF HIP MUSCLES: ICD-10-CM

## 2023-07-19 DIAGNOSIS — M25.651 IMPAIRED RANGE OF MOTION OF RIGHT HIP: ICD-10-CM

## 2023-07-19 DIAGNOSIS — Z78.9 DECREASED ACTIVITIES OF DAILY LIVING (ADL): ICD-10-CM

## 2023-07-19 DIAGNOSIS — M25.551 PAIN OF RIGHT HIP: Primary | ICD-10-CM

## 2023-07-19 DIAGNOSIS — Z74.09 IMPAIRED FUNCTIONAL MOBILITY, BALANCE, GAIT, AND ENDURANCE: ICD-10-CM

## 2023-07-19 DIAGNOSIS — R68.89 DECREASED ACTIVITY TOLERANCE: ICD-10-CM

## 2023-07-19 PROCEDURE — 97110 THERAPEUTIC EXERCISES: CPT | Performed by: PHYSICAL THERAPIST

## 2023-07-19 PROCEDURE — 97140 MANUAL THERAPY 1/> REGIONS: CPT | Performed by: PHYSICAL THERAPIST

## 2023-07-19 NOTE — PROGRESS NOTES
pain, promote appropriate mm tone of the L hip mm    ASSESSMENT     Patient reports/demonstrates the following improvements:  Diminished sensitivity over scar. Diminished mm guarding of L hip and proximal thigh mm  Able to attain neutral hip position re flexion-extension  Improved function of R hip abductors - 2/5. The following impairments are noted:  Allodynia over L hip  Increased tone, guarding of L hip and proximal thigh mm. Antalgic gait. Limited functional ROM of L hip. Diminished mm function of L hip and LE. These impairments contribute to the following functional limitations:  Positional tolerance - L side lying, sitting, standing. Wbing - standing, walking, negotiating stairs, squatting or kneeling, lifting, carrying    PT GOAL(S) ATTAINED:    PT GOAL(S) NOT ATTAINED:    PLAN     Assess:   Response to K tape   Pain, discomfort   Response to palpation   Gait   AROM  Utilize MT, TE and modalities    GOALS     SHORT - TERM GOALS TO BE MET BY 8/2/2023  (4 weeks):  Patient will demonstrate good recall of HEP requiring no verbal cuing for proper form and technique. Patient will report 50% reduction of allodynia over the scar. Pt. will be able to modify activities in order keep pain to minimal levels (? 4/10) with ADLs. Pt. will ascend steps in a reciprocal pattern and descend with step to gait using rails for balance only, modified independent. Decrease PT to 1x week. Improve HOS to 55, demonstrating improved function with daily activities. LONG - TERM GOALS TO BE MET BY 9/3/2023  (8 weeks):      Patient will be compliant and independent with a comprehensive HEP and activity progression. Demonstrate pain free AROM of hip that allows for no functional restrictions with ADLs and recreational activities. Resolve allodynia. Pt will increase LE strength to ? 4/5 for R hip flexion, abduction and hip extension.    Pt. will independently ascend and descend steps with a reciprocal pattern,

## 2023-07-20 DIAGNOSIS — Z96.641 STATUS POST RIGHT HIP REPLACEMENT: Primary | ICD-10-CM

## 2023-07-20 RX ORDER — OXYCODONE HYDROCHLORIDE 5 MG/1
5 TABLET ORAL EVERY 8 HOURS PRN
Qty: 30 TABLET | Refills: 0 | Status: SHIPPED | OUTPATIENT
Start: 2023-07-20 | End: 2023-07-30

## 2023-07-24 ENCOUNTER — CLINICAL DOCUMENTATION (OUTPATIENT)
Dept: ORTHOPEDIC SURGERY | Age: 58
End: 2023-07-24

## 2023-07-24 ENCOUNTER — TREATMENT (OUTPATIENT)
Age: 58
End: 2023-07-24
Payer: COMMERCIAL

## 2023-07-24 DIAGNOSIS — M25.651 IMPAIRED RANGE OF MOTION OF RIGHT HIP: ICD-10-CM

## 2023-07-24 DIAGNOSIS — R29.898 IMPAIRED STRENGTH OF HIP MUSCLES: ICD-10-CM

## 2023-07-24 DIAGNOSIS — Z74.09 IMPAIRED FUNCTIONAL MOBILITY, BALANCE, GAIT, AND ENDURANCE: ICD-10-CM

## 2023-07-24 DIAGNOSIS — Z78.9 DECREASED ACTIVITIES OF DAILY LIVING (ADL): ICD-10-CM

## 2023-07-24 DIAGNOSIS — M25.551 PAIN OF RIGHT HIP: Primary | ICD-10-CM

## 2023-07-24 DIAGNOSIS — R68.89 DECREASED ACTIVITY TOLERANCE: ICD-10-CM

## 2023-07-24 PROCEDURE — 97110 THERAPEUTIC EXERCISES: CPT | Performed by: PHYSICAL THERAPIST

## 2023-07-24 PROCEDURE — 97140 MANUAL THERAPY 1/> REGIONS: CPT | Performed by: PHYSICAL THERAPIST

## 2023-07-24 NOTE — PROGRESS NOTES
HEP and activity progression. Demonstrate pain free AROM of hip that allows for no functional restrictions with ADLs and recreational activities. Resolve allodynia. Pt will increase LE strength to ? 4/5 for R hip flexion, abduction and hip extension. Pt. will independently ascend and descend steps with a reciprocal pattern, demonstrating good eccentric control and balance with ? 2/10 increase in hip pain. Improve HOS which shows patient having ? 70% functional deficit related to hip injury. [employer]    Access Code: [email or web address]  URL: [email or web address]/  Date: 07/24/2023  Prepared by: Heriberto Chávez    Exercises  - Supine Gluteal Sets  - 2 x daily - 6 x weekly - 1 sets - 5 reps - 10 hold  - Supine Heel Slide  - 2 x daily - 6 x weekly - 1 sets - 10 reps - 2 - 3 min.  duration  - Supine Single Bent Knee Fallout  - 2 x daily - 6 x weekly - 1 sets - 10 reps  - Seated Long Arc Quad  - 2 x daily - 6 x weekly - 1 sets - 10 reps  - Seated Transversus Abdominis Bracing  - 2 x daily - 5-10 reps - 5-10 sec hold  - Seated Hip Adduction Isometrics with Ball  - 2 x daily - 5-10 reps - 5-10 sec hold  - Seated Isometric Hip Abduction with Belt  - 2 x daily - 5-10 reps - 5-10 sec hold

## 2023-07-27 ENCOUNTER — TREATMENT (OUTPATIENT)
Age: 58
End: 2023-07-27

## 2023-07-27 DIAGNOSIS — R68.89 DECREASED ACTIVITY TOLERANCE: ICD-10-CM

## 2023-07-27 DIAGNOSIS — R29.898 IMPAIRED STRENGTH OF HIP MUSCLES: ICD-10-CM

## 2023-07-27 DIAGNOSIS — Z78.9 DECREASED ACTIVITIES OF DAILY LIVING (ADL): ICD-10-CM

## 2023-07-27 DIAGNOSIS — Z74.09 IMPAIRED FUNCTIONAL MOBILITY, BALANCE, GAIT, AND ENDURANCE: ICD-10-CM

## 2023-07-27 DIAGNOSIS — M25.651 IMPAIRED RANGE OF MOTION OF RIGHT HIP: ICD-10-CM

## 2023-07-27 DIAGNOSIS — M25.551 PAIN OF RIGHT HIP: Primary | ICD-10-CM

## 2023-07-27 NOTE — PROGRESS NOTES
[address]                                                         Dept: [phone]                                   PHYSICAL THERAPY DAILY NOTE     PT SESSION:  5 of 19    DATE of EVAL: 7/5/23    CURRENT POC ENDS: 9/3/23    DATE of LAST PROGRESS NOTE: ---    DATE of SURGERY: Tues. 5/30/23  WEEKS POST OP: 8    DATE of NEXT MD/PROVIDER APPT: 12/15/23 mayra PATIÑO    TOTAL TIMED PROCEDURE CODES: 36 MIN. TOTAL TIME: 45 MIN. MODIFIER NEEDED: No    REFERRING PROVIDER: Elian Cat MD  DIAGNOSIS:    Diagnosis Orders   1. Pain of right hip        2. Decreased activities of daily living (ADL)        3. Impaired functional mobility, balance, gait, and endurance        4. Impaired strength of hip muscles        5. Impaired range of motion of right hip        6. Decreased activity tolerance              MEDICATIONS: Reviewed in chart. ALLERGIES: Reviewed in chart. LATEX    THERAPY PRECAUTIONS: Fall risk, LATEX ALLERGY, and Hip precautions.   CO - MORBIDITIES AFFECTING POC: Smoker    PERTINENT MEDICAL HISTORY      Past Medical History:   Diagnosis Date    Asthma     inhaler and nebs prn    Bell's palsy 11/2019    Chronic obstructive pulmonary disease (HCC)     advair and albuterol prn=uses 1-2 times per week; Neb PRN=never uses; not followed by Pulmonary     Former cigarette smoker     History of positive PPD 1996    no meds     Hypothyroidism     on med for control     Ill-defined condition     pancreatitis x1; not chronic per patient     Marijuana smoker     OA (osteoarthritis)      Past Surgical History:   Procedure Laterality Date    ORTHOPEDIC SURGERY Left 2007    left ankle fracture- has a plate    OTHER SURGICAL

## 2023-08-04 ENCOUNTER — TREATMENT (OUTPATIENT)
Age: 58
End: 2023-08-04

## 2023-08-04 DIAGNOSIS — Z74.09 IMPAIRED FUNCTIONAL MOBILITY, BALANCE, GAIT, AND ENDURANCE: ICD-10-CM

## 2023-08-04 DIAGNOSIS — R29.898 IMPAIRED STRENGTH OF HIP MUSCLES: ICD-10-CM

## 2023-08-04 DIAGNOSIS — Z78.9 DECREASED ACTIVITIES OF DAILY LIVING (ADL): ICD-10-CM

## 2023-08-04 DIAGNOSIS — M25.651 IMPAIRED RANGE OF MOTION OF RIGHT HIP: ICD-10-CM

## 2023-08-04 DIAGNOSIS — M25.551 PAIN OF RIGHT HIP: Primary | ICD-10-CM

## 2023-08-04 DIAGNOSIS — R68.89 DECREASED ACTIVITY TOLERANCE: ICD-10-CM

## 2023-08-04 NOTE — PROGRESS NOTES
[address] [place] [id number] [facility]                                                         Dept: [phone]                                   PHYSICAL THERAPY DAILY NOTE     PT SESSION:  5 of 19    DATE of EVAL: 7/5/23    CURRENT POC ENDS: 9/3/23    DATE of LAST PROGRESS NOTE: ---    DATE of SURGERY: Tues. 5/30/23  WEEKS POST OP: 8+    DATE of NEXT MD/PROVIDER APPT: 12/15/23 w [facility]    TOTAL TIMED PROCEDURE CODES: 48 MIN. TOTAL TIME: 50 MIN. MODIFIER NEEDED: No    REFERRING PROVIDER: Bryan Almanzar MD  DIAGNOSIS:    Diagnosis Orders   1. Pain of right hip        2. Impaired strength of hip muscles        3. Decreased activities of daily living (ADL)        4. Impaired range of motion of right hip        5. Impaired functional mobility, balance, gait, and endurance        6. Decreased activity tolerance                MEDICATIONS: Reviewed in chart. ALLERGIES: Reviewed in chart. LATEX    THERAPY PRECAUTIONS: Fall risk, LATEX ALLERGY, and Hip precautions.   CO - MORBIDITIES AFFECTING POC: Smoker    PERTINENT MEDICAL HISTORY      Past Medical History:   Diagnosis Date    Asthma     inhaler and nebs prn    Bell's palsy 11/2019    Chronic obstructive pulmonary disease (HCC)     advair and albuterol prn=uses 1-2 times per week; Neb PRN=never uses; not followed by Pulmonary     Former cigarette smoker     History of positive PPD 1996    no meds     Hypothyroidism     on med for control     Ill-defined condition     pancreatitis x1; not chronic per patient     Marijuana smoker     OA (osteoarthritis)      Past Surgical History:   Procedure Laterality Date    ORTHOPEDIC SURGERY Left 2007    left ankle fracture- has a plate    OTHER SURGICAL

## 2023-08-15 ENCOUNTER — TREATMENT (OUTPATIENT)
Age: 58
End: 2023-08-15

## 2023-08-15 DIAGNOSIS — R29.898 IMPAIRED STRENGTH OF HIP MUSCLES: ICD-10-CM

## 2023-08-15 DIAGNOSIS — Z78.9 DECREASED ACTIVITIES OF DAILY LIVING (ADL): ICD-10-CM

## 2023-08-15 DIAGNOSIS — M25.551 PAIN OF RIGHT HIP: Primary | ICD-10-CM

## 2023-08-15 DIAGNOSIS — M25.651 IMPAIRED RANGE OF MOTION OF RIGHT HIP: ICD-10-CM

## 2023-08-15 DIAGNOSIS — R68.89 DECREASED ACTIVITY TOLERANCE: ICD-10-CM

## 2023-08-15 DIAGNOSIS — Z74.09 IMPAIRED FUNCTIONAL MOBILITY, BALANCE, GAIT, AND ENDURANCE: ICD-10-CM

## 2023-08-15 NOTE — PROGRESS NOTES
[address]                                                         Dept: [phone]                                   PHYSICAL THERAPY DAILY NOTE     PT SESSION:  7 of 19    DATE of EVAL: 7/5/23    CURRENT POC ENDS: 9/3/23    DATE of LAST PROGRESS NOTE: ---    DATE of SURGERY: Tues. 5/30/23  WEEKS POST OP: 11    DATE of NEXT MD/PROVIDER APPT: 12/15/23 mayra PATIÑO    TOTAL TIMED PROCEDURE CODES: 36 MIN. TOTAL TIME: 40 MIN. MODIFIER NEEDED: No    REFERRING PROVIDER: Shiva Rogel MD  DIAGNOSIS:    Diagnosis Orders   1. Pain of right hip        2. Impaired range of motion of right hip        3. Impaired strength of hip muscles        4. Impaired functional mobility, balance, gait, and endurance        5. Decreased activities of daily living (ADL)        6. Decreased activity tolerance                  MEDICATIONS: Reviewed in chart. ALLERGIES: Reviewed in chart. LATEX    THERAPY PRECAUTIONS: Fall risk, LATEX ALLERGY, and Hip precautions.   CO - MORBIDITIES AFFECTING POC: Smoker    PERTINENT MEDICAL HISTORY      Past Medical History:   Diagnosis Date    Asthma     inhaler and nebs prn    Bell's palsy 11/2019    Chronic obstructive pulmonary disease (HCC)     advair and albuterol prn=uses 1-2 times per week; Neb PRN=never uses; not followed by Pulmonary     Former cigarette smoker     History of positive PPD 1996    no meds     Hypothyroidism     on med for control     Ill-defined condition     pancreatitis x1; not chronic per patient     Marijuana smoker     OA (osteoarthritis)      Past Surgical History:   Procedure Laterality Date    ORTHOPEDIC SURGERY Left 2007    left ankle fracture- has a plate    OTHER SURGICAL

## 2023-08-29 ENCOUNTER — TREATMENT (OUTPATIENT)
Age: 58
End: 2023-08-29
Payer: COMMERCIAL

## 2023-08-29 DIAGNOSIS — M25.651 IMPAIRED RANGE OF MOTION OF RIGHT HIP: ICD-10-CM

## 2023-08-29 DIAGNOSIS — R68.89 DECREASED ACTIVITY TOLERANCE: ICD-10-CM

## 2023-08-29 DIAGNOSIS — R29.898 IMPAIRED STRENGTH OF HIP MUSCLES: ICD-10-CM

## 2023-08-29 DIAGNOSIS — M25.551 PAIN OF RIGHT HIP: Primary | ICD-10-CM

## 2023-08-29 DIAGNOSIS — Z74.09 IMPAIRED FUNCTIONAL MOBILITY, BALANCE, GAIT, AND ENDURANCE: ICD-10-CM

## 2023-08-29 PROCEDURE — 97110 THERAPEUTIC EXERCISES: CPT | Performed by: PHYSICAL THERAPIST

## 2023-08-29 NOTE — PROGRESS NOTES
the scar. Pt. will be able to modify activities in order keep pain to minimal levels (? 4/10) with ADLs. Decrease PT to 1x week. Improve HOS to 55, demonstrating improved function with daily activities. PT GOAL(S) NOT ATTAINED:  Pt. will ascend steps in a reciprocal pattern and descend with step to gait using rails for balance only, modified independent. Not assessed. PLAN     Evaluate response to updated exercises and activities. Utilize MT, TE and modalities. GOALS     LONG - TERM GOALS TO BE MET BY 9/3/2023  (8 weeks):      Patient will be compliant and independent with a comprehensive HEP and activity progression. Demonstrate pain free AROM of hip that allows for no functional restrictions with ADLs and recreational activities. Resolve allodynia. Pt will increase LE strength to ? 4/5 for R hip flexion, abduction and hip extension. Pt. will independently ascend and descend steps with a reciprocal pattern, demonstrating good eccentric control and balance with ? 2/10 increase in hip pain. Improve HOS which shows patient having ? 70% functional deficit related to hip injury. [employer]    Access Code: [email or web address]  URL: [email or web address]  Date: 07/24/2023  Prepared by: Danette Valladares    Exercises  - Supine Gluteal Sets  - 2 x daily - 6 x weekly - 1 sets - 5 reps - 10 hold  - Supine Heel Slide  - 2 x daily - 6 x weekly - 1 sets - 10 reps - 2 - 3 min.  duration  - Supine Single Bent Knee Fallout  - 2 x daily - 6 x weekly - 1 sets - 10 reps  - Seated Long Arc Quad  - 2 x daily - 6 x weekly - 1 sets - 10 reps  - Seated Transversus Abdominis Bracing  - 2 x daily - 5-10 reps - 5-10 sec hold  - Seated Hip Adduction Isometrics with Ball  - 2 x daily - 5-10 reps - 5-10 sec hold  - Seated Isometric Hip Abduction with Belt  - 2 x daily - 5-10 reps - 5-10 sec hold

## 2023-08-31 ENCOUNTER — TREATMENT (OUTPATIENT)
Age: 58
End: 2023-08-31

## 2023-08-31 DIAGNOSIS — Z78.9 DECREASED ACTIVITIES OF DAILY LIVING (ADL): ICD-10-CM

## 2023-08-31 DIAGNOSIS — M25.551 PAIN OF RIGHT HIP: Primary | ICD-10-CM

## 2023-08-31 DIAGNOSIS — Z74.09 IMPAIRED FUNCTIONAL MOBILITY, BALANCE, GAIT, AND ENDURANCE: ICD-10-CM

## 2023-08-31 DIAGNOSIS — M25.651 IMPAIRED RANGE OF MOTION OF RIGHT HIP: ICD-10-CM

## 2023-08-31 DIAGNOSIS — R68.89 DECREASED ACTIVITY TOLERANCE: ICD-10-CM

## 2023-08-31 DIAGNOSIS — R29.898 IMPAIRED STRENGTH OF HIP MUSCLES: ICD-10-CM

## 2023-08-31 NOTE — PROGRESS NOTES
[facility]                                                       [address]                                                         Dept: [phone]                                   PHYSICAL THERAPY DAILY NOTE     PT SESSION:  9 of 19    DATE of EVAL: 7/5/23    CURRENT POC ENDS: 9/3/23    DATE of LAST PROGRESS NOTE: ---    DATE of SURGERY: Tues. 5/30/23  WEEKS POST OP: 13    DATE of NEXT MD/PROVIDER APPT: 12/15/23 mayra PATIÑO    TOTAL TIMED PROCEDURE CODES: 39 MIN. TOTAL TIME: 45 MIN. MODIFIER NEEDED: No    REFERRING PROVIDER: Huang Li MD  DIAGNOSIS:    Diagnosis Orders   1. Pain of right hip        2. Impaired range of motion of right hip        3. Impaired strength of hip muscles        4. Impaired functional mobility, balance, gait, and endurance        5. Decreased activity tolerance        6. Decreased activities of daily living (ADL)          MEDICATIONS: Reviewed in chart. ALLERGIES: Reviewed in chart. LATEX    THERAPY PRECAUTIONS: Fall risk, LATEX ALLERGY, and Hip precautions.   CO - MORBIDITIES AFFECTING POC: Smoker    PERTINENT MEDICAL HISTORY      Past Medical History:   Diagnosis Date    Asthma     inhaler and nebs prn    Bell's palsy 11/2019    Chronic obstructive pulmonary disease (HCC)     advair and albuterol prn=uses 1-2 times per week; Neb PRN=never uses; not followed by Pulmonary     Former cigarette smoker     History of positive PPD 1996    no meds     Hypothyroidism     on med for control     Ill-defined condition     pancreatitis x1; not chronic per patient     Marijuana smoker     OA (osteoarthritis)      Past Surgical History:   Procedure Laterality Date    ORTHOPEDIC SURGERY Left 2007    left ankle fracture- has a plate    OTHER SURGICAL HISTORY

## 2023-09-12 ENCOUNTER — APPOINTMENT (OUTPATIENT)
Dept: GENERAL RADIOLOGY | Age: 58
End: 2023-09-12
Payer: COMMERCIAL

## 2023-09-12 ENCOUNTER — HOSPITAL ENCOUNTER (EMERGENCY)
Age: 58
Discharge: HOME | End: 2023-09-12
Attending: GENERAL PRACTICE
Payer: COMMERCIAL

## 2023-09-12 VITALS
HEART RATE: 83 BPM | RESPIRATION RATE: 18 BRPM | TEMPERATURE: 98.4 F | DIASTOLIC BLOOD PRESSURE: 104 MMHG | BODY MASS INDEX: 24.54 KG/M2 | WEIGHT: 125 LBS | OXYGEN SATURATION: 96 % | HEIGHT: 60 IN | SYSTOLIC BLOOD PRESSURE: 146 MMHG

## 2023-09-12 DIAGNOSIS — S61.311A LACERATION OF LEFT INDEX FINGER WITHOUT FOREIGN BODY WITH DAMAGE TO NAIL, INITIAL ENCOUNTER: Primary | ICD-10-CM

## 2023-09-12 PROCEDURE — 12001 RPR S/N/AX/GEN/TRNK 2.5CM/<: CPT

## 2023-09-12 PROCEDURE — 73140 X-RAY EXAM OF FINGER(S): CPT

## 2023-09-12 PROCEDURE — 99283 EMERGENCY DEPT VISIT LOW MDM: CPT

## 2023-09-12 PROCEDURE — 2500000003 HC RX 250 WO HCPCS: Performed by: PHYSICIAN ASSISTANT

## 2023-09-12 PROCEDURE — 6360000002 HC RX W HCPCS: Performed by: PHYSICIAN ASSISTANT

## 2023-09-12 RX ORDER — BUPIVACAINE HYDROCHLORIDE 5 MG/ML
30 INJECTION, SOLUTION EPIDURAL; INTRACAUDAL ONCE
Status: COMPLETED | OUTPATIENT
Start: 2023-09-12 | End: 2023-09-12

## 2023-09-12 RX ORDER — CEPHALEXIN 500 MG/1
500 CAPSULE ORAL 4 TIMES DAILY
Qty: 28 CAPSULE | Refills: 0 | Status: SHIPPED | OUTPATIENT
Start: 2023-09-12 | End: 2023-09-19

## 2023-09-12 RX ORDER — OXYCODONE HYDROCHLORIDE AND ACETAMINOPHEN 5; 325 MG/1; MG/1
1 TABLET ORAL EVERY 6 HOURS PRN
Qty: 6 TABLET | Refills: 0 | Status: SHIPPED | OUTPATIENT
Start: 2023-09-12 | End: 2023-09-15

## 2023-09-12 RX ADMIN — LIDOCAINE HYDROCHLORIDE 2 ML: 10 INJECTION, SOLUTION INFILTRATION; PERINEURAL at 16:45

## 2023-09-12 RX ADMIN — BUPIVACAINE HYDROCHLORIDE 150 MG: 5 INJECTION, SOLUTION EPIDURAL; INTRACAUDAL; PERINEURAL at 16:45

## 2023-09-12 ASSESSMENT — PAIN - FUNCTIONAL ASSESSMENT: PAIN_FUNCTIONAL_ASSESSMENT: 0-10

## 2023-09-12 ASSESSMENT — PAIN DESCRIPTION - LOCATION: LOCATION: HAND

## 2023-09-12 ASSESSMENT — ENCOUNTER SYMPTOMS
RESPIRATORY NEGATIVE: 1
GASTROINTESTINAL NEGATIVE: 1

## 2023-09-12 ASSESSMENT — PAIN SCALES - GENERAL: PAINLEVEL_OUTOF10: 10

## 2023-09-12 NOTE — ED PROVIDER NOTES
Emergency Department Provider Note       PCP: Vicente Mejia MD   Age: 66 y.o. Sex: female     DISPOSITION Decision To Discharge 09/12/2023 04:54:38 PM       ICD-10-CM    1. Laceration of left index finger without foreign body with damage to nail, initial encounter  S61.311A oxyCODONE-acetaminophen (PERCOCET) 5-325 MG per tablet     [address] [place] [address] Dr Marybeth Lakhani of Problems Addressed:  1 or more acute illnesses that pose a threat to life or bodily function. Data Reviewed and Analyzed:  I independently ordered and reviewed each unique test.         I interpreted the X-rays no fracture or foreign body. Discussion of management or test interpretation. Patient is a 79-year-old female who presents with laceration to left index finger from getting her finger second something at work. X-ray shows no fracture or foreign body. Wound cleansed and thoroughly irrigated. There is a good amount of avulsion of the skin. Wound edges were approximated. After the digit was numb tested patient's range of motion but her DIP would not flex. I am unsure if this is due to tendon injury or pain or effort. Regardless reached out to Ortho who recommended patient following up with Vena Muckle or friend. Referral placed. Will cover with antibiotics. Suture removal in 14 days. Wound care instructions provided. Risk of Complications and/or Morbidity of Patient Management:  Prescription drug management performed. Diagnosis or care significantly limited by social determinants of health  . ED Course as of 09/12/23 2006   Tue Sep 12, 2023   1105 Digital block performed [GADIEL]      ED Course User Index  [GADIEL] LILIAN Miles       History       Patient is a 79-year-old female who presents for laceration to the left index finger. She was at work and her finger got caught in some type of the machine. She jerked it back.   And

## 2023-09-12 NOTE — ED TRIAGE NOTES
Patient to triage with c/o getting her left 2nd finger smashed in a machine at work which caused a laceration. Bleeding is controlled in triage. Pt unsure of last tetanus date.

## 2023-09-15 ENCOUNTER — TELEPHONE (OUTPATIENT)
Dept: ORTHOPEDIC SURGERY | Age: 58
End: 2023-09-15

## 2023-09-19 ENCOUNTER — OFFICE VISIT (OUTPATIENT)
Dept: ORTHOPEDIC SURGERY | Age: 58
End: 2023-09-19

## 2023-09-19 DIAGNOSIS — S67.22XA CRUSHING INJURY OF LEFT HAND AND FINGER, INITIAL ENCOUNTER: Primary | ICD-10-CM

## 2023-09-19 RX ORDER — HYDROCODONE BITARTRATE AND ACETAMINOPHEN 5; 325 MG/1; MG/1
1 TABLET ORAL EVERY 6 HOURS PRN
Qty: 20 TABLET | Refills: 0 | Status: SHIPPED | OUTPATIENT
Start: 2023-09-19 | End: 2023-09-24

## 2023-09-19 RX ORDER — CEPHALEXIN 500 MG/1
500 CAPSULE ORAL 3 TIMES DAILY
Qty: 21 CAPSULE | Refills: 0 | Status: SHIPPED | OUTPATIENT
Start: 2023-09-19 | End: 2023-09-26

## 2023-09-20 NOTE — PROGRESS NOTES
Orthopaedic Hand Clinic Note    Name: Thee Bryant  YOB: 1965  Gender: female  MRN: [id number]      CC: Patient referred for evaluation of upper extremity pain    HPI: Thee Bryant is a 66 y.o. female Right hand dominant with a chief complaint of left index finger injury, she sustained a work injury when the left index finger was crushed in a machine at work, she is a , this occurred almost a week ago, she was seen at the emergency department where her wounds were washed and closed with sutures. ROS/Meds/PSH/PMH/FH/SH: I personally reviewed the patients standard intake form. Pertinents are discussed in the HPI    Physical Examination:  General: Awake and alert. HEENT: Normocephalic, atraumatic  CV/Pulm: Breathing even and unlabored  Skin: No obvious rashes noted. Lymphatic: No obvious evidence of lymphedema or lymphadenopathy    Musculoskeletal Exam:  Examination on the left upper extremity demonstrates cap refill < 5 seconds in all fingers, transverse laceration on the pulp of the left index finger, sutures in place without sign of infection, moderate swelling and ecchymosis, significant tenderness to palpation but overall intact flexor and extensor mechanism. Imaging / Electrodiagnostic Tests:     X-ray of the left hand from the emergency department was reviewed and independently interpreted, this demonstrates a soft tissue injury without fracture    Assessment:   1. Crushing injury of left hand and finger, initial encounter        Plan:   We discussed the diagnosis and different treatment options. We discussed observation, therapy, antiinflammatory medications and other pertinent treatment modalities.     After discussing in detail the patient elects to proceed with Xeroform daily dressing change and Coban wrap, Keflex 500 3 times daily for 1 week, hydrocodone was prescribed for pain relief, I will reassess the patient in 1 week to ensure there is no sign of

## 2023-09-26 ENCOUNTER — OFFICE VISIT (OUTPATIENT)
Dept: ORTHOPEDIC SURGERY | Age: 58
End: 2023-09-26
Payer: COMMERCIAL

## 2023-09-26 DIAGNOSIS — S67.22XA CRUSHING INJURY OF LEFT HAND AND FINGER, INITIAL ENCOUNTER: Primary | ICD-10-CM

## 2023-09-26 PROCEDURE — 99214 OFFICE O/P EST MOD 30 MIN: CPT | Performed by: ORTHOPAEDIC SURGERY

## 2023-09-26 RX ORDER — TRAMADOL HYDROCHLORIDE 50 MG/1
50 TABLET ORAL EVERY 6 HOURS PRN
Qty: 20 TABLET | Refills: 0 | Status: SHIPPED | OUTPATIENT
Start: 2023-09-26 | End: 2023-10-01

## 2023-09-26 NOTE — PROGRESS NOTES
Orthopaedic Hand Clinic Note    Name: Cookie Carrera  Age: 66 y.o. YOB: 1965  Gender: female  MRN: [id number]      Follow up visit:   1. Crushing injury of left hand and finger, initial encounter        HPI: Cookie Carrera is a 66 y.o. female who is following up for left index finger crush injury, patient reports continued pain. ROS/Meds/PSH/PMH/FH/SH: I personally reviewed the patients standard intake form. Pertinents are discussed in the HPI    Physical Examination:  General: Awake and alert. HEENT: Normocephalic, atraumatic  CV/Pulm: Breathing even and unlabored  Skin: No obvious rashes noted. Lymphatic: No obvious evidence of lymphedema or lymphadenopathy    Musculoskeletal Examination:  Examination on the left upper extremity demonstrates cap refill < 5 seconds in all fingers, index finger crush injury with good granulation tissue, no sign of infection today. Imaging / Electrodiagnostic Tests:     none    Assessment:   1. Crushing injury of left hand and finger, initial encounter        Plan:   We discussed the diagnosis and different treatment options. We discussed observation, therapy, antiinflammatory medications and other pertinent treatment modalities. After discussing in detail the patient elects to proceed with Xeroform daily dressing change, no antibiotics needed at this time, tramadol 50 mg 3 times daily as needed for pain, I will reassess the patient in 2 weeks to ensure that this is healing well, there is some necrotic skin distal to the injury, this may follow up for it may heal with time, at this point the nail appears well secured and I have no reason to believe that the nail will fall off I will reassess this in 2 weeks, she may remain out of work for the next 2 weeks. Patient voiced accordance and understanding of the information provided and the formulated plan. All questions were answered to the patient's satisfaction during the encounter.     [employer]

## 2023-10-10 ENCOUNTER — OFFICE VISIT (OUTPATIENT)
Dept: ORTHOPEDIC SURGERY | Age: 58
End: 2023-10-10
Payer: COMMERCIAL

## 2023-10-10 DIAGNOSIS — S67.22XA CRUSHING INJURY OF LEFT HAND AND FINGER, INITIAL ENCOUNTER: Primary | ICD-10-CM

## 2023-10-10 PROCEDURE — 99213 OFFICE O/P EST LOW 20 MIN: CPT | Performed by: ORTHOPAEDIC SURGERY

## 2023-10-11 NOTE — PROGRESS NOTES
Orthopaedic Hand Clinic Note    Name: Marciano Ureña  Age: 66 y.o. YOB: 1965  Gender: female  MRN: [id number]      Follow up visit:   1. Crushing injury of left hand and finger, initial encounter        HPI: Marciano Ureña is a 66 y.o. female who is following up for left index finger crush injury, patient reports continued pain but better than before. ROS/Meds/PSH/PMH/FH/SH: I personally reviewed the patients standard intake form. Pertinents are discussed in the HPI    Physical Examination:  General: Awake and alert. HEENT: Normocephalic, atraumatic  CV/Pulm: Breathing even and unlabored  Skin: No obvious rashes noted. Lymphatic: No obvious evidence of lymphedema or lymphadenopathy    Musculoskeletal Examination:  Examination on the left upper extremity demonstrates cap refill < 5 seconds in all fingers, index finger crush injury with good granulation tissue, no sign of infection today. Imaging / Electrodiagnostic Tests:     none    Assessment:   1. Crushing injury of left hand and finger, initial encounter        Plan:   We discussed the diagnosis and different treatment options. We discussed observation, therapy, antiinflammatory medications and other pertinent treatment modalities. After discussing in detail the patient elects to proceed with Xeroform daily dressing change,  I will reassess the patient in 2 weeks to ensure that this is healing well, there is some hypertrophic granulation tissue, if in 2 weeks this is not fully healed and we may perform local cauterization to avoid pyogenic granuloma, she may remain out of work for the next 2 weeks. I will refer the patient to hand therapy to start motion as tolerated given that she is getting quite stiff and the patient is very apprehensive with any motion    Patient voiced accordance and understanding of the information provided and the formulated plan.  All questions were answered to the patient's satisfaction during the

## 2023-10-24 ENCOUNTER — EVALUATION (OUTPATIENT)
Age: 58
End: 2023-10-24

## 2023-10-24 ENCOUNTER — TELEPHONE (OUTPATIENT)
Dept: ORTHOPEDIC SURGERY | Age: 58
End: 2023-10-24

## 2023-10-24 ENCOUNTER — OFFICE VISIT (OUTPATIENT)
Dept: ORTHOPEDIC SURGERY | Age: 58
End: 2023-10-24
Payer: COMMERCIAL

## 2023-10-24 DIAGNOSIS — S67.22XA CRUSHING INJURY OF LEFT HAND AND FINGER, INITIAL ENCOUNTER: Primary | ICD-10-CM

## 2023-10-24 DIAGNOSIS — M79.645 PAIN OF FINGER OF LEFT HAND: Primary | ICD-10-CM

## 2023-10-24 PROCEDURE — 99214 OFFICE O/P EST MOD 30 MIN: CPT | Performed by: ORTHOPAEDIC SURGERY

## 2023-10-24 NOTE — TELEPHONE ENCOUNTER
Merna Childers can you sned this patient OT order to her work comp.  She has not started therapy with them yet, alena has agreed to see her today for initial

## 2023-10-24 NOTE — PROGRESS NOTES
Occupational Therapy Encounter No Charge    Patient seen briefly while in clinic this date for development of home exercise program (see below) for current presentation of L IF finger tip crush injury . Patient is workman's compensation and does not currently have authorization for therapy. Extensive discussion on trying to look at her finger tip, touch it, accept the injury in a gentle manner. Patient smiling upon exit. Precautions of current conditions and prognosis were also reviewed with the patient this date. Patient in agreement with therapist recommendations of HEP for ROM. Access Code: [email or web address]  URL: [email or web address]  Date: 10/24/2023  Prepared by: Zandra Mae    Exercises  - Seated Finger DIP Flexion AROM with Blocking  - 4-5 x daily - 7 x weekly - 2 sets - 10 reps - 3 sec hold  - Seated Finger PIP Flexion AROM  - 4-5 x daily - 7 x weekly - 2 sets - 10 reps - 3 sec hold  - Seated Claw Fist AROM  - 4-5 x daily - 7 x weekly - 2 sets - 10 reps - 3 sec hold  - Seated Full Fist AROM  - 4-5 x daily - 7 x weekly - 2 sets - 10 reps - 3 sec hold

## 2023-10-24 NOTE — PROGRESS NOTES
Orthopaedic Hand Clinic Note    Name: Shruti Kaufman  Age: 66 y.o. YOB: 1965  Gender: female  MRN: [id number]      Follow up visit:   1. Crushing injury of left hand and finger, initial encounter        HPI: Shruti Kaufman is a 66 y.o. female who is following up for left index finger crush injury, this patient is still resistant to even looking at her finger or using it. ROS/Meds/PSH/PMH/FH/SH: I personally reviewed the patients standard intake form. Pertinents are discussed in the HPI    Physical Examination:  General: Awake and alert. HEENT: Normocephalic, atraumatic  CV/Pulm: Breathing even and unlabored  Skin: No obvious rashes noted. Lymphatic: No obvious evidence of lymphedema or lymphadenopathy    Musculoskeletal Examination:  Examination on the left upper extremity demonstrates cap refill < 5 seconds in all fingers, well-healed scars on the left index finger, she has an intact FDP and extensor mechanism however, patient is hesitant and very reactive with any examination. Imaging / Electrodiagnostic Tests:     none    Assessment:   1. Crushing injury of left hand and finger, initial encounter        Plan:   We discussed the diagnosis and different treatment options. We discussed observation, therapy, antiinflammatory medications and other pertinent treatment modalities. After discussing in detail the patient elects to proceed with therapy referral to work on motion, desensitization modalities and strengthening,, she may resume work with no lifting, pushing or pulling more than 2 pounds for the next 6 weeks, I will reassess the patient in 6 weeks and at that time we will release the patient back to full duty without restrictions and perform MMI if applicable, I personally discussed the case with a hand therapist to guide her treatment. Patient voiced accordance and understanding of the information provided and the formulated plan.  All questions were answered to the

## 2023-11-09 ENCOUNTER — TELEPHONE (OUTPATIENT)
Age: 58
End: 2023-11-09

## 2023-11-10 NOTE — PROGRESS NOTES
management, scar mobilization, myofascial correction, mechanical joint correction or support, and ([id number]) Kineseotaping for Therapeutic Procedure/Exercise  for strengthening or lengthening a muscle. Used in conjunction with retraining posture, endurance and flexibility    The referring physician has reviewed and approved this evaluation and plan of care as noted by the electronic signature attached to note. GOALS   Short Term Goals:  12/11/2023  (4 weeks)  Patient will be I with HEP   Increase AROM left IF MP joint flexion from 67 deg to at least 75 deg to improve ability to make a fist  Increase AROM left IF PIP joint flexion from 35 deg to at least 50 deg to improve ability to make a fist.  Increase AROM left IF DIP joint flexion from 10 deg to at least 30 deg to improve ability to make a fist.   Quick DASH assessment score will be less than a 28% functional deficit. Long Term Goals: 2/5/2024  (12 weeks)  Pt will be able to make a full composite fist with the affected hand so that the pt is able to grasp and hold objects during self care. Pt will have no pain at rest in left IF. Pt will be able to use her left hand for ADL's/iADL's with c/o pain no greater than a 2/10 occasionally. Pt will report normal sensation in left IF. Pt will have adequate strength to be able to hold and open containers without difficulty.   Pt will have full left IF PIP extension (AROM)  Decrease edema left IF PIP from 2-1/2\" circumferentially to 2-3/8\" or less  Quick DASH assessment score will be 20% or less      [facility] Portal     OT Protocols

## 2023-11-13 ENCOUNTER — EVALUATION (OUTPATIENT)
Age: 58
End: 2023-11-13
Payer: COMMERCIAL

## 2023-11-13 DIAGNOSIS — R20.9 SENSATION DISTURBANCE OF SKIN: ICD-10-CM

## 2023-11-13 DIAGNOSIS — M62.81 HAND MUSCLE WEAKNESS: ICD-10-CM

## 2023-11-13 DIAGNOSIS — M25.449 EFFUSION OF FINGER JOINT: ICD-10-CM

## 2023-11-13 DIAGNOSIS — Z78.9 DECREASED ACTIVITIES OF DAILY LIVING (ADL): ICD-10-CM

## 2023-11-13 DIAGNOSIS — M79.645 PAIN OF FINGER OF LEFT HAND: Primary | ICD-10-CM

## 2023-11-13 DIAGNOSIS — M25.642 STIFFNESS OF FINGER JOINT OF LEFT HAND: ICD-10-CM

## 2023-11-13 PROCEDURE — 97110 THERAPEUTIC EXERCISES: CPT | Performed by: OCCUPATIONAL THERAPIST

## 2023-11-13 PROCEDURE — 97165 OT EVAL LOW COMPLEX 30 MIN: CPT | Performed by: OCCUPATIONAL THERAPIST

## 2023-11-17 NOTE — PROGRESS NOTES
[address] [place] [address] [address] [facility]  [address] [facility]  Dept: [phone]      Occupational Therapy Daily Note     Referring MD: Aric Pace MD    Diagnosis:     ICD-10-CM    1. Pain of finger of left hand  M79.645       2. Hand muscle weakness  M62.81       3. Stiffness of finger joint of left hand  M25.642       4. Effusion of finger joint  M25.449       5. Sensation disturbance of skin  R20.9       6. Decreased activities of daily living (ADL)  Z78.9            Surgery/Medical Dx: Date 9/12/23 Crush Injury Left  Index finger without fracture    Therapy precautions: none    History of injury/onset : Pt injured her left index finger on 9/12/23 when she got it crushed in a machine at work. She was seen in the ER where her wounds were washed and closed with sutures. Total Direct Treatment Time: 35 min  Total In Office Time: 45 min  Modifier needed: No    Episode visit count:  2     Preferred Name: Sadny Lopez HISTORY     PMHX & Meds:   Past Medical History:   Diagnosis Date    Asthma     inhaler and nebs prn    Bell's palsy 11/2019    Chronic obstructive pulmonary disease (HCC)     advair and albuterol prn=uses 1-2 times per week; Neb PRN=never uses; not followed by Pulmonary     Former cigarette smoker     History of positive PPD 1996    no meds     Hypothyroidism     on med for control     Ill-defined condition     pancreatitis x1; not chronic per patient     Marijuana smoker     OA (osteoarthritis)    ,   Past Surgical History:   Procedure Laterality Date    ORTHOPEDIC SURGERY Left 2007    left ankle fracture- has a plate    OTHER SURGICAL HISTORY  10/09/2020    teeth removal     THYROIDECTOMY  12/04/2018    TOTAL HIP ARTHROPLASTY Right 5/30/2023    HIP TOTAL ARTHROPLASTY/ SDD performed by Oskar Anguiano MD at [address] [place] [address]      Medications. : Reviewed in chart  Allergies:    Allergies   Allergen Reactions    Latex Rash        SUBJECTIVE     Current Symptoms/Chief

## 2023-11-20 ENCOUNTER — TREATMENT (OUTPATIENT)
Age: 58
End: 2023-11-20

## 2023-11-20 DIAGNOSIS — M62.81 HAND MUSCLE WEAKNESS: ICD-10-CM

## 2023-11-20 DIAGNOSIS — M79.645 PAIN OF FINGER OF LEFT HAND: Primary | ICD-10-CM

## 2023-11-20 DIAGNOSIS — M25.449 EFFUSION OF FINGER JOINT: ICD-10-CM

## 2023-11-20 DIAGNOSIS — Z78.9 DECREASED ACTIVITIES OF DAILY LIVING (ADL): ICD-10-CM

## 2023-11-20 DIAGNOSIS — M25.642 STIFFNESS OF FINGER JOINT OF LEFT HAND: ICD-10-CM

## 2023-11-20 DIAGNOSIS — R20.9 SENSATION DISTURBANCE OF SKIN: ICD-10-CM

## 2023-11-28 NOTE — PROGRESS NOTES
[address] [facility] [address]  [address]  [address] [facility]  [address] [facility]  Dept: [phone]      Occupational Therapy Daily Note     Referring MD: Vonda Jimenez MD    Diagnosis:     ICD-10-CM    1. Pain of finger of left hand  M79.645       2. Hand muscle weakness  M62.81       3. Stiffness of finger joint of left hand  M25.642       4. Effusion of finger joint  M25.449       5. Sensation disturbance of skin  R20.9       6. Decreased activities of daily living (ADL)  Z78.9            Surgery/Medical Dx: Date 9/12/23 Crush Injury Left  Index finger without fracture    Therapy precautions: none    History of injury/onset : Pt injured her left index finger on 9/12/23 when she got it crushed in a machine at work. She was seen in the ER where her wounds were washed and closed with sutures. Total Direct Treatment Time: 35 min  Total In Office Time: 45 min  Modifier needed: No    Episode visit count:  3     Preferred Name: Celia Days HISTORY     PMHX & Meds:   Past Medical History:   Diagnosis Date    Asthma     inhaler and nebs prn    Bell's palsy 11/2019    Chronic obstructive pulmonary disease (HCC)     advair and albuterol prn=uses 1-2 times per week; Neb PRN=never uses; not followed by Pulmonary     Former cigarette smoker     History of positive PPD 1996    no meds     Hypothyroidism     on med for control     Ill-defined condition     pancreatitis x1; not chronic per patient     Marijuana smoker     OA (osteoarthritis)    ,   Past Surgical History:   Procedure Laterality Date    ORTHOPEDIC SURGERY Left 2007    left ankle fracture- has a plate    OTHER SURGICAL HISTORY  10/09/2020    teeth removal     THYROIDECTOMY  12/04/2018    TOTAL HIP ARTHROPLASTY Right 5/30/2023    HIP TOTAL ARTHROPLASTY/ SDD performed by Damien Rai MD at [address] [place] [address]      Medications. : Reviewed in chart  Allergies:    Allergies   Allergen Reactions    Latex Rash        SUBJECTIVE     Current Symptoms/Chief

## 2023-11-29 ENCOUNTER — TREATMENT (OUTPATIENT)
Age: 58
End: 2023-11-29
Payer: COMMERCIAL

## 2023-11-29 DIAGNOSIS — R20.9 SENSATION DISTURBANCE OF SKIN: ICD-10-CM

## 2023-11-29 DIAGNOSIS — M25.449 EFFUSION OF FINGER JOINT: ICD-10-CM

## 2023-11-29 DIAGNOSIS — M79.645 PAIN OF FINGER OF LEFT HAND: Primary | ICD-10-CM

## 2023-11-29 DIAGNOSIS — M25.642 STIFFNESS OF FINGER JOINT OF LEFT HAND: ICD-10-CM

## 2023-11-29 DIAGNOSIS — M62.81 HAND MUSCLE WEAKNESS: ICD-10-CM

## 2023-11-29 DIAGNOSIS — Z78.9 DECREASED ACTIVITIES OF DAILY LIVING (ADL): ICD-10-CM

## 2023-11-29 PROCEDURE — 97140 MANUAL THERAPY 1/> REGIONS: CPT | Performed by: OCCUPATIONAL THERAPIST

## 2023-11-29 PROCEDURE — 97110 THERAPEUTIC EXERCISES: CPT | Performed by: OCCUPATIONAL THERAPIST

## 2023-12-05 ENCOUNTER — OFFICE VISIT (OUTPATIENT)
Dept: ORTHOPEDIC SURGERY | Age: 58
End: 2023-12-05
Payer: COMMERCIAL

## 2023-12-05 DIAGNOSIS — S67.22XA CRUSHING INJURY OF LEFT HAND AND FINGER, INITIAL ENCOUNTER: Primary | ICD-10-CM

## 2023-12-05 PROCEDURE — 99214 OFFICE O/P EST MOD 30 MIN: CPT | Performed by: ORTHOPAEDIC SURGERY

## 2023-12-05 NOTE — PROGRESS NOTES
Orthopaedic Hand Clinic Note    Name: Sapna Murillo  Age: 66 y.o. YOB: 1965  Gender: female  MRN: [id number]      Follow up visit:   1. Crushing injury of left hand and finger, initial encounter        HPI: Sapna Murillo is a 66 y.o. female who is following up for left index finger crush injury, patient reports great improvement in motion, pain and swelling. ROS/Meds/PSH/PMH/FH/SH: I personally reviewed the patients standard intake form. Pertinents are discussed in the HPI    Physical Examination:  General: Awake and alert. HEENT: Normocephalic, atraumatic  CV/Pulm: Breathing even and unlabored  Skin: No obvious rashes noted. Lymphatic: No obvious evidence of lymphedema or lymphadenopathy    Musculoskeletal Examination:  Examination on the left upper extremity demonstrates cap refill < 5 seconds in all fingers, well-healed laceration on the left index finger, she has a 1 cm tip to palm distance, some tenderness palpation along the tip. Imaging / Electrodiagnostic Tests:     none    Assessment:   1. Crushing injury of left hand and finger, initial encounter        Plan:   We discussed the diagnosis and different treatment options. We discussed observation, therapy, antiinflammatory medications and other pertinent treatment modalities. After discussing in detail the patient elects to proceed with activity as tolerated, she may resume work without restrictions, she will finish her last few sessions of therapy left to continue working on desensitization, strengthening and motion, she will follow-up on an as-needed basis. According to the [facility] guides to evaluation permanent permanent sixth edition the patient has a left index finger impairment of 5% (table 15-2, page 391). We discussed the she may still lose the index nail plate but may take another 3 months, if she has any issues we can always reassess.   I discussed with her that she has up to 1 year to reopen the case if she

## 2023-12-11 ENCOUNTER — HOSPITAL ENCOUNTER (EMERGENCY)
Age: 58
Discharge: HOME | End: 2023-12-11

## 2023-12-11 ENCOUNTER — APPOINTMENT (OUTPATIENT)
Dept: GENERAL RADIOLOGY | Age: 58
End: 2023-12-11

## 2023-12-11 VITALS
OXYGEN SATURATION: 98 % | HEART RATE: 67 BPM | DIASTOLIC BLOOD PRESSURE: 95 MMHG | BODY MASS INDEX: 24.35 KG/M2 | HEIGHT: 60 IN | SYSTOLIC BLOOD PRESSURE: 142 MMHG | RESPIRATION RATE: 16 BRPM | TEMPERATURE: 98.5 F | WEIGHT: 124 LBS

## 2023-12-11 DIAGNOSIS — E03.9 HYPOTHYROIDISM, UNSPECIFIED TYPE: ICD-10-CM

## 2023-12-11 DIAGNOSIS — M94.0 TIETZE'S DISEASE: Primary | ICD-10-CM

## 2023-12-11 DIAGNOSIS — E89.0 POSTSURGICAL HYPOTHYROIDISM: ICD-10-CM

## 2023-12-11 LAB
ANION GAP SERPL CALC-SCNC: 5 MMOL/L (ref 2–11)
BASOPHILS # BLD: 0.1 K/UL (ref 0–0.2)
BASOPHILS NFR BLD: 2 % (ref 0–2)
BUN SERPL-MCNC: 7 MG/DL (ref 6–23)
CALCIUM SERPL-MCNC: 8.8 MG/DL (ref 8.3–10.4)
CHLORIDE SERPL-SCNC: 103 MMOL/L (ref 103–113)
CO2 SERPL-SCNC: 30 MMOL/L (ref 21–32)
CREAT SERPL-MCNC: 1.14 MG/DL (ref 0.6–1)
DIFFERENTIAL METHOD BLD: ABNORMAL
EKG ATRIAL RATE: 66 BPM
EKG DIAGNOSIS: NORMAL
EKG P AXIS: 78 DEGREES
EKG P-R INTERVAL: 156 MS
EKG Q-T INTERVAL: 418 MS
EKG QRS DURATION: 79 MS
EKG QTC CALCULATION (BAZETT): 442 MS
EKG R AXIS: -15 DEGREES
EKG T AXIS: 54 DEGREES
EKG VENTRICULAR RATE: 67 BPM
EOSINOPHIL # BLD: 0.3 K/UL (ref 0–0.8)
EOSINOPHIL NFR BLD: 8 % (ref 0.5–7.8)
ERYTHROCYTE [DISTWIDTH] IN BLOOD BY AUTOMATED COUNT: 17.6 % (ref 11.9–14.6)
GLUCOSE SERPL-MCNC: 75 MG/DL (ref 65–100)
HCT VFR BLD AUTO: 35.2 % (ref 35.8–46.3)
HGB BLD-MCNC: 11.5 G/DL (ref 11.7–15.4)
IMM GRANULOCYTES # BLD AUTO: 0 K/UL (ref 0–0.5)
IMM GRANULOCYTES NFR BLD AUTO: 0 % (ref 0–5)
LYMPHOCYTES # BLD: 2.1 K/UL (ref 0.5–4.6)
LYMPHOCYTES NFR BLD: 50 % (ref 13–44)
MCH RBC QN AUTO: 27.3 PG (ref 26.1–32.9)
MCHC RBC AUTO-ENTMCNC: 32.7 G/DL (ref 31.4–35)
MCV RBC AUTO: 83.4 FL (ref 82–102)
MONOCYTES # BLD: 0.3 K/UL (ref 0.1–1.3)
MONOCYTES NFR BLD: 8 % (ref 4–12)
NEUTS SEG # BLD: 1.3 K/UL (ref 1.7–8.2)
NEUTS SEG NFR BLD: 31 % (ref 43–78)
NRBC # BLD: 0 K/UL (ref 0–0.2)
PLATELET # BLD AUTO: 234 K/UL (ref 150–450)
PMV BLD AUTO: 11.3 FL (ref 9.4–12.3)
POTASSIUM SERPL-SCNC: 3.5 MMOL/L (ref 3.5–5.1)
RBC # BLD AUTO: 4.22 M/UL (ref 4.05–5.2)
SODIUM SERPL-SCNC: 138 MMOL/L (ref 136–146)
T4 FREE SERPL-MCNC: 0.2 NG/DL (ref 0.78–1.46)
TROPONIN I SERPL HS-MCNC: 13 PG/ML (ref 0–14)
TSH W FREE THYROID IF ABNORMAL: 128 UIU/ML (ref 0.36–3.74)
WBC # BLD AUTO: 4.2 K/UL (ref 4.3–11.1)

## 2023-12-11 PROCEDURE — 6360000002 HC RX W HCPCS: Performed by: STUDENT IN AN ORGANIZED HEALTH CARE EDUCATION/TRAINING PROGRAM

## 2023-12-11 PROCEDURE — 93005 ELECTROCARDIOGRAM TRACING: CPT | Performed by: STUDENT IN AN ORGANIZED HEALTH CARE EDUCATION/TRAINING PROGRAM

## 2023-12-11 PROCEDURE — 80048 BASIC METABOLIC PNL TOTAL CA: CPT

## 2023-12-11 PROCEDURE — 96374 THER/PROPH/DIAG INJ IV PUSH: CPT

## 2023-12-11 PROCEDURE — 84484 ASSAY OF TROPONIN QUANT: CPT

## 2023-12-11 PROCEDURE — 84443 ASSAY THYROID STIM HORMONE: CPT

## 2023-12-11 PROCEDURE — 6370000000 HC RX 637 (ALT 250 FOR IP): Performed by: STUDENT IN AN ORGANIZED HEALTH CARE EDUCATION/TRAINING PROGRAM

## 2023-12-11 PROCEDURE — 99285 EMERGENCY DEPT VISIT HI MDM: CPT

## 2023-12-11 PROCEDURE — 84439 ASSAY OF FREE THYROXINE: CPT

## 2023-12-11 PROCEDURE — 93010 ELECTROCARDIOGRAM REPORT: CPT | Performed by: INTERNAL MEDICINE

## 2023-12-11 PROCEDURE — 71046 X-RAY EXAM CHEST 2 VIEWS: CPT

## 2023-12-11 PROCEDURE — 85025 COMPLETE CBC W/AUTO DIFF WBC: CPT

## 2023-12-11 RX ORDER — KETOROLAC TROMETHAMINE 15 MG/ML
15 INJECTION, SOLUTION INTRAMUSCULAR; INTRAVENOUS ONCE
Status: COMPLETED | OUTPATIENT
Start: 2023-12-11 | End: 2023-12-11

## 2023-12-11 RX ORDER — ASPIRIN 81 MG/1
324 TABLET, CHEWABLE ORAL ONCE
Status: COMPLETED | OUTPATIENT
Start: 2023-12-11 | End: 2023-12-11

## 2023-12-11 RX ORDER — MELOXICAM 15 MG/1
15 TABLET ORAL DAILY
Qty: 30 TABLET | Refills: 0 | Status: SHIPPED | OUTPATIENT
Start: 2023-12-11 | End: 2024-01-10

## 2023-12-11 RX ORDER — LEVOTHYROXINE SODIUM 0.1 MG/1
100 TABLET ORAL DAILY
Qty: 30 TABLET | Refills: 2 | Status: SHIPPED | OUTPATIENT
Start: 2023-12-11 | End: 2024-03-10

## 2023-12-11 RX ADMIN — ASPIRIN 324 MG: 81 TABLET, CHEWABLE ORAL at 09:54

## 2023-12-11 RX ADMIN — KETOROLAC TROMETHAMINE 15 MG: 15 INJECTION, SOLUTION INTRAMUSCULAR; INTRAVENOUS at 09:55

## 2023-12-11 ASSESSMENT — ENCOUNTER SYMPTOMS
ABDOMINAL PAIN: 0
PHOTOPHOBIA: 0
COUGH: 0
FACIAL SWELLING: 0
SHORTNESS OF BREATH: 0
TROUBLE SWALLOWING: 0
VOMITING: 0

## 2023-12-11 ASSESSMENT — PAIN SCALES - GENERAL
PAINLEVEL_OUTOF10: 8
PAINLEVEL_OUTOF10: 8

## 2023-12-11 ASSESSMENT — PAIN DESCRIPTION - LOCATION: LOCATION: OTHER (COMMENT)

## 2023-12-11 ASSESSMENT — PAIN - FUNCTIONAL ASSESSMENT: PAIN_FUNCTIONAL_ASSESSMENT: 0-10

## 2023-12-11 ASSESSMENT — PAIN DESCRIPTION - ORIENTATION: ORIENTATION: RIGHT

## 2023-12-11 NOTE — DISCHARGE INSTRUCTIONS
As we discussed, I suspect your chest pain is due to inflammation of the cartilage near your ribs. This will be treated with Mobic. Please take it once daily to help control your symptoms. Do not take other NSAIDs like Advil or celecoxib while taking Mobic. Your thyroid function is very low. Please begin taking your levothyroxine again. I have refilled this. Please be sure to follow your family doctor regarding this. As we discussed, I did not find a life threatening cause of your symptoms today. However, THAT DOES NOT MEAN IT COULD NOT DEVELOP. If you develop ANY new or worsening symptoms, it is critical that you return for re-evaluation. This includes any symptoms that are concerning to you, especially symptoms such as severe constipation, confusion, new or changed your chest pain, trouble breathing. If you do not return for re-evaluation, you risk serious complications, including death.

## 2023-12-11 NOTE — ED TRIAGE NOTES
Patient complaining of pain to right collarbone x 3 days stating it looks different. Patient denies any falls or further trauma. Patient advises very tender to touch. Patient is also requesting her thyroid levels be checked stating she has not been on her levothyroxine for months.

## 2023-12-11 NOTE — PROGRESS NOTES
There is sensitivity. PLAN OF CARE   Continue 1-2x per week to address goals not met. GOALS   Short Term Goals:  12/11/2023  (4 weeks)  Patient will be I with HEP  (met)  Increase AROM left IF MP joint flexion from 67 deg to at least 75 deg to improve ability to make a fist (met)  Increase AROM left IF PIP joint flexion from 35 deg to at least 50 deg to improve ability to make a fist. (Met)  Increase AROM left IF DIP joint flexion from 10 deg to at least 30 deg to improve ability to make a fist.  (Met)  Quick DASH assessment score will be less than a 28% functional deficit. (Not met, 32%)    Long Term Goals: 2/5/2024  (12 weeks)  Pt will be able to make a full composite fist with the affected hand so that the pt is able to grasp and hold objects during self care. Pt will have no pain at rest in left IF. (Met)  Pt will be able to use her left hand for ADL's/iADL's with c/o pain no greater than a 2/10 occasionally. Pt will report normal sensation in left IF. Pt will have adequate strength to be able to hold and open containers without difficulty. Pt will have full left IF PIP extension (AROM)  Decrease edema left IF PIP from 2-1/2\" circumferentially to 2-3/8\" or less  Quick DASH assessment score will be 20% or less      [facility] Portal   Access Code: [email or web address]  URL: [email or web address]  Date: 10/24/2023  Prepared by: Luiza Guzman     Exercises  - Seated Finger DIP Flexion AROM with Blocking  - 4-5 x daily - 7 x weekly - 2 sets - 10 reps - 3 sec hold  - Seated Finger PIP Flexion AROM  - 4-5 x daily - 7 x weekly - 2 sets - 10 reps - 3 sec hold  - Seated Claw Fist AROM  - 4-5 x daily - 7 x weekly - 2 sets - 10 reps - 3 sec hold  - Seated Full Fist AROM  - 4-5 x daily - 7 x weekly - 2 sets - 10 reps - 3 sec hold  - Marcial straps  - Silopad digital cap to don at night  - DIP blocking device fabricated  - scar massage and retrograde massage  OT Protocols

## 2023-12-11 NOTE — ED PROVIDER NOTES
(L) 4.3 - 11.1 K/uL    RBC 4.22 4.05 - 5.2 M/uL    Hemoglobin 11.5 (L) 11.7 - 15.4 g/dL    Hematocrit 35.2 (L) 35.8 - 46.3 %    MCV 83.4 82.0 - 102.0 FL    MCH 27.3 26.1 - 32.9 PG    MCHC 32.7 31.4 - 35.0 g/dL    RDW 17.6 (H) 11.9 - 14.6 %    Platelets 894 741 - 304 K/uL    MPV 11.3 9.4 - 12.3 FL    nRBC 0.00 0.0 - 0.2 K/uL    Differential Type AUTOMATED      Neutrophils % 31 (L) 43 - 78 %    Lymphocytes % 50 (H) 13 - 44 %    Monocytes % 8 4.0 - 12.0 %    Eosinophils % 8 (H) 0.5 - 7.8 %    Basophils % 2 0.0 - 2.0 %    Immature Granulocytes 0 0.0 - 5.0 %    Neutrophils Absolute 1.3 (L) 1.7 - 8.2 K/UL    Lymphocytes Absolute 2.1 0.5 - 4.6 K/UL    Monocytes Absolute 0.3 0.1 - 1.3 K/UL    Eosinophils Absolute 0.3 0.0 - 0.8 K/UL    Basophils Absolute 0.1 0.0 - 0.2 K/UL    Absolute Immature Granulocyte 0.0 0.0 - 0.5 K/UL   Troponin   Result Value Ref Range    Troponin, High Sensitivity 13.0 0 - 14 pg/mL   TSH with Reflex   Result Value Ref Range    TSH w Free Thyroid if Abnormal 128.00 (H) 0.358 - 3.740 UIU/ML   T4, Free   Result Value Ref Range    T4 Free 0.2 (L) 0.78 - 1.46 NG/DL   EKG 12 Lead   Result Value Ref Range    Ventricular Rate 67 BPM    Atrial Rate 66 BPM    P-R Interval 156 ms    QRS Duration 79 ms    Q-T Interval 418 ms    QTc Calculation (Bazett) 442 ms    P Axis 78 degrees    R Axis -15 degrees    T Axis 54 degrees    Diagnosis       Sinus rhythm  Borderline left axis deviation  Low voltage, precordial leads    Confirmed by MD PAVON DANIEL ([id number]) on 12/11/2023 12:21:49 PM          XR CHEST (2 VW)   Final Result   No acute findings in the chest                           Voice dictation software was used during the making of this note. This software is not perfect and grammatical and other typographical errors may be present. This note has not been completely proofread for errors.      Leyla Montoya [place]  12/11/23 0246

## 2023-12-12 ENCOUNTER — TREATMENT (OUTPATIENT)
Age: 58
End: 2023-12-12

## 2023-12-12 DIAGNOSIS — M79.645 PAIN OF FINGER OF LEFT HAND: Primary | ICD-10-CM

## 2023-12-12 DIAGNOSIS — Z78.9 DECREASED ACTIVITIES OF DAILY LIVING (ADL): ICD-10-CM

## 2023-12-12 DIAGNOSIS — M62.81 HAND MUSCLE WEAKNESS: ICD-10-CM

## 2023-12-12 DIAGNOSIS — M25.642 STIFFNESS OF FINGER JOINT OF LEFT HAND: ICD-10-CM

## 2023-12-12 DIAGNOSIS — R20.9 SENSATION DISTURBANCE OF SKIN: ICD-10-CM

## 2023-12-12 DIAGNOSIS — M25.449 EFFUSION OF FINGER JOINT: ICD-10-CM

## 2023-12-14 NOTE — PROGRESS NOTES
[address] [place] [address] [address] [facility]  [address] [facility]  Dept: [phone]      Occupational Therapy Daily Note     Referring MD: Jena Velásquez MD    Diagnosis:     ICD-10-CM    1. Hand muscle weakness  M62.81       2. Pain of finger of left hand  M79.645       3. Stiffness of finger joint of left hand  M25.642       4. Effusion of finger joint  M25.449       5. Sensation disturbance of skin  R20.9       6. Decreased activities of daily living (ADL)  Z78.9            Surgery/Medical Dx: Date 9/12/23 Crush Injury Left  Index finger without fracture    Therapy precautions: none    History of injury/onset : Pt injured her left index finger on 9/12/23 when she got it crushed in a machine at work. She was seen in the ER where her wounds were washed and closed with sutures. Total Direct Treatment Time: 35 min  Total In Office Time: 45 min  Modifier needed: No    Episode visit count:  5     Preferred Name: En Murdock HISTORY     PMHX & Meds:   Past Medical History:   Diagnosis Date    Asthma     inhaler and nebs prn    Bell's palsy 11/2019    Chronic obstructive pulmonary disease (HCC)     advair and albuterol prn=uses 1-2 times per week; Neb PRN=never uses; not followed by Pulmonary     Former cigarette smoker     History of positive PPD 1996    no meds     Hypothyroidism     on med for control     Ill-defined condition     pancreatitis x1; not chronic per patient     Marijuana smoker     OA (osteoarthritis)    ,   Past Surgical History:   Procedure Laterality Date    ORTHOPEDIC SURGERY Left 2007    left ankle fracture- has a plate    OTHER SURGICAL HISTORY  10/09/2020    teeth removal     THYROIDECTOMY  12/04/2018    TOTAL HIP ARTHROPLASTY Right 5/30/2023    HIP TOTAL ARTHROPLASTY/ SDD performed by Fred Shell MD at [address] [facility]      Medications. : Reviewed in chart  Allergies:    Allergies   Allergen Reactions    Latex Rash        SUBJECTIVE     Current Symptoms/Chief

## 2023-12-15 ENCOUNTER — OFFICE VISIT (OUTPATIENT)
Dept: ORTHOPEDIC SURGERY | Age: 58
End: 2023-12-15

## 2023-12-15 ENCOUNTER — TREATMENT (OUTPATIENT)
Age: 58
End: 2023-12-15

## 2023-12-15 DIAGNOSIS — M25.449 EFFUSION OF FINGER JOINT: ICD-10-CM

## 2023-12-15 DIAGNOSIS — R20.9 SENSATION DISTURBANCE OF SKIN: ICD-10-CM

## 2023-12-15 DIAGNOSIS — M47.816 LUMBAR SPONDYLOSIS: ICD-10-CM

## 2023-12-15 DIAGNOSIS — M79.645 PAIN OF FINGER OF LEFT HAND: ICD-10-CM

## 2023-12-15 DIAGNOSIS — M62.81 HAND MUSCLE WEAKNESS: Primary | ICD-10-CM

## 2023-12-15 DIAGNOSIS — M54.50 RIGHT LOW BACK PAIN, UNSPECIFIED CHRONICITY, UNSPECIFIED WHETHER SCIATICA PRESENT: ICD-10-CM

## 2023-12-15 DIAGNOSIS — M25.642 STIFFNESS OF FINGER JOINT OF LEFT HAND: ICD-10-CM

## 2023-12-15 DIAGNOSIS — Z78.9 DECREASED ACTIVITIES OF DAILY LIVING (ADL): ICD-10-CM

## 2023-12-15 DIAGNOSIS — Z96.641 STATUS POST RIGHT HIP REPLACEMENT: Primary | ICD-10-CM

## 2023-12-15 PROCEDURE — 99214 OFFICE O/P EST MOD 30 MIN: CPT | Performed by: PHYSICIAN ASSISTANT

## 2023-12-15 NOTE — PROGRESS NOTES
inhaler, Inhale 1 puff into the lungs 2 times daily, Disp: , Rfl:     polyethylene glycol (GLYCOLAX) 17 GM/SCOOP powder, Take 17 g by mouth daily, Disp: , Rfl:   Allergies   Allergen Reactions    Latex Rash     Past Medical History:   Diagnosis Date    Asthma     inhaler and nebs prn    Bell's palsy 11/2019    Chronic obstructive pulmonary disease (HCC)     advair and albuterol prn=uses 1-2 times per week; Neb PRN=never uses; not followed by Pulmonary     Former cigarette smoker     History of positive PPD 1996    no meds     Hypothyroidism     on med for control     Ill-defined condition     pancreatitis x1; not chronic per patient     Marijuana smoker     OA (osteoarthritis)      . pmh  Past Surgical History:   Procedure Laterality Date    ORTHOPEDIC SURGERY Left 2007    left ankle fracture- has a plate    OTHER SURGICAL HISTORY  10/09/2020    teeth removal     THYROIDECTOMY  12/04/2018    TOTAL HIP ARTHROPLASTY Right 5/30/2023    HIP TOTAL ARTHROPLASTY/ SDD performed by Judith Maldonado MD at [address]     Family History   Problem Relation Age of Onset    Hypertension Father     Cancer Brother 47        brain    Hypertension Mother      Social History     Socioeconomic History    Marital status: Single     Spouse name: Not on file    Number of children: Not on file    Years of education: Not on file    Highest education level: Not on file   Occupational History    Not on file   Tobacco Use    Smoking status: Every Day     Packs/day: .25     Types: Cigarettes     Start date: 1/1/1992    Smokeless tobacco: Never   Substance and Sexual Activity    Alcohol use: No    Drug use: Yes     Types: Marijuana Darin Javed)    Sexual activity: Not on file   Other Topics Concern    Not on file   Social History Narrative    Not on file     Social Determinants of Health     Financial Resource Strain: Not on file   Food Insecurity: Not on file   Transportation Needs: Unmet Transportation Needs (6/22/2023)    [place] :

## 2023-12-26 NOTE — PROGRESS NOTES
12/11/2023  (4 weeks)  Patient will be I with HEP  (met)  Increase AROM left IF MP joint flexion from 67 deg to at least 75 deg to improve ability to make a fist (met)  Increase AROM left IF PIP joint flexion from 35 deg to at least 50 deg to improve ability to make a fist. (Met)  Increase AROM left IF DIP joint flexion from 10 deg to at least 30 deg to improve ability to make a fist.  (Met)  Quick DASH assessment score will be less than a 28% functional deficit. (Not met, 32%)    Long Term Goals: 2/5/2024  (12 weeks)  Pt will be able to make a full composite fist with the affected hand so that the pt is able to grasp and hold objects during self care. Pt will have no pain at rest in left IF. (Met)  Pt will be able to use her left hand for ADL's/iADL's with c/o pain no greater than a 2/10 occasionally. Pt will report normal sensation in left IF. Pt will have adequate strength to be able to hold and open containers without difficulty. Pt will have full left IF PIP extension (AROM)  Decrease edema left IF PIP from 2-1/2\" circumferentially to 2-3/8\" or less  Quick DASH assessment score will be 20% or less      [employer] Portal   Access Code: [email or web address]  URL: [email or web address]  Date: 10/24/2023  Prepared by: Max Rear     Exercises  - Seated Finger DIP Flexion AROM with Blocking  - 4-5 x daily - 7 x weekly - 2 sets - 10 reps - 3 sec hold  - Seated Finger PIP Flexion AROM  - 4-5 x daily - 7 x weekly - 2 sets - 10 reps - 3 sec hold  - Seated Claw Fist AROM  - 4-5 x daily - 7 x weekly - 2 sets - 10 reps - 3 sec hold  - Seated Full Fist AROM  - 4-5 x daily - 7 x weekly - 2 sets - 10 reps - 3 sec hold  - Marcial straps  - Silopad digital cap to don at night  - DIP blocking device fabricated  - scar massage and retrograde massage  OT Protocols

## 2023-12-27 ENCOUNTER — TREATMENT (OUTPATIENT)
Age: 58
End: 2023-12-27
Payer: COMMERCIAL

## 2023-12-27 DIAGNOSIS — M79.645 PAIN OF FINGER OF LEFT HAND: ICD-10-CM

## 2023-12-27 DIAGNOSIS — M62.81 HAND MUSCLE WEAKNESS: Primary | ICD-10-CM

## 2023-12-27 DIAGNOSIS — M25.642 STIFFNESS OF FINGER JOINT OF LEFT HAND: ICD-10-CM

## 2023-12-27 DIAGNOSIS — Z78.9 DECREASED ACTIVITIES OF DAILY LIVING (ADL): ICD-10-CM

## 2023-12-27 DIAGNOSIS — R20.9 SENSATION DISTURBANCE OF SKIN: ICD-10-CM

## 2023-12-27 DIAGNOSIS — M25.449 EFFUSION OF FINGER JOINT: ICD-10-CM

## 2023-12-27 PROCEDURE — 97140 MANUAL THERAPY 1/> REGIONS: CPT | Performed by: OCCUPATIONAL THERAPIST

## 2023-12-27 PROCEDURE — 97110 THERAPEUTIC EXERCISES: CPT | Performed by: OCCUPATIONAL THERAPIST

## 2024-01-02 NOTE — PROGRESS NOTES
[facility]  [address] [facility] [address]  Dept: [phone]      Occupational Therapy Daily Note     Referring MD: Amber Langley MD    Diagnosis:     ICD-10-CM    1. Hand muscle weakness  M62.81       2. Pain of finger of left hand  M79.645       3. Effusion of finger joint  M25.449       4. Stiffness of finger joint of left hand  M25.642       5. Sensation disturbance of skin  R20.9       6. Decreased activities of daily living (ADL)  Z78.9            Surgery/Medical Dx: Date 9/12/23 Crush Injury Left  Index finger without fracture    Therapy precautions: none    History of injury/onset : Pt injured her left index finger on 9/12/23 when she got it crushed in a machine at work. She was seen in the ER where her wounds were washed and closed with sutures.       Total Direct Treatment Time: 35 min  Total In Office Time: 45 min  Modifier needed: No    Episode visit count:  8     Preferred Name: Enedelia    PERTINENT MEDICAL HISTORY     PMHX & Meds:   Past Medical History:   Diagnosis Date    Asthma     inhaler and nebs prn    Bell's palsy 11/2019    Chronic obstructive pulmonary disease (HCC)     advair and albuterol prn=uses 1-2 times per week; Neb PRN=never uses; not followed by Pulmonary     Former cigarette smoker     History of positive PPD 1996    no meds     Hypothyroidism     on med for control     Ill-defined condition     pancreatitis x1; not chronic per patient     Marijuana smoker     OA (osteoarthritis)    ,   Past Surgical History:   Procedure Laterality Date    ORTHOPEDIC SURGERY Left 2007    left ankle fracture- has a plate    OTHER SURGICAL HISTORY  10/09/2020    teeth removal     THYROIDECTOMY  12/04/2018    TOTAL HIP ARTHROPLASTY Right 5/30/2023    HIP TOTAL ARTHROPLASTY/ SDD performed by Javid Geller MD at [facility] MA[facility] OR      Medications. : Reviewed in chart  Allergies:   Allergies   Allergen Reactions    Latex Rash        SUBJECTIVE     Current Symptoms/Chief

## 2024-01-03 ENCOUNTER — TREATMENT (OUTPATIENT)
Age: 59
End: 2024-01-03
Payer: COMMERCIAL

## 2024-01-03 DIAGNOSIS — Z78.9 DECREASED ACTIVITIES OF DAILY LIVING (ADL): ICD-10-CM

## 2024-01-03 DIAGNOSIS — M62.81 HAND MUSCLE WEAKNESS: Primary | ICD-10-CM

## 2024-01-03 DIAGNOSIS — R20.9 SENSATION DISTURBANCE OF SKIN: ICD-10-CM

## 2024-01-03 DIAGNOSIS — M79.645 PAIN OF FINGER OF LEFT HAND: ICD-10-CM

## 2024-01-03 DIAGNOSIS — M25.449 EFFUSION OF FINGER JOINT: ICD-10-CM

## 2024-01-03 DIAGNOSIS — M25.642 STIFFNESS OF FINGER JOINT OF LEFT HAND: ICD-10-CM

## 2024-01-03 PROCEDURE — 97140 MANUAL THERAPY 1/> REGIONS: CPT | Performed by: OCCUPATIONAL THERAPIST

## 2024-01-03 PROCEDURE — 97110 THERAPEUTIC EXERCISES: CPT | Performed by: OCCUPATIONAL THERAPIST

## 2024-01-09 ENCOUNTER — TELEPHONE (OUTPATIENT)
Age: 59
End: 2024-01-09

## 2024-01-12 ENCOUNTER — TREATMENT (OUTPATIENT)
Age: 59
End: 2024-01-12

## 2024-01-12 DIAGNOSIS — M25.642 STIFFNESS OF FINGER JOINT OF LEFT HAND: ICD-10-CM

## 2024-01-12 DIAGNOSIS — M25.449 EFFUSION OF FINGER JOINT: ICD-10-CM

## 2024-01-12 DIAGNOSIS — M79.645 PAIN OF FINGER OF LEFT HAND: ICD-10-CM

## 2024-01-12 DIAGNOSIS — M62.81 HAND MUSCLE WEAKNESS: Primary | ICD-10-CM

## 2024-01-12 DIAGNOSIS — Z78.9 DECREASED ACTIVITIES OF DAILY LIVING (ADL): ICD-10-CM

## 2024-01-12 DIAGNOSIS — R20.9 SENSATION DISTURBANCE OF SKIN: ICD-10-CM

## 2024-01-12 NOTE — PROGRESS NOTES
[facility]  [address] [facility] [address]  Dept: [phone]      Occupational Therapy Daily Note     Referring MD: Amber Langley MD    Diagnosis:     ICD-10-CM    1. Hand muscle weakness  M62.81       2. Pain of finger of left hand  M79.645       3. Stiffness of finger joint of left hand  M25.642       4. Sensation disturbance of skin  R20.9       5. Effusion of finger joint  M25.449       6. Decreased activities of daily living (ADL)  Z78.9            Surgery/Medical Dx: Date 9/12/23 Crush Injury Left  Index finger without fracture    Therapy precautions: none    History of injury/onset : Pt injured her left index finger on 9/12/23 when she got it crushed in a machine at work. She was seen in the ER where her wounds were washed and closed with sutures.       Total Direct Treatment Time: 30 min  Total In Office Time: 35 min  Modifier needed: No    Episode visit count:  9     Preferred Name: Enedelia    PERTINENT MEDICAL HISTORY     PMHX & Meds:   Past Medical History:   Diagnosis Date    Asthma     inhaler and nebs prn    Bell's palsy 11/2019    Chronic obstructive pulmonary disease (HCC)     advair and albuterol prn=uses 1-2 times per week; Neb PRN=never uses; not followed by Pulmonary     Former cigarette smoker     History of positive PPD 1996    no meds     Hypothyroidism     on med for control     Ill-defined condition     pancreatitis x1; not chronic per patient     Marijuana smoker     OA (osteoarthritis)    ,   Past Surgical History:   Procedure Laterality Date    ORTHOPEDIC SURGERY Left 2007    left ankle fracture- has a plate    OTHER SURGICAL HISTORY  10/09/2020    teeth removal     THYROIDECTOMY  12/04/2018    TOTAL HIP ARTHROPLASTY Right 5/30/2023    HIP TOTAL ARTHROPLASTY/ SDD [place]id [place] [facility] [place]      Medications. : Reviewed in chart  Allergies:   Allergies   Allergen Reactions    Latex Rash        SUBJECTIVE     Current Symptoms/Chief

## 2024-01-15 NOTE — PROGRESS NOTES
[facility]  [address] [place] [address]  Dept: [phone]      Occupational Therapy Daily Note     Referring MD: Ambre Langley MD    Diagnosis:     ICD-10-CM    1. Pain of finger of left hand  M79.645       2. Stiffness of finger joint of left hand  M25.642              Surgery/Medical Dx: Date 9/12/23 Crush Injury Left  Index finger without fracture    Therapy precautions: none    History of injury/onset : Pt injured her left index finger on 9/12/23 when she got it crushed in a machine at work. She was seen in the ER where her wounds were washed and closed with sutures.       Total Direct Treatment Time: 45 min  Total In Office Time: 45 min  Modifier needed: No    Episode visit count:  10   Authorization expires 2/4/24; 16 visits    Preferred Name: Enedelia    PERTINENT MEDICAL HISTORY     PMHX & Meds:   Past Medical History:   Diagnosis Date    Asthma     inhaler and nebs prn    Bell's palsy 11/2019    Chronic obstructive pulmonary disease (HCC)     advair and albuterol prn=uses 1-2 times per week; Neb PRN=never uses; not followed by Pulmonary     Former cigarette smoker     History of positive PPD 1996    no meds     Hypothyroidism     on med for control     Ill-defined condition     pancreatitis x1; not chronic per patient     Marijuana smoker     OA (osteoarthritis)    ,   Past Surgical History:   Procedure Laterality Date    ORTHOPEDIC SURGERY Left 2007    left ankle fracture- has a plate    OTHER SURGICAL HISTORY  10/09/2020    teeth removal     THYROIDECTOMY  12/04/2018    TOTAL HIP ARTHROPLASTY Right 5/30/2023    HIP TOTAL ARTHROPLASTY/ SDD performed by Javid Geller MD at [facility] MA[facility] OR      Medications. : Reviewed in chart  Allergies:   Allergies   Allergen Reactions    Latex Rash        SUBJECTIVE     Current Symptoms/Chief complaints:   Chief Complaint   Patient presents with    Hand Pain     Neuro screen: mild numbness, no tingling    Patient Stated Goals: \"Be

## 2024-01-16 ENCOUNTER — TREATMENT (OUTPATIENT)
Age: 59
End: 2024-01-16

## 2024-01-16 DIAGNOSIS — M25.642 STIFFNESS OF FINGER JOINT OF LEFT HAND: ICD-10-CM

## 2024-01-16 DIAGNOSIS — M79.645 PAIN OF FINGER OF LEFT HAND: Primary | ICD-10-CM

## 2024-01-16 PROCEDURE — 97110 THERAPEUTIC EXERCISES: CPT | Performed by: OCCUPATIONAL THERAPIST

## 2024-01-16 PROCEDURE — 97140 MANUAL THERAPY 1/> REGIONS: CPT | Performed by: OCCUPATIONAL THERAPIST

## 2024-01-24 ENCOUNTER — TREATMENT (OUTPATIENT)
Age: 59
End: 2024-01-24
Payer: COMMERCIAL

## 2024-01-24 DIAGNOSIS — R20.9 SENSATION DISTURBANCE OF SKIN: ICD-10-CM

## 2024-01-24 DIAGNOSIS — M62.81 HAND MUSCLE WEAKNESS: ICD-10-CM

## 2024-01-24 DIAGNOSIS — Z78.9 DECREASED ACTIVITIES OF DAILY LIVING (ADL): ICD-10-CM

## 2024-01-24 DIAGNOSIS — M25.642 STIFFNESS OF FINGER JOINT OF LEFT HAND: ICD-10-CM

## 2024-01-24 DIAGNOSIS — M79.645 PAIN OF FINGER OF LEFT HAND: Primary | ICD-10-CM

## 2024-01-24 PROCEDURE — 97110 THERAPEUTIC EXERCISES: CPT | Performed by: OCCUPATIONAL THERAPIST

## 2024-01-24 PROCEDURE — 97140 MANUAL THERAPY 1/> REGIONS: CPT | Performed by: OCCUPATIONAL THERAPIST

## 2024-01-24 NOTE — PROGRESS NOTES
[facility]  [address] [place] [address]  Dept: [phone]      Occupational Therapy Progress Note     Referring MD: Amber Langley MD    Diagnosis:     ICD-10-CM    1. Pain of finger of left hand  M79.645       2. Stiffness of finger joint of left hand  M25.642       3. Hand muscle weakness  M62.81       4. Sensation disturbance of skin  R20.9       5. Decreased activities of daily living (ADL)  Z78.9              Surgery/Medical Dx: Date 9/12/23 Crush Injury Left  Index finger without fracture    Therapy precautions: none    History of injury/onset : Pt injured her left index finger on 9/12/23 when she got it crushed in a machine at work. She was seen in the ER where her wounds were washed and closed with sutures.       Total Direct Treatment Time: 45 min  Total In Office Time: 55 min  Modifier needed: No    Episode visit count:  11   Authorization expires 2/4/24; 16 visits    Preferred Name: Enedelia    PERTINENT MEDICAL HISTORY     PMHX & Meds:   Past Medical History:   Diagnosis Date    Asthma     inhaler and nebs prn    Bell's palsy 11/2019    Chronic obstructive pulmonary disease (HCC)     advair and albuterol prn=uses 1-2 times per week; Neb PRN=never uses; not followed by Pulmonary     Former cigarette smoker     History of positive PPD 1996    no meds     Hypothyroidism     on med for control     Ill-defined condition     pancreatitis x1; not chronic per patient     Marijuana smoker     OA (osteoarthritis)    ,   Past Surgical History:   Procedure Laterality Date    ORTHOPEDIC SURGERY Left 2007    left ankle fracture- has a plate    OTHER SURGICAL HISTORY  10/09/2020    teeth removal     THYROIDECTOMY  12/04/2018    TOTAL HIP ARTHROPLASTY Right 5/30/2023    HIP TOTAL ARTHROPLASTY/ SDD performed by Javid Geller MD at [facility] MA[facility] OR      Medications. : Reviewed in chart  Allergies:   Allergies   Allergen Reactions    Latex Rash        SUBJECTIVE     Current

## 2024-01-25 NOTE — PROGRESS NOTES
SUBJECTIVE     Current Symptoms/Chief complaints:   Chief Complaint   Patient presents with    Finger Injury     Neuro screen: mild numbness, no tingling    Patient Stated Goals: \"Be able to bend the tip of my finger\"    Pt reports her IF continues to be hypersensitive.    OBJECTIVE     Functional Outcome Measures: Quick Dash  26 score=   34 % functional deficit  Swelling/Edema: measured circumferentially PIP joints; IF right 2-3/8\"; left 2-1/2\"   (12/12/23 right 2-1/2\") (1/24/24 2-1/4\")  Skin Integrity: tip of IF closed and healing      A/PROM Measures:    Digital AROM: IE IF   IE IF  11/29/23 IF  12/12/23 IF  12/15/23 IF  1/3/24 IF  1/24/24   MCP 0/67 0/82 0/85 0/85 0/85 0/85   PIP -5/35 0/75 0/85 0/85 0/85 0/85   DIP 0/10 0/25 -5/30 -5/30 -5/35 -5/27   Flexion to DPC           /Pinch Strength  NOT TESTED IE  Strength (psi): RIGHT  12/12/23 LEFT  12/12/23 Left  1/24/24     Position II 90 25 60    Lat Pinch: 23 5 18    2pt pinch: 15 2 4    3pt pinch: 20 4 5        Treatment:   Hot Pack (82229) x 5 minutes to left hand prior to treatment for moist heat/tissue extensibility in preparation for treatment.    Manual Therapy (02925) x 10  minutes: Addressing soft tissue/joint restrictions and swelling of left UE:    Scar mobilization by therapist  Desensitization IF tip    Therapeutic exercise (09058) x 35 min:  Home Exercise Program review  Use of heat and ice as needed for pain and inflammation reduction. Precautions reviewed.  OT POC and rationale, education for pain management, edema management  AROM composite fisting  PROM by therapist  Place and hold IF flexion  Prolonged stretching IP IF  Therapist assisted IP blocking  MP and PIP held in flexion and active DIP flexion/extension  Theraputty, yellow, , roll, pinch x 5  Theraputty, yellow, pressing small dowel with tip of IF, removing with thumb and IF  Desensitization with textures (rice and lentils) for IF  Resistive clothespins, yellow, red,

## 2024-01-26 ENCOUNTER — TREATMENT (OUTPATIENT)
Age: 59
End: 2024-01-26

## 2024-01-26 DIAGNOSIS — M62.81 HAND MUSCLE WEAKNESS: ICD-10-CM

## 2024-01-26 DIAGNOSIS — M79.645 PAIN OF FINGER OF LEFT HAND: Primary | ICD-10-CM

## 2024-01-26 DIAGNOSIS — Z78.9 DECREASED ACTIVITIES OF DAILY LIVING (ADL): ICD-10-CM

## 2024-01-26 DIAGNOSIS — M25.449 EFFUSION OF FINGER JOINT: ICD-10-CM

## 2024-01-26 DIAGNOSIS — M25.642 STIFFNESS OF FINGER JOINT OF LEFT HAND: ICD-10-CM

## 2024-01-26 DIAGNOSIS — R20.9 SENSATION DISTURBANCE OF SKIN: ICD-10-CM

## 2024-01-29 ENCOUNTER — TREATMENT (OUTPATIENT)
Age: 59
End: 2024-01-29
Payer: COMMERCIAL

## 2024-01-29 DIAGNOSIS — Z78.9 DECREASED ACTIVITIES OF DAILY LIVING (ADL): ICD-10-CM

## 2024-01-29 DIAGNOSIS — M25.642 STIFFNESS OF FINGER JOINT OF LEFT HAND: ICD-10-CM

## 2024-01-29 DIAGNOSIS — M62.81 HAND MUSCLE WEAKNESS: ICD-10-CM

## 2024-01-29 DIAGNOSIS — M25.449 EFFUSION OF FINGER JOINT: ICD-10-CM

## 2024-01-29 DIAGNOSIS — M79.645 PAIN OF FINGER OF LEFT HAND: Primary | ICD-10-CM

## 2024-01-29 DIAGNOSIS — R20.9 SENSATION DISTURBANCE OF SKIN: ICD-10-CM

## 2024-01-29 PROCEDURE — 97140 MANUAL THERAPY 1/> REGIONS: CPT | Performed by: OCCUPATIONAL THERAPIST

## 2024-01-29 PROCEDURE — 97110 THERAPEUTIC EXERCISES: CPT | Performed by: OCCUPATIONAL THERAPIST

## 2024-01-29 NOTE — PROGRESS NOTES
[facility]  [address] [facility] [address]  Dept: [phone]      Occupational Therapy Daily Note     Referring MD: Amber Langley MD    Diagnosis:     ICD-10-CM    1. Pain of finger of left hand  M79.645       2. Stiffness of finger joint of left hand  M25.642       3. Hand muscle weakness  M62.81       4. Sensation disturbance of skin  R20.9       5. Decreased activities of daily living (ADL)  Z78.9       6. Effusion of finger joint  M25.449              Surgery/Medical Dx: Date 9/12/23 Crush Injury Left  Index finger without fracture    Therapy precautions: none    History of injury/onset : Pt injured her left index finger on 9/12/23 when she got it crushed in a machine at work. She was seen in the ER where her wounds were washed and closed with sutures.       Total Direct Treatment Time: 50 min  Total In Office Time: 60 min  Modifier needed: No    Episode visit count:  Visit count could not be calculated. Make sure you are using a visit which is associated with an episode.   Authorization expires 2/4/24; 16 visits    Preferred Name: Enedelia    PERTINENT MEDICAL HISTORY     PMHX & Meds:   Past Medical History:   Diagnosis Date    Asthma     inhaler and nebs prn    Bell's palsy 11/2019    Chronic obstructive pulmonary disease (HCC)     advair and albuterol prn=uses 1-2 times per week; Neb PRN=never uses; not followed by Pulmonary     Former cigarette smoker     History of positive PPD 1996    no meds     Hypothyroidism     on med for control     Ill-defined condition     pancreatitis x1; not chronic per patient     Marijuana smoker     OA (osteoarthritis)    ,   Past Surgical History:   Procedure Laterality Date    ORTHOPEDIC SURGERY Left 2007    left ankle fracture- has a plate    OTHER SURGICAL HISTORY  10/09/2020    teeth removal     THYROIDECTOMY  12/04/2018    TOTAL HIP ARTHROPLASTY Right 5/30/2023    HIP TOTAL ARTHROPLASTY/ SDD [place] [facility]

## 2024-02-01 NOTE — PROGRESS NOTES
[facility]  [address] [facility] [address]  Dept: [phone]      Occupational Therapy Progress Note     Referring MD: Amber Langley MD    Diagnosis:     ICD-10-CM    1. Pain of finger of left hand  M79.645       2. Stiffness of finger joint of left hand  M25.642       3. Hand muscle weakness  M62.81       4. Sensation disturbance of skin  R20.9       5. Decreased activities of daily living (ADL)  Z78.9       6. Effusion of finger joint  M25.449              Surgery/Medical Dx: Date 9/12/23 Crush Injury Left  Index finger without fracture    Therapy precautions: none    History of injury/onset : Pt injured her left index finger on 9/12/23 when she got it crushed in a machine at work. She was seen in the ER where her wounds were washed and closed with sutures.       Total Direct Treatment Time: 40  min  Total In Office Time: 55 min  Modifier needed: No    Episode visit count:  Visit count could not be calculated. Make sure you are using a visit which is associated with an episode.   Authorization expires 2/4/24; 16 visits    Preferred Name: Enedelia    PERTINENT MEDICAL HISTORY     PMHX & Meds:   Past Medical History:   Diagnosis Date    Asthma     inhaler and nebs prn    Bell's palsy 11/2019    Chronic obstructive pulmonary disease (HCC)     advair and albuterol prn=uses 1-2 times per week; Neb PRN=never uses; not followed by Pulmonary     Former cigarette smoker     History of positive PPD 1996    no meds     Hypothyroidism     on med for control     Ill-defined condition     pancreatitis x1; not chronic per patient     Marijuana smoker     OA (osteoarthritis)    ,   Past Surgical History:   Procedure Laterality Date    ORTHOPEDIC SURGERY Left 2007    left ankle fracture- has a plate    OTHER SURGICAL HISTORY  10/09/2020    teeth removal     THYROIDECTOMY  12/04/2018    TOTAL HIP ARTHROPLASTY Right 5/30/2023    HIP TOTAL ARTHROPLASTY/ SDD performed by Javid Geller MD at [facility] [place]

## 2024-02-02 ENCOUNTER — TREATMENT (OUTPATIENT)
Age: 59
End: 2024-02-02

## 2024-02-02 DIAGNOSIS — R20.9 SENSATION DISTURBANCE OF SKIN: ICD-10-CM

## 2024-02-02 DIAGNOSIS — M62.81 HAND MUSCLE WEAKNESS: ICD-10-CM

## 2024-02-02 DIAGNOSIS — Z78.9 DECREASED ACTIVITIES OF DAILY LIVING (ADL): ICD-10-CM

## 2024-02-02 DIAGNOSIS — M25.449 EFFUSION OF FINGER JOINT: ICD-10-CM

## 2024-02-02 DIAGNOSIS — M79.645 PAIN OF FINGER OF LEFT HAND: Primary | ICD-10-CM

## 2024-02-02 DIAGNOSIS — M25.642 STIFFNESS OF FINGER JOINT OF LEFT HAND: ICD-10-CM

## 2024-02-08 DIAGNOSIS — S67.22XA CRUSHING INJURY OF LEFT HAND AND FINGER, INITIAL ENCOUNTER: Primary | ICD-10-CM

## 2024-02-13 ENCOUNTER — TREATMENT (OUTPATIENT)
Age: 59
End: 2024-02-13
Payer: COMMERCIAL

## 2024-02-13 DIAGNOSIS — M25.642 STIFFNESS OF FINGER JOINT OF LEFT HAND: ICD-10-CM

## 2024-02-13 DIAGNOSIS — M79.645 PAIN OF FINGER OF LEFT HAND: Primary | ICD-10-CM

## 2024-02-13 PROCEDURE — 97110 THERAPEUTIC EXERCISES: CPT | Performed by: OCCUPATIONAL THERAPIST

## 2024-02-13 PROCEDURE — 97140 MANUAL THERAPY 1/> REGIONS: CPT | Performed by: OCCUPATIONAL THERAPIST

## 2024-02-13 NOTE — PROGRESS NOTES
[facility]  [address] [place] [address]  Dept: [phone]      Occupational Therapy Daily Note     Referring MD: Amber Langley MD    Diagnosis:     ICD-10-CM    1. Pain of finger of left hand  M79.645       2. Stiffness of finger joint of left hand  M25.642            Surgery/Medical Dx: Date 9/12/23 Crush Injury Left  Index finger without fracture    Therapy precautions: none    History of injury/onset : Pt injured her left index finger on 9/12/23 when she got it crushed in a machine at work. She was seen in the ER where her wounds were washed and closed with sutures.       Total Direct Treatment Time: 50  min  Total In Office Time: 55 min  Modifier needed: No    Episode visit count:  15   Authorization expires 2/4/24; 16 visits    Preferred Name: Enedelia    PERTINENT MEDICAL HISTORY     PMHX & Meds:   Past Medical History:   Diagnosis Date    Asthma     inhaler and nebs prn    Bell's palsy 11/2019    Chronic obstructive pulmonary disease (HCC)     advair and albuterol prn=uses 1-2 times per week; Neb PRN=never uses; not followed by Pulmonary     Former cigarette smoker     History of positive PPD 1996    no meds     Hypothyroidism     on med for control     Ill-defined condition     pancreatitis x1; not chronic per patient     Marijuana smoker     OA (osteoarthritis)    ,   Past Surgical History:   Procedure Laterality Date    ORTHOPEDIC SURGERY Left 2007    left ankle fracture- has a plate    OTHER SURGICAL HISTORY  10/09/2020    teeth removal     THYROIDECTOMY  12/04/2018    TOTAL HIP ARTHROPLASTY Right 5/30/2023    HIP TOTAL ARTHROPLASTY/ SDD performed [place] [facility] [place]      Medications. : Reviewed in chart  Allergies:   Allergies   Allergen Reactions    Latex Rash        SUBJECTIVE     Current Symptoms/Chief complaints:   Chief Complaint   Patient presents with    Hand Pain     Pt reports she \"has come a long way\"  OBJECTIVE     Functional Outcome

## 2024-02-26 ENCOUNTER — APPOINTMENT (OUTPATIENT)
Dept: CT IMAGING | Age: 59
End: 2024-02-26
Payer: COMMERCIAL

## 2024-02-26 ENCOUNTER — HOSPITAL ENCOUNTER (EMERGENCY)
Age: 59
Discharge: HOME | End: 2024-02-26
Payer: COMMERCIAL

## 2024-02-26 VITALS
HEART RATE: 64 BPM | TEMPERATURE: 98.5 F | OXYGEN SATURATION: 98 % | BODY MASS INDEX: 22.38 KG/M2 | RESPIRATION RATE: 14 BRPM | DIASTOLIC BLOOD PRESSURE: 79 MMHG | SYSTOLIC BLOOD PRESSURE: 119 MMHG | HEIGHT: 60 IN | WEIGHT: 114 LBS

## 2024-02-26 DIAGNOSIS — R93.89 ABNORMAL FINDING ON CT SCAN: Primary | ICD-10-CM

## 2024-02-26 DIAGNOSIS — R10.13 ABDOMINAL PAIN, EPIGASTRIC: ICD-10-CM

## 2024-02-26 LAB
ALBUMIN SERPL-MCNC: 4.2 G/DL (ref 3.5–5)
ALBUMIN/GLOB SERPL: 0.9 (ref 0.4–1.6)
ALP SERPL-CCNC: 115 U/L (ref 50–136)
ALT SERPL-CCNC: 17 U/L (ref 12–65)
ANION GAP SERPL CALC-SCNC: 2 MMOL/L (ref 2–11)
AST SERPL-CCNC: 15 U/L (ref 15–37)
BACTERIA URNS QL MICRO: ABNORMAL /HPF
BASOPHILS # BLD: 0.1 K/UL (ref 0–0.2)
BASOPHILS NFR BLD: 2 % (ref 0–2)
BILIRUB SERPL-MCNC: 0.7 MG/DL (ref 0.2–1.1)
BILIRUB UR QL: ABNORMAL
BUN SERPL-MCNC: 10 MG/DL (ref 6–23)
CALCIUM SERPL-MCNC: 10.6 MG/DL (ref 8.3–10.4)
CASTS URNS QL MICRO: 0 /LPF
CHLORIDE SERPL-SCNC: 103 MMOL/L (ref 103–113)
CO2 SERPL-SCNC: 32 MMOL/L (ref 21–32)
CREAT SERPL-MCNC: 1.01 MG/DL (ref 0.6–1)
CRYSTALS URNS QL MICRO: 0 /LPF
DIFFERENTIAL METHOD BLD: ABNORMAL
EKG ATRIAL RATE: 85 BPM
EKG DIAGNOSIS: NORMAL
EKG P AXIS: 83 DEGREES
EKG P-R INTERVAL: 142 MS
EKG Q-T INTERVAL: 416 MS
EKG QRS DURATION: 81 MS
EKG QTC CALCULATION (BAZETT): 495 MS
EKG R AXIS: 26 DEGREES
EKG T AXIS: 77 DEGREES
EKG VENTRICULAR RATE: 85 BPM
EOSINOPHIL # BLD: 0.4 K/UL (ref 0–0.8)
EOSINOPHIL NFR BLD: 8 % (ref 0.5–7.8)
EPI CELLS #/AREA URNS HPF: ABNORMAL /HPF
ERYTHROCYTE [DISTWIDTH] IN BLOOD BY AUTOMATED COUNT: 14.4 % (ref 11.9–14.6)
GLOBULIN SER CALC-MCNC: 4.6 G/DL (ref 2.8–4.5)
GLUCOSE SERPL-MCNC: 107 MG/DL (ref 65–100)
GLUCOSE UR QL STRIP.AUTO: 100 MG/DL
HCG UR QL: NEGATIVE
HCT VFR BLD AUTO: 39.6 % (ref 35.8–46.3)
HGB BLD-MCNC: 12.9 G/DL (ref 11.7–15.4)
IMM GRANULOCYTES # BLD AUTO: 0 K/UL (ref 0–0.5)
IMM GRANULOCYTES NFR BLD AUTO: 0 % (ref 0–5)
KETONES UR-MCNC: 15 MG/DL
LEUKOCYTE ESTERASE UR QL STRIP: NEGATIVE
LIPASE SERPL-CCNC: 256 U/L (ref 73–393)
LYMPHOCYTES # BLD: 2.3 K/UL (ref 0.5–4.6)
LYMPHOCYTES NFR BLD: 49 % (ref 13–44)
MCH RBC QN AUTO: 27.3 PG (ref 26.1–32.9)
MCHC RBC AUTO-ENTMCNC: 32.6 G/DL (ref 31.4–35)
MCV RBC AUTO: 83.7 FL (ref 82–102)
MONOCYTES # BLD: 0.3 K/UL (ref 0.1–1.3)
MONOCYTES NFR BLD: 6 % (ref 4–12)
MUCOUS THREADS URNS QL MICRO: 0 /LPF
NEUTS SEG # BLD: 1.6 K/UL (ref 1.7–8.2)
NEUTS SEG NFR BLD: 35 % (ref 43–78)
NITRITE UR QL: NEGATIVE
NRBC # BLD: 0 K/UL (ref 0–0.2)
OTHER OBSERVATIONS: ABNORMAL
PH UR: 6 (ref 5–9)
PLATELET # BLD AUTO: 315 K/UL (ref 150–450)
PMV BLD AUTO: 10 FL (ref 9.4–12.3)
POTASSIUM SERPL-SCNC: 4 MMOL/L (ref 3.5–5.1)
PROT SERPL-MCNC: 8.8 G/DL (ref 6.3–8.2)
PROT UR QL: 100 MG/DL
RBC # BLD AUTO: 4.73 M/UL (ref 4.05–5.2)
RBC # UR STRIP: NEGATIVE
RBC #/AREA URNS HPF: ABNORMAL /HPF
SERVICE CMNT-IMP: ABNORMAL
SODIUM SERPL-SCNC: 137 MMOL/L (ref 136–146)
SP GR UR: 1.02 (ref 1–1.02)
UROBILINOGEN UR QL: 2 EU/DL (ref 0.2–1)
WBC # BLD AUTO: 4.6 K/UL (ref 4.3–11.1)
WBC URNS QL MICRO: ABNORMAL /HPF

## 2024-02-26 PROCEDURE — A4216 STERILE WATER/SALINE, 10 ML: HCPCS

## 2024-02-26 PROCEDURE — 81025 URINE PREGNANCY TEST: CPT

## 2024-02-26 PROCEDURE — 99285 EMERGENCY DEPT VISIT HI MDM: CPT

## 2024-02-26 PROCEDURE — 83690 ASSAY OF LIPASE: CPT

## 2024-02-26 PROCEDURE — 6370000000 HC RX 637 (ALT 250 FOR IP)

## 2024-02-26 PROCEDURE — 96375 TX/PRO/DX INJ NEW DRUG ADDON: CPT

## 2024-02-26 PROCEDURE — 93010 ELECTROCARDIOGRAM REPORT: CPT | Performed by: INTERNAL MEDICINE

## 2024-02-26 PROCEDURE — 2500000003 HC RX 250 WO HCPCS

## 2024-02-26 PROCEDURE — 81003 URINALYSIS AUTO W/O SCOPE: CPT

## 2024-02-26 PROCEDURE — 74177 CT ABD & PELVIS W/CONTRAST: CPT

## 2024-02-26 PROCEDURE — 93005 ELECTROCARDIOGRAM TRACING: CPT

## 2024-02-26 PROCEDURE — 80053 COMPREHEN METABOLIC PANEL: CPT

## 2024-02-26 PROCEDURE — 2580000003 HC RX 258

## 2024-02-26 PROCEDURE — 96361 HYDRATE IV INFUSION ADD-ON: CPT

## 2024-02-26 PROCEDURE — 85025 COMPLETE CBC W/AUTO DIFF WBC: CPT

## 2024-02-26 PROCEDURE — 96374 THER/PROPH/DIAG INJ IV PUSH: CPT

## 2024-02-26 PROCEDURE — 6360000004 HC RX CONTRAST MEDICATION

## 2024-02-26 PROCEDURE — 6360000002 HC RX W HCPCS

## 2024-02-26 PROCEDURE — 81015 MICROSCOPIC EXAM OF URINE: CPT

## 2024-02-26 RX ORDER — LIDOCAINE HYDROCHLORIDE 20 MG/ML
15 SOLUTION OROPHARYNGEAL
Status: COMPLETED | OUTPATIENT
Start: 2024-02-26 | End: 2024-02-26

## 2024-02-26 RX ORDER — FAMOTIDINE 20 MG/1
20 TABLET, FILM COATED ORAL NIGHTLY PRN
Qty: 60 TABLET | Refills: 0 | Status: ON HOLD | OUTPATIENT
Start: 2024-02-26

## 2024-02-26 RX ORDER — MORPHINE SULFATE 4 MG/ML
4 INJECTION INTRAVENOUS ONCE
Status: COMPLETED | OUTPATIENT
Start: 2024-02-26 | End: 2024-02-26

## 2024-02-26 RX ORDER — 0.9 % SODIUM CHLORIDE 0.9 %
1000 INTRAVENOUS SOLUTION INTRAVENOUS
Status: DISCONTINUED | OUTPATIENT
Start: 2024-02-26 | End: 2024-02-26

## 2024-02-26 RX ORDER — 0.9 % SODIUM CHLORIDE 0.9 %
1000 INTRAVENOUS SOLUTION INTRAVENOUS
Status: COMPLETED | OUTPATIENT
Start: 2024-02-26 | End: 2024-02-26

## 2024-02-26 RX ORDER — HYDROCODONE BITARTRATE AND ACETAMINOPHEN 5; 325 MG/1; MG/1
1 TABLET ORAL EVERY 6 HOURS PRN
Qty: 8 TABLET | Refills: 0 | Status: ON HOLD | OUTPATIENT
Start: 2024-02-26 | End: 2024-02-29

## 2024-02-26 RX ORDER — MAGNESIUM HYDROXIDE/ALUMINUM HYDROXICE/SIMETHICONE 120; 1200; 1200 MG/30ML; MG/30ML; MG/30ML
30 SUSPENSION ORAL
Status: COMPLETED | OUTPATIENT
Start: 2024-02-26 | End: 2024-02-26

## 2024-02-26 RX ORDER — ONDANSETRON 2 MG/ML
4 INJECTION INTRAMUSCULAR; INTRAVENOUS
Status: COMPLETED | OUTPATIENT
Start: 2024-02-26 | End: 2024-02-26

## 2024-02-26 RX ADMIN — LIDOCAINE HYDROCHLORIDE 15 ML: 20 SOLUTION ORAL; TOPICAL at 15:11

## 2024-02-26 RX ADMIN — HYOSCYAMINE SULFATE 125 MCG: 0.12 TABLET ORAL; SUBLINGUAL at 15:11

## 2024-02-26 RX ADMIN — IOPAMIDOL 100 ML: 755 INJECTION, SOLUTION INTRAVENOUS at 12:52

## 2024-02-26 RX ADMIN — SODIUM CHLORIDE 1000 ML: 9 INJECTION, SOLUTION INTRAVENOUS at 12:31

## 2024-02-26 RX ADMIN — ONDANSETRON 4 MG: 2 INJECTION INTRAMUSCULAR; INTRAVENOUS at 12:30

## 2024-02-26 RX ADMIN — ALUMINUM HYDROXIDE, MAGNESIUM HYDROXIDE, DIMETHICONE 30 ML: 200; 200; 20 LIQUID ORAL at 15:11

## 2024-02-26 RX ADMIN — FAMOTIDINE 20 MG: 10 INJECTION, SOLUTION INTRAVENOUS at 15:11

## 2024-02-26 RX ADMIN — MORPHINE SULFATE 4 MG: 4 INJECTION, SOLUTION INTRAMUSCULAR; INTRAVENOUS at 12:30

## 2024-02-26 ASSESSMENT — ENCOUNTER SYMPTOMS
CONSTIPATION: 1
ABDOMINAL PAIN: 1
NAUSEA: 0
VOMITING: 0

## 2024-02-26 ASSESSMENT — PAIN - FUNCTIONAL ASSESSMENT: PAIN_FUNCTIONAL_ASSESSMENT: 0-10

## 2024-02-26 ASSESSMENT — PAIN SCALES - GENERAL
PAINLEVEL_OUTOF10: 1
PAINLEVEL_OUTOF10: 10

## 2024-02-26 NOTE — DISCHARGE INSTRUCTIONS
Please make appointment with the gastroenterologist for close follow-up.  Only use the pain medication as needed.  Remember this may make you drowsy so do not drive or operate machinery on this medication.  Use Pepcid as needed to help with the pain as well.  Return here for any new concerning or worsening symptoms.

## 2024-02-26 NOTE — ED NOTES
I have reviewed discharge instructions with the patient.  The patient verbalized understanding.    Patient left ED via Discharge Method: ambulatory to Home with family    Opportunity for questions and clarification provided.       Patient given 2 scripts.         To continue your aftercare when you leave the hospital, you may receive an automated call from our care team to check in on how you are doing.  This is a free service and part of our promise to provide the best care and service to meet your aftercare needs.” If you have questions, or wish to unsubscribe from this service please call [phone].  Thank you for Choosing our [facility] Emergency Department.

## 2024-02-26 NOTE — ED PROVIDER NOTES
Emergency Department Provider Note       PCP: Keith Santoyo MD   Age: 59 y.o.   Sex: female     DISPOSITION Decision To Discharge 02/26/2024 04:21:01 PM       ICD-10-CM    1. Abnormal finding on CT scan  R93.89 [facility]     HYDROcodone-acetaminophen (NORCO) 5-325 MG per tablet      2. Abdominal pain, epigastric  R10.13 [facility] G[facility]enter[facility]     HYDROcodone-acetaminophen (NORCO) 5-325 MG per tablet          Medical Decision Making     This patient is a 59-year-old female presents today due to epigastric pain that started about 5 days ago and acutely worsened 3 days ago.  She denies all associated symptoms.  Patient is vitally stable.  She has mild reproducible pain to palpation of her epigastrium.  The patient CBC and CMP are unremarkable.  Her lipase is within normal limits at 256.  CT scan of the patient reveals changes to her pancreas including prominent pancreatic body and tail without explicit pancreatitis or other acute changes.  This was discussed with the patient.  I also discussed this patient with GI who advised that if she is able to tolerate fluids and food p.o. that she should be able to follow-up as an outpatient.  Patient is able to tolerate food and fluids p.o.  On reevaluation, she states that she is feeling better.  Pain medication and Pepcid sent to the patient's pharmacy.  Return precautions discussed.  Patient verbalized understanding and appreciation of her care today.    ED Course as of 02/26/24 1825   Mon Feb 26, 2024   1158 ECG interpretation for ECG dated 2/26/2024 at 11:49 AM: ECG reveals a normal sinus rhythm rate 85 bpm with normal PA and borderline prolonged QTc intervals and normal axis.  Probable biatrial enlargement.  Borderline ECG.  Billy Wright MD   [BB]   2789 CT IMPRESSION:  Prominent distal pancreatic body and tail with small areas of low-attenuation posterior to the pancreatic duct. However,

## 2024-02-26 NOTE — ED TRIAGE NOTES
Pt ambulatory with c/o upper abd pain X4 days. Pt reports history of pancreatitis and states this feels the same.

## 2024-02-27 ENCOUNTER — TELEPHONE (OUTPATIENT)
Age: 59
End: 2024-02-27

## 2024-02-29 ENCOUNTER — HOSPITAL ENCOUNTER (INPATIENT)
Age: 59
LOS: 1 days | Discharge: HOME | DRG: 439 | End: 2024-03-04
Attending: EMERGENCY MEDICINE | Admitting: STUDENT IN AN ORGANIZED HEALTH CARE EDUCATION/TRAINING PROGRAM
Payer: COMMERCIAL

## 2024-02-29 ENCOUNTER — APPOINTMENT (OUTPATIENT)
Dept: ULTRASOUND IMAGING | Age: 59
DRG: 439 | End: 2024-02-29
Payer: COMMERCIAL

## 2024-02-29 DIAGNOSIS — K85.90 ACUTE PANCREATITIS, UNSPECIFIED COMPLICATION STATUS, UNSPECIFIED PANCREATITIS TYPE: Primary | ICD-10-CM

## 2024-02-29 DIAGNOSIS — R93.89 ABNORMAL FINDING ON CT SCAN: ICD-10-CM

## 2024-02-29 DIAGNOSIS — K85.10 ACUTE BILIARY PANCREATITIS WITHOUT INFECTION OR NECROSIS: ICD-10-CM

## 2024-02-29 DIAGNOSIS — R10.13 ABDOMINAL PAIN, EPIGASTRIC: ICD-10-CM

## 2024-02-29 LAB
ALBUMIN SERPL-MCNC: 3.7 G/DL (ref 3.5–5)
ALBUMIN/GLOB SERPL: 0.9 (ref 0.4–1.6)
ALP SERPL-CCNC: 97 U/L (ref 50–136)
ALT SERPL-CCNC: 16 U/L (ref 12–65)
ANION GAP SERPL CALC-SCNC: 1 MMOL/L (ref 2–11)
AST SERPL-CCNC: 18 U/L (ref 15–37)
BASOPHILS # BLD: 0.1 K/UL (ref 0–0.2)
BASOPHILS NFR BLD: 2 % (ref 0–2)
BILIRUB SERPL-MCNC: 0.4 MG/DL (ref 0.2–1.1)
BILIRUB UR QL: ABNORMAL
BUN SERPL-MCNC: 8 MG/DL (ref 6–23)
CALCIUM SERPL-MCNC: 9.1 MG/DL (ref 8.3–10.4)
CHLORIDE SERPL-SCNC: 105 MMOL/L (ref 103–113)
CHOLEST SERPL-MCNC: 272 MG/DL
CO2 SERPL-SCNC: 32 MMOL/L (ref 21–32)
CREAT SERPL-MCNC: 0.96 MG/DL (ref 0.6–1)
DIFFERENTIAL METHOD BLD: ABNORMAL
EOSINOPHIL # BLD: 0.4 K/UL (ref 0–0.8)
EOSINOPHIL NFR BLD: 9 % (ref 0.5–7.8)
ERYTHROCYTE [DISTWIDTH] IN BLOOD BY AUTOMATED COUNT: 14.7 % (ref 11.9–14.6)
GLOBULIN SER CALC-MCNC: 4.1 G/DL (ref 2.8–4.5)
GLUCOSE SERPL-MCNC: 98 MG/DL (ref 65–100)
GLUCOSE UR QL STRIP.AUTO: NEGATIVE MG/DL
HCT VFR BLD AUTO: 35.2 % (ref 35.8–46.3)
HDLC SERPL-MCNC: 55 MG/DL (ref 40–60)
HDLC SERPL: 4.9
HGB BLD-MCNC: 11.3 G/DL (ref 11.7–15.4)
IMM GRANULOCYTES # BLD AUTO: 0 K/UL (ref 0–0.5)
IMM GRANULOCYTES NFR BLD AUTO: 0 % (ref 0–5)
KETONES UR-MCNC: NEGATIVE MG/DL
LDLC SERPL CALC-MCNC: 182.6 MG/DL
LEUKOCYTE ESTERASE UR QL STRIP: NEGATIVE
LIPASE SERPL-CCNC: 732 U/L (ref 73–393)
LYMPHOCYTES # BLD: 2.1 K/UL (ref 0.5–4.6)
LYMPHOCYTES NFR BLD: 45 % (ref 13–44)
MCH RBC QN AUTO: 26.9 PG (ref 26.1–32.9)
MCHC RBC AUTO-ENTMCNC: 32.1 G/DL (ref 31.4–35)
MCV RBC AUTO: 83.8 FL (ref 82–102)
MONOCYTES # BLD: 0.4 K/UL (ref 0.1–1.3)
MONOCYTES NFR BLD: 8 % (ref 4–12)
NEUTS SEG # BLD: 1.7 K/UL (ref 1.7–8.2)
NEUTS SEG NFR BLD: 37 % (ref 43–78)
NITRITE UR QL: NEGATIVE
NRBC # BLD: 0 K/UL (ref 0–0.2)
PH UR: 6 (ref 5–9)
PLATELET # BLD AUTO: 274 K/UL (ref 150–450)
PMV BLD AUTO: 10.3 FL (ref 9.4–12.3)
POTASSIUM SERPL-SCNC: 3.6 MMOL/L (ref 3.5–5.1)
PROT SERPL-MCNC: 7.8 G/DL (ref 6.3–8.2)
PROT UR QL: 30 MG/DL
RBC # BLD AUTO: 4.2 M/UL (ref 4.05–5.2)
RBC # UR STRIP: NEGATIVE
SERVICE CMNT-IMP: ABNORMAL
SODIUM SERPL-SCNC: 138 MMOL/L (ref 136–146)
SP GR UR: >1.03 (ref 1–1.02)
TRIGL SERPL-MCNC: 172 MG/DL (ref 35–150)
UROBILINOGEN UR QL: 1 EU/DL (ref 0.2–1)
VLDLC SERPL CALC-MCNC: 34.4 MG/DL (ref 6–23)
WBC # BLD AUTO: 4.7 K/UL (ref 4.3–11.1)

## 2024-02-29 PROCEDURE — G0378 HOSPITAL OBSERVATION PER HR: HCPCS

## 2024-02-29 PROCEDURE — 80061 LIPID PANEL: CPT

## 2024-02-29 PROCEDURE — 96374 THER/PROPH/DIAG INJ IV PUSH: CPT

## 2024-02-29 PROCEDURE — 2580000003 HC RX 258: Performed by: STUDENT IN AN ORGANIZED HEALTH CARE EDUCATION/TRAINING PROGRAM

## 2024-02-29 PROCEDURE — 83690 ASSAY OF LIPASE: CPT

## 2024-02-29 PROCEDURE — 85025 COMPLETE CBC W/AUTO DIFF WBC: CPT

## 2024-02-29 PROCEDURE — 2580000003 HC RX 258

## 2024-02-29 PROCEDURE — 99285 EMERGENCY DEPT VISIT HI MDM: CPT

## 2024-02-29 PROCEDURE — 6360000002 HC RX W HCPCS

## 2024-02-29 PROCEDURE — 80053 COMPREHEN METABOLIC PANEL: CPT

## 2024-02-29 PROCEDURE — 81003 URINALYSIS AUTO W/O SCOPE: CPT

## 2024-02-29 PROCEDURE — 76705 ECHO EXAM OF ABDOMEN: CPT

## 2024-02-29 PROCEDURE — 96372 THER/PROPH/DIAG INJ SC/IM: CPT

## 2024-02-29 PROCEDURE — 96375 TX/PRO/DX INJ NEW DRUG ADDON: CPT

## 2024-02-29 PROCEDURE — 6360000002 HC RX W HCPCS: Performed by: STUDENT IN AN ORGANIZED HEALTH CARE EDUCATION/TRAINING PROGRAM

## 2024-02-29 RX ORDER — ACETAMINOPHEN 325 MG/1
650 TABLET ORAL EVERY 6 HOURS PRN
Status: DISCONTINUED | OUTPATIENT
Start: 2024-02-29 | End: 2024-03-04 | Stop reason: HOSPADM

## 2024-02-29 RX ORDER — ONDANSETRON 4 MG/1
4 TABLET, ORALLY DISINTEGRATING ORAL EVERY 8 HOURS PRN
Status: DISCONTINUED | OUTPATIENT
Start: 2024-02-29 | End: 2024-03-04 | Stop reason: HOSPADM

## 2024-02-29 RX ORDER — LEVOTHYROXINE SODIUM 0.1 MG/1
100 TABLET ORAL DAILY
Status: DISCONTINUED | OUTPATIENT
Start: 2024-03-01 | End: 2024-03-04 | Stop reason: HOSPADM

## 2024-02-29 RX ORDER — 0.9 % SODIUM CHLORIDE 0.9 %
1000 INTRAVENOUS SOLUTION INTRAVENOUS
Status: COMPLETED | OUTPATIENT
Start: 2024-02-29 | End: 2024-02-29

## 2024-02-29 RX ORDER — ENOXAPARIN SODIUM 100 MG/ML
40 INJECTION SUBCUTANEOUS DAILY
Status: DISCONTINUED | OUTPATIENT
Start: 2024-02-29 | End: 2024-03-04 | Stop reason: HOSPADM

## 2024-02-29 RX ORDER — ONDANSETRON 2 MG/ML
4 INJECTION INTRAMUSCULAR; INTRAVENOUS
Status: COMPLETED | OUTPATIENT
Start: 2024-02-29 | End: 2024-02-29

## 2024-02-29 RX ORDER — SENNOSIDES A AND B 8.6 MG/1
2 TABLET, FILM COATED ORAL NIGHTLY PRN
Status: DISCONTINUED | OUTPATIENT
Start: 2024-02-29 | End: 2024-03-04 | Stop reason: HOSPADM

## 2024-02-29 RX ORDER — MAGNESIUM SULFATE IN WATER 40 MG/ML
2000 INJECTION, SOLUTION INTRAVENOUS PRN
Status: DISCONTINUED | OUTPATIENT
Start: 2024-02-29 | End: 2024-03-04 | Stop reason: HOSPADM

## 2024-02-29 RX ORDER — LORAZEPAM 0.5 MG/1
0.5 TABLET ORAL EVERY 6 HOURS PRN
Status: DISCONTINUED | OUTPATIENT
Start: 2024-02-29 | End: 2024-03-04 | Stop reason: HOSPADM

## 2024-02-29 RX ORDER — TRAZODONE HYDROCHLORIDE 50 MG/1
50 TABLET ORAL NIGHTLY PRN
Status: DISCONTINUED | OUTPATIENT
Start: 2024-02-29 | End: 2024-03-04 | Stop reason: HOSPADM

## 2024-02-29 RX ORDER — LANOLIN ALCOHOL/MO/W.PET/CERES
1.5 CREAM (GRAM) TOPICAL NIGHTLY PRN
Status: DISCONTINUED | OUTPATIENT
Start: 2024-02-29 | End: 2024-03-04 | Stop reason: HOSPADM

## 2024-02-29 RX ORDER — MORPHINE SULFATE 4 MG/ML
4 INJECTION INTRAVENOUS ONCE
Status: COMPLETED | OUTPATIENT
Start: 2024-02-29 | End: 2024-02-29

## 2024-02-29 RX ORDER — POLYETHYLENE GLYCOL 3350 17 G/17G
17 POWDER, FOR SOLUTION ORAL DAILY PRN
Status: DISCONTINUED | OUTPATIENT
Start: 2024-02-29 | End: 2024-03-04 | Stop reason: HOSPADM

## 2024-02-29 RX ORDER — MAGNESIUM HYDROXIDE/ALUMINUM HYDROXICE/SIMETHICONE 120; 1200; 1200 MG/30ML; MG/30ML; MG/30ML
30 SUSPENSION ORAL EVERY 6 HOURS PRN
Status: DISCONTINUED | OUTPATIENT
Start: 2024-02-29 | End: 2024-03-04 | Stop reason: HOSPADM

## 2024-02-29 RX ORDER — OXYCODONE HYDROCHLORIDE 5 MG/1
5 TABLET ORAL EVERY 6 HOURS PRN
Status: DISCONTINUED | OUTPATIENT
Start: 2024-02-29 | End: 2024-03-04 | Stop reason: HOSPADM

## 2024-02-29 RX ORDER — ONDANSETRON 2 MG/ML
4 INJECTION INTRAMUSCULAR; INTRAVENOUS EVERY 6 HOURS PRN
Status: DISCONTINUED | OUTPATIENT
Start: 2024-02-29 | End: 2024-03-04 | Stop reason: HOSPADM

## 2024-02-29 RX ORDER — HYDROCODONE BITARTRATE AND ACETAMINOPHEN 5; 325 MG/1; MG/1
1 TABLET ORAL
Status: DISCONTINUED | OUTPATIENT
Start: 2024-02-29 | End: 2024-02-29

## 2024-02-29 RX ORDER — POTASSIUM CHLORIDE 7.45 MG/ML
10 INJECTION INTRAVENOUS PRN
Status: DISCONTINUED | OUTPATIENT
Start: 2024-02-29 | End: 2024-03-04 | Stop reason: HOSPADM

## 2024-02-29 RX ORDER — SODIUM CHLORIDE, SODIUM LACTATE, POTASSIUM CHLORIDE, CALCIUM CHLORIDE 600; 310; 30; 20 MG/100ML; MG/100ML; MG/100ML; MG/100ML
INJECTION, SOLUTION INTRAVENOUS CONTINUOUS
Status: DISCONTINUED | OUTPATIENT
Start: 2024-02-29 | End: 2024-03-01

## 2024-02-29 RX ORDER — POTASSIUM CHLORIDE 20 MEQ/1
40 TABLET, EXTENDED RELEASE ORAL PRN
Status: DISCONTINUED | OUTPATIENT
Start: 2024-02-29 | End: 2024-03-04 | Stop reason: HOSPADM

## 2024-02-29 RX ADMIN — MORPHINE SULFATE 4 MG: 4 INJECTION, SOLUTION INTRAMUSCULAR; INTRAVENOUS at 20:25

## 2024-02-29 RX ADMIN — ENOXAPARIN SODIUM 40 MG: 100 INJECTION SUBCUTANEOUS at 22:23

## 2024-02-29 RX ADMIN — SODIUM CHLORIDE 1000 ML: 9 INJECTION, SOLUTION INTRAVENOUS at 20:25

## 2024-02-29 RX ADMIN — ONDANSETRON 4 MG: 2 INJECTION INTRAMUSCULAR; INTRAVENOUS at 20:23

## 2024-02-29 RX ADMIN — SODIUM CHLORIDE, POTASSIUM CHLORIDE, SODIUM LACTATE AND CALCIUM CHLORIDE: 600; 310; 30; 20 INJECTION, SOLUTION INTRAVENOUS at 22:23

## 2024-02-29 RX ADMIN — HYDROMORPHONE HYDROCHLORIDE 1 MG: 1 INJECTION, SOLUTION INTRAMUSCULAR; INTRAVENOUS; SUBCUTANEOUS at 22:09

## 2024-02-29 ASSESSMENT — PAIN - FUNCTIONAL ASSESSMENT: PAIN_FUNCTIONAL_ASSESSMENT: 0-10

## 2024-02-29 ASSESSMENT — PAIN DESCRIPTION - ORIENTATION
ORIENTATION: UPPER;MID
ORIENTATION: MID

## 2024-02-29 ASSESSMENT — PAIN DESCRIPTION - LOCATION
LOCATION: ABDOMEN
LOCATION: ABDOMEN

## 2024-02-29 ASSESSMENT — PAIN SCALES - GENERAL
PAINLEVEL_OUTOF10: 10
PAINLEVEL_OUTOF10: 10
PAINLEVEL_OUTOF10: 9
PAINLEVEL_OUTOF10: 2
PAINLEVEL_OUTOF10: 7

## 2024-02-29 ASSESSMENT — PAIN DESCRIPTION - DESCRIPTORS: DESCRIPTORS: ACHING

## 2024-02-29 NOTE — ED TRIAGE NOTES
Pt ambulatory with c/o abd pain radiating to her back. Pt states she was seen here for pancreatitis on Tuesday and reports continued pain.

## 2024-02-29 NOTE — ED PROVIDER NOTES
(osteoarthritis)     Pancreatitis         Past Surgical History:   Procedure Laterality Date    ORTHOPEDIC SURGERY Left 2007    left ankle fracture- has a plate    OTHER SURGICAL HISTORY  10/09/2020    teeth removal     THYROIDECTOMY  12/04/2018    TOTAL HIP ARTHROPLASTY Right 5/30/2023    HIP TOTAL ARTHROPLASTY/ SDD performed by Javid Geller MD at [facility] [place]        Social History     Socioeconomic History    Marital status: Single     Spouse name: None    Number of children: None    Years of education: None    Highest education level: None   Tobacco Use    Smoking status: Every Day     Current packs/day: 0.25     Average packs/day: 0.3 packs/day for 32.2 years (8.0 ttl pk-yrs)     Types: Cigarettes     Start date: 1/1/1992    Smokeless tobacco: Never   Substance and Sexual Activity    Alcohol use: No    Drug use: Yes     Types: Marijuana (Weed)     Social Determinants of Health     Transportation Needs: Unmet Transportation Needs (6/22/2023)    OASIS : Transportation     Lack of Transportation (Medical): Yes     Lack of Transportation (Non-Medical): No     Patient Unable or Declines to Respond: No   Social Connections: Feeling Socially Integrated (6/22/2023)    OASIS : Social Isolation     Frequency of experiencing loneliness or isolation: Never        Current Discharge Medication List        CONTINUE these medications which have NOT CHANGED    Details   famotidine (PEPCID) 20 MG tablet Take 1 tablet by mouth nightly as needed (epigastric pain)  Qty: 60 tablet, Refills: 0      HYDROcodone-acetaminophen (NORCO) 5-325 MG per tablet Take 1 tablet by mouth every 6 hours as needed for Pain for up to 3 days. Intended supply: 3 days. Take lowest dose possible to manage pain Max Daily Amount: 4 tablets  Qty: 8 tablet, Refills: 0    Associated Diagnoses: Abnormal finding on CT scan; Abdominal pain, epigastric      meloxicam (MOBIC) 15 MG tablet Take 1 tablet by mouth daily  Qty: 30 tablet, Refills: 0

## 2024-03-01 PROBLEM — K85.90 PANCREATITIS: Status: ACTIVE | Noted: 2024-02-29

## 2024-03-01 PROBLEM — K85.90 ACUTE PANCREATITIS: Status: ACTIVE | Noted: 2024-03-01

## 2024-03-01 LAB
ANION GAP SERPL CALC-SCNC: 5 MMOL/L (ref 2–11)
BASOPHILS # BLD: 0.1 K/UL (ref 0–0.2)
BASOPHILS NFR BLD: 2 % (ref 0–2)
BUN SERPL-MCNC: 7 MG/DL (ref 6–23)
CALCIUM SERPL-MCNC: 8.2 MG/DL (ref 8.3–10.4)
CANCER AG19-9 SERPL-ACNC: <2 U/ML (ref 2–37)
CHLORIDE SERPL-SCNC: 109 MMOL/L (ref 103–113)
CO2 SERPL-SCNC: 27 MMOL/L (ref 21–32)
CREAT SERPL-MCNC: 0.73 MG/DL (ref 0.6–1)
DIFFERENTIAL METHOD BLD: ABNORMAL
EOSINOPHIL # BLD: 0.4 K/UL (ref 0–0.8)
EOSINOPHIL NFR BLD: 8 % (ref 0.5–7.8)
ERYTHROCYTE [DISTWIDTH] IN BLOOD BY AUTOMATED COUNT: 14.7 % (ref 11.9–14.6)
GLUCOSE SERPL-MCNC: 88 MG/DL (ref 65–100)
HAV IGM SER QL: NONREACTIVE
HBV CORE IGM SER QL: NONREACTIVE
HBV SURFACE AG SER QL: NONREACTIVE
HCT VFR BLD AUTO: 33.6 % (ref 35.8–46.3)
HCV AB SER QL: NONREACTIVE
HGB BLD-MCNC: 10.7 G/DL (ref 11.7–15.4)
HIV 1+2 AB+HIV1 P24 AG SERPL QL IA: NONREACTIVE
HIV 1/2 RESULT COMMENT: NORMAL
IMM GRANULOCYTES # BLD AUTO: 0 K/UL (ref 0–0.5)
IMM GRANULOCYTES NFR BLD AUTO: 0 % (ref 0–5)
LYMPHOCYTES # BLD: 2.2 K/UL (ref 0.5–4.6)
LYMPHOCYTES NFR BLD: 52 % (ref 13–44)
MAGNESIUM SERPL-MCNC: 2 MG/DL (ref 1.8–2.4)
MCH RBC QN AUTO: 26.7 PG (ref 26.1–32.9)
MCHC RBC AUTO-ENTMCNC: 31.8 G/DL (ref 31.4–35)
MCV RBC AUTO: 83.8 FL (ref 82–102)
MONOCYTES # BLD: 0.3 K/UL (ref 0.1–1.3)
MONOCYTES NFR BLD: 7 % (ref 4–12)
NEUTS SEG # BLD: 1.4 K/UL (ref 1.7–8.2)
NEUTS SEG NFR BLD: 32 % (ref 43–78)
NRBC # BLD: 0 K/UL (ref 0–0.2)
PLATELET # BLD AUTO: 266 K/UL (ref 150–450)
PMV BLD AUTO: 10.5 FL (ref 9.4–12.3)
POTASSIUM SERPL-SCNC: 3.4 MMOL/L (ref 3.5–5.1)
RBC # BLD AUTO: 4.01 M/UL (ref 4.05–5.2)
SODIUM SERPL-SCNC: 141 MMOL/L (ref 136–146)
WBC # BLD AUTO: 4.3 K/UL (ref 4.3–11.1)

## 2024-03-01 PROCEDURE — 6360000002 HC RX W HCPCS: Performed by: STUDENT IN AN ORGANIZED HEALTH CARE EDUCATION/TRAINING PROGRAM

## 2024-03-01 PROCEDURE — 99254 IP/OBS CNSLTJ NEW/EST MOD 60: CPT | Performed by: INTERNAL MEDICINE

## 2024-03-01 PROCEDURE — 2580000003 HC RX 258: Performed by: STUDENT IN AN ORGANIZED HEALTH CARE EDUCATION/TRAINING PROGRAM

## 2024-03-01 PROCEDURE — 36415 COLL VENOUS BLD VENIPUNCTURE: CPT

## 2024-03-01 PROCEDURE — 87389 HIV-1 AG W/HIV-1&-2 AB AG IA: CPT

## 2024-03-01 PROCEDURE — 96372 THER/PROPH/DIAG INJ SC/IM: CPT

## 2024-03-01 PROCEDURE — 82787 IGG 1 2 3 OR 4 EACH: CPT

## 2024-03-01 PROCEDURE — 51798 US URINE CAPACITY MEASURE: CPT

## 2024-03-01 PROCEDURE — 6370000000 HC RX 637 (ALT 250 FOR IP): Performed by: STUDENT IN AN ORGANIZED HEALTH CARE EDUCATION/TRAINING PROGRAM

## 2024-03-01 PROCEDURE — G0378 HOSPITAL OBSERVATION PER HR: HCPCS

## 2024-03-01 PROCEDURE — 80048 BASIC METABOLIC PNL TOTAL CA: CPT

## 2024-03-01 PROCEDURE — 96376 TX/PRO/DX INJ SAME DRUG ADON: CPT

## 2024-03-01 PROCEDURE — 85025 COMPLETE CBC W/AUTO DIFF WBC: CPT

## 2024-03-01 PROCEDURE — 80074 ACUTE HEPATITIS PANEL: CPT

## 2024-03-01 PROCEDURE — 83735 ASSAY OF MAGNESIUM: CPT

## 2024-03-01 PROCEDURE — 86301 IMMUNOASSAY TUMOR CA 19-9: CPT

## 2024-03-01 RX ORDER — OXYCODONE HYDROCHLORIDE 5 MG/1
5 TABLET ORAL EVERY 6 HOURS PRN
OUTPATIENT
Start: 2024-03-01

## 2024-03-01 RX ORDER — ONDANSETRON 2 MG/ML
4 INJECTION INTRAMUSCULAR; INTRAVENOUS EVERY 6 HOURS PRN
OUTPATIENT
Start: 2024-03-01

## 2024-03-01 RX ORDER — POLYETHYLENE GLYCOL 3350 17 G/17G
17 POWDER, FOR SOLUTION ORAL DAILY PRN
OUTPATIENT
Start: 2024-03-01

## 2024-03-01 RX ORDER — SODIUM CHLORIDE, SODIUM LACTATE, POTASSIUM CHLORIDE, CALCIUM CHLORIDE 600; 310; 30; 20 MG/100ML; MG/100ML; MG/100ML; MG/100ML
INJECTION, SOLUTION INTRAVENOUS CONTINUOUS
Status: CANCELLED | OUTPATIENT
Start: 2024-03-01 | End: 2024-03-01

## 2024-03-01 RX ORDER — LANOLIN ALCOHOL/MO/W.PET/CERES
1.5 CREAM (GRAM) TOPICAL NIGHTLY PRN
OUTPATIENT
Start: 2024-03-01

## 2024-03-01 RX ORDER — POTASSIUM CHLORIDE 20 MEQ/1
40 TABLET, EXTENDED RELEASE ORAL PRN
OUTPATIENT
Start: 2024-03-01

## 2024-03-01 RX ORDER — ONDANSETRON 4 MG/1
4 TABLET, ORALLY DISINTEGRATING ORAL EVERY 8 HOURS PRN
OUTPATIENT
Start: 2024-03-01

## 2024-03-01 RX ORDER — TRAZODONE HYDROCHLORIDE 50 MG/1
50 TABLET ORAL NIGHTLY PRN
OUTPATIENT
Start: 2024-03-01

## 2024-03-01 RX ORDER — ENOXAPARIN SODIUM 100 MG/ML
40 INJECTION SUBCUTANEOUS DAILY
OUTPATIENT
Start: 2024-03-02

## 2024-03-01 RX ORDER — SENNOSIDES A AND B 8.6 MG/1
2 TABLET, FILM COATED ORAL NIGHTLY PRN
OUTPATIENT
Start: 2024-03-01

## 2024-03-01 RX ORDER — ACETAMINOPHEN 325 MG/1
650 TABLET ORAL EVERY 6 HOURS PRN
OUTPATIENT
Start: 2024-03-01

## 2024-03-01 RX ORDER — MAGNESIUM HYDROXIDE/ALUMINUM HYDROXICE/SIMETHICONE 120; 1200; 1200 MG/30ML; MG/30ML; MG/30ML
30 SUSPENSION ORAL EVERY 6 HOURS PRN
OUTPATIENT
Start: 2024-03-01

## 2024-03-01 RX ORDER — LEVOTHYROXINE SODIUM 0.1 MG/1
100 TABLET ORAL DAILY
OUTPATIENT
Start: 2024-03-02

## 2024-03-01 RX ORDER — HYDROMORPHONE HYDROCHLORIDE 1 MG/ML
1 INJECTION, SOLUTION INTRAMUSCULAR; INTRAVENOUS; SUBCUTANEOUS EVERY 4 HOURS PRN
OUTPATIENT
Start: 2024-03-01

## 2024-03-01 RX ORDER — LORAZEPAM 0.5 MG/1
0.5 TABLET ORAL EVERY 6 HOURS PRN
OUTPATIENT
Start: 2024-03-01

## 2024-03-01 RX ORDER — POTASSIUM CHLORIDE 7.45 MG/ML
10 INJECTION INTRAVENOUS PRN
OUTPATIENT
Start: 2024-03-01

## 2024-03-01 RX ORDER — MAGNESIUM SULFATE IN WATER 40 MG/ML
2000 INJECTION, SOLUTION INTRAVENOUS PRN
OUTPATIENT
Start: 2024-03-01

## 2024-03-01 RX ADMIN — POTASSIUM CHLORIDE 40 MEQ: 1500 TABLET, EXTENDED RELEASE ORAL at 12:06

## 2024-03-01 RX ADMIN — SODIUM CHLORIDE, POTASSIUM CHLORIDE, SODIUM LACTATE AND CALCIUM CHLORIDE: 600; 310; 30; 20 INJECTION, SOLUTION INTRAVENOUS at 05:17

## 2024-03-01 RX ADMIN — LEVOTHYROXINE SODIUM 100 MCG: 0.1 TABLET ORAL at 05:18

## 2024-03-01 RX ADMIN — HYDROMORPHONE HYDROCHLORIDE 1 MG: 1 INJECTION, SOLUTION INTRAMUSCULAR; INTRAVENOUS; SUBCUTANEOUS at 10:03

## 2024-03-01 RX ADMIN — ENOXAPARIN SODIUM 40 MG: 100 INJECTION SUBCUTANEOUS at 09:03

## 2024-03-01 RX ADMIN — ONDANSETRON 4 MG: 2 INJECTION INTRAMUSCULAR; INTRAVENOUS at 05:59

## 2024-03-01 RX ADMIN — HYDROMORPHONE HYDROCHLORIDE 1 MG: 1 INJECTION, SOLUTION INTRAMUSCULAR; INTRAVENOUS; SUBCUTANEOUS at 03:37

## 2024-03-01 RX ADMIN — HYDROMORPHONE HYDROCHLORIDE 1 MG: 1 INJECTION, SOLUTION INTRAMUSCULAR; INTRAVENOUS; SUBCUTANEOUS at 20:16

## 2024-03-01 ASSESSMENT — PAIN SCALES - GENERAL
PAINLEVEL_OUTOF10: 6
PAINLEVEL_OUTOF10: 9
PAINLEVEL_OUTOF10: 9
PAINLEVEL_OUTOF10: 8

## 2024-03-01 ASSESSMENT — PAIN DESCRIPTION - DESCRIPTORS: DESCRIPTORS: DISCOMFORT

## 2024-03-01 ASSESSMENT — PAIN DESCRIPTION - ORIENTATION: ORIENTATION: MID;UPPER

## 2024-03-01 ASSESSMENT — PAIN DESCRIPTION - LOCATION
LOCATION: ABDOMEN
LOCATION: ABDOMEN

## 2024-03-01 NOTE — ED NOTES
TRANSFER - OUT REPORT:    Verbal report given to Lee Ann RICH on Enedelia Handley  being transferred to [facility] for routine progression of patient care       Report consisted of patient's Situation, Background, Assessment and   Recommendations(SBAR).     Information from the following report(s) Nurse Handoff Report, ED Encounter Summary, ED SBAR, Adult Overview, Intake/Output, MAR, and Recent Results was reviewed with the receiving nurse.    Kinder Fall Assessment:                           Lines:   Peripheral IV 02/29/24 Left Antecubital (Active)   Site Assessment Clean, dry & intact 02/29/24 1914   Line Status Brisk blood return;Capped;Flushed;Normal saline locked;Specimen collected 02/29/24 1914   Phlebitis Assessment No symptoms 02/29/24 1914   Infiltration Assessment 0 02/29/24 1914   Dressing Status Clean, dry & intact;New dressing applied 02/29/24 1914   Dressing Type Transparent 02/29/24 1914        Opportunity for questions and clarification was provided.      Patient transported with:  Registered Nurse

## 2024-03-01 NOTE — H&P
Report called to Lillian  2nd rm [address]  floor DT.  Transport set up for 1930.  
status: Every Day     Current packs/day: 0.25     Average packs/day: 0.3 packs/day for 32.2 years (8.0 ttl pk-yrs)     Types: Cigarettes     Start date: 1/1/1992    Smokeless tobacco: Never   Substance Use Topics    Alcohol use: No      Family History   Problem Relation Age of Onset    Hypertension Father     Cancer Brother 54        brain    Hypertension Mother       Family history reviewed and negative except as noted above.    Immunization History   Administered Date(s) Administered    Hepatitis B 10/05/2020, 11/05/2020    Influenza, FLUARIX, FLULAVAL, FLUZONE (age 6 mo+) AND AFLURIA, (age 3 y+), PF, 0.5mL 12/05/2018     Prior to Admit Medications:  Current Outpatient Medications   Medication Instructions    acetaminophen (TYLENOL) 325 mg, EVERY 4 HOURS PRN    albuterol (PROVENTIL) (2.5 MG/3ML) 0.083% nebulizer solution 3 mLs, EVERY 4 HOURS PRN    albuterol sulfate  (90 Base) MCG/ACT inhaler 1 puff, EVERY 4 HOURS PRN    aspirin 81 mg, Oral, 2 TIMES DAILY    famotidine (PEPCID) 20 mg, Oral, NIGHTLY PRN    fluticasone-salmeterol (ADVAIR) 250-50 MCG/ACT AEPB diskus inhaler 1 puff, 2 TIMES DAILY    levothyroxine (SYNTHROID) 100 mcg, Oral, DAILY    meloxicam (MOBIC) 15 mg, Oral, DAILY    ondansetron (ZOFRAN-ODT) 4 mg, Oral, EVERY 8 HOURS PRN    polyethylene glycol (GLYCOLAX) 17 g, DAILY         Objective:   Patient Vitals for the past 24 hrs:   Temp Pulse Resp BP SpO2   03/01/24 0333 -- 56 -- 131/82 97 %   02/29/24 2158 97.5 °F (36.4 °C) 62 16 119/81 98 %   02/29/24 2037 -- 56 18 (!) 140/89 99 %   02/29/24 2025 -- -- 17 -- --   02/29/24 1758 98.6 °F (37 °C) 81 17 124/81 98 %       Estimated body mass index is 22.26 kg/m² as calculated from the following:    Height as of this encounter: 1.524 m (5').    Weight as of this encounter: 51.7 kg (114 lb).  No intake or output data in the 24 hours ending 03/01/24 0351      Physical Exam    I have reviewed ordered lab tests and data below:    Last 24hr Labs:  Recent

## 2024-03-01 NOTE — PROGRESS NOTES
TRANSFER - IN REPORT:    Verbal report received from RN med surg ES SF on Enedelia Handley  being received from med surg floor for routine progression of patient care      Report consisted of patient's Situation, Background, Assessment and   Recommendations(SBAR).     Information from the following report(s) Nurse Handoff Report, Intake/Output, MAR, and Recent Results was reviewed with the receiving nurse.    Opportunity for questions and clarification was provided.      Assessment completed upon patient's arrival to unit and care assumed.      .sb

## 2024-03-01 NOTE — PROGRESS NOTES
TRANSFER - IN REPORT:    Verbal report received from Timbo RICH on Enedelia Handley  being received from [facility] ED for routine progression of patient care      Report consisted of patient's Situation, Background, Assessment and   Recommendations(SBAR).     Information from the following report(s) ED Encounter Summary was reviewed with the receiving nurse.    Opportunity for questions and clarification was provided.      Assessment completed upon patient's arrival to unit and care assumed.

## 2024-03-01 NOTE — PROGRESS NOTES
4 Eyes Skin Assessment     NAME:  Enedelia Handley  YOB: 1965  MEDICAL RECORD NUMBER:  [id number]    The patient is being assessed for  Transfer to New Unit    I agree that at least one RN has performed a thorough Head to Toe Skin Assessment on the patient. ALL assessment sites listed below have been assessed.      Areas assessed by both nurses:    Head, Face, Ears, Shoulders, Back, Chest, Arms, Elbows, Hands, Sacrum. Buttock, Coccyx, Ischium, Legs. Feet and Heels, and Under Medical Devices         Does the Patient have a Wound? No noted wound(s)       Nagi Prevention initiated by RN: Yes  Wound Care Orders initiated by RN: No    Pressure Injury (Stage 3,4, Unstageable, DTI, NWPT, and Complex wounds) if present, place Wound referral order by RN under : No    New Ostomies, if present place, Ostomy referral order under : No     Nurse 1 eSignature: Electronically signed by CASANDRA WALL RN on 3/1/24 at 1:37 AM EST    **SHARE this note so that the co-signing nurse can place an eSignature**    Nurse 2 eSignature: Electronically signed by Yesenia Moise RN on 3/1/24 at 1:39 AM EST

## 2024-03-01 NOTE — PROGRESS NOTES
Patient is a 58 y/o female with medical history of hypothyroidism s/p total thyroidectomy (2018), osteoarthritis, ED visit 2/26 for acute epigastric pain (CT A/P non confirmatory but suspected pancreatitis, lipase wnl at that time) who presented back to ED 2/29 with worsening epigastric pain and elevated lipase consistent with acute pancreatitis. History of pancreatitis one time in 2015 that was idiopathic.    Hemodynamics stable on arrival, afebrile. Labs notable for lipase 732 (up from 256 2/26) otherwise unremarkable. The ER gave morphine, Zofran and a 1 L sodium chloride bolus.  Hospitalist admission requested.    Patient admitted to observation today; there is no charge for this visit.    Acute pancreatitis  -Denies any etoh use, no evidence of gallstones or biliary obstruction, , denies any known viral illness, no concerns for drug-induced based on home medications  -Check hepatitis panel and HIV  -CT A/P (recent ED visit) with prominent distal pancreatic body and tail  -RUQ ultrasound with prominent pancreatic head, no distinct masses  -Continue fluid resuscitation  -Aggressive pain control with as needed hydromorphone  -NPO today, advance diet through weekend to clear liquids as tolerated  -NPO midnight Sunday for procedure Monday  -Spoke with Dr. Veliz, Gastroenterology who recommends EUS downtown on Monday for pancreatic evaluation  -Gastroenterology consultation ordered  -Will pursue transfer downtown this weekend when bed is available    Hypothyroidism  -Synthroid    Discharge plan: transfer downtown for EUS Monday, home at discharge  Signed: Luz Marina Mensah [facility]-BC

## 2024-03-01 NOTE — DISCHARGE SUMMARY
Patient to transfer to [facility] for GI services.    Signed: Luz Marina HOYOS-BC

## 2024-03-01 NOTE — PERIOP NOTE
Tc to davis Kate at [facility]-advised him pt on schedule for endoscopic us and to have pt transported here to our facility on 03/04/24 and to be here at 1245pm-npo after midnight, working iv and pt to be dresssed in pt gown-he verb understanding of all info given/thogan,rn

## 2024-03-01 NOTE — H&P
Patient admitted to [facility] today. See H&P from E.  Transferring to [facility] for GI services (EUS 3/4/24).  Orders have been signed and held for release upon arrival to [facility] unit.  Accepting physician: Dr. Branham    Signed: Luz Marina Mensah [facility]

## 2024-03-02 LAB
ANION GAP SERPL CALC-SCNC: 4 MMOL/L (ref 2–11)
BUN SERPL-MCNC: 4 MG/DL (ref 6–23)
CALCIUM SERPL-MCNC: 8.7 MG/DL (ref 8.3–10.4)
CHLORIDE SERPL-SCNC: 107 MMOL/L (ref 103–113)
CO2 SERPL-SCNC: 27 MMOL/L (ref 21–32)
CREAT SERPL-MCNC: 0.71 MG/DL (ref 0.6–1)
GLUCOSE SERPL-MCNC: 79 MG/DL (ref 65–100)
LIPASE SERPL-CCNC: 79 U/L (ref 73–393)
POTASSIUM SERPL-SCNC: 4 MMOL/L (ref 3.5–5.1)
SODIUM SERPL-SCNC: 138 MMOL/L (ref 136–146)

## 2024-03-02 PROCEDURE — 86038 ANTINUCLEAR ANTIBODIES: CPT

## 2024-03-02 PROCEDURE — G0378 HOSPITAL OBSERVATION PER HR: HCPCS

## 2024-03-02 PROCEDURE — 6370000000 HC RX 637 (ALT 250 FOR IP): Performed by: STUDENT IN AN ORGANIZED HEALTH CARE EDUCATION/TRAINING PROGRAM

## 2024-03-02 PROCEDURE — 83690 ASSAY OF LIPASE: CPT

## 2024-03-02 PROCEDURE — 6360000002 HC RX W HCPCS: Performed by: STUDENT IN AN ORGANIZED HEALTH CARE EDUCATION/TRAINING PROGRAM

## 2024-03-02 PROCEDURE — 2580000003 HC RX 258: Performed by: PHYSICIAN ASSISTANT

## 2024-03-02 PROCEDURE — 96376 TX/PRO/DX INJ SAME DRUG ADON: CPT

## 2024-03-02 PROCEDURE — 36415 COLL VENOUS BLD VENIPUNCTURE: CPT

## 2024-03-02 PROCEDURE — 80048 BASIC METABOLIC PNL TOTAL CA: CPT

## 2024-03-02 RX ORDER — SODIUM CHLORIDE, SODIUM LACTATE, POTASSIUM CHLORIDE, CALCIUM CHLORIDE 600; 310; 30; 20 MG/100ML; MG/100ML; MG/100ML; MG/100ML
INJECTION, SOLUTION INTRAVENOUS CONTINUOUS
Status: DISCONTINUED | OUTPATIENT
Start: 2024-03-02 | End: 2024-03-04 | Stop reason: HOSPADM

## 2024-03-02 RX ADMIN — LEVOTHYROXINE SODIUM 100 MCG: 0.1 TABLET ORAL at 06:35

## 2024-03-02 RX ADMIN — HYDROMORPHONE HYDROCHLORIDE 1 MG: 1 INJECTION, SOLUTION INTRAMUSCULAR; INTRAVENOUS; SUBCUTANEOUS at 09:50

## 2024-03-02 RX ADMIN — HYDROMORPHONE HYDROCHLORIDE 1 MG: 1 INJECTION, SOLUTION INTRAMUSCULAR; INTRAVENOUS; SUBCUTANEOUS at 15:11

## 2024-03-02 RX ADMIN — SODIUM CHLORIDE, POTASSIUM CHLORIDE, SODIUM LACTATE AND CALCIUM CHLORIDE: 600; 310; 30; 20 INJECTION, SOLUTION INTRAVENOUS at 18:28

## 2024-03-02 RX ADMIN — HYDROMORPHONE HYDROCHLORIDE 1 MG: 1 INJECTION, SOLUTION INTRAMUSCULAR; INTRAVENOUS; SUBCUTANEOUS at 00:29

## 2024-03-02 RX ADMIN — SODIUM CHLORIDE, POTASSIUM CHLORIDE, SODIUM LACTATE AND CALCIUM CHLORIDE: 600; 310; 30; 20 INJECTION, SOLUTION INTRAVENOUS at 09:04

## 2024-03-02 RX ADMIN — ENOXAPARIN SODIUM 40 MG: 100 INJECTION SUBCUTANEOUS at 09:01

## 2024-03-02 ASSESSMENT — PAIN SCALES - GENERAL
PAINLEVEL_OUTOF10: 6
PAINLEVEL_OUTOF10: 7
PAINLEVEL_OUTOF10: 8

## 2024-03-02 ASSESSMENT — PAIN DESCRIPTION - ORIENTATION
ORIENTATION: UPPER;MID
ORIENTATION: MID

## 2024-03-02 ASSESSMENT — PAIN DESCRIPTION - DESCRIPTORS
DESCRIPTORS: DISCOMFORT
DESCRIPTORS: ACHING

## 2024-03-02 ASSESSMENT — PAIN DESCRIPTION - LOCATION
LOCATION: ABDOMEN
LOCATION: ABDOMEN

## 2024-03-02 NOTE — CARE COORDINATION
59 yr-old F with pancreatitis.  Lives at home with family who can assist at discharge.  No needs.     03/02/24 1049   Service Assessment   Patient Orientation Alert and Oriented   Cognition Alert   History Provided By Patient   Primary Caregiver Self   Support Systems Family Members   Patient's Healthcare Decision Maker is: Named in Scanned ACP Document   PCP Verified by CM Yes   Last Visit to PCP Within last 3 months   Prior Functional Level Independent in ADLs/IADLs   Current Functional Level Independent in ADLs/IADLs   Can patient return to prior living arrangement Yes   Ability to make needs known: Good   Family able to assist with home care needs: Yes   Would you like for me to discuss the discharge plan with any other family members/significant others, and if so, who? No   Financial Resources Other (Comment)  (BCBS)   Community Resources Other (Comment)  (n/a)

## 2024-03-02 NOTE — RT PROTOCOL NOTE
second breath hold.  Patient may be changed to self-administer as appropriate.   Patients in isolation will be provided an individual inhaler.  Unassisted aerosol (UA) is indicated if  Ventilation is inadequate  Patient is unable to perform MDI appropriately     VII. Guidelines:   Monitor patient's vital signs and evaluate patient's clinical status. The need to change medication and/or modality may be indicated by:  A pulse greater than 120 bpm, or if a pulse increase of 20 bpm occurs with bronchodilator medications.  Significant worsening of dyspnea or wheezing occurring during or within 30 minutes of discontinuing therapy.  Worsening of patient's sensorium (e.g. patient becomes confused or obtunded, and unable to follow directions).  Worsening of patient's chest x-ray.  Change in sputum (e.g. increased pulmonary infiltrate, which might indicate need for volume expansion therapy).  Patient has difficulty coughing up secretions, which might indicate need for acetylcysteine and/or bronchial hygiene therapy.  Call physician immediately if dyspnea worsens and is not responsive to modifications allowed by protocol.    VIII. Clinical Responsibility:  The therapy assessment guidelines will be used to evaluate all patients receiving aerosolized medications with the exception of critical care areas.    RCP's will perform changes in therapy per protocol.   It will be the responsibility of RCP to provide instruction regarding respiratory medications, possible side effects, aerosol therapy and proper MDI technique, as well as, spacer usage to patients ordered MDI therapy.   Current therapy that is part of a patient's home regimen will not be discontinued.  Provide spacer and educate patient on proper inhaler technique if needed.    IX. Documentation  Document assessment findings in the respiratory assessment section of the patient's EMR.  Document changes in therapy per protocol in the respiratory orders section and in the

## 2024-03-02 NOTE — PROGRESS NOTES
Hospitalist Progress Note   Admit Date:  2024  6:47 PM   Name:  Enedelia Handley   Age:  59 y.o.  Sex:  female  :  1965   MRN:  [id number]   Room:  [facility]/    Presenting/Chief Complaint: Abdominal Pain     Reason(s) for Admission: Acute pancreatitis, unspecified complication status, unspecified pancreatitis type [K85.90]  Severe acute pancreatitis [K85.90]     Hospital Course:   Enedelia Handley is a 59 y.o. female with medical history of hypothyroidism s/p total thyroidectomy (), osteoarthritis, ED visit  for acute epigastric pain (CT A/P non confirmatory but suspected pancreatitis, lipase wnl at that time) who presented back to ED  with worsening epigastric pain and elevated lipase consistent with acute pancreatitis. History of pancreatitis one time in  that was idiopathic.     Hemodynamics stable on arrival, afebrile. Labs notable for lipase 732 (up from 256 ) otherwise unremarkable. The ER gave morphine, Zofran and a 1 L sodium chloride bolus.  Hospitalist admission requested.      Subjective & 24hr Events:   3/2/24 - Pt reclined in bed. States continued abd pain, but much improved since admission.  Pt states she does not drink, and has never drank.  Denies fever/chills, n/v.        Assessment & Plan:     Principal Problem:  Acute pancreatitis  -Denies any etoh use, no evidence of gallstones or biliary obstruction, , denies any known viral illness, no concerns for drug-induced based on home medications  -HIV and hepatitis panel neg  -CA 19-9 low  -CT A/P (recent ED visit) with prominent distal pancreatic body and tail  -RUQ ultrasound with prominent pancreatic head, no distinct masses  -Continue fluid resuscitation  -Aggressive pain control with as needed hydromorphone  - GI consulted, EUS downtown on Monday for pancreatic evaluation, continue clear liquids and IV fluids thru weekend  -NPO midnight  for procedure Monday  3/2/24 - Pt tolerating clears.

## 2024-03-03 LAB
ALBUMIN SERPL-MCNC: 3.3 G/DL (ref 3.5–5)
ALBUMIN/GLOB SERPL: 0.9 (ref 0.4–1.6)
ALP SERPL-CCNC: 86 U/L (ref 50–130)
ALT SERPL-CCNC: 12 U/L (ref 12–65)
ANION GAP SERPL CALC-SCNC: 5 MMOL/L (ref 2–11)
AST SERPL-CCNC: 18 U/L (ref 15–37)
BASOPHILS # BLD: 0.1 K/UL (ref 0–0.2)
BASOPHILS NFR BLD: 1 % (ref 0–2)
BILIRUB SERPL-MCNC: 0.7 MG/DL (ref 0.2–1.1)
BUN SERPL-MCNC: 2 MG/DL (ref 6–23)
CALCIUM SERPL-MCNC: 9.2 MG/DL (ref 8.3–10.4)
CHLORIDE SERPL-SCNC: 101 MMOL/L (ref 103–113)
CO2 SERPL-SCNC: 30 MMOL/L (ref 21–32)
CREAT SERPL-MCNC: 0.77 MG/DL (ref 0.6–1)
CRP SERPL-MCNC: 0.3 MG/DL (ref 0–0.9)
DIFFERENTIAL METHOD BLD: ABNORMAL
EOSINOPHIL # BLD: 0.3 K/UL (ref 0–0.8)
EOSINOPHIL NFR BLD: 8 % (ref 0.5–7.8)
ERYTHROCYTE [DISTWIDTH] IN BLOOD BY AUTOMATED COUNT: 14.4 % (ref 11.9–14.6)
GLOBULIN SER CALC-MCNC: 3.8 G/DL (ref 2.8–4.5)
GLUCOSE SERPL-MCNC: 75 MG/DL (ref 65–100)
HCT VFR BLD AUTO: 36.1 % (ref 35.8–46.3)
HGB BLD-MCNC: 11.6 G/DL (ref 11.7–15.4)
IMM GRANULOCYTES # BLD AUTO: 0 K/UL (ref 0–0.5)
IMM GRANULOCYTES NFR BLD AUTO: 0 % (ref 0–5)
LIPASE SERPL-CCNC: 60 U/L (ref 73–393)
LYMPHOCYTES # BLD: 2.1 K/UL (ref 0.5–4.6)
LYMPHOCYTES NFR BLD: 46 % (ref 13–44)
MCH RBC QN AUTO: 26.6 PG (ref 26.1–32.9)
MCHC RBC AUTO-ENTMCNC: 32.1 G/DL (ref 31.4–35)
MCV RBC AUTO: 82.8 FL (ref 82–102)
MONOCYTES # BLD: 0.3 K/UL (ref 0.1–1.3)
MONOCYTES NFR BLD: 7 % (ref 4–12)
NEUTS SEG # BLD: 1.7 K/UL (ref 1.7–8.2)
NEUTS SEG NFR BLD: 38 % (ref 43–78)
NRBC # BLD: 0 K/UL (ref 0–0.2)
PLATELET # BLD AUTO: 265 K/UL (ref 150–450)
PMV BLD AUTO: 11 FL (ref 9.4–12.3)
POTASSIUM SERPL-SCNC: 3.5 MMOL/L (ref 3.5–5.1)
PROT SERPL-MCNC: 7.1 G/DL (ref 6.3–8.2)
RBC # BLD AUTO: 4.36 M/UL (ref 4.05–5.2)
SODIUM SERPL-SCNC: 136 MMOL/L (ref 136–146)
TRIGL SERPL-MCNC: 100 MG/DL (ref 35–150)
WBC # BLD AUTO: 4.5 K/UL (ref 4.3–11.1)

## 2024-03-03 PROCEDURE — 96376 TX/PRO/DX INJ SAME DRUG ADON: CPT

## 2024-03-03 PROCEDURE — 85025 COMPLETE CBC W/AUTO DIFF WBC: CPT

## 2024-03-03 PROCEDURE — 6370000000 HC RX 637 (ALT 250 FOR IP): Performed by: STUDENT IN AN ORGANIZED HEALTH CARE EDUCATION/TRAINING PROGRAM

## 2024-03-03 PROCEDURE — 6360000002 HC RX W HCPCS: Performed by: STUDENT IN AN ORGANIZED HEALTH CARE EDUCATION/TRAINING PROGRAM

## 2024-03-03 PROCEDURE — 2580000003 HC RX 258: Performed by: PHYSICIAN ASSISTANT

## 2024-03-03 PROCEDURE — 99232 SBSQ HOSP IP/OBS MODERATE 35: CPT | Performed by: INTERNAL MEDICINE

## 2024-03-03 PROCEDURE — G0378 HOSPITAL OBSERVATION PER HR: HCPCS

## 2024-03-03 PROCEDURE — 96372 THER/PROPH/DIAG INJ SC/IM: CPT

## 2024-03-03 PROCEDURE — 86140 C-REACTIVE PROTEIN: CPT

## 2024-03-03 PROCEDURE — 84478 ASSAY OF TRIGLYCERIDES: CPT

## 2024-03-03 PROCEDURE — 80053 COMPREHEN METABOLIC PANEL: CPT

## 2024-03-03 PROCEDURE — 83690 ASSAY OF LIPASE: CPT

## 2024-03-03 PROCEDURE — 36415 COLL VENOUS BLD VENIPUNCTURE: CPT

## 2024-03-03 RX ADMIN — HYDROMORPHONE HYDROCHLORIDE 1 MG: 1 INJECTION, SOLUTION INTRAMUSCULAR; INTRAVENOUS; SUBCUTANEOUS at 06:47

## 2024-03-03 RX ADMIN — ENOXAPARIN SODIUM 40 MG: 100 INJECTION SUBCUTANEOUS at 08:51

## 2024-03-03 RX ADMIN — HYDROMORPHONE HYDROCHLORIDE 1 MG: 1 INJECTION, SOLUTION INTRAMUSCULAR; INTRAVENOUS; SUBCUTANEOUS at 00:27

## 2024-03-03 RX ADMIN — SODIUM CHLORIDE, POTASSIUM CHLORIDE, SODIUM LACTATE AND CALCIUM CHLORIDE: 600; 310; 30; 20 INJECTION, SOLUTION INTRAVENOUS at 02:35

## 2024-03-03 RX ADMIN — HYDROMORPHONE HYDROCHLORIDE 1 MG: 1 INJECTION, SOLUTION INTRAMUSCULAR; INTRAVENOUS; SUBCUTANEOUS at 10:39

## 2024-03-03 RX ADMIN — HYDROMORPHONE HYDROCHLORIDE 1 MG: 1 INJECTION, SOLUTION INTRAMUSCULAR; INTRAVENOUS; SUBCUTANEOUS at 18:01

## 2024-03-03 RX ADMIN — SODIUM CHLORIDE, POTASSIUM CHLORIDE, SODIUM LACTATE AND CALCIUM CHLORIDE: 600; 310; 30; 20 INJECTION, SOLUTION INTRAVENOUS at 10:42

## 2024-03-03 RX ADMIN — LEVOTHYROXINE SODIUM 100 MCG: 0.1 TABLET ORAL at 05:55

## 2024-03-03 ASSESSMENT — PAIN DESCRIPTION - LOCATION
LOCATION: ABDOMEN

## 2024-03-03 ASSESSMENT — PAIN DESCRIPTION - DESCRIPTORS
DESCRIPTORS: ACHING
DESCRIPTORS: ACHING
DESCRIPTORS: CRAMPING;ACHING;SHARP
DESCRIPTORS: SHARP;ACHING;CRAMPING

## 2024-03-03 ASSESSMENT — PAIN DESCRIPTION - ORIENTATION
ORIENTATION: UPPER
ORIENTATION: LEFT;RIGHT;MID

## 2024-03-03 ASSESSMENT — PAIN SCALES - GENERAL
PAINLEVEL_OUTOF10: 8
PAINLEVEL_OUTOF10: 9
PAINLEVEL_OUTOF10: 6
PAINLEVEL_OUTOF10: 8
PAINLEVEL_OUTOF10: 3
PAINLEVEL_OUTOF10: 8

## 2024-03-03 NOTE — PROGRESS NOTES
Gastroenterology and Interventional Endoscopy Progress Note    Subjective    Doing better this am. Minimal pain. Has concerns about EUS tomorrow  Objective  BP (!) 153/87 Comment: nurse notified  Pulse 50   Temp 98.1 °F (36.7 °C) (Oral)   Resp 20   Ht 1.524 m (5')   Wt 54.4 kg (120 lb)   SpO2 98%   BMI 23.44 kg/m²     Gen: A&O x 3.  CV: RRR. +S1S2. No c/g/r  Lungs: CTABL; good respiratory effort. no wheezes/rales/crackles  Abd: Soft, NT /ND. No peritoneal signs.   Ext: no pedal edema  Psych: Alert/oriented. No SI/HI      Plan  --plan to proceed with EUS. Risks/benefits d/w patient. She is agreeable to proceed  --    Anita Veliz MD  Gastroenterology and Interventional Endoscopy

## 2024-03-03 NOTE — PROGRESS NOTES
Hospitalist Progress Note   Admit Date:  2024  6:47 PM   Name:  Enedelia Handley   Age:  59 y.o.  Sex:  female  :  1965   MRN:  [id number]   Room:  [facility]/    Presenting/Chief Complaint: Abdominal Pain     Reason(s) for Admission: Acute pancreatitis, unspecified complication status, unspecified pancreatitis type [K85.90]  Severe acute pancreatitis [K85.90]     Hospital Course:   Enedelia Handley is a 59 y.o. female with medical history of hypothyroidism s/p total thyroidectomy (), osteoarthritis, ED visit  for acute epigastric pain (CT A/P non confirmatory but suspected pancreatitis, lipase wnl at that time) who presented back to ED  with worsening epigastric pain and elevated lipase consistent with acute pancreatitis. History of pancreatitis one time in  that was idiopathic.     Hemodynamics stable on arrival, afebrile. Labs notable for lipase 732 (up from 256 ) otherwise unremarkable. The ER gave morphine, Zofran and a 1 L sodium chloride bolus.  Hospitalist admission requested.    ROUNDS 3/2/24: Pt reclined in bed. States continued abd pain, but much improved since admission.  Pt states she does not drink, and has never drank.  Denies fever/chills, n/v.      ROUNDS 3/3/2024: Patient resting in bed.  Intermittent hiccups, asked for some ginger ale.    Was seen by Dr. Veliz with GI this morning.  May have clears today and then n.p.o. after midnight.  Will be transferred downtown to the GI lab in the morning for EUS.   Patient is afebrile.  Blood pressure is within normal limits.    A.m. labs show a lipase down to 60, WBCs 4.5, CRP 0.3, triglycerides down to 100, sodium 136, potassium 3.5, creatinine 0.77, LFTs now normal as well as T. bili. No a.m. labs needed in the am tomorrow.    Patient states she has felt so much better since being on a clear liquid diet and receiving IV fluids, continue fluids overnight.  Nursing is looking into transportation to downw and back for

## 2024-03-04 ENCOUNTER — ANESTHESIA EVENT (OUTPATIENT)
Dept: ENDOSCOPY | Age: 59
DRG: 440 | End: 2024-03-04
Payer: COMMERCIAL

## 2024-03-04 ENCOUNTER — HOSPITAL ENCOUNTER (INPATIENT)
Age: 59
LOS: 1 days | Discharge: ANOTHER HOSPITAL | DRG: 440 | End: 2024-03-04
Attending: FAMILY MEDICINE | Admitting: FAMILY MEDICINE
Payer: COMMERCIAL

## 2024-03-04 ENCOUNTER — ANESTHESIA (OUTPATIENT)
Dept: ENDOSCOPY | Age: 59
DRG: 440 | End: 2024-03-04
Payer: COMMERCIAL

## 2024-03-04 ENCOUNTER — HOSPITAL ENCOUNTER (OUTPATIENT)
Age: 59
Setting detail: OUTPATIENT SURGERY
Discharge: OTHER INSTITUTION | DRG: 440 | End: 2024-03-04
Attending: INTERNAL MEDICINE | Admitting: INTERNAL MEDICINE
Payer: COMMERCIAL

## 2024-03-04 VITALS
WEIGHT: 118.5 LBS | SYSTOLIC BLOOD PRESSURE: 151 MMHG | HEIGHT: 60 IN | DIASTOLIC BLOOD PRESSURE: 81 MMHG | OXYGEN SATURATION: 96 % | BODY MASS INDEX: 23.26 KG/M2 | RESPIRATION RATE: 16 BRPM | HEART RATE: 66 BPM | TEMPERATURE: 98.1 F

## 2024-03-04 VITALS
OXYGEN SATURATION: 94 % | DIASTOLIC BLOOD PRESSURE: 87 MMHG | SYSTOLIC BLOOD PRESSURE: 145 MMHG | WEIGHT: 118 LBS | HEART RATE: 72 BPM | BODY MASS INDEX: 23.16 KG/M2 | RESPIRATION RATE: 16 BRPM | HEIGHT: 60 IN | TEMPERATURE: 97.9 F

## 2024-03-04 LAB — IGG4 SER-MCNC: 47 MG/DL (ref 2–96)

## 2024-03-04 PROCEDURE — 6370000000 HC RX 637 (ALT 250 FOR IP): Performed by: STUDENT IN AN ORGANIZED HEALTH CARE EDUCATION/TRAINING PROGRAM

## 2024-03-04 PROCEDURE — 1100000000 HC RM PRIVATE

## 2024-03-04 PROCEDURE — 6370000000 HC RX 637 (ALT 250 FOR IP): Performed by: ANESTHESIOLOGY

## 2024-03-04 PROCEDURE — 96376 TX/PRO/DX INJ SAME DRUG ADON: CPT

## 2024-03-04 PROCEDURE — C1753 CATH, INTRAVAS ULTRASOUND: HCPCS | Performed by: INTERNAL MEDICINE

## 2024-03-04 PROCEDURE — 7100000001 HC PACU RECOVERY - ADDTL 15 MIN: Performed by: INTERNAL MEDICINE

## 2024-03-04 PROCEDURE — G0378 HOSPITAL OBSERVATION PER HR: HCPCS

## 2024-03-04 PROCEDURE — 2580000003 HC RX 258: Performed by: ANESTHESIOLOGY

## 2024-03-04 PROCEDURE — 7100000010 HC PHASE II RECOVERY - FIRST 15 MIN: Performed by: INTERNAL MEDICINE

## 2024-03-04 PROCEDURE — 3609018500 HC EGD US SCOPE W/ADJACENT STRUCTURES: Performed by: INTERNAL MEDICINE

## 2024-03-04 PROCEDURE — 0DJ08ZZ INSPECTION OF UPPER INTESTINAL TRACT, VIA NATURAL OR ARTIFICIAL OPENING ENDOSCOPIC: ICD-10-PCS | Performed by: INTERNAL MEDICINE

## 2024-03-04 PROCEDURE — 6360000002 HC RX W HCPCS: Performed by: STUDENT IN AN ORGANIZED HEALTH CARE EDUCATION/TRAINING PROGRAM

## 2024-03-04 PROCEDURE — 7100000000 HC PACU RECOVERY - FIRST 15 MIN: Performed by: INTERNAL MEDICINE

## 2024-03-04 PROCEDURE — 6360000002 HC RX W HCPCS: Performed by: ANESTHESIOLOGY

## 2024-03-04 PROCEDURE — 7100000011 HC PHASE II RECOVERY - ADDTL 15 MIN: Performed by: INTERNAL MEDICINE

## 2024-03-04 PROCEDURE — 6360000002 HC RX W HCPCS: Performed by: NURSE ANESTHETIST, CERTIFIED REGISTERED

## 2024-03-04 PROCEDURE — 2500000003 HC RX 250 WO HCPCS: Performed by: NURSE ANESTHETIST, CERTIFIED REGISTERED

## 2024-03-04 PROCEDURE — 96372 THER/PROPH/DIAG INJ SC/IM: CPT

## 2024-03-04 PROCEDURE — 2709999900 HC NON-CHARGEABLE SUPPLY: Performed by: INTERNAL MEDICINE

## 2024-03-04 PROCEDURE — 3700000000 HC ANESTHESIA ATTENDED CARE: Performed by: INTERNAL MEDICINE

## 2024-03-04 RX ORDER — ONDANSETRON 4 MG/1
4 TABLET, ORALLY DISINTEGRATING ORAL EVERY 8 HOURS PRN
Qty: 9 TABLET | Refills: 0 | Status: SHIPPED | OUTPATIENT
Start: 2024-03-04 | End: 2024-03-07

## 2024-03-04 RX ORDER — DEXMEDETOMIDINE HYDROCHLORIDE 100 UG/ML
INJECTION, SOLUTION INTRAVENOUS PRN
Status: DISCONTINUED | OUTPATIENT
Start: 2024-03-04 | End: 2024-03-04 | Stop reason: SDUPTHER

## 2024-03-04 RX ORDER — HYDROCODONE BITARTRATE AND ACETAMINOPHEN 5; 325 MG/1; MG/1
1 TABLET ORAL EVERY 6 HOURS PRN
Qty: 12 TABLET | Refills: 0 | Status: SHIPPED | OUTPATIENT
Start: 2024-03-04 | End: 2024-03-07

## 2024-03-04 RX ORDER — SODIUM CHLORIDE, SODIUM LACTATE, POTASSIUM CHLORIDE, CALCIUM CHLORIDE 600; 310; 30; 20 MG/100ML; MG/100ML; MG/100ML; MG/100ML
INJECTION, SOLUTION INTRAVENOUS CONTINUOUS
Status: DISCONTINUED | OUTPATIENT
Start: 2024-03-04 | End: 2024-03-04 | Stop reason: HOSPADM

## 2024-03-04 RX ORDER — LIDOCAINE HYDROCHLORIDE 10 MG/ML
1 INJECTION, SOLUTION INFILTRATION; PERINEURAL
Status: DISCONTINUED | OUTPATIENT
Start: 2024-03-04 | End: 2024-03-04 | Stop reason: HOSPADM

## 2024-03-04 RX ORDER — ONDANSETRON 2 MG/ML
4 INJECTION INTRAMUSCULAR; INTRAVENOUS
Status: DISCONTINUED | OUTPATIENT
Start: 2024-03-04 | End: 2024-03-04 | Stop reason: HOSPADM

## 2024-03-04 RX ORDER — GLYCOPYRROLATE 0.2 MG/ML
INJECTION INTRAMUSCULAR; INTRAVENOUS PRN
Status: DISCONTINUED | OUTPATIENT
Start: 2024-03-04 | End: 2024-03-04 | Stop reason: SDUPTHER

## 2024-03-04 RX ORDER — OXYCODONE HYDROCHLORIDE 5 MG/1
5 TABLET ORAL
Status: COMPLETED | OUTPATIENT
Start: 2024-03-04 | End: 2024-03-04

## 2024-03-04 RX ORDER — PROPOFOL 10 MG/ML
INJECTION, EMULSION INTRAVENOUS PRN
Status: DISCONTINUED | OUTPATIENT
Start: 2024-03-04 | End: 2024-03-04 | Stop reason: SDUPTHER

## 2024-03-04 RX ORDER — MIDAZOLAM HYDROCHLORIDE 2 MG/2ML
2 INJECTION, SOLUTION INTRAMUSCULAR; INTRAVENOUS
Status: DISCONTINUED | OUTPATIENT
Start: 2024-03-04 | End: 2024-03-04 | Stop reason: HOSPADM

## 2024-03-04 RX ORDER — HYDROMORPHONE HYDROCHLORIDE 2 MG/ML
0.5 INJECTION, SOLUTION INTRAMUSCULAR; INTRAVENOUS; SUBCUTANEOUS PRN
Status: DISCONTINUED | OUTPATIENT
Start: 2024-03-04 | End: 2024-03-04 | Stop reason: HOSPADM

## 2024-03-04 RX ORDER — LIDOCAINE HYDROCHLORIDE 20 MG/ML
INJECTION, SOLUTION EPIDURAL; INFILTRATION; INTRACAUDAL; PERINEURAL PRN
Status: DISCONTINUED | OUTPATIENT
Start: 2024-03-04 | End: 2024-03-04 | Stop reason: SDUPTHER

## 2024-03-04 RX ORDER — HYDROMORPHONE HYDROCHLORIDE 2 MG/ML
0.5 INJECTION, SOLUTION INTRAMUSCULAR; INTRAVENOUS; SUBCUTANEOUS EVERY 5 MIN PRN
Status: DISCONTINUED | OUTPATIENT
Start: 2024-03-04 | End: 2024-03-04 | Stop reason: HOSPADM

## 2024-03-04 RX ORDER — FENTANYL CITRATE 50 UG/ML
100 INJECTION, SOLUTION INTRAMUSCULAR; INTRAVENOUS PRN
Status: DISCONTINUED | OUTPATIENT
Start: 2024-03-04 | End: 2024-03-04 | Stop reason: HOSPADM

## 2024-03-04 RX ORDER — FENTANYL CITRATE 50 UG/ML
50 INJECTION, SOLUTION INTRAMUSCULAR; INTRAVENOUS PRN
Status: DISCONTINUED | OUTPATIENT
Start: 2024-03-04 | End: 2024-03-04 | Stop reason: HOSPADM

## 2024-03-04 RX ADMIN — OXYCODONE 5 MG: 5 TABLET ORAL at 04:14

## 2024-03-04 RX ADMIN — DEXMEDETOMIDINE 8 MCG: 100 INJECTION, SOLUTION, CONCENTRATE INTRAVENOUS at 14:12

## 2024-03-04 RX ADMIN — SODIUM CHLORIDE, POTASSIUM CHLORIDE, SODIUM LACTATE AND CALCIUM CHLORIDE: 600; 310; 30; 20 INJECTION, SOLUTION INTRAVENOUS at 13:08

## 2024-03-04 RX ADMIN — LEVOTHYROXINE SODIUM 100 MCG: 0.1 TABLET ORAL at 08:28

## 2024-03-04 RX ADMIN — PROPOFOL 50 MG: 10 INJECTION, EMULSION INTRAVENOUS at 14:14

## 2024-03-04 RX ADMIN — HYDROMORPHONE HYDROCHLORIDE 1 MG: 1 INJECTION, SOLUTION INTRAMUSCULAR; INTRAVENOUS; SUBCUTANEOUS at 02:30

## 2024-03-04 RX ADMIN — PROPOFOL 160 MCG/KG/MIN: 10 INJECTION, EMULSION INTRAVENOUS at 14:11

## 2024-03-04 RX ADMIN — HYDROMORPHONE HYDROCHLORIDE 0.5 MG: 2 INJECTION, SOLUTION INTRAMUSCULAR; INTRAVENOUS; SUBCUTANEOUS at 13:34

## 2024-03-04 RX ADMIN — DEXMEDETOMIDINE 8 MCG: 100 INJECTION, SOLUTION, CONCENTRATE INTRAVENOUS at 14:16

## 2024-03-04 RX ADMIN — HYDROMORPHONE HYDROCHLORIDE 1 MG: 1 INJECTION, SOLUTION INTRAMUSCULAR; INTRAVENOUS; SUBCUTANEOUS at 08:32

## 2024-03-04 RX ADMIN — LIDOCAINE HYDROCHLORIDE 100 MG: 20 INJECTION, SOLUTION EPIDURAL; INFILTRATION; INTRACAUDAL; PERINEURAL at 14:10

## 2024-03-04 RX ADMIN — PROPOFOL 80 MG: 10 INJECTION, EMULSION INTRAVENOUS at 14:10

## 2024-03-04 RX ADMIN — OXYCODONE 5 MG: 5 TABLET ORAL at 15:34

## 2024-03-04 RX ADMIN — GLYCOPYRROLATE 0.2 MG: 0.2 INJECTION INTRAMUSCULAR; INTRAVENOUS at 14:12

## 2024-03-04 ASSESSMENT — PAIN DESCRIPTION - ORIENTATION
ORIENTATION: RIGHT;LEFT;MID
ORIENTATION: RIGHT;LEFT;MID

## 2024-03-04 ASSESSMENT — PAIN SCALES - GENERAL
PAINLEVEL_OUTOF10: 6
PAINLEVEL_OUTOF10: 8
PAINLEVEL_OUTOF10: 7
PAINLEVEL_OUTOF10: 6
PAINLEVEL_OUTOF10: 9

## 2024-03-04 ASSESSMENT — PAIN DESCRIPTION - LOCATION
LOCATION: ABDOMEN

## 2024-03-04 ASSESSMENT — PAIN DESCRIPTION - DESCRIPTORS
DESCRIPTORS: DULL;SPASM
DESCRIPTORS: DISCOMFORT
DESCRIPTORS: ACHING

## 2024-03-04 ASSESSMENT — LIFESTYLE VARIABLES: SMOKING_STATUS: 1

## 2024-03-04 ASSESSMENT — PAIN - FUNCTIONAL ASSESSMENT
PAIN_FUNCTIONAL_ASSESSMENT: FACE, LEGS, ACTIVITY, CRY, AND CONSOLABILITY (FLACC)
PAIN_FUNCTIONAL_ASSESSMENT: 0-10

## 2024-03-04 ASSESSMENT — COPD QUESTIONNAIRES: CAT_SEVERITY: MODERATE

## 2024-03-04 NOTE — ANESTHESIA PRE PROCEDURE
Department of Anesthesiology  Preprocedure Note       Name:  Enedelia Handley   Age:  59 y.o.  :  1965                                          MRN:  [id number]         Date:  3/4/2024      Surgeon: Surgeon(s):  Anita Veliz MD    Procedure: Procedure(s):  ENDOSCOPIC ULTRASOUND *[facility]*    Medications prior to admission:   Prior to Admission medications    Medication Sig Start Date End Date Taking? Authorizing Provider   famotidine (PEPCID) 20 MG tablet Take 1 tablet by mouth nightly as needed (epigastric pain)  Patient not taking: Reported on 2024   Ramirez Cuevas PA   meloxicam (MOBIC) 15 MG tablet Take 1 tablet by mouth daily 12/11/23 1/10/24  Scar Max PA   levothyroxine (SYNTHROID) 100 MCG tablet Take 1 tablet by mouth daily 12/11/23 3/10/24  Scar Max PA   acetaminophen (TYLENOL) 325 MG tablet Take 325 mg by mouth every 4 hours as needed for Pain (for breakthrough pain).  Patient not taking: Reported on 2024    Provider, MD Vilma   aspirin 81 MG EC tablet Take 1 tablet by mouth 2 times daily for 35 doses 23  Curtis Hooker PA   ondansetron (ZOFRAN-ODT) 4 MG disintegrating tablet Take 1 tablet by mouth every 8 hours as needed for Nausea or Vomiting  Patient not taking: Reported on 2024   Curtis Hooker PA   albuterol sulfate  (90 Base) MCG/ACT inhaler Inhale 1 puff into the lungs every 4 hours as needed for Shortness of Breath  Patient not taking: Reported on 2024 3/10/19   Automatic Reconciliation, Ar   albuterol (PROVENTIL) (2.5 MG/3ML) 0.083% nebulizer solution Inhale 3 mLs into the lungs every 4 hours as needed for Shortness of Breath  Patient not taking: Reported on 2024    Automatic Reconciliation, Ar   fluticasone-salmeterol (ADVAIR) 250-50 MCG/ACT AEPB diskus inhaler Inhale 1 puff into the lungs 2 times daily  Patient not taking: Reported on 2024    Automatic Reconciliation, Ar   polyethylene glycol

## 2024-03-04 NOTE — PROGRESS NOTES
Patient discharged to home with family at side. Patients IV discontinued. Patient given AVS with opportunities for questions provided. Patient verbalized understanding with no further questions noted.

## 2024-03-04 NOTE — CARE COORDINATION
Pt chart reviewed and discussed in AM IDR for continued stay. Pt changed to inpatient status today with severe acute pancreatitis. Per MD, pt going downtown for EUS today with potential PM discharge today.    No needs anticipated at discharge.

## 2024-03-04 NOTE — PROGRESS NOTES
Comprehensive Nutrition Assessment    Type and Reason for Visit: Initial, Positive Nutrition Screen  Malnutrition Screening Tool: Malnutrition Screen  Have you recently lost weight without trying?: 2 to 13 pounds (1 point)  Have you been eating poorly because of a decreased appetite?: Yes (1 point)  Malnutrition Screening Tool Score: 2    Nutrition Recommendations/Plan:   Meals and Snacks:  Diet: Continue NPO and advance as medically appropriate  Nutrition Supplement Therapy:  Medical food supplement therapy:  Initiate Ensure Clear three times per day (this provides 240 kcal and 8 grams protein per bottle). Provided with bottle to try post procedure or at home.     Malnutrition Assessment:  Malnutrition Status: Insufficient data (Unable to vlaidate diet or weight hx. At risk at a minimum d/t + weight loss and chronic low po intake)  NFPE:  visually general thinness       Nutrition Assessment:  Nutrition History: Unable to provide any quantifiable nutrition history. Pt reports after her first episode of pancreatitis she avoided any fried, with added fat, beef and pork. In general she eats small amounts several times per day. She can't pinpoint a recent change in po intake and says she has always had problems with her stomach. She says it has been weeks that she has had the epigastric pain but can't say how much that has impacted her po intake partly d.t chronic low po intake. She did add things like a small amount of chili or sharma in the past year which she says she did not always tolerate. She does not like Ensure shake as it tastes like medicine. However she did drink some Ensure presurgery before her hip surgery and said she tolerated that.           Weight History: Pt reports -140#.She endorsees weight loss and says she weighed 114# at her 2/26 ED visit.  12/11/2023 124 lbs Stated   9/12/2023 125 lbs Stated   5/30/2023 124 lbs 5 oz Standing scale   5/9/2023 124 lbs 5 oz Standing scale   5.2% change in

## 2024-03-04 NOTE — PROGRESS NOTES
TRANSFER - OUT REPORT:    Verbal report given to HILARIO Ospina on Enedelia Handley  being transferred to [facility] for ordered procedure       Report consisted of patient's Situation, Background, Assessment and   Recommendations(SBAR).     Information from the following report(s) Nurse Handoff Report was reviewed with the receiving nurse.           Patient scheduled for  at 1200

## 2024-03-04 NOTE — DISCHARGE SUMMARY
Hospitalist Discharge Summary   Admit Date:  2024  6:47 PM   DC Note date: 3/4/2024  Name:  Enedelia Handley   Age:  59 y.o.  Sex:  female  :  1965   MRN:  [id number]   Room:  [facility]  PCP:  Keith Santoyo MD    Presenting Complaint: Abdominal Pain     Initial Admission Diagnosis: Acute pancreatitis, unspecified complication status, unspecified pancreatitis type [K85.90]  Severe acute pancreatitis [K85.90]     Problem List for this Hospitalization (present on admission):    Principal Problem:    Severe acute pancreatitis  Active Problems:    Acute pancreatitis  Resolved Problems:    * No resolved hospital problems. *      Hospital Course:  Enedelia Handley is a 59 y.o. female with medical history of hypothyroidism s/p total thyroidectomy (), osteoarthritis, ED visit  for acute epigastric pain (CT A/P non confirmatory but suspected pancreatitis, lipase wnl at that time) who presented back to ED  with worsening epigastric pain and elevated lipase consistent with acute pancreatitis. History of pancreatitis one time in  that was idiopathic.     Hemodynamics stable on arrival, afebrile. Labs notable for lipase 732 (up from 256 ) otherwise unremarkable. The ER gave morphine, Zofran and a 1 L sodium chloride bolus.  Hospitalist admission requested.     ROUNDS 3/3/2024: Patient resting in bed.  Intermittent hiccups, asked for some ginger ale.    Was seen by Dr. Veliz with GI this morning.  May have clears today and then n.p.o. after midnight.  Will be transferred downtown to the GI lab in the morning for EUS.   Patient is afebrile.  Blood pressure is within normal limits.    A.m. labs show a lipase down to 60, WBCs 4.5, CRP 0.3, triglycerides down to 100, sodium 136, potassium 3.5, creatinine 0.77, LFTs now normal as well as T. bili. No a.m. labs needed in the am tomorrow.    Patient states she has felt so much better since being on a clear liquid diet and receiving IV fluids,

## 2024-03-04 NOTE — PROGRESS NOTES
Hospitalist Progress Note   Admit Date:  2024  6:47 PM   Name:  Enedelia Handley   Age:  59 y.o.  Sex:  female  :  1965   MRN:  [id number]   Room:  [facility]    Presenting/Chief Complaint: Abdominal Pain     Reason(s) for Admission: Acute pancreatitis, unspecified complication status, unspecified pancreatitis type [K85.90]  Severe acute pancreatitis [K85.90]     Hospital Course:   Enedelia Handley is a 59 y.o. female with medical history of hypothyroidism s/p total thyroidectomy (), osteoarthritis, ED visit  for acute epigastric pain (CT A/P non confirmatory but suspected pancreatitis, lipase wnl at that time) who presented back to ED  with worsening epigastric pain and elevated lipase consistent with acute pancreatitis. History of pancreatitis one time in  that was idiopathic.     Hemodynamics stable on arrival, afebrile. Labs notable for lipase 732 (up from 256 ) otherwise unremarkable. The ER gave morphine, Zofran and a 1 L sodium chloride bolus.  Hospitalist admission requested.    ROUNDS 3/3/2024: Patient resting in bed.  Intermittent hiccups, asked for some ginger ale.    Was seen by Dr. Veliz with GI this morning.  May have clears today and then n.p.o. after midnight.  Will be transferred downtown to the GI lab in the morning for EUS.   Patient is afebrile.  Blood pressure is within normal limits.    A.m. labs show a lipase down to 60, WBCs 4.5, CRP 0.3, triglycerides down to 100, sodium 136, potassium 3.5, creatinine 0.77, LFTs now normal as well as T. bili. No a.m. labs needed in the am tomorrow.    Patient states she has felt so much better since being on a clear liquid diet and receiving IV fluids, continue fluids overnight.  Nursing is looking into transportation to downtown and back for procedure.  All questions were answered at the time of morning rounds    ROUNDS 3/4/2024:  Patient resting in bed.  Has been n.p.o. after midnight.  Will be transferred downtown for an

## 2024-03-04 NOTE — PERIOP NOTE
TRANSFER - OUT REPORT:    Verbal report given to HILARIO Chase on Enedelia Handley being transferred to [facility] for routine progression of patient care       Report consisted of patient’s Situation, Background, Assessment and Recommendations (SBAR).     Information from the following report(s) SBAR, Kardex, Procedure Summary, and Cardiac Rhythm SR  was reviewed with the receiving nurse.    Opportunity for questions and clarification was provided.

## 2024-03-04 NOTE — PERIOP NOTE
TRANSFER - IN REPORT:    Verbal report received from Rena moncada Enedelia Handley  being received from  341 for routine progression of patient care      Report consisted of patient's Situation, Background, Assessment and   Recommendations(SBAR).     Information from the following report(s) Nurse Handoff Report, MAR, and Recent Results was reviewed with the receiving nurse.    Opportunity for questions and clarification was provided.      Assessment completed upon patient's arrival to unit and care assumed.      Pt is being picked up at 1200 at [facility] for transfer to [facility] pre op.

## 2024-03-04 NOTE — OP NOTE
Procedure: EGD, Endoscopic Ultrasound (EUS)    Indication: Recurrent pancreatitis.    Date of Procedure: 3/4/2024    Patient profile: Refer to patient note in chart for documentation of history and physical    Providers: Anita Veliz MD    Referring MD: Dr. Winslow    PCP: Keith Santoyo MD    Medicines: Monitored anesthesia care    Complication: No immediate complications.     Estimated blood loss: Minimal    Procedure: After the risks including, but not limited to medication reaction, infection, bleeding, perforation, missed lesions, benefits and alternatives of the procedure were discussed with the patient and all questions were answered, informed consent was obtained. The gastroscope and the linear echoendoscope were passed under direct vision throughout the procedure, the patient's blood pressure pulse and oxygen saturation were monitored continuously. The scopes were introduced through the mouth and advanced to the second part of duodenum. The endoscopy was performed without difficulty. The patient tolerated the procedure well.       Findings:     EUS Exam  Fuji Arietta Precision Ultrasound System was utilized for EUS imaging.     The linear echoendoscope (Olympus 180) was introduced from the mouth and advanced through the esophagus into the stomach to the level of the celiac axis.  The celiac axis was normal. There was no evidence of clinically significant celiac lymphadenopathy. The scope was then torqued counterclockwise to identify the pancreatic parenchyma. The pancreatic body and tail were normal. The pancreatic duct was identified. The PD measured 2 mm in the neck, 1.8 mm in the body and 1 mm in the tail.     The scope was then introduced further toward the gastric antrum. The gallbladder was identified. Gallbladder sludge noted. The scope was then introduced into the duodenal bulb, where the common bile duct was identified. The CBD measured 5 mm. The portal vein was idenitified. The portal

## 2024-03-04 NOTE — PROGRESS NOTES
I spoke w Dr Veliz GI after procedure. Pt tolerated well. On her way back to [facility]. He states ok to DC pt home today, GI bland diet x 2 days then advance back to regular. Avoid greasy / dried foods. Refer to Dr walker or Dr Denton for out pt elective cholecystectomy. - referral sent. Printed off RX for pain and nausea meds for DC and left w RN.     Yoanna Hills, NP

## 2024-03-04 NOTE — DISCHARGE INSTR - DIET
Nutrition Supplements  If you are not able to eat enough food or if you have extra calorie requirements, oral supplements can provide you with additional calories, protein, vitamins and minerals.     Recommend Ensure Clear or Boost Breeze or a comparable/similar product Twice daily for 30 days unless otherwise directed by your Primary Care Physician.

## 2024-03-05 LAB — ANA SER QL: NEGATIVE

## 2024-03-05 NOTE — ANESTHESIA POSTPROCEDURE EVALUATION
Department of Anesthesiology  Postprocedure Note    Patient: Enedelia Handley  MRN: [id number]  YOB: 1965  Date of evaluation: 3/4/2024    Procedure Summary       Date: 03/04/24 Room / Location: [facility] ENDO FLOURO 1 / [facility] ENDOSCOPY    Anesthesia Start: 1406 Anesthesia Stop: 1425    Procedure: ENDOSCOPIC ULTRASOUND *[facility]m [facility]* Diagnosis:       Pancreatitis      (Pancreatitis [K85.90])    Surgeons: Anita Veliz MD Responsible Provider: Twan Wharton IV, MD    Anesthesia Type: TIVA ASA Status: 3            Anesthesia Type: No value filed.    Joselo Phase I: Joselo Score: 10    Joselo Phase II: Joselo Score: 10    Anesthesia Post Evaluation    Patient location during evaluation: PACU  Patient participation: complete - patient participated  Level of consciousness: awake and alert  Airway patency: patent  Nausea & Vomiting: no nausea and no vomiting  Cardiovascular status: hemodynamically stable  Respiratory status: acceptable, nonlabored ventilation and spontaneous ventilation  Hydration status: euvolemic  Comments: BP (!) 145/87   Pulse 72   Temp 97.9 °F (36.6 °C) (Temporal)   Resp 16   Ht 1.524 m (5')   Wt 53.5 kg (118 lb)   SpO2 94%   BMI 23.05 kg/m²     Multimodal analgesia pain management approach  Pain management: adequate and satisfactory to patient        No notable events documented.

## 2024-03-11 NOTE — PROGRESS NOTES
increased ROM and strength in her left hand. She has decreased hypersensitivity in her IF. The nail is growing out and loose on the ulnar aspect of the DIP joint. Recommend she keep this covered with a band-aid to avoid \"snagging\" the nail. Wound closure continues volar surface of IF. She continues to lack DIP motion in the IF. There is weakness with pinch and . She rates herself as having a 23% functional deficit per the Quick DASH assessment. Goal dates and POC revised due to loss of treatment sessions.      PLAN OF CARE   ROM, strengthening, desensitization of left IF.    GOALS       Long Term Goals: by 4/9/24  Pt will be able to make a full composite fist with the affected hand so that the pt is able to grasp and hold objects during self care. (Not met, continue by 4/9/24)  Pt will be able to use her left hand for ADL's/iADL's with c/o pain no greater than a 2/10 occasionally. (Not met, continue goal by 4/9/24)  Pt will report normal sensation in left IF. (Not met, continue goal by 4/9/24)  Pt will have adequate strength to be able to hold and open containers without difficulty. (Not met for all, continue goal by 4/9/24)  Quick DASH assessment score will be 20% or less (not met, 23%, continue goal by 4/9/24)      [email or web address] Portal   Access Code: [email or web address]  URL: [email or web address]  Date: 10/24/2023  Prepared by: Jazmín Higgins     Exercises  - Seated Finger DIP Flexion AROM with Blocking  - 4-5 x daily - 7 x weekly - 2 sets - 10 reps - 3 sec hold  - Seated Finger PIP Flexion AROM  - 4-5 x daily - 7 x weekly - 2 sets - 10 reps - 3 sec hold  - Seated Claw Fist AROM  - 4-5 x daily - 7 x weekly - 2 sets - 10 reps - 3 sec hold  - Seated Full Fist AROM  - 4-5 x daily - 7 x weekly - 2 sets - 10 reps - 3 sec hold  - Marcial straps  - Silopad digital cap to don at night  - DIP blocking device fabricated  - scar massage and retrograde massage  OT Protocols

## 2024-03-12 ENCOUNTER — TREATMENT (OUTPATIENT)
Age: 59
End: 2024-03-12

## 2024-03-12 DIAGNOSIS — R20.9 SENSATION DISTURBANCE OF SKIN: ICD-10-CM

## 2024-03-12 DIAGNOSIS — Z78.9 DECREASED ACTIVITIES OF DAILY LIVING (ADL): ICD-10-CM

## 2024-03-12 DIAGNOSIS — M62.81 HAND MUSCLE WEAKNESS: ICD-10-CM

## 2024-03-12 DIAGNOSIS — M25.642 STIFFNESS OF FINGER JOINT OF LEFT HAND: ICD-10-CM

## 2024-03-12 DIAGNOSIS — M79.645 PAIN OF FINGER OF LEFT HAND: Primary | ICD-10-CM

## 2024-03-12 DIAGNOSIS — M25.449 EFFUSION OF FINGER JOINT: ICD-10-CM

## 2024-03-18 NOTE — PROGRESS NOTES
[facility] ORTHOPAEDICS  [facility] [facility] [address]  Dept: [phone]      Occupational Therapy Daily Note     Referring MD: Amber Langley MD    Diagnosis:     ICD-10-CM    1. Pain of finger of left hand  M79.645       2. Stiffness of finger joint of left hand  M25.642       3. Sensation disturbance of skin  R20.9       4. Effusion of finger joint  M25.449       5. Hand muscle weakness  M62.81       6. Decreased activities of daily living (ADL)  Z78.9            Surgery/Medical Dx: Date 9/12/23 Crush Injury Left  Index finger without fracture    Therapy precautions: none    History of injury/onset : Pt injured her left index finger on 9/12/23 when she got it crushed in a machine at work. She was seen in the ER where her wounds were washed and closed with sutures.       Total Direct Treatment Time: 40  min  Total In Office Time: 45 min  Modifier needed: No    Episode visit count:  17   (28 total: 12 additional visits added 2/8/24    Preferred Name: Enedelia    PERTINENT MEDICAL HISTORY     PMHX & Meds:   Past Medical History:   Diagnosis Date    Asthma     inhaler and nebs prn    Bell's palsy 11/2019    Chronic obstructive pulmonary disease (HCC)     advair and albuterol prn=uses 1-2 times per week; Neb PRN=never uses; not followed by Pulmonary     Former cigarette smoker     History of positive PPD 1996    no meds     Hypothyroidism     on med for control     Ill-defined condition     pancreatitis x1; not chronic per patient     Marijuana smoker     OA (osteoarthritis)     Pancreatitis    ,   Past Surgical History:   Procedure Laterality Date    ORTHOPEDIC SURGERY Left 2007    left ankle fracture- has a plate    OTHER SURGICAL HISTORY  10/09/2020    teeth removal     THYROIDECTOMY  12/04/2018    TOTAL HIP ARTHROPLASTY Right 5/30/2023    HIP TOTAL ARTHROPLASTY/ SDD performed by Javid Geller MD at [facility] MAIN OR    UPPER GASTROINTESTINAL ENDOSCOPY N/A 3/4/2024    ENDOSCOPIC ULTRASOUND

## 2024-03-19 ENCOUNTER — TREATMENT (OUTPATIENT)
Age: 59
End: 2024-03-19

## 2024-03-19 DIAGNOSIS — R20.9 SENSATION DISTURBANCE OF SKIN: ICD-10-CM

## 2024-03-19 DIAGNOSIS — M79.645 PAIN OF FINGER OF LEFT HAND: Primary | ICD-10-CM

## 2024-03-19 DIAGNOSIS — M25.449 EFFUSION OF FINGER JOINT: ICD-10-CM

## 2024-03-19 DIAGNOSIS — Z78.9 DECREASED ACTIVITIES OF DAILY LIVING (ADL): ICD-10-CM

## 2024-03-19 DIAGNOSIS — M25.642 STIFFNESS OF FINGER JOINT OF LEFT HAND: ICD-10-CM

## 2024-03-19 DIAGNOSIS — M62.81 HAND MUSCLE WEAKNESS: ICD-10-CM

## 2024-03-21 NOTE — PROGRESS NOTES
*[facility] Rm 341* performed by Anita Veliz MD at [facility] ENDOSCOPY      Medications. : Reviewed in chart  Allergies:   Allergies   Allergen Reactions    Latex Rash        SUBJECTIVE     Current Symptoms/Chief complaints:   Chief Complaint   Patient presents with    Finger Pain     Pt reports \"I still feel like something is in my finger\"    OBJECTIVE     Functional Outcome Measures: Quick Dash  26 score=   34 % functional deficit  Swelling/Edema: measured circumferentially PIP joints; IF right 2-3/8\"; left 2-1/2\"   (12/12/23 right 2-1/2\") (1/24/24 2-1/4\") 3/12/24 2-1/4\")  Skin Integrity: tip of IF closed and healing      Digital AROM: IE IF   IE IF  11/29/23 IF  12/12/23 IF  12/15/23 IF  1/3/24 IF  1/24/24 IF  1/29/24 IF  2/2/24 IF  3/12/24   MCP 0/67 0/82 0/85 0/85 0/85 0/85 0/85 0/85 0/85   PIP -5/35 0/75 0/85 0/85 0/85 0/85 0/90 0/90 0/90   DIP 0/10 0/25 -5/30 -5/30 -5/35 -5/27 -5/35 0/35 0/25   Flexion to DPC                Strength (psi): RIGHT  12/12/23 LEFT  12/12/23 Left  1/24/24 Left  2/2/24 Left   3/12/24    Position II 90 25 60 67 75   Lat Pinch: 23 5 18 18 22   2pt pinch: 15 2 4 4 4   3pt pinch: 20 4 5        10       13       Treatment:    Fluidotherapy (56892)  x 10 minutes to left hand/wrist for desensitization and preparation for treatment.    Manual Therapy (68493) x 10  minutes: Addressing soft tissue/joint restrictions and swelling of left UE:    IASTM to the dorsal and volar IF with \"tissue \" tool  Desensitization IF tip with mini massager  Grade II,III mobilizations to the IP's to increase flexion    Therapeutic exercise (49189) x 30 min:  AROM composite fisting  PROM by therapist  Place and hold IF DIP flexion and composite flexion  Prolonged stretching IP IF  Therapist assisted IP blocking  MP and PIP held in flexion and active DIP flexion/extension  Theraputty, yellow, pressing small dowel with tip of IF  Theraputty, hook fisting of IF to grasp the small dowels out of putty  Resistive

## 2024-03-22 ENCOUNTER — TREATMENT (OUTPATIENT)
Age: 59
End: 2024-03-22

## 2024-03-22 DIAGNOSIS — M79.645 PAIN OF FINGER OF LEFT HAND: Primary | ICD-10-CM

## 2024-03-22 DIAGNOSIS — Z78.9 DECREASED ACTIVITIES OF DAILY LIVING (ADL): ICD-10-CM

## 2024-03-22 DIAGNOSIS — M25.642 STIFFNESS OF FINGER JOINT OF LEFT HAND: ICD-10-CM

## 2024-03-22 DIAGNOSIS — M25.449 EFFUSION OF FINGER JOINT: ICD-10-CM

## 2024-03-22 DIAGNOSIS — R20.9 SENSATION DISTURBANCE OF SKIN: ICD-10-CM

## 2024-03-22 DIAGNOSIS — M62.81 HAND MUSCLE WEAKNESS: ICD-10-CM

## 2024-03-27 NOTE — PROGRESS NOTES
[facility] OT [facility] ORTHOPAEDICS  [facility] [facility] [address]  Dept: [phone]      Occupational Therapy Daily Note     Referring MD: Amber Langley MD    Diagnosis:     ICD-10-CM    1. Pain of finger of left hand  M79.645       2. Stiffness of finger joint of left hand  M25.642       3. Sensation disturbance of skin  R20.9       4. Effusion of finger joint  M25.449       5. Hand muscle weakness  M62.81       6. Decreased activities of daily living (ADL)  Z78.9            Surgery/Medical Dx: Date 9/12/23 Crush Injury Left  Index finger without fracture    Therapy precautions: none    History of injury/onset : Pt injured her left index finger on 9/12/23 when she got it crushed in a machine at work. She was seen in the ER where her wounds were washed and closed with sutures.       Total Direct Treatment Time: 40  min  Total In Office Time: 45 min  Modifier needed: No    Episode visit count:  19   (28 total: 12 additional visits added 2/8/24    Preferred Name: Enedelia    PERTINENT MEDICAL HISTORY     PMHX & Meds:   Past Medical History:   Diagnosis Date    Asthma     inhaler and nebs prn    Bell's palsy 11/2019    Chronic obstructive pulmonary disease (HCC)     advair and albuterol prn=uses 1-2 times per week; Neb PRN=never uses; not followed by Pulmonary     Former cigarette smoker     History of positive PPD 1996    no meds     Hypothyroidism     on med for control     Ill-defined condition     pancreatitis x1; not chronic per patient     Marijuana smoker     OA (osteoarthritis)     Pancreatitis    ,   Past Surgical History:   Procedure Laterality Date    ORTHOPEDIC SURGERY Left 2007    left ankle fracture- has a plate    OTHER SURGICAL HISTORY  10/09/2020    teeth removal     THYROIDECTOMY  12/04/2018    TOTAL HIP ARTHROPLASTY Right 5/30/2023    HIP TOTAL ARTHROPLASTY/ SDD performed by Javid Geller MD at [facility] MAIN OR    UPPER GASTROINTESTINAL ENDOSCOPY N/A 3/4/2024    ENDOSCOPIC ULTRASOUND

## 2024-03-28 ENCOUNTER — TREATMENT (OUTPATIENT)
Age: 59
End: 2024-03-28
Payer: COMMERCIAL

## 2024-03-28 DIAGNOSIS — M79.645 PAIN OF FINGER OF LEFT HAND: Primary | ICD-10-CM

## 2024-03-28 DIAGNOSIS — M25.642 STIFFNESS OF FINGER JOINT OF LEFT HAND: ICD-10-CM

## 2024-03-28 DIAGNOSIS — M25.449 EFFUSION OF FINGER JOINT: ICD-10-CM

## 2024-03-28 DIAGNOSIS — Z78.9 DECREASED ACTIVITIES OF DAILY LIVING (ADL): ICD-10-CM

## 2024-03-28 DIAGNOSIS — R20.9 SENSATION DISTURBANCE OF SKIN: ICD-10-CM

## 2024-03-28 DIAGNOSIS — M62.81 HAND MUSCLE WEAKNESS: ICD-10-CM

## 2024-03-28 PROCEDURE — 97110 THERAPEUTIC EXERCISES: CPT | Performed by: OCCUPATIONAL THERAPIST

## 2024-03-28 PROCEDURE — 97140 MANUAL THERAPY 1/> REGIONS: CPT | Performed by: OCCUPATIONAL THERAPIST

## 2024-03-29 ENCOUNTER — TREATMENT (OUTPATIENT)
Age: 59
End: 2024-03-29
Payer: COMMERCIAL

## 2024-03-29 DIAGNOSIS — M25.449 EFFUSION OF FINGER JOINT: ICD-10-CM

## 2024-03-29 DIAGNOSIS — R20.9 SENSATION DISTURBANCE OF SKIN: ICD-10-CM

## 2024-03-29 DIAGNOSIS — M25.642 STIFFNESS OF FINGER JOINT OF LEFT HAND: ICD-10-CM

## 2024-03-29 DIAGNOSIS — Z78.9 DECREASED ACTIVITIES OF DAILY LIVING (ADL): ICD-10-CM

## 2024-03-29 DIAGNOSIS — M62.81 HAND MUSCLE WEAKNESS: ICD-10-CM

## 2024-03-29 DIAGNOSIS — M79.645 PAIN OF FINGER OF LEFT HAND: Primary | ICD-10-CM

## 2024-03-29 PROCEDURE — 97110 THERAPEUTIC EXERCISES: CPT | Performed by: OCCUPATIONAL THERAPIST

## 2024-03-29 NOTE — PROGRESS NOTES
[facility] OT [facility] ORTHOPAEDICS  [address] [facility] [address]  Dept: [phone]      Occupational Therapy Daily Note     Referring MD: Amber Langley MD    Diagnosis:     ICD-10-CM    1. Pain of finger of left hand  M79.645       2. Stiffness of finger joint of left hand  M25.642       3. Sensation disturbance of skin  R20.9       4. Effusion of finger joint  M25.449       5. Hand muscle weakness  M62.81       6. Decreased activities of daily living (ADL)  Z78.9            Surgery/Medical Dx: Date 9/12/23 Crush Injury Left  Index finger without fracture    Therapy precautions: none    History of injury/onset : Pt injured her left index finger on 9/12/23 when she got it crushed in a machine at work. She was seen in the ER where her wounds were washed and closed with sutures.       Total Direct Treatment Time: 30  min  Total In Office Time: 35 min  Modifier needed: No    Episode visit count:  Visit count could not be calculated. Make sure you are using a visit which is associated with an episode.   (28 total: 12 additional visits added 2/8/24    Preferred Name: Enedelia    PERTINENT MEDICAL HISTORY     PMHX & Meds:   Past Medical History:   Diagnosis Date    Asthma     inhaler and nebs prn    Bell's palsy 11/2019    Chronic obstructive pulmonary disease (HCC)     advair and albuterol prn=uses 1-2 times per week; Neb PRN=never uses; not followed by Pulmonary     Former cigarette smoker     History of positive PPD 1996    no meds     Hypothyroidism     on med for control     Ill-defined condition     pancreatitis x1; not chronic per patient     Marijuana smoker     OA (osteoarthritis)     Pancreatitis    ,   Past Surgical History:   Procedure Laterality Date    ORTHOPEDIC SURGERY Left 2007    left ankle fracture- has a plate    OTHER SURGICAL HISTORY  10/09/2020    teeth removal     THYROIDECTOMY  12/04/2018    TOTAL HIP ARTHROPLASTY Right 5/30/2023    HIP TOTAL ARTHROPLASTY/ SDD performed by Javid WILSON

## 2024-04-08 ENCOUNTER — TREATMENT (OUTPATIENT)
Age: 59
End: 2024-04-08
Payer: COMMERCIAL

## 2024-04-08 DIAGNOSIS — M25.449 EFFUSION OF FINGER JOINT: ICD-10-CM

## 2024-04-08 DIAGNOSIS — M79.645 PAIN OF FINGER OF LEFT HAND: Primary | ICD-10-CM

## 2024-04-08 DIAGNOSIS — M62.81 HAND MUSCLE WEAKNESS: ICD-10-CM

## 2024-04-08 DIAGNOSIS — Z78.9 DECREASED ACTIVITIES OF DAILY LIVING (ADL): ICD-10-CM

## 2024-04-08 DIAGNOSIS — R20.9 SENSATION DISTURBANCE OF SKIN: ICD-10-CM

## 2024-04-08 DIAGNOSIS — M25.642 STIFFNESS OF FINGER JOINT OF LEFT HAND: ICD-10-CM

## 2024-04-08 PROCEDURE — 97010 HOT OR COLD PACKS THERAPY: CPT | Performed by: OCCUPATIONAL THERAPIST

## 2024-04-08 PROCEDURE — 97110 THERAPEUTIC EXERCISES: CPT | Performed by: OCCUPATIONAL THERAPIST

## 2024-04-08 NOTE — PROGRESS NOTES
IF.    GOALS       Long Term Goals: by 4/9/24  Pt will be able to make a full composite fist with the affected hand so that the pt is able to grasp and hold objects during self care. (Not met, continue by 4/9/24)  Pt will be able to use her left hand for ADL's/iADL's with c/o pain no greater than a 2/10 occasionally. (Not met, continue goal by 4/9/24)  Pt will report normal sensation in left IF. (Not met, continue goal by 4/9/24)  Pt will have adequate strength to be able to hold and open containers without difficulty. (Not met for all, continue goal by 4/9/24)  Quick DASH assessment score will be 20% or less (not met, 23%, continue goal by 4/9/24)      [employer] Portal   Access Code: [email or web address]  URL: [email or web address]  Date: 10/24/2023  Prepared by: Jazmín Higgins     Exercises  - Seated Finger DIP Flexion AROM with Blocking  - 4-5 x daily - 7 x weekly - 2 sets - 10 reps - 3 sec hold  - Seated Finger PIP Flexion AROM  - 4-5 x daily - 7 x weekly - 2 sets - 10 reps - 3 sec hold  - Seated Claw Fist AROM  - 4-5 x daily - 7 x weekly - 2 sets - 10 reps - 3 sec hold  - Seated Full Fist AROM  - 4-5 x daily - 7 x weekly - 2 sets - 10 reps - 3 sec hold  - Marcial straps  - Silopad digital cap to don at night  - DIP blocking device fabricated  - scar massage and retrograde massage  OT Protocols

## 2024-04-09 ENCOUNTER — OFFICE VISIT (OUTPATIENT)
Age: 59
End: 2024-04-09
Payer: COMMERCIAL

## 2024-04-09 ENCOUNTER — PREP FOR PROCEDURE (OUTPATIENT)
Dept: GASTROENTEROLOGY | Age: 59
End: 2024-04-09

## 2024-04-09 VITALS
HEIGHT: 60 IN | OXYGEN SATURATION: 97 % | SYSTOLIC BLOOD PRESSURE: 123 MMHG | BODY MASS INDEX: 23.05 KG/M2 | WEIGHT: 117.4 LBS | RESPIRATION RATE: 16 BRPM | HEART RATE: 83 BPM | DIASTOLIC BLOOD PRESSURE: 76 MMHG

## 2024-04-09 DIAGNOSIS — R10.10 UPPER ABDOMINAL PAIN: ICD-10-CM

## 2024-04-09 DIAGNOSIS — Z86.010 HISTORY OF ADENOMATOUS POLYP OF COLON: ICD-10-CM

## 2024-04-09 DIAGNOSIS — Z86.010 HISTORY OF ADENOMATOUS POLYP OF COLON: Primary | ICD-10-CM

## 2024-04-09 PROBLEM — Z86.0101 HISTORY OF ADENOMATOUS POLYP OF COLON: Status: ACTIVE | Noted: 2024-04-09

## 2024-04-09 PROCEDURE — 99204 OFFICE O/P NEW MOD 45 MIN: CPT | Performed by: INTERNAL MEDICINE

## 2024-04-09 RX ORDER — SODIUM CHLORIDE 0.9 % (FLUSH) 0.9 %
5-40 SYRINGE (ML) INJECTION EVERY 12 HOURS SCHEDULED
Status: CANCELLED | OUTPATIENT
Start: 2024-04-09

## 2024-04-09 RX ORDER — SODIUM, POTASSIUM,MAG SULFATES 17.5-3.13G
1 SOLUTION, RECONSTITUTED, ORAL ORAL ONCE
Qty: 1 EACH | Refills: 0 | Status: SHIPPED | OUTPATIENT
Start: 2024-04-09 | End: 2024-04-09

## 2024-04-09 RX ORDER — SODIUM CHLORIDE 0.9 % (FLUSH) 0.9 %
5-40 SYRINGE (ML) INJECTION PRN
Status: CANCELLED | OUTPATIENT
Start: 2024-04-09

## 2024-04-09 RX ORDER — SODIUM CHLORIDE 9 MG/ML
25 INJECTION, SOLUTION INTRAVENOUS PRN
Status: CANCELLED | OUTPATIENT
Start: 2024-04-09

## 2024-04-09 NOTE — PATIENT INSTRUCTIONS
Schedule upper endoscopy with biopsies and surveillance colonoscopy, if negative then would have patient follow up with surgery clinic to consider cholecystectomy.     Smaller, more frequent, lower residue, lower fat meals    Limit Mobic use and other NSAIDs, take Prilosec 20mg over the counter daily if on long term NSAID use.     Take Miralax 17gm (1 scoop daily), Benefiber daily, and drink plenty of water    Colace 100mg oral 2x per day and Dulcolax suppository 10mg at night

## 2024-04-11 NOTE — PROGRESS NOTES
[facility] OT [facility] ORTHOPAEDICS  [address] [facility] [address]  Dept: [phone]      Occupational Therapy Discharge Note     Referring MD: Amber Langley MD    Diagnosis:     ICD-10-CM    1. Pain of finger of left hand  M79.645       2. Stiffness of finger joint of left hand  M25.642       3. Sensation disturbance of skin  R20.9       4. Effusion of finger joint  M25.449       5. Decreased activities of daily living (ADL)  Z78.9       6. Hand muscle weakness  M62.81            Surgery/Medical Dx: Date 9/12/23 Crush Injury Left  Index finger without fracture    Therapy precautions: none    History of injury/onset : Pt injured her left index finger on 9/12/23 when she got it crushed in a machine at work. She was seen in the ER where her wounds were washed and closed with sutures.       Total Direct Treatment Time: 10  min  Total In Office Time: 10 min  Modifier needed: No    Episode visit count:  21   (28 total: 12 additional visits added 2/8/24    Preferred Name: Enedelia    PERTINENT MEDICAL HISTORY     PMHX & Meds:   Past Medical History:   Diagnosis Date    Asthma     inhaler and nebs prn    Bell's palsy 11/2019    Chronic obstructive pulmonary disease (HCC)     advair and albuterol prn=uses 1-2 times per week; Neb PRN=never uses; not followed by Pulmonary     Former cigarette smoker     History of positive PPD 1996    no meds     Hypothyroidism     on med for control     Ill-defined condition     pancreatitis x1; not chronic per patient     Marijuana smoker     OA (osteoarthritis)     Pancreatitis    ,   Past Surgical History:   Procedure Laterality Date    ORTHOPEDIC SURGERY Left 2007    left ankle fracture- has a plate    OTHER SURGICAL HISTORY  10/09/2020    teeth removal     THYROIDECTOMY  12/04/2018    TOTAL HIP ARTHROPLASTY Right 5/30/2023    HIP TOTAL ARTHROPLASTY/ SDD performed by Javid Geller MD at [facility] MAIN OR    UPPER GASTROINTESTINAL ENDOSCOPY N/A 3/4/2024    ENDOSCOPIC ULTRASOUND

## 2024-04-12 ENCOUNTER — TREATMENT (OUTPATIENT)
Age: 59
End: 2024-04-12

## 2024-04-12 DIAGNOSIS — R20.9 SENSATION DISTURBANCE OF SKIN: ICD-10-CM

## 2024-04-12 DIAGNOSIS — M62.81 HAND MUSCLE WEAKNESS: ICD-10-CM

## 2024-04-12 DIAGNOSIS — Z78.9 DECREASED ACTIVITIES OF DAILY LIVING (ADL): ICD-10-CM

## 2024-04-12 DIAGNOSIS — M25.642 STIFFNESS OF FINGER JOINT OF LEFT HAND: ICD-10-CM

## 2024-04-12 DIAGNOSIS — M79.645 PAIN OF FINGER OF LEFT HAND: Primary | ICD-10-CM

## 2024-04-12 DIAGNOSIS — M25.449 EFFUSION OF FINGER JOINT: ICD-10-CM

## 2024-04-23 ENCOUNTER — APPOINTMENT (OUTPATIENT)
Dept: CT IMAGING | Age: 59
End: 2024-04-23
Payer: COMMERCIAL

## 2024-04-23 ENCOUNTER — HOSPITAL ENCOUNTER (INPATIENT)
Age: 59
LOS: 1 days | Discharge: HOME | End: 2024-04-25
Attending: EMERGENCY MEDICINE | Admitting: FAMILY MEDICINE
Payer: COMMERCIAL

## 2024-04-23 DIAGNOSIS — E86.0 DEHYDRATION: ICD-10-CM

## 2024-04-23 DIAGNOSIS — R19.5 POSITIVE FECAL OCCULT BLOOD TEST: Primary | ICD-10-CM

## 2024-04-23 DIAGNOSIS — R10.13 ABDOMINAL PAIN, EPIGASTRIC: ICD-10-CM

## 2024-04-23 DIAGNOSIS — R11.2 INTRACTABLE NAUSEA AND VOMITING: ICD-10-CM

## 2024-04-23 LAB
ALBUMIN SERPL-MCNC: 3.7 G/DL (ref 3.5–5)
ALBUMIN/GLOB SERPL: 1 (ref 1–1.9)
ALP SERPL-CCNC: 104 U/L (ref 35–104)
ALT SERPL-CCNC: 10 U/L (ref 12–65)
ANION GAP SERPL CALC-SCNC: 12 MMOL/L (ref 9–18)
AST SERPL-CCNC: 19 U/L (ref 15–37)
BASOPHILS # BLD: 0.1 K/UL (ref 0–0.2)
BASOPHILS NFR BLD: 2 % (ref 0–2)
BILIRUB SERPL-MCNC: 0.5 MG/DL (ref 0–1.2)
BUN SERPL-MCNC: 10 MG/DL (ref 6–23)
CALCIUM SERPL-MCNC: 9.5 MG/DL (ref 8.8–10.2)
CHLORIDE SERPL-SCNC: 101 MMOL/L (ref 98–107)
CO2 SERPL-SCNC: 25 MMOL/L (ref 20–28)
CREAT SERPL-MCNC: 0.8 MG/DL (ref 0.6–1.1)
DIFFERENTIAL METHOD BLD: ABNORMAL
EOSINOPHIL # BLD: 0.4 K/UL (ref 0–0.8)
EOSINOPHIL NFR BLD: 8 % (ref 0.5–7.8)
ERYTHROCYTE [DISTWIDTH] IN BLOOD BY AUTOMATED COUNT: 15.9 % (ref 11.9–14.6)
GLOBULIN SER CALC-MCNC: 3.8 G/DL (ref 2.3–3.5)
GLUCOSE SERPL-MCNC: 112 MG/DL (ref 70–99)
HCT VFR BLD AUTO: 35.6 % (ref 35.8–46.3)
HGB BLD-MCNC: 11.3 G/DL (ref 11.7–15.4)
IMM GRANULOCYTES # BLD AUTO: 0 K/UL (ref 0–0.5)
IMM GRANULOCYTES NFR BLD AUTO: 0 % (ref 0–5)
LIPASE SERPL-CCNC: 76 U/L (ref 13–60)
LYMPHOCYTES # BLD: 2.2 K/UL (ref 0.5–4.6)
LYMPHOCYTES NFR BLD: 40 % (ref 13–44)
MAGNESIUM SERPL-MCNC: 2.2 MG/DL (ref 1.8–2.4)
MCH RBC QN AUTO: 25.9 PG (ref 26.1–32.9)
MCHC RBC AUTO-ENTMCNC: 31.7 G/DL (ref 31.4–35)
MCV RBC AUTO: 81.5 FL (ref 82–102)
MONOCYTES # BLD: 0.4 K/UL (ref 0.1–1.3)
MONOCYTES NFR BLD: 8 % (ref 4–12)
NEUTS SEG # BLD: 2.4 K/UL (ref 1.7–8.2)
NEUTS SEG NFR BLD: 43 % (ref 43–78)
NRBC # BLD: 0 K/UL (ref 0–0.2)
PLATELET # BLD AUTO: 315 K/UL (ref 150–450)
PMV BLD AUTO: 10.5 FL (ref 9.4–12.3)
POTASSIUM SERPL-SCNC: 3.8 MMOL/L (ref 3.5–5.1)
PROT SERPL-MCNC: 7.5 G/DL (ref 6.3–8.2)
RBC # BLD AUTO: 4.37 M/UL (ref 4.05–5.2)
SODIUM SERPL-SCNC: 138 MMOL/L (ref 136–145)
WBC # BLD AUTO: 5.5 K/UL (ref 4.3–11.1)

## 2024-04-23 PROCEDURE — 81003 URINALYSIS AUTO W/O SCOPE: CPT

## 2024-04-23 PROCEDURE — 93005 ELECTROCARDIOGRAM TRACING: CPT | Performed by: PHYSICIAN ASSISTANT

## 2024-04-23 PROCEDURE — 85025 COMPLETE CBC W/AUTO DIFF WBC: CPT

## 2024-04-23 PROCEDURE — 81001 URINALYSIS AUTO W/SCOPE: CPT

## 2024-04-23 PROCEDURE — 6360000002 HC RX W HCPCS: Performed by: PHYSICIAN ASSISTANT

## 2024-04-23 PROCEDURE — 80053 COMPREHEN METABOLIC PANEL: CPT

## 2024-04-23 PROCEDURE — 96374 THER/PROPH/DIAG INJ IV PUSH: CPT

## 2024-04-23 PROCEDURE — 96375 TX/PRO/DX INJ NEW DRUG ADDON: CPT

## 2024-04-23 PROCEDURE — 99285 EMERGENCY DEPT VISIT HI MDM: CPT

## 2024-04-23 PROCEDURE — 96361 HYDRATE IV INFUSION ADD-ON: CPT

## 2024-04-23 PROCEDURE — 2580000003 HC RX 258: Performed by: PHYSICIAN ASSISTANT

## 2024-04-23 PROCEDURE — 80307 DRUG TEST PRSMV CHEM ANLYZR: CPT

## 2024-04-23 PROCEDURE — 83735 ASSAY OF MAGNESIUM: CPT

## 2024-04-23 PROCEDURE — 83690 ASSAY OF LIPASE: CPT

## 2024-04-23 PROCEDURE — 74177 CT ABD & PELVIS W/CONTRAST: CPT

## 2024-04-23 PROCEDURE — 6360000004 HC RX CONTRAST MEDICATION: Performed by: PHYSICIAN ASSISTANT

## 2024-04-23 RX ORDER — KETOROLAC TROMETHAMINE 30 MG/ML
30 INJECTION, SOLUTION INTRAMUSCULAR; INTRAVENOUS ONCE
Status: COMPLETED | OUTPATIENT
Start: 2024-04-23 | End: 2024-04-23

## 2024-04-23 RX ORDER — 0.9 % SODIUM CHLORIDE 0.9 %
1000 INTRAVENOUS SOLUTION INTRAVENOUS ONCE
Status: COMPLETED | OUTPATIENT
Start: 2024-04-23 | End: 2024-04-24

## 2024-04-23 RX ORDER — ONDANSETRON 2 MG/ML
4 INJECTION INTRAMUSCULAR; INTRAVENOUS ONCE
Status: COMPLETED | OUTPATIENT
Start: 2024-04-23 | End: 2024-04-23

## 2024-04-23 RX ADMIN — ONDANSETRON 4 MG: 2 INJECTION INTRAMUSCULAR; INTRAVENOUS at 22:06

## 2024-04-23 RX ADMIN — KETOROLAC TROMETHAMINE 30 MG: 30 INJECTION, SOLUTION INTRAMUSCULAR; INTRAVENOUS at 23:41

## 2024-04-23 RX ADMIN — SODIUM CHLORIDE 1000 ML: 9 INJECTION, SOLUTION INTRAVENOUS at 22:05

## 2024-04-23 RX ADMIN — IOPAMIDOL 100 ML: 755 INJECTION, SOLUTION INTRAVENOUS at 22:56

## 2024-04-23 ASSESSMENT — PAIN - FUNCTIONAL ASSESSMENT: PAIN_FUNCTIONAL_ASSESSMENT: 0-10

## 2024-04-23 ASSESSMENT — PAIN SCALES - GENERAL: PAINLEVEL_OUTOF10: 10

## 2024-04-23 ASSESSMENT — LIFESTYLE VARIABLES
HOW OFTEN DO YOU HAVE A DRINK CONTAINING ALCOHOL: NEVER
HOW MANY STANDARD DRINKS CONTAINING ALCOHOL DO YOU HAVE ON A TYPICAL DAY: PATIENT DOES NOT DRINK

## 2024-04-24 PROBLEM — K92.2 UPPER GI BLEED: Status: ACTIVE | Noted: 2024-04-24

## 2024-04-24 PROBLEM — D64.9 NORMOCYTIC ANEMIA: Status: ACTIVE | Noted: 2024-04-24

## 2024-04-24 PROBLEM — R10.10 UPPER ABDOMINAL PAIN: Status: RESOLVED | Noted: 2024-04-09 | Resolved: 2024-04-24

## 2024-04-24 PROBLEM — J45.909 ASTHMA: Status: ACTIVE | Noted: 2018-02-27

## 2024-04-24 PROBLEM — K85.90 PANCREATITIS: Status: RESOLVED | Noted: 2024-02-29 | Resolved: 2024-04-24

## 2024-04-24 PROBLEM — J44.9 COPD (CHRONIC OBSTRUCTIVE PULMONARY DISEASE) (HCC): Status: ACTIVE | Noted: 2018-02-28

## 2024-04-24 LAB
ALBUMIN SERPL-MCNC: 3.1 G/DL (ref 3.5–5)
ALBUMIN/GLOB SERPL: 0.9 (ref 1–1.9)
ALP SERPL-CCNC: 92 U/L (ref 35–104)
ALT SERPL-CCNC: 8 U/L (ref 12–65)
AMPHET UR QL SCN: NEGATIVE
ANION GAP SERPL CALC-SCNC: 11 MMOL/L (ref 9–18)
APPEARANCE UR: CLEAR
AST SERPL-CCNC: 18 U/L (ref 15–37)
BACTERIA URNS QL MICRO: 0 /HPF
BARBITURATES UR QL SCN: NEGATIVE
BENZODIAZ UR QL: NEGATIVE
BILIRUB SERPL-MCNC: 0.5 MG/DL (ref 0–1.2)
BILIRUB UR QL: ABNORMAL
BUN SERPL-MCNC: 9 MG/DL (ref 6–23)
CALCIUM SERPL-MCNC: 8.6 MG/DL (ref 8.8–10.2)
CANNABINOIDS UR QL SCN: POSITIVE
CHLORIDE SERPL-SCNC: 105 MMOL/L (ref 98–107)
CO2 SERPL-SCNC: 22 MMOL/L (ref 20–28)
COCAINE UR QL SCN: NEGATIVE
COLOR UR: ABNORMAL
CREAT SERPL-MCNC: 0.65 MG/DL (ref 0.6–1.1)
EKG ATRIAL RATE: 58 BPM
EKG DIAGNOSIS: NORMAL
EKG P AXIS: 75 DEGREES
EKG P-R INTERVAL: 152 MS
EKG Q-T INTERVAL: 458 MS
EKG QRS DURATION: 74 MS
EKG QTC CALCULATION (BAZETT): 449 MS
EKG R AXIS: 16 DEGREES
EKG T AXIS: 55 DEGREES
EKG VENTRICULAR RATE: 58 BPM
EPI CELLS #/AREA URNS HPF: ABNORMAL /HPF
GLOBULIN SER CALC-MCNC: 3.5 G/DL (ref 2.3–3.5)
GLUCOSE SERPL-MCNC: 81 MG/DL (ref 70–99)
GLUCOSE UR STRIP.AUTO-MCNC: NEGATIVE MG/DL
HCT VFR BLD AUTO: 32.8 % (ref 35.8–46.3)
HCT VFR BLD AUTO: 32.9 % (ref 35.8–46.3)
HCT VFR BLD AUTO: 35.4 % (ref 35.8–46.3)
HGB BLD-MCNC: 10.4 G/DL (ref 11.7–15.4)
HGB BLD-MCNC: 10.5 G/DL (ref 11.7–15.4)
HGB BLD-MCNC: 11.3 G/DL (ref 11.7–15.4)
HGB UR QL STRIP: NEGATIVE
IRON SERPL-MCNC: 39 UG/DL (ref 35–100)
KETONES UR QL STRIP.AUTO: ABNORMAL MG/DL
LEUKOCYTE ESTERASE UR QL STRIP.AUTO: NEGATIVE
LIPASE SERPL-CCNC: 52 U/L (ref 13–60)
METHADONE UR QL: NEGATIVE
MUCOUS THREADS URNS QL MICRO: ABNORMAL /LPF
NITRITE UR QL STRIP.AUTO: NEGATIVE
OPIATES UR QL: NEGATIVE
OTHER OBSERVATIONS: ABNORMAL
PCP UR QL: POSITIVE
PH UR STRIP: 6 (ref 5–9)
POTASSIUM SERPL-SCNC: 3.9 MMOL/L (ref 3.5–5.1)
PROT SERPL-MCNC: 6.6 G/DL (ref 6.3–8.2)
PROT UR STRIP-MCNC: 30 MG/DL
RBC #/AREA URNS HPF: ABNORMAL /HPF
SODIUM SERPL-SCNC: 138 MMOL/L (ref 136–145)
SP GR UR REFRACTOMETRY: >1.035 (ref 1–1.02)
UROBILINOGEN UR QL STRIP.AUTO: 1 EU/DL (ref 0.2–1)
WBC URNS QL MICRO: ABNORMAL /HPF

## 2024-04-24 PROCEDURE — 1100000000 HC RM PRIVATE

## 2024-04-24 PROCEDURE — C9113 INJ PANTOPRAZOLE SODIUM, VIA: HCPCS | Performed by: FAMILY MEDICINE

## 2024-04-24 PROCEDURE — 6370000000 HC RX 637 (ALT 250 FOR IP): Performed by: FAMILY MEDICINE

## 2024-04-24 PROCEDURE — 83540 ASSAY OF IRON: CPT

## 2024-04-24 PROCEDURE — 36415 COLL VENOUS BLD VENIPUNCTURE: CPT

## 2024-04-24 PROCEDURE — 96375 TX/PRO/DX INJ NEW DRUG ADDON: CPT

## 2024-04-24 PROCEDURE — 94640 AIRWAY INHALATION TREATMENT: CPT

## 2024-04-24 PROCEDURE — 6360000002 HC RX W HCPCS: Performed by: INTERNAL MEDICINE

## 2024-04-24 PROCEDURE — 85014 HEMATOCRIT: CPT

## 2024-04-24 PROCEDURE — 2580000003 HC RX 258: Performed by: FAMILY MEDICINE

## 2024-04-24 PROCEDURE — 94760 N-INVAS EAR/PLS OXIMETRY 1: CPT

## 2024-04-24 PROCEDURE — 93010 ELECTROCARDIOGRAM REPORT: CPT | Performed by: INTERNAL MEDICINE

## 2024-04-24 PROCEDURE — 85018 HEMOGLOBIN: CPT

## 2024-04-24 PROCEDURE — 83690 ASSAY OF LIPASE: CPT

## 2024-04-24 PROCEDURE — A4216 STERILE WATER/SALINE, 10 ML: HCPCS | Performed by: FAMILY MEDICINE

## 2024-04-24 PROCEDURE — 6370000000 HC RX 637 (ALT 250 FOR IP): Performed by: INTERNAL MEDICINE

## 2024-04-24 PROCEDURE — 6360000002 HC RX W HCPCS: Performed by: FAMILY MEDICINE

## 2024-04-24 PROCEDURE — 84466 ASSAY OF TRANSFERRIN: CPT

## 2024-04-24 PROCEDURE — 6370000000 HC RX 637 (ALT 250 FOR IP): Performed by: PHYSICIAN ASSISTANT

## 2024-04-24 PROCEDURE — 6360000002 HC RX W HCPCS: Performed by: PHYSICIAN ASSISTANT

## 2024-04-24 PROCEDURE — 80053 COMPREHEN METABOLIC PANEL: CPT

## 2024-04-24 RX ORDER — MORPHINE SULFATE 2 MG/ML
2 INJECTION, SOLUTION INTRAMUSCULAR; INTRAVENOUS EVERY 4 HOURS PRN
Status: DISCONTINUED | OUTPATIENT
Start: 2024-04-24 | End: 2024-04-25 | Stop reason: HOSPADM

## 2024-04-24 RX ORDER — ONDANSETRON 4 MG/1
4 TABLET, ORALLY DISINTEGRATING ORAL EVERY 8 HOURS PRN
Status: DISCONTINUED | OUTPATIENT
Start: 2024-04-24 | End: 2024-04-25 | Stop reason: HOSPADM

## 2024-04-24 RX ORDER — SODIUM CHLORIDE 9 MG/ML
INJECTION, SOLUTION INTRAVENOUS PRN
Status: DISCONTINUED | OUTPATIENT
Start: 2024-04-24 | End: 2024-04-25 | Stop reason: HOSPADM

## 2024-04-24 RX ORDER — FAMOTIDINE 20 MG/1
20 TABLET, FILM COATED ORAL NIGHTLY PRN
Status: DISCONTINUED | OUTPATIENT
Start: 2024-04-24 | End: 2024-04-25 | Stop reason: HOSPADM

## 2024-04-24 RX ORDER — SODIUM CHLORIDE 9 MG/ML
INJECTION, SOLUTION INTRAVENOUS CONTINUOUS
Status: DISCONTINUED | OUTPATIENT
Start: 2024-04-24 | End: 2024-04-25 | Stop reason: HOSPADM

## 2024-04-24 RX ORDER — HYDROMORPHONE HYDROCHLORIDE 1 MG/ML
0.5 INJECTION, SOLUTION INTRAMUSCULAR; INTRAVENOUS; SUBCUTANEOUS ONCE
Status: COMPLETED | OUTPATIENT
Start: 2024-04-24 | End: 2024-04-24

## 2024-04-24 RX ORDER — MORPHINE SULFATE 2 MG/ML
2 INJECTION, SOLUTION INTRAMUSCULAR; INTRAVENOUS ONCE
Status: COMPLETED | OUTPATIENT
Start: 2024-04-24 | End: 2024-04-24

## 2024-04-24 RX ORDER — SODIUM CHLORIDE 0.9 % (FLUSH) 0.9 %
5-40 SYRINGE (ML) INJECTION EVERY 12 HOURS SCHEDULED
Status: DISCONTINUED | OUTPATIENT
Start: 2024-04-24 | End: 2024-04-25 | Stop reason: HOSPADM

## 2024-04-24 RX ORDER — SODIUM CHLORIDE 0.9 % (FLUSH) 0.9 %
5-40 SYRINGE (ML) INJECTION PRN
Status: DISCONTINUED | OUTPATIENT
Start: 2024-04-24 | End: 2024-04-25 | Stop reason: HOSPADM

## 2024-04-24 RX ORDER — MAGNESIUM HYDROXIDE/ALUMINUM HYDROXICE/SIMETHICONE 120; 1200; 1200 MG/30ML; MG/30ML; MG/30ML
30 SUSPENSION ORAL
Status: COMPLETED | OUTPATIENT
Start: 2024-04-24 | End: 2024-04-24

## 2024-04-24 RX ORDER — ONDANSETRON 2 MG/ML
4 INJECTION INTRAMUSCULAR; INTRAVENOUS EVERY 6 HOURS PRN
Status: DISCONTINUED | OUTPATIENT
Start: 2024-04-24 | End: 2024-04-25 | Stop reason: HOSPADM

## 2024-04-24 RX ORDER — ACETAMINOPHEN 650 MG/1
650 SUPPOSITORY RECTAL EVERY 6 HOURS PRN
Status: DISCONTINUED | OUTPATIENT
Start: 2024-04-24 | End: 2024-04-25 | Stop reason: HOSPADM

## 2024-04-24 RX ORDER — LIDOCAINE HYDROCHLORIDE 20 MG/ML
15 SOLUTION OROPHARYNGEAL
Status: COMPLETED | OUTPATIENT
Start: 2024-04-24 | End: 2024-04-24

## 2024-04-24 RX ORDER — PANTOPRAZOLE SODIUM 40 MG/1
40 TABLET, DELAYED RELEASE ORAL
Status: DISCONTINUED | OUTPATIENT
Start: 2024-04-24 | End: 2024-04-25 | Stop reason: HOSPADM

## 2024-04-24 RX ORDER — ACETAMINOPHEN 325 MG/1
650 TABLET ORAL EVERY 6 HOURS PRN
Status: DISCONTINUED | OUTPATIENT
Start: 2024-04-24 | End: 2024-04-25 | Stop reason: HOSPADM

## 2024-04-24 RX ADMIN — PANTOPRAZOLE SODIUM 40 MG: 40 TABLET, DELAYED RELEASE ORAL at 14:34

## 2024-04-24 RX ADMIN — MORPHINE SULFATE 2 MG: 2 INJECTION, SOLUTION INTRAMUSCULAR; INTRAVENOUS at 06:06

## 2024-04-24 RX ADMIN — PANTOPRAZOLE SODIUM 80 MG: 40 INJECTION, POWDER, FOR SOLUTION INTRAVENOUS at 03:01

## 2024-04-24 RX ADMIN — HYDROMORPHONE HYDROCHLORIDE 0.5 MG: 1 INJECTION, SOLUTION INTRAMUSCULAR; INTRAVENOUS; SUBCUTANEOUS at 00:46

## 2024-04-24 RX ADMIN — SODIUM CHLORIDE: 9 INJECTION, SOLUTION INTRAVENOUS at 03:00

## 2024-04-24 RX ADMIN — MORPHINE SULFATE 2 MG: 2 INJECTION, SOLUTION INTRAMUSCULAR; INTRAVENOUS at 14:34

## 2024-04-24 RX ADMIN — FAMOTIDINE 20 MG: 20 TABLET, FILM COATED ORAL at 06:08

## 2024-04-24 RX ADMIN — SODIUM CHLORIDE, PRESERVATIVE FREE 10 ML: 5 INJECTION INTRAVENOUS at 08:52

## 2024-04-24 RX ADMIN — MOMETASONE FUROATE AND FORMOTEROL FUMARATE DIHYDRATE 2 PUFF: 100; 5 AEROSOL RESPIRATORY (INHALATION) at 20:11

## 2024-04-24 RX ADMIN — SODIUM CHLORIDE, PRESERVATIVE FREE 10 ML: 5 INJECTION INTRAVENOUS at 21:00

## 2024-04-24 RX ADMIN — LIDOCAINE HYDROCHLORIDE 15 ML: 20 SOLUTION ORAL at 01:00

## 2024-04-24 RX ADMIN — SODIUM CHLORIDE: 9 INJECTION, SOLUTION INTRAVENOUS at 14:38

## 2024-04-24 RX ADMIN — MORPHINE SULFATE 2 MG: 2 INJECTION, SOLUTION INTRAMUSCULAR; INTRAVENOUS at 08:51

## 2024-04-24 RX ADMIN — ALUMINUM HYDROXIDE, MAGNESIUM HYDROXIDE, DIMETHICONE 30 ML: 200; 200; 20 LIQUID ORAL at 01:00

## 2024-04-24 ASSESSMENT — PAIN SCALES - GENERAL
PAINLEVEL_OUTOF10: 0
PAINLEVEL_OUTOF10: 9
PAINLEVEL_OUTOF10: 8
PAINLEVEL_OUTOF10: 9
PAINLEVEL_OUTOF10: 10
PAINLEVEL_OUTOF10: 8
PAINLEVEL_OUTOF10: 2

## 2024-04-24 ASSESSMENT — PAIN DESCRIPTION - LOCATION
LOCATION: ABDOMEN

## 2024-04-24 ASSESSMENT — PAIN DESCRIPTION - DESCRIPTORS: DESCRIPTORS: ACHING

## 2024-04-24 ASSESSMENT — PAIN - FUNCTIONAL ASSESSMENT: PAIN_FUNCTIONAL_ASSESSMENT: 0-10

## 2024-04-24 NOTE — PROGRESS NOTES
Hospitalist Progress Note   Admit Date:  2024  9:20 PM   Name:  Enedelia Handley   Age:  59 y.o.  Sex:  female  :  1965   MRN:  [id number]   Room:  [facility]/    Presenting/Chief Complaint: Abdominal Pain     Reason(s) for Admission: Abdominal pain, epigastric [R10.13]  Dehydration [E86.0]  Upper GI bleed [K92.2]  Positive fecal occult blood test [R19.5]  Intractable nausea and vomiting [R11.2]     Hospital Course:   Enedelia Handley is a 59 y.o. female, with a history of  has a past medical history of Asthma, Bell's palsy, Chronic obstructive pulmonary disease (HCC), Former cigarette smoker, History of positive PPD, Hypothyroidism, Ill-defined condition, Marijuana smoker, OA (osteoarthritis), and Pancreatitis.,  has a past surgical history that includes other surgical history (10/09/2020); Thyroidectomy (2018); orthopedic surgery (Left, ); Total hip arthroplasty (Right, 2023); and Upper gastrointestinal endoscopy (N/A, 3/4/2024). who presents to the ER with report of abdominal pain, nausea, and dry heaving for the past 3 days. Pain is mostly epigastric. She reports that she can't eat because of her nausea. She reports feeling tired and weak as well. Denies any fevers, chills, alexx vomiting, chest pain, cough.     Subjective & 24hr Events:   First meeting with patient. She is having ongoing abdominal pain exacerbated by p.o. intake. This is somewhat improved as compared with presentation. She has ongoing nausea and emesis when taking in clears. She attributes most of her symptoms to pancreatitis, although she stresses that she has no history of alcohol abuse.     Discussed plan with Dr. Winslow of GI. Plan is for outpatient EGD and colonoscopy on . Greatly appreciate GI assistance.     ROS: Denies hematemesis, melena, hematochezia, fever, SHOB, chest pain, edema, headache, or any other complaints.     Assessment & Plan:     Principal Problem:    Upper GI bleed  Plan: Hgb stable.  calculated from the following:    Height as of this encounter: 1.524 m (5').    Weight as of this encounter: 52 kg (114 lb 11.2 oz).    Intake/Output Summary (Last 24 hours) at 4/24/2024 1057  Last data filed at 4/24/2024 0101  Gross per 24 hour   Intake 1000.25 ml   Output --   Net 1000.25 ml         Physical Exam:     General:    Well nourished. Pleasant woman resting in bed.    Head:  Normocephalic, atraumatic  Eyes:  Sclerae appear normal.  Pupils equally round.  ENT:  Nares appear normal.  Moist oral mucosa  Neck:  No restricted ROM.  Trachea midline   CV:   RRR.  No m/r/g.  No jugular venous distension.  Lungs:   CTAB.  No wheezing, rhonchi, or rales.  Symmetric expansion.  Abdomen:   Soft, moderate epigastric TTP, nondistended.  Extremities: No cyanosis or clubbing.  No edema  Skin:     No rashes.  Normal coloration.   Warm and dry.    Neuro:  CN II-XII grossly intact.    Psych:  Normal mood and affect.      I have personally reviewed labs and tests:  Recent Labs:  Recent Results (from the past 48 hour(s))   EKG 12 Lead (Select if upper abdominal pain or symptoms of SOB,Diaphoresis, or Tachycardia)    Collection Time: 04/23/24  9:55 PM   Result Value Ref Range    Ventricular Rate 58 BPM    Atrial Rate 58 BPM    P-R Interval 152 ms    QRS Duration 74 ms    Q-T Interval 458 ms    QTc Calculation (Bazett) 449 ms    P Axis 75 degrees    R Axis 16 degrees    T Axis 55 degrees    Diagnosis       Sinus bradycardia  Otherwise normal ECG  When compared with ECG of 26-FEB-2024 11:49,  PREVIOUS ECG IS PRESENT  Confirmed by LYNN CANO (), CITLALY KINNEY ([id number]) on 4/24/2024 7:01:00 AM     CBC with Auto Differential    Collection Time: 04/23/24 10:07 PM   Result Value Ref Range    WBC 5.5 4.3 - 11.1 K/uL    RBC 4.37 4.05 - 5.2 M/uL    Hemoglobin 11.3 (L) 11.7 - 15.4 g/dL    Hematocrit 35.6 (L) 35.8 - 46.3 %    MCV 81.5 (L) 82.0 - 102.0 FL    MCH 25.9 (L) 26.1 - 32.9 PG    MCHC 31.7 31.4 - 35.0 g/dL    RDW 15.9 (H) 11.9 - 14.6

## 2024-04-24 NOTE — H&P
[facility] Hospitalist Initial History and Physical Note    Patient: Enedelia Handley Date: 4/24/2024  female, 59 y.o.  Admit Date: 4/23/2024  Attending: Deondre Mendiola MD     ASSESSMENT AND PLAN:     Principal Problem:    Upper GI bleed  Plan: Heme positive stool. NPO. IVF. IV Protonix. Consult GI re: endoscopy. Follow hemoglobin  Active Problems:    Asthma  Plan: Stable. Continue home meds.    COPD (chronic obstructive pulmonary disease) (HCC)  Plan: Stable. Continue home meds.    Normocytic anemia  Plan: Follow CBC         DVT Prophylaxis: SCDs      Code Status: FULL CODE      Disposition: Admit to med/surg for evaluation and treatment as per above.      Anticipated discharge: 2-3 days     CHIEF COMPLAINT:  abdominal pain    HISTORY OF PRESENT ILLNESS:      Patient Active Problem List   Diagnosis    Marijuana use, continuous    Cough    History of total left hip replacement    Asthma    Difficulty walking    Generalized muscle weakness    Effusion of left hip    Acute bronchitis    Osteoarthritis of left hip    Leukocytosis    COPD (chronic obstructive pulmonary disease) (HCC)    Left hip pain    Occupational exposure to air contaminants    Decreased functional mobility and endurance    Cannabis abuse    Arthritis of carpometacarpal (CMC) joint of right thumb    Postsurgical hypothyroidism    Primary osteoarthritis of right hip    Osteoarthritis of right hip, unspecified osteoarthritis type    Severe acute pancreatitis    Acute pancreatitis    History of adenomatous polyp of colon    Upper GI bleed    Normocytic anemia       Enedelia Handley is a 59 y.o. female, with a history of  has a past medical history of Asthma, Bell's palsy, Chronic obstructive pulmonary disease (HCC), Former cigarette smoker, History of positive PPD, Hypothyroidism, Ill-defined condition, Marijuana smoker, OA (osteoarthritis), and Pancreatitis.,  has a past surgical history that includes other surgical history (10/09/2020); Thyroidectomy     Atrial Rate 58    P-R Interval 152    QRS Duration 74    Q-T Interval 458    QTc Calculation (Bazett) 449    P Axis 75    R Axis 16    T Axis 55    Diagnosis      Sinus bradycardia  Otherwise normal ECG  When compared with ECG of 26-FEB-2024 11:49,  PREVIOUS ECG IS PRESENT          Deondre Mendiola MD  4/24/2024 1:30 AM

## 2024-04-24 NOTE — CONSULTS
GI Note    Patient admitted with abdominal pain and concern for GI bleed; however, Hgb at baseline. BUN:Cr normal. Patient was seen by my in the GI clinic earlier this month. She has an EGD and colonoscopy scheduled with me on 4/29. I feel that it would ne appropriate to have patient remain on Protonix 40mg PO BID, Carafate, Zofran prn, and low residue diet (liquids today), until my procedure in 5 days. At that time we can plan for the EGD and colonoscopy. If patient manifests any significant bleeding in the interim, then we can plan for a more urgent EGD. She has known pancreatitis and underwent an EUS recently which showed this was resolving and EGD in 2015 which showed chronic gastritis. Avoid all NSAIDs. Overall + fecal occult blood test is not concerning. It was mentioned to the patient that she needs to limit NSAID use and remain on PPI. She also was started on a bowel regimen.     No further recommendations at this juncture.     Thank you for involving the GI service in the care of your patient. Please dont hesitate to call me directly on my cell below with any questions, updates, etc.      -Keny Winslow MD  [phone]   Gastroenterology/Hepatology
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
## 2024-04-24 NOTE — PLAN OF CARE
Problem: Pain  Goal: Verbalizes/displays adequate comfort level or baseline comfort level  Outcome: Not Progressing   Reporting pain 8/10 one hour following administration of morphine 2mg IV. MD aware.

## 2024-04-24 NOTE — DISCHARGE INSTR - DIET
Nutrition Supplements  If you are not able to eat enough food or if you have extra calorie requirements, oral supplements can provide you with additional calories, protein, vitamins and minerals.     Recommend Ensure Clear or Boost Breeze or a comparable/similar product Three times daily for 30 days unless otherwise directed by your Primary Care Physician.  Both these products can be ordered online at the following links.    https://www.[employer].com/our-products/ensure-clear-nutrition-drink  https://www.boost[employer]/products/boost-breeze

## 2024-04-24 NOTE — PROGRESS NOTES
Patient verified name, , and procedure.    Type: 1a; abbreviated assessment per anesthesia guidelines.    Labs ordered per anesthesia: None.    Instructed pt that they will be notified the day before their procedure by the GI Lab of the time of arrival if their procedure is [place], and by Pre-op for [facility] procedures. Arrival times should be called by 5 pm. If no phone call is received, the patient should contact the respective hospital. The GI Lab telephone number is [phone] and [facility] Pre-Op is [phone].     Instructed pt to follow diet and prep instructions, per MD, including NPO status. If patient has NOT received instructions from office, patient advised to call the MD's office as soon as possible. Phone number provided.    Pt may bathe or shower the night before and the am of surgery with non-moisturizing soap. No lotions, oils, powders, make-up or colognes/perfumes on skin. No jewelry. Wear loose-fitting, comfortable, clean clothing.     Pt must have adult present in building the entire time that can drive them home.     Medications to take on the day of procedure: Synthroid and instructed pt to bring her Albuterol inhaler with her.    Medications to hold: None, per anesthesia guidelines.     The following discharge instructions were reviewed with patient: medication given during procedure may cause drowsiness for several hours, therefore, do not drive or operate machinery for remainder of the day. You may not drink alcohol on the day of your procedure, please resume regular diet and activity unless otherwise directed. You may experience abdominal distention for several hours that is relieved by the passage of gas. Contact your physician if you have any of the following: fever or chills, severe abdominal pain, or excessive amount of bleeding or a large amount when having a bowel movement. Occasional specks of blood with bowel movement would not be unusual.    Opportunity for questions

## 2024-04-24 NOTE — PROGRESS NOTES
4 Eyes Skin Assessment     NAME:  Enedelia Handley  YOB: 1965  MEDICAL RECORD NUMBER:  [id number]    The patient is being assessed for  Admission    I agree that at least one RN has performed a thorough Head to Toe Skin Assessment on the patient. ALL assessment sites listed below have been assessed.      Areas assessed by both nurses:    Head, Face, Ears, Shoulders, Back, Chest, Arms, Elbows, Hands, Sacrum. Buttock, Coccyx, Ischium, and Legs. Feet and Heels        Does the Patient have a Wound? No noted wound(s)       Nagi Prevention initiated by RN: No  Wound Care Orders initiated by RN: No    Pressure Injury (Stage 3,4, Unstageable, DTI, NWPT, and Complex wounds) if present, place Wound referral order by RN under : No    New Ostomies, if present place, Ostomy referral order under : No     Nurse 1 eSignature: Electronically signed by Genia Machado RN on 4/24/24 at 7:33 AM EDT    **SHARE this note so that the co-signing nurse can place an eSignature**    Nurse 2 eSignature: Electronically signed by Faiza Packer RN on 4/24/24 at 7:34 AM EDT

## 2024-04-24 NOTE — ED PROVIDER NOTES
Emergency Department Provider Note       PCP: Keith Santoyo MD   Age: 59 y.o.   Sex: female     DISPOSITION Decision To Admit 04/24/2024 01:11:18 AM       ICD-10-CM    1. Positive fecal occult blood test  R19.5       2. Intractable nausea and vomiting  R11.2       3. Abdominal pain, epigastric  R10.13       4. Dehydration  E86.0           Medical Decision Making     12:45 AM Spoke with Dr. Walters regarding patient.    1:09 AM Spangle message sent to Dr. Mendiola for admission.  Stool occult is positive with thickened gastric antrum, intractable epigastric pain along with nausea and vomiting and dehydration.  Will need GI consult.  Patient states she cannot go home like this.     1 or more acute illnesses that pose a threat to life or bodily function.   Discussion with external consultants.    I independently ordered and reviewed each unique test.  I reviewed external records: ED visit note from an outside group.   The patients assessment required an independent historian: None.  The reason they were needed is .      The patient was admitted and I have discussed patient management with the admitting provider.          History     Patient is here with severe abdominal pain, nausea and dry heaving for the last few days.  She states she was recently admitted to the hospital for pancreatitis and was supposed to have her gallbladder out but did not as they wanted her to see the gastroenterologist first.  That appointment is not until the 29th.  She states she has gotten progressively worse.  She feels like she is having decreased urination and no bowel movements as she is not taking in anything orally.  She states she is very weak.  No chest pain or shortness of breath, swelling/tingling or weakness to her arms or legs.    The history is provided by the patient.       ROS     Review of Systems     Physical Exam     Vitals signs and nursing note reviewed:  Vitals:    04/23/24 2056 04/24/24 0047   BP: 121/81  Marital status: Single   Tobacco Use    Smoking status: Every Day     Current packs/day: 0.25     Average packs/day: 0.3 packs/day for 32.3 years (8.1 ttl pk-yrs)     Types: Cigarettes     Start date: 1/1/1992    Smokeless tobacco: Never   Substance and Sexual Activity    Alcohol use: No    Drug use: Yes     Types: Marijuana (Weed)     Social Determinants of Health     Transportation Needs: Unmet Transportation Needs (6/22/2023)    OASIS : Transportation     Lack of Transportation (Medical): Yes     Lack of Transportation (Non-Medical): No     Patient Unable or Declines to Respond: No   Social Connections: Feeling Socially Integrated (6/22/2023)    OASIS : Social Isolation     Frequency of experiencing loneliness or isolation: Never        Previous Medications    ACETAMINOPHEN (TYLENOL) 325 MG TABLET    Take 325 mg by mouth every 4 hours as needed for Pain (for breakthrough pain).    ALBUTEROL (PROVENTIL) (2.5 MG/3ML) 0.083% NEBULIZER SOLUTION    Inhale 3 mLs into the lungs every 4 hours as needed for Shortness of Breath    ALBUTEROL SULFATE  (90 BASE) MCG/ACT INHALER    Inhale 1 puff into the lungs every 4 hours as needed for Shortness of Breath    ASPIRIN 81 MG EC TABLET    Take 1 tablet by mouth 2 times daily for 35 doses    FAMOTIDINE (PEPCID) 20 MG TABLET    Take 1 tablet by mouth nightly as needed (epigastric pain)    FLUTICASONE-SALMETEROL (ADVAIR) 250-50 MCG/ACT AEPB DISKUS INHALER    Inhale 1 puff into the lungs 2 times daily    LEVOTHYROXINE (SYNTHROID) 100 MCG TABLET    Take 1 tablet by mouth daily    MELOXICAM (MOBIC) 15 MG TABLET    Take 1 tablet by mouth daily        Results for orders placed or performed during the hospital encounter of 04/23/24   CT ABDOMEN PELVIS W IV CONTRAST Additional Contrast? None    Narrative    Clinical History/Indication for Exam:  Severe upper abdominal pain : Severe upper abdominal pain    CT ABDOMEN AND PELVIS WITH INTRAVENOUS CONTRAST    INDICATION:

## 2024-04-24 NOTE — ED PROVIDER NOTES
ED ATTENDING SHARED SERVICES NOTE:     I have seen and evaluated the patient and performed an independent history and physical exam, and I agree with the assessment and plan as documented in the advanced provider note.  I performed the medical decision making in its entirety.  With the following updates: Discussed with the patient.  Will admit for further evaluation of likely peptic ulcer disease with positive guaiac and intractable pain/vomiting.             I interpreted the CT Scan thickened gastric antrum, constipation.    The patient was admitted and I have discussed patient management with the admitting provider.              Tanvi Choi MD  04/24/24 0123

## 2024-04-24 NOTE — ED NOTES
TRANSFER - OUT REPORT:    Verbal report given to Genia RICH on Enedelia Handley  being transferred to Critical access hospital for routine progression of patient care       Report consisted of patient's Situation, Background, Assessment and   Recommendations(SBAR).     Information from the following report(s) Nurse Handoff Report, ED Encounter Summary, ED SBAR, Adult Overview, Intake/Output, MAR, and Recent Results was reviewed with the receiving nurse.    [place] Fall Assessment:    Presents to emergency department  because of falls (Syncope, seizure, or loss of consciousness): No  Age > 70: No  Altered Mental Status, Intoxication with alcohol or substance confusion (Disorientation, impaired judgment, poor safety awaremess, or inability to follow instructions): No  Impaired Mobility: Ambulates or transfers with assistive devices or assistance; Unable to ambulate or transer.: No  Nursing Judgement: No          Lines:   Peripheral IV 04/23/24 Left Antecubital (Active)        Opportunity for questions and clarification was provided.      Patient transported with:  Registered Nurse

## 2024-04-24 NOTE — PROGRESS NOTES
Comprehensive Nutrition Assessment    Type and Reason for Visit: Initial, Positive Nutrition Screen  Malnutrition Screening Tool: Malnutrition Screen  Have you recently lost weight without trying?: 14 to 23 pounds (2 points)  Have you been eating poorly because of a decreased appetite?: Yes (1 point)  Malnutrition Screening Tool Score: 3    Nutrition Recommendations/Plan:   Meals and Snacks:  Diet: Continue current diet and advance as medically appropriate  Nutrition Supplement Therapy:  Medical food supplement therapy:  Initiate Ensure Clear three times per day (this provides 240 kcal and 8 grams protein per bottle). Provided with bottle to drink at lunch today.     Malnutrition Assessment:  Malnutrition Status: Insufficient data (Unable to valdate diet hx, at risk at a minimum d/t chronic low po intake and + weight loss)  NFPE:  general visual thinness with no visible alexx fat or muscle loss     Nutrition Assessment:  Nutrition History: Per RD encounter 3/4/2024:Unable to provide any quantifiable nutrition history. Pt reports after her first episode of pancreatitis she avoided any fried, with added fat, beef and pork. In general she eats small amounts several times per day. She can't pinpoint a recent change in po intake and says she has always had problems with her stomach. She says it has been weeks that she has had the epigastric pain but can't say how much that has impacted her po intake partly d/t chronic low po intake. She did add things like a small amount of chili or sharma in the past year which she says she did not always tolerate. She does not like Ensure shake as it tastes like medicine. However she did drink some Ensure presurgery before her hip surgery and said she tolerated that. Ensure clear provided at last admission and was suggested for use at home.  4/24: Unable to provide any quantifiable nutrition history. She says she was able to eat better and gain 5#, but then lost it all when she couldn't

## 2024-04-24 NOTE — ED TRIAGE NOTES
Pt c/o abd pain.  Nausea.  Has not had any intake of food or liquid because, \"it hurts to much,\"  Pt stated.  Pt has hx of pancreatitis.

## 2024-04-25 VITALS
TEMPERATURE: 97.9 F | BODY MASS INDEX: 22.52 KG/M2 | DIASTOLIC BLOOD PRESSURE: 75 MMHG | WEIGHT: 114.7 LBS | RESPIRATION RATE: 16 BRPM | HEART RATE: 59 BPM | SYSTOLIC BLOOD PRESSURE: 128 MMHG | OXYGEN SATURATION: 99 % | HEIGHT: 60 IN

## 2024-04-25 PROBLEM — K92.2 UPPER GI BLEED: Status: RESOLVED | Noted: 2024-04-24 | Resolved: 2024-04-25

## 2024-04-25 LAB
ALBUMIN SERPL-MCNC: 3.3 G/DL (ref 3.5–5)
ALBUMIN/GLOB SERPL: 1 (ref 1–1.9)
ALP SERPL-CCNC: 93 U/L (ref 35–104)
ALT SERPL-CCNC: 6 U/L (ref 12–65)
ANION GAP SERPL CALC-SCNC: 12 MMOL/L (ref 9–18)
APTT PPP: 30.4 SEC (ref 23.3–37.4)
AST SERPL-CCNC: 19 U/L (ref 15–37)
BILIRUB SERPL-MCNC: 0.4 MG/DL (ref 0–1.2)
BUN SERPL-MCNC: 8 MG/DL (ref 6–23)
CALCIUM SERPL-MCNC: 8.7 MG/DL (ref 8.8–10.2)
CHLORIDE SERPL-SCNC: 104 MMOL/L (ref 98–107)
CO2 SERPL-SCNC: 21 MMOL/L (ref 20–28)
CREAT SERPL-MCNC: 0.66 MG/DL (ref 0.6–1.1)
GLOBULIN SER CALC-MCNC: 3.4 G/DL (ref 2.3–3.5)
GLUCOSE SERPL-MCNC: 86 MG/DL (ref 70–99)
HCT VFR BLD AUTO: 32.2 % (ref 35.8–46.3)
HCT VFR BLD AUTO: 33.5 % (ref 35.8–46.3)
HCT VFR BLD AUTO: 34.4 % (ref 35.8–46.3)
HGB BLD-MCNC: 10.4 G/DL (ref 11.7–15.4)
HGB BLD-MCNC: 10.8 G/DL (ref 11.7–15.4)
HGB BLD-MCNC: 10.8 G/DL (ref 11.7–15.4)
INR PPP: 1
POTASSIUM SERPL-SCNC: 3.8 MMOL/L (ref 3.5–5.1)
PROT SERPL-MCNC: 6.7 G/DL (ref 6.3–8.2)
PROTHROMBIN TIME: 13.7 SEC (ref 11.3–14.9)
SODIUM SERPL-SCNC: 137 MMOL/L (ref 136–145)

## 2024-04-25 PROCEDURE — 94760 N-INVAS EAR/PLS OXIMETRY 1: CPT

## 2024-04-25 PROCEDURE — 85610 PROTHROMBIN TIME: CPT

## 2024-04-25 PROCEDURE — 85730 THROMBOPLASTIN TIME PARTIAL: CPT

## 2024-04-25 PROCEDURE — 6370000000 HC RX 637 (ALT 250 FOR IP): Performed by: INTERNAL MEDICINE

## 2024-04-25 PROCEDURE — 85018 HEMOGLOBIN: CPT

## 2024-04-25 PROCEDURE — 85014 HEMATOCRIT: CPT

## 2024-04-25 PROCEDURE — 94640 AIRWAY INHALATION TREATMENT: CPT

## 2024-04-25 PROCEDURE — 80053 COMPREHEN METABOLIC PANEL: CPT

## 2024-04-25 PROCEDURE — 36415 COLL VENOUS BLD VENIPUNCTURE: CPT

## 2024-04-25 PROCEDURE — 2580000003 HC RX 258: Performed by: FAMILY MEDICINE

## 2024-04-25 RX ORDER — LEVOTHYROXINE SODIUM 0.1 MG/1
100 TABLET ORAL DAILY
Status: DISCONTINUED | OUTPATIENT
Start: 2024-04-25 | End: 2024-04-25 | Stop reason: HOSPADM

## 2024-04-25 RX ORDER — POTASSIUM CHLORIDE 7.45 MG/ML
10 INJECTION INTRAVENOUS PRN
Status: DISCONTINUED | OUTPATIENT
Start: 2024-04-25 | End: 2024-04-25 | Stop reason: HOSPADM

## 2024-04-25 RX ORDER — POTASSIUM CHLORIDE 20 MEQ/1
40 TABLET, EXTENDED RELEASE ORAL PRN
Status: DISCONTINUED | OUTPATIENT
Start: 2024-04-25 | End: 2024-04-25 | Stop reason: HOSPADM

## 2024-04-25 RX ADMIN — SODIUM CHLORIDE: 9 INJECTION, SOLUTION INTRAVENOUS at 04:08

## 2024-04-25 RX ADMIN — LEVOTHYROXINE SODIUM 100 MCG: 0.1 TABLET ORAL at 06:38

## 2024-04-25 RX ADMIN — MOMETASONE FUROATE AND FORMOTEROL FUMARATE DIHYDRATE 2 PUFF: 100; 5 AEROSOL RESPIRATORY (INHALATION) at 07:21

## 2024-04-25 RX ADMIN — PANTOPRAZOLE SODIUM 40 MG: 40 TABLET, DELAYED RELEASE ORAL at 06:23

## 2024-04-25 RX ADMIN — PANTOPRAZOLE SODIUM 40 MG: 40 TABLET, DELAYED RELEASE ORAL at 15:46

## 2024-04-25 NOTE — PROGRESS NOTES
Tele tx13 Dcd. Pt has EGD scheduled outpatient. Instructed to call doctor if symptoms worsen. Discharge instructions given. Waiting for family.

## 2024-04-25 NOTE — PLAN OF CARE
Problem: Discharge Planning  Goal: Discharge to home or other facility with appropriate resources  Outcome: Progressing  Flowsheets (Taken 4/24/2024 1957)  Discharge to home or other facility with appropriate resources:   Identify barriers to discharge with patient and caregiver   Identify discharge learning needs (meds, wound care, etc)     Problem: Pain  Goal: Verbalizes/displays adequate comfort level or baseline comfort level  Outcome: Progressing

## 2024-04-25 NOTE — DISCHARGE SUMMARY
Hospitalist Discharge Summary   Admit Date:  2024  9:20 PM   DC Note date: 2024  Name:  Enedelia Handley   Age:  59 y.o.  Sex:  female  :  1965   MRN:  [id number]   Room:  [facility] [place]  PCP:  Keith Santoyo MD    Presenting Complaint: Abdominal Pain     Initial Admission Diagnosis: Abdominal pain, epigastric [R10.13]  Dehydration [E86.0]  Upper GI bleed [K92.2]  Positive fecal occult blood test [R19.5]  Intractable nausea and vomiting [R11.2]     Problem List for this Hospitalization (present on admission):    Principal Problem (Resolved):    Upper GI bleed  Active Problems:    Asthma    COPD (chronic obstructive pulmonary disease) (HCC)    Normocytic anemia      Hospital Course:  Enedelia Handley is a 59 y.o. female, with a history of  has a past medical history of Asthma, Bell's palsy, Chronic obstructive pulmonary disease (HCC), Former cigarette smoker, History of positive PPD, Hypothyroidism, Ill-defined condition, Marijuana smoker, OA (osteoarthritis), and Pancreatitis.,  has a past surgical history that includes other surgical history (10/09/2020); Thyroidectomy (2018); orthopedic surgery (Left, ); Total hip arthroplasty (Right, 2023); and Upper gastrointestinal endoscopy (N/A, 3/4/2024). who presents to the ER with report of abdominal pain, nausea, and dry heaving for the past 3 days. Pain is mostly epigastric. She reports that she can't eat because of her nausea. She reports feeling tired and weak as well. Denies any fevers, chills, alexx vomiting, chest pain, cough.     She had mild normocytic anemia on presentation. Stool occult blood was positive. She was admitted with IV fluids, PPI, serial Hgb checks. She did not have any gross blood loss and Hgb was stable. Lipase was notably not elevated. She initially required IV pain and nausea medication, but was able to discontinue this  and advance diet to full liquids. She is known to Dr. Winslow of GI, with plan in place  for EGD and colonoscopy on 4/29. The importance of doing prep and keeping this appointment was discussed with patient.     Disposition: Home  Diet: ADULT DIET; Full Liquid  ADULT ORAL NUTRITION SUPPLEMENT; Breakfast, Lunch, Dinner; Clear Liquid Oral Supplement  Code Status: Full Code    Follow Ups:  Follow-up Information    None         Time spent in patient discharge and coordination 30 minutes.        Follow up labs/diagnostics (ultimately defer to outpatient provider):  CBC, BMP, and Defer to Follow-up Provider    Plan was discussed with Patient.  All questions answered.  Patient was stable at time of discharge.  Instructions given to call a physician or return if any concerns.    Pending Labs       Order Current Status    Hemoglobin and Hematocrit In process    Transferrin In process            Current Discharge Medication List        CONTINUE these medications which have NOT CHANGED    Details   VITAMIN D, CHOLECALCIFEROL, PO Take by mouth daily      famotidine (PEPCID) 20 MG tablet Take 1 tablet by mouth nightly as needed (epigastric pain)  Qty: 60 tablet, Refills: 0      meloxicam (MOBIC) 15 MG tablet Take 1 tablet by mouth daily  Qty: 30 tablet, Refills: 0      levothyroxine (SYNTHROID) 100 MCG tablet Take 1 tablet by mouth daily  Qty: 30 tablet, Refills: 2    Associated Diagnoses: Postsurgical hypothyroidism      acetaminophen (TYLENOL) 325 MG tablet Take 325 mg by mouth every 4 hours as needed for Pain (for breakthrough pain).      aspirin 81 MG EC tablet Take 1 tablet by mouth 2 times daily for 35 doses  Qty: 70 tablet, Refills: 0      albuterol sulfate  (90 Base) MCG/ACT inhaler Inhale 1 puff into the lungs every 4 hours as needed for Shortness of Breath      albuterol (PROVENTIL) (2.5 MG/3ML) 0.083% nebulizer solution Inhale 3 mLs into the lungs every 4 hours as needed for Shortness of Breath      fluticasone-salmeterol (ADVAIR) 250-50 MCG/ACT AEPB diskus inhaler Inhale 1 puff into the lungs 2

## 2024-04-25 NOTE — PROGRESS NOTES
Hospitalist Progress Note   Admit Date:  2024  9:20 PM   Name:  Enedelia Handley   Age:  59 y.o.  Sex:  female  :  1965   MRN:  [id number]   Room:  [facility] hospital/    Presenting/Chief Complaint: Abdominal Pain     Reason(s) for Admission: Abdominal pain, epigastric [R10.13]  Dehydration [E86.0]  Upper GI bleed [K92.2]  Positive fecal occult blood test [R19.5]  Intractable nausea and vomiting [R11.2]     Hospital Course:   Enedelia Handley is a 59 y.o. female, with a history of  has a past medical history of Asthma, Bell's palsy, Chronic obstructive pulmonary disease (HCC), Former cigarette smoker, History of positive PPD, Hypothyroidism, Ill-defined condition, Marijuana smoker, OA (osteoarthritis), and Pancreatitis.,  has a past surgical history that includes other surgical history (10/09/2020); Thyroidectomy (2018); orthopedic surgery (Left, ); Total hip arthroplasty (Right, 2023); and Upper gastrointestinal endoscopy (N/A, 3/4/2024). who presents to the ER with report of abdominal pain, nausea, and dry heaving for the past 3 days. Pain is mostly epigastric. She reports that she can't eat because of her nausea. She reports feeling tired and weak as well. Denies any fevers, chills, alexx vomiting, chest pain, cough.     Subjective & 24hr Events:   Patient resting without complaints. She has not had any nausea / emesis, abdominal pain improved. She has not required and pain or nausea meds today. We discussed that her low lipase indicates that her symptoms are most likely not due to pancreatitis. I emphasized the importance of her planned endoscopy on , and she is looking forward to having answers and relief. She feels ready for discharge home today, needs to wait for her partner to get off work at 7.     ROS: Denies hematemesis, melena, hematochezia, fever, SHOB, chest pain, edema, headache, or any other complaints.     Assessment & Plan:     Principal Problem:    Upper GI bleed  Plan:  restricted ROM.  Trachea midline   CV:   RRR.  No m/r/g.  No jugular venous distension.  Lungs:   CTAB.  No wheezing, rhonchi, or rales.  Symmetric expansion.  Abdomen:   Soft, nontender, nondistended.  Extremities: No cyanosis or clubbing.  No edema  Skin:     No rashes.  Normal coloration.   Warm and dry.    Neuro:  CN II-XII grossly intact.    Psych:  Normal mood and affect.      I have personally reviewed labs and tests:  Recent Labs:  Recent Results (from the past 48 hour(s))   EKG 12 Lead (Select if upper abdominal pain or symptoms of SOB,Diaphoresis, or Tachycardia)    Collection Time: 04/23/24  9:55 PM   Result Value Ref Range    Ventricular Rate 58 BPM    Atrial Rate 58 BPM    P-R Interval 152 ms    QRS Duration 74 ms    Q-T Interval 458 ms    QTc Calculation (Bazett) 449 ms    P Axis 75 degrees    R Axis 16 degrees    T Axis 55 degrees    Diagnosis       Sinus bradycardia  Otherwise normal ECG  When compared with ECG of 26-FEB-2024 11:49,  PREVIOUS ECG IS PRESENT  Confirmed by LYNN CANO (), CITLALY KINNEY ([id number]) on 4/24/2024 7:01:00 AM     CBC with Auto Differential    Collection Time: 04/23/24 10:07 PM   Result Value Ref Range    WBC 5.5 4.3 - 11.1 K/uL    RBC 4.37 4.05 - 5.2 M/uL    Hemoglobin 11.3 (L) 11.7 - 15.4 g/dL    Hematocrit 35.6 (L) 35.8 - 46.3 %    MCV 81.5 (L) 82.0 - 102.0 FL    MCH 25.9 (L) 26.1 - 32.9 PG    MCHC 31.7 31.4 - 35.0 g/dL    RDW 15.9 (H) 11.9 - 14.6 %    Platelets 315 150 - 450 K/uL    MPV 10.5 9.4 - 12.3 FL    nRBC 0.00 0.0 - 0.2 K/uL    Differential Type AUTOMATED      Neutrophils % 43 43 - 78 %    Lymphocytes % 40 13 - 44 %    Monocytes % 8 4.0 - 12.0 %    Eosinophils % 8 (H) 0.5 - 7.8 %    Basophils % 2 0.0 - 2.0 %    Immature Granulocytes % 0 0.0 - 5.0 %    Neutrophils Absolute 2.4 1.7 - 8.2 K/UL    Lymphocytes Absolute 2.2 0.5 - 4.6 K/UL    Monocytes Absolute 0.4 0.1 - 1.3 K/UL    Eosinophils Absolute 0.4 0.0 - 0.8 K/UL    Basophils Absolute 0.1 0.0 - 0.2 K/UL    Immature

## 2024-04-26 PROBLEM — R10.10 UPPER ABDOMINAL PAIN: Status: ACTIVE | Noted: 2024-04-09

## 2024-04-26 NOTE — PROGRESS NOTES
Spoke with pt regarding tomorrow's procedure. Pt verbalized understanding of 1015 arrival time as well as  policy.

## 2024-04-27 LAB — TRANSFERRIN SERPL-MCNC: 213 MG/DL (ref 200–360)

## 2024-04-28 ENCOUNTER — ANESTHESIA EVENT (OUTPATIENT)
Dept: ENDOSCOPY | Age: 59
End: 2024-04-28
Payer: COMMERCIAL

## 2024-04-28 RX ORDER — ONDANSETRON 2 MG/ML
4 INJECTION INTRAMUSCULAR; INTRAVENOUS
Status: CANCELLED | OUTPATIENT
Start: 2024-04-28 | End: 2024-04-29

## 2024-04-28 RX ORDER — SODIUM CHLORIDE, SODIUM LACTATE, POTASSIUM CHLORIDE, CALCIUM CHLORIDE 600; 310; 30; 20 MG/100ML; MG/100ML; MG/100ML; MG/100ML
INJECTION, SOLUTION INTRAVENOUS CONTINUOUS
Status: CANCELLED | OUTPATIENT
Start: 2024-04-28

## 2024-04-28 RX ORDER — SODIUM CHLORIDE 9 MG/ML
INJECTION, SOLUTION INTRAVENOUS PRN
Status: CANCELLED | OUTPATIENT
Start: 2024-04-28

## 2024-04-28 RX ORDER — SODIUM CHLORIDE 0.9 % (FLUSH) 0.9 %
5-40 SYRINGE (ML) INJECTION EVERY 12 HOURS SCHEDULED
Status: CANCELLED | OUTPATIENT
Start: 2024-04-28

## 2024-04-28 RX ORDER — DEXTROSE MONOHYDRATE 100 MG/ML
INJECTION, SOLUTION INTRAVENOUS CONTINUOUS PRN
Status: CANCELLED | OUTPATIENT
Start: 2024-04-28

## 2024-04-28 RX ORDER — SODIUM CHLORIDE 0.9 % (FLUSH) 0.9 %
5-40 SYRINGE (ML) INJECTION PRN
Status: CANCELLED | OUTPATIENT
Start: 2024-04-28

## 2024-04-28 RX ORDER — IBUPROFEN 600 MG/1
1 TABLET ORAL PRN
Status: CANCELLED | OUTPATIENT
Start: 2024-04-28

## 2024-04-28 RX ORDER — LIDOCAINE HYDROCHLORIDE 10 MG/ML
1 INJECTION, SOLUTION INFILTRATION; PERINEURAL
Status: CANCELLED | OUTPATIENT
Start: 2024-04-28 | End: 2024-04-29

## 2024-04-28 RX ORDER — NALOXONE HYDROCHLORIDE 0.4 MG/ML
INJECTION, SOLUTION INTRAMUSCULAR; INTRAVENOUS; SUBCUTANEOUS PRN
Status: CANCELLED | OUTPATIENT
Start: 2024-04-28

## 2024-04-29 ENCOUNTER — HOSPITAL ENCOUNTER (OUTPATIENT)
Age: 59
Setting detail: OUTPATIENT SURGERY
Discharge: HOME | End: 2024-04-29
Attending: INTERNAL MEDICINE | Admitting: INTERNAL MEDICINE
Payer: COMMERCIAL

## 2024-04-29 ENCOUNTER — ANESTHESIA (OUTPATIENT)
Dept: ENDOSCOPY | Age: 59
End: 2024-04-29
Payer: COMMERCIAL

## 2024-04-29 VITALS
BODY MASS INDEX: 22.38 KG/M2 | HEIGHT: 60 IN | TEMPERATURE: 98.6 F | DIASTOLIC BLOOD PRESSURE: 81 MMHG | HEART RATE: 63 BPM | SYSTOLIC BLOOD PRESSURE: 138 MMHG | RESPIRATION RATE: 16 BRPM | WEIGHT: 114 LBS | OXYGEN SATURATION: 99 %

## 2024-04-29 PROCEDURE — 7100000011 HC PHASE II RECOVERY - ADDTL 15 MIN: Performed by: INTERNAL MEDICINE

## 2024-04-29 PROCEDURE — 45378 DIAGNOSTIC COLONOSCOPY: CPT | Performed by: INTERNAL MEDICINE

## 2024-04-29 PROCEDURE — 43239 EGD BIOPSY SINGLE/MULTIPLE: CPT | Performed by: INTERNAL MEDICINE

## 2024-04-29 PROCEDURE — 2580000003 HC RX 258: Performed by: ANESTHESIOLOGY

## 2024-04-29 PROCEDURE — 88305 TISSUE EXAM BY PATHOLOGIST: CPT

## 2024-04-29 PROCEDURE — 3700000000 HC ANESTHESIA ATTENDED CARE: Performed by: INTERNAL MEDICINE

## 2024-04-29 PROCEDURE — 2500000003 HC RX 250 WO HCPCS

## 2024-04-29 PROCEDURE — 3609012400 HC EGD TRANSORAL BIOPSY SINGLE/MULTIPLE: Performed by: INTERNAL MEDICINE

## 2024-04-29 PROCEDURE — 2709999900 HC NON-CHARGEABLE SUPPLY: Performed by: INTERNAL MEDICINE

## 2024-04-29 PROCEDURE — 7100000010 HC PHASE II RECOVERY - FIRST 15 MIN: Performed by: INTERNAL MEDICINE

## 2024-04-29 PROCEDURE — 88312 SPECIAL STAINS GROUP 1: CPT

## 2024-04-29 PROCEDURE — 3700000001 HC ADD 15 MINUTES (ANESTHESIA): Performed by: INTERNAL MEDICINE

## 2024-04-29 PROCEDURE — 6360000002 HC RX W HCPCS

## 2024-04-29 PROCEDURE — 3609027000 HC COLONOSCOPY: Performed by: INTERNAL MEDICINE

## 2024-04-29 RX ORDER — SODIUM CHLORIDE 0.9 % (FLUSH) 0.9 %
5-40 SYRINGE (ML) INJECTION EVERY 12 HOURS SCHEDULED
Status: DISCONTINUED | OUTPATIENT
Start: 2024-04-29 | End: 2024-04-29 | Stop reason: HOSPADM

## 2024-04-29 RX ORDER — SODIUM CHLORIDE 0.9 % (FLUSH) 0.9 %
5-40 SYRINGE (ML) INJECTION PRN
Status: DISCONTINUED | OUTPATIENT
Start: 2024-04-29 | End: 2024-04-29 | Stop reason: HOSPADM

## 2024-04-29 RX ORDER — SODIUM CHLORIDE 9 MG/ML
25 INJECTION, SOLUTION INTRAVENOUS PRN
Status: DISCONTINUED | OUTPATIENT
Start: 2024-04-29 | End: 2024-04-29 | Stop reason: HOSPADM

## 2024-04-29 RX ORDER — SODIUM CHLORIDE 9 MG/ML
INJECTION, SOLUTION INTRAVENOUS PRN
Status: DISCONTINUED | OUTPATIENT
Start: 2024-04-29 | End: 2024-04-29 | Stop reason: HOSPADM

## 2024-04-29 RX ORDER — SODIUM CHLORIDE, SODIUM LACTATE, POTASSIUM CHLORIDE, CALCIUM CHLORIDE 600; 310; 30; 20 MG/100ML; MG/100ML; MG/100ML; MG/100ML
INJECTION, SOLUTION INTRAVENOUS CONTINUOUS
Status: DISCONTINUED | OUTPATIENT
Start: 2024-04-29 | End: 2024-04-29 | Stop reason: HOSPADM

## 2024-04-29 RX ORDER — LIDOCAINE HYDROCHLORIDE 20 MG/ML
INJECTION, SOLUTION EPIDURAL; INFILTRATION; INTRACAUDAL; PERINEURAL PRN
Status: DISCONTINUED | OUTPATIENT
Start: 2024-04-29 | End: 2024-04-29 | Stop reason: SDUPTHER

## 2024-04-29 RX ORDER — PROPOFOL 10 MG/ML
INJECTION, EMULSION INTRAVENOUS PRN
Status: DISCONTINUED | OUTPATIENT
Start: 2024-04-29 | End: 2024-04-29 | Stop reason: SDUPTHER

## 2024-04-29 RX ADMIN — LIDOCAINE HYDROCHLORIDE 40 MG: 20 INJECTION, SOLUTION EPIDURAL; INFILTRATION; INTRACAUDAL; PERINEURAL at 12:06

## 2024-04-29 RX ADMIN — PROPOFOL 160 MCG/KG/MIN: 10 INJECTION, EMULSION INTRAVENOUS at 12:07

## 2024-04-29 RX ADMIN — PROPOFOL 50 MG: 10 INJECTION, EMULSION INTRAVENOUS at 12:06

## 2024-04-29 RX ADMIN — SODIUM CHLORIDE, POTASSIUM CHLORIDE, SODIUM LACTATE AND CALCIUM CHLORIDE: 600; 310; 30; 20 INJECTION, SOLUTION INTRAVENOUS at 11:59

## 2024-04-29 ASSESSMENT — LIFESTYLE VARIABLES: SMOKING_STATUS: 1

## 2024-04-29 ASSESSMENT — PAIN - FUNCTIONAL ASSESSMENT: PAIN_FUNCTIONAL_ASSESSMENT: 0-10

## 2024-04-29 NOTE — DISCHARGE INSTRUCTIONS
Gastrointestinal Colonoscopy/Flexible Sigmoidoscopy - Lower Exam Discharge Instructions    Call Dr. villarreal at [phone] for any problems or questions.    Contact the doctor’s office for follow up appointment as directed.    Medication may cause drowsiness for several hours, therefore:  Do not drive or operate machinery for reminder of the day.  No alcohol today.  Do not make any important or legal decisions for 24 hours.  Do not sign any legal documents for 24 hours.    Ordinarily, you may resume regular diet and activity after exam unless otherwise specified by your physician.    Because of air put into your colon during exam, you may experience some abdominal distension, relieved by the passage of gas, for several hours.    Contact your physician if you have any of the following:  Excessive amount of bleeding - large amount when having a bowel movement.  Occasional specks of blood with bowel movement would not be unusual.  Severe abdominal pain  Fever or Chills    Polyp Removal - follow these additional instructions   Take Metamucil - 1 tablespoon in juice every morning for 3 days, as needed for constipation.

## 2024-04-29 NOTE — PROCEDURES
Endoscopy Note          Operative Report    Patient: Enedelia Handley MRN: [id number]      YOB: 1965  Age: 59 y.o.  Sex: female            Indications:  Abnormal imaging     Preoperative Evaluation: The patient was evaluated prior to the procedure in the GI lab admission area, the patient ASA was recorded .  Consent was obtained from the patient with the risk of perforation bleeding and aspiration.    Anesthesia: DEVONTE-per anesthesia  Complications: None  EBL: Unremarkable  Procedure: The patient was sedated in the left lateral decubitus position. Scope was advance from the mouth to the third portion of the duodenum. The scope was withdrawn to the stomach and a refroflexed view was performed.     Findings:  Schatzkis ring, small hiatal hernia- biopsies of the distal esophagus were taken   Moderate gatritis- biopsies of the stomach were taken   Mild duodenitis- biopsies of duodenum were taken     Postoperative Diagnosis:  Schatzkis ring, small hiatal hernia- biopsies of the distal esophagus were taken   Moderate gatritis- biopsies of the stomach were taken   Mild duodenitis- biopsies of duodenum were taken         Recommendations:   Follow up path results in 1-2 weeks   Start taking Omeprazole 20mg oral twice a day, over the counter, taken 30 minutes prior to meals   Smaller, more frequent lower residue meals   Avoid NSAIDs   Pepcid as needed        Signed By:  Keny Winslow MD     April 29, 2024                       
Operative Report    Patient: Enedelia Handley MRN: [id number]      YOB: 1965  Age: 59 y.o.  Sex: female            Indications:  History of polyps     Preoperative Evaluation: The patient was evaluated prior to the procedure in the GI lab admission area, the patient ASA was recorded .  Consent was obtained from the patient with the risk of perforation bleeding and aspiration.    Anesthesia: DEVONTE-per anesthesia    Complications: None; patient tolerated the procedure well.    EBL -insignificant      Procedure: The patient was sedated in the left lateral decubitus position.  Scope was advanced from the rectum to the transverse colon.  The  The rectal exam was normal.  Preparation was poor.   Findings:    Poor prep throughout the entire colon, with thick, copious amount of stools and debris.   Extent reached was transverse colon.     Postoperative Diagnosis:   Poor prep throughout the entire colon, with thick, copious amount of stools and debris.   Extent reached was transverse colon.         Recommendations:   Repeat colonoscopy in upcoming weeks with alternate prep which will be arranged by GI clinic. Will need 2 days clear liquids.       Signed By:  Keny Winslow MD     April 29, 2024                       
Base) MCG/ACT inhaler Inhale 1 puff into the lungs every 4 hours as needed for Shortness of Breath 3/10/19   Automatic Reconciliation, Ar        Allergies     Latex    Social History     For pertinent, see above.     Family History     Family History   Problem Relation Age of Onset    Hypertension Father     Cancer Brother 54        brain    Hypertension Mother        Review of Systems   - CONSTITUTIONAL: Denies weight loss, fever and chills.    - HEENT: Denies changes in vision and hearing.    - RESPIRATORY: Denies SOB and cough.    - CV: Denies palpitations and CP.    - GI: Denies abdominal pain, nausea.    - : Denies dysuria and urinary frequency.    - MSK: Denies myalgia and joint pain.    - SKIN: Denies rash and pruritus.    - NEUROLOGICAL: Denies headache and syncope.    - PSYCHIATRIC: Denies recent changes in mood. Denies anxiety and depression.    - SKIN: No jaundice or skin lesions.    -RECTAL: No pain or tenderness.     Physical Exam   Ht 1.524 m (5')   Wt 51.7 kg (114 lb)   BMI 22.26 kg/m²     - GENERAL: Alert and oriented x 3. No acute distress. Well-nourished.    - EYES: EOMI. Anicteric.    - HENT: Moist mucous membranes. No cervical lymphadenopathy.    - LUNGS: Clear to auscultation bilaterally. No accessory muscle use.    - CARDIOVASCULAR: Regular rate and rhythm. No murmur. No JVD.    - ABDOMEN: Soft, non-tender and non-distended. No palpable masses.    - EXTREMITIES: No edema. Non-tender.?SKIN: No rashes or lesions. Warm.    - NEUROLOGIC: No focal neurological deficits. CN II-XII grossly intact, but not individually tested.    - PSYCHIATRIC: Cooperative. Appropriate mood and affect.    - SKIN: No rashes, sores, or lesions.     - RECTAL: Deferred.     Labs      No results found for this or any previous visit (from the past 24 hour(s)).     Imaging/Diagnostics Last 24 Hours     See my note above, if applicable.       Assessment & Plan:   EGD for abnormal imaging  Surveillance colonoscopy

## 2024-04-29 NOTE — ANESTHESIA PRE PROCEDURE
Department of Anesthesiology  Preprocedure Note       Name:  Enedelia Handley   Age:  59 y.o.  :  1965                                          MRN:  [id number]         Date:  2024      Surgeon: Surgeon(s):  Keny Winslow MD    Procedure: Procedure(s):  ESOPHAGOGASTRODUODENOSCOPY  COLORECTAL CANCER SCREENING, NOT HIGH RISK    Medications prior to admission:   Prior to Admission medications    Medication Sig Start Date End Date Taking? Authorizing Provider   VITAMIN D, CHOLECALCIFEROL, PO Take by mouth daily   Yes Vilma Marino MD   famotidine (PEPCID) 20 MG tablet Take 1 tablet by mouth nightly as needed (epigastric pain)  Patient not taking: Reported on 2024   Ramirez Cuevas PA   levothyroxine (SYNTHROID) 100 MCG tablet Take 1 tablet by mouth daily 23  Scar Max PA   acetaminophen (TYLENOL) 325 MG tablet Take 325 mg by mouth every 4 hours as needed for Pain (for breakthrough pain).  Patient not taking: Reported on 2024    Vilma Marino MD   albuterol sulfate  (90 Base) MCG/ACT inhaler Inhale 1 puff into the lungs every 4 hours as needed for Shortness of Breath 3/10/19   Automatic Reconciliation, Ar       Current medications:    Current Facility-Administered Medications   Medication Dose Route Frequency Provider Last Rate Last Admin   • lactated ringers IV soln infusion   IntraVENous Continuous Roxanne Phillips MD       • sodium chloride flush 0.9 % injection 5-40 mL  5-40 mL IntraVENous 2 times per day Roxanne Phillips MD       • sodium chloride flush 0.9 % injection 5-40 mL  5-40 mL IntraVENous PRN Roxanne Phillips MD       • 0.9 % sodium chloride infusion   IntraVENous PRN Roxanne Phillips MD       • sodium chloride flush 0.9 % injection 5-40 mL  5-40 mL IntraVENous 2 times per day Keny Winslow MD       • sodium chloride flush 0.9 % injection 5-40 mL  5-40 mL IntraVENous PRN Keny Winslow MD       • 0.9 % sodium chloride

## 2024-04-29 NOTE — ANESTHESIA POSTPROCEDURE EVALUATION
Department of Anesthesiology  Postprocedure Note    Patient: Enedelia Handley  MRN: [id number]  YOB: 1965  Date of evaluation: 4/29/2024    Procedure Summary       Date: 04/29/24 Room / Location: [facility] 03 / [facility] ENDOSCOPY    Anesthesia Start: 1203 Anesthesia Stop: 1232    Procedures:       ESOPHAGOGASTRODUODENOSCOPY BIOPSY (Upper GI Region)      COLORECTAL CANCER SCREENING, NOT HIGH RISK Diagnosis:       History of adenomatous polyp of colon      Upper abdominal pain      (History of adenomatous polyp of colon [Z86.010])      (Upper abdominal pain [R10.10])    Surgeons: Keny Winslow MD Responsible Provider: Roxanne Phillips MD    Anesthesia Type: TIVA ASA Status: 3            Anesthesia Type: TIVA    Joselo Phase I: Joselo Score: 10    Joselo Phase II: Joselo Score: 10    Anesthesia Post Evaluation    Patient location during evaluation: PACU  Patient participation: complete - patient participated  Level of consciousness: awake and alert  Airway patency: patent  Nausea: well controlled.  Cardiovascular status: acceptable.  Respiratory status: acceptable  Hydration status: stable  Pain management: adequate    No notable events documented.

## 2024-05-03 ENCOUNTER — TELEPHONE (OUTPATIENT)
Age: 59
End: 2024-05-03

## 2024-05-03 DIAGNOSIS — K21.9 GASTROESOPHAGEAL REFLUX DISEASE, UNSPECIFIED WHETHER ESOPHAGITIS PRESENT: Primary | ICD-10-CM

## 2024-05-03 RX ORDER — OMEPRAZOLE 20 MG/1
20 CAPSULE, DELAYED RELEASE ORAL
Qty: 180 CAPSULE | Refills: 1 | Status: SHIPPED | OUTPATIENT
Start: 2024-05-03

## 2024-05-03 NOTE — TELEPHONE ENCOUNTER
Pt returned call to clinic to review results and recommendations per Dr. Winslow. Pt verbalized understanding.     Pt agreeable to proceed with scheduling repeat colonoscopy with 2-day prep as advised by MD. Informed pt that our  will reach out in the next couple weeks to arrange. Pt confirmed she had SuPREP for most recent colo, so will need alternative with repeat procedure.     Pt reports that she is still struggling with constipation. Re-reviewed recommendations for constipation per Dr. Winslow from OV on 4/9. Pt verbalized understanding, stated she had not implemented any of those recommendations yet but would try.     Pt agreeable to begin omeprazole 20mg BID as instructed by Dr. Winslow. RX sent to pt's pharmacy as requested. Reviewed anti-reflux measures with pt. Pt verbalized understanding.     Letter mailed to pt's house with recommendations for reflux and constipation per Dr. Winslow as requested by pt.     Message routed to Rama, surgery scheduler to reach out to pt.   
was normal.  Preparation was poor.   Findings:    Poor prep throughout the entire colon, with thick, copious amount of stools and debris.   Extent reached was transverse colon.      Postoperative Diagnosis:   Poor prep throughout the entire colon, with thick, copious amount of stools and debris.   Extent reached was transverse colon.      Recommendations:   Repeat colonoscopy in upcoming weeks with alternate prep which will be arranged by GI clinic. Will need 2 days clear liquids.

## 2024-05-07 ENCOUNTER — TELEPHONE (OUTPATIENT)
Dept: GASTROENTEROLOGY | Age: 59
End: 2024-05-07

## 2024-05-07 ENCOUNTER — TELEPHONE (OUTPATIENT)
Age: 59
End: 2024-05-07

## 2024-05-07 NOTE — TELEPHONE ENCOUNTER
Called pt to reschedule spoke with someone left message requesting a call bck to reschedule procedure.

## 2024-05-29 ENCOUNTER — HOSPITAL ENCOUNTER (EMERGENCY)
Age: 59
Discharge: HOME | End: 2024-05-30
Attending: EMERGENCY MEDICINE
Payer: COMMERCIAL

## 2024-05-29 DIAGNOSIS — K86.1 CHRONIC PANCREATITIS, UNSPECIFIED PANCREATITIS TYPE (HCC): ICD-10-CM

## 2024-05-29 DIAGNOSIS — K29.00 ACUTE GASTRITIS, PRESENCE OF BLEEDING UNSPECIFIED, UNSPECIFIED GASTRITIS TYPE: ICD-10-CM

## 2024-05-29 DIAGNOSIS — R10.9 ACUTE ABDOMINAL PAIN: Primary | ICD-10-CM

## 2024-05-29 LAB
ALBUMIN SERPL-MCNC: 4.1 G/DL (ref 3.5–5)
ALBUMIN/GLOB SERPL: 1.2 (ref 1–1.9)
ALP SERPL-CCNC: 97 U/L (ref 35–104)
ALT SERPL-CCNC: 10 U/L (ref 12–65)
ANION GAP SERPL CALC-SCNC: 14 MMOL/L (ref 9–18)
AST SERPL-CCNC: 15 U/L (ref 15–37)
BASOPHILS # BLD: 0.1 K/UL (ref 0–0.2)
BASOPHILS NFR BLD: 2 % (ref 0–2)
BILIRUB SERPL-MCNC: 0.7 MG/DL (ref 0–1.2)
BUN SERPL-MCNC: 9 MG/DL (ref 6–23)
CALCIUM SERPL-MCNC: 9.8 MG/DL (ref 8.8–10.2)
CHLORIDE SERPL-SCNC: 101 MMOL/L (ref 98–107)
CO2 SERPL-SCNC: 25 MMOL/L (ref 20–28)
CREAT SERPL-MCNC: 0.77 MG/DL (ref 0.6–1.1)
DIFFERENTIAL METHOD BLD: ABNORMAL
EOSINOPHIL # BLD: 0.4 K/UL (ref 0–0.8)
EOSINOPHIL NFR BLD: 7 % (ref 0.5–7.8)
ERYTHROCYTE [DISTWIDTH] IN BLOOD BY AUTOMATED COUNT: 16.9 % (ref 11.9–14.6)
GLOBULIN SER CALC-MCNC: 3.5 G/DL (ref 2.3–3.5)
GLUCOSE SERPL-MCNC: 107 MG/DL (ref 70–99)
HCT VFR BLD AUTO: 37.5 % (ref 35.8–46.3)
HGB BLD-MCNC: 12.3 G/DL (ref 11.7–15.4)
IMM GRANULOCYTES # BLD AUTO: 0 K/UL (ref 0–0.5)
IMM GRANULOCYTES NFR BLD AUTO: 0 % (ref 0–5)
LIPASE SERPL-CCNC: 115 U/L (ref 13–60)
LYMPHOCYTES # BLD: 2 K/UL (ref 0.5–4.6)
LYMPHOCYTES NFR BLD: 40 % (ref 13–44)
MCH RBC QN AUTO: 26.6 PG (ref 26.1–32.9)
MCHC RBC AUTO-ENTMCNC: 32.8 G/DL (ref 31.4–35)
MCV RBC AUTO: 81 FL (ref 82–102)
MONOCYTES # BLD: 0.4 K/UL (ref 0.1–1.3)
MONOCYTES NFR BLD: 7 % (ref 4–12)
NEUTS SEG # BLD: 2.3 K/UL (ref 1.7–8.2)
NEUTS SEG NFR BLD: 45 % (ref 43–78)
NRBC # BLD: 0 K/UL (ref 0–0.2)
PLATELET # BLD AUTO: 233 K/UL (ref 150–450)
PMV BLD AUTO: 10.6 FL (ref 9.4–12.3)
POTASSIUM SERPL-SCNC: 3.6 MMOL/L (ref 3.5–5.1)
PROT SERPL-MCNC: 7.6 G/DL (ref 6.3–8.2)
RBC # BLD AUTO: 4.63 M/UL (ref 4.05–5.2)
SODIUM SERPL-SCNC: 140 MMOL/L (ref 136–145)
WBC # BLD AUTO: 5.1 K/UL (ref 4.3–11.1)

## 2024-05-29 PROCEDURE — 81001 URINALYSIS AUTO W/SCOPE: CPT

## 2024-05-29 PROCEDURE — 85025 COMPLETE CBC W/AUTO DIFF WBC: CPT

## 2024-05-29 PROCEDURE — 99285 EMERGENCY DEPT VISIT HI MDM: CPT

## 2024-05-29 PROCEDURE — 80053 COMPREHEN METABOLIC PANEL: CPT

## 2024-05-29 PROCEDURE — 83690 ASSAY OF LIPASE: CPT

## 2024-05-29 ASSESSMENT — PAIN DESCRIPTION - LOCATION: LOCATION: ABDOMEN

## 2024-05-29 ASSESSMENT — PAIN - FUNCTIONAL ASSESSMENT: PAIN_FUNCTIONAL_ASSESSMENT: 0-10

## 2024-05-29 ASSESSMENT — PAIN SCALES - GENERAL: PAINLEVEL_OUTOF10: 10

## 2024-05-30 ENCOUNTER — APPOINTMENT (OUTPATIENT)
Dept: CT IMAGING | Age: 59
End: 2024-05-30
Payer: COMMERCIAL

## 2024-05-30 VITALS
TEMPERATURE: 98.3 F | SYSTOLIC BLOOD PRESSURE: 118 MMHG | DIASTOLIC BLOOD PRESSURE: 80 MMHG | RESPIRATION RATE: 19 BRPM | WEIGHT: 114 LBS | BODY MASS INDEX: 22.38 KG/M2 | HEIGHT: 60 IN | HEART RATE: 83 BPM | OXYGEN SATURATION: 93 %

## 2024-05-30 LAB
APPEARANCE UR: CLEAR
BACTERIA URNS QL MICRO: ABNORMAL /HPF
BILIRUB UR QL: NEGATIVE
COLOR UR: ABNORMAL
EPI CELLS #/AREA URNS HPF: ABNORMAL /HPF
GLUCOSE UR STRIP.AUTO-MCNC: NEGATIVE MG/DL
HGB UR QL STRIP: NEGATIVE
KETONES UR QL STRIP.AUTO: 40 MG/DL
LEUKOCYTE ESTERASE UR QL STRIP.AUTO: ABNORMAL
MUCOUS THREADS URNS QL MICRO: ABNORMAL /LPF
NITRITE UR QL STRIP.AUTO: NEGATIVE
PH UR STRIP: 6 (ref 5–9)
PROT UR STRIP-MCNC: 30 MG/DL
SP GR UR REFRACTOMETRY: 1.02 (ref 1–1.02)
UROBILINOGEN UR QL STRIP.AUTO: 1 EU/DL (ref 0.2–1)
WBC URNS QL MICRO: ABNORMAL /HPF

## 2024-05-30 PROCEDURE — A4216 STERILE WATER/SALINE, 10 ML: HCPCS | Performed by: EMERGENCY MEDICINE

## 2024-05-30 PROCEDURE — 6360000002 HC RX W HCPCS: Performed by: EMERGENCY MEDICINE

## 2024-05-30 PROCEDURE — 96374 THER/PROPH/DIAG INJ IV PUSH: CPT

## 2024-05-30 PROCEDURE — 6360000004 HC RX CONTRAST MEDICATION: Performed by: EMERGENCY MEDICINE

## 2024-05-30 PROCEDURE — 2580000003 HC RX 258: Performed by: EMERGENCY MEDICINE

## 2024-05-30 PROCEDURE — 96361 HYDRATE IV INFUSION ADD-ON: CPT

## 2024-05-30 PROCEDURE — 96375 TX/PRO/DX INJ NEW DRUG ADDON: CPT

## 2024-05-30 PROCEDURE — 74177 CT ABD & PELVIS W/CONTRAST: CPT

## 2024-05-30 PROCEDURE — 2500000003 HC RX 250 WO HCPCS: Performed by: EMERGENCY MEDICINE

## 2024-05-30 PROCEDURE — 6370000000 HC RX 637 (ALT 250 FOR IP): Performed by: EMERGENCY MEDICINE

## 2024-05-30 RX ORDER — 0.9 % SODIUM CHLORIDE 0.9 %
1000 INTRAVENOUS SOLUTION INTRAVENOUS
Status: COMPLETED | OUTPATIENT
Start: 2024-05-30 | End: 2024-05-30

## 2024-05-30 RX ORDER — HALOPERIDOL 5 MG/ML
2 INJECTION INTRAMUSCULAR
Status: COMPLETED | OUTPATIENT
Start: 2024-05-30 | End: 2024-05-30

## 2024-05-30 RX ORDER — SUCRALFATE 1 G/1
1 TABLET ORAL 4 TIMES DAILY
Qty: 40 TABLET | Refills: 0 | Status: SHIPPED | OUTPATIENT
Start: 2024-05-30 | End: 2024-06-09

## 2024-05-30 RX ORDER — HALOPERIDOL 5 MG/ML
3 INJECTION INTRAMUSCULAR
Status: DISCONTINUED | OUTPATIENT
Start: 2024-05-30 | End: 2024-05-30

## 2024-05-30 RX ORDER — HYDROMORPHONE HYDROCHLORIDE 1 MG/ML
0.5 INJECTION, SOLUTION INTRAMUSCULAR; INTRAVENOUS; SUBCUTANEOUS
Status: COMPLETED | OUTPATIENT
Start: 2024-05-30 | End: 2024-05-30

## 2024-05-30 RX ORDER — LIDOCAINE HYDROCHLORIDE 20 MG/ML
15 SOLUTION OROPHARYNGEAL
Status: COMPLETED | OUTPATIENT
Start: 2024-05-30 | End: 2024-05-30

## 2024-05-30 RX ORDER — ONDANSETRON 4 MG/1
4 TABLET, ORALLY DISINTEGRATING ORAL 3 TIMES DAILY PRN
Qty: 21 TABLET | Refills: 0 | Status: SHIPPED | OUTPATIENT
Start: 2024-05-30

## 2024-05-30 RX ORDER — HYDROCODONE BITARTRATE AND ACETAMINOPHEN 5; 325 MG/1; MG/1
1 TABLET ORAL EVERY 6 HOURS PRN
Qty: 15 TABLET | Refills: 0 | Status: SHIPPED | OUTPATIENT
Start: 2024-05-30 | End: 2024-06-03

## 2024-05-30 RX ORDER — ONDANSETRON 2 MG/ML
4 INJECTION INTRAMUSCULAR; INTRAVENOUS
Status: COMPLETED | OUTPATIENT
Start: 2024-05-30 | End: 2024-05-30

## 2024-05-30 RX ORDER — MAGNESIUM HYDROXIDE/ALUMINUM HYDROXICE/SIMETHICONE 120; 1200; 1200 MG/30ML; MG/30ML; MG/30ML
30 SUSPENSION ORAL
Status: COMPLETED | OUTPATIENT
Start: 2024-05-30 | End: 2024-05-30

## 2024-05-30 RX ADMIN — SODIUM CHLORIDE 1000 ML: 9 INJECTION, SOLUTION INTRAVENOUS at 01:02

## 2024-05-30 RX ADMIN — LIDOCAINE HYDROCHLORIDE 15 ML: 20 SOLUTION ORAL at 04:42

## 2024-05-30 RX ADMIN — ONDANSETRON 4 MG: 2 INJECTION INTRAMUSCULAR; INTRAVENOUS at 01:02

## 2024-05-30 RX ADMIN — FAMOTIDINE 20 MG: 10 INJECTION, SOLUTION INTRAVENOUS at 01:02

## 2024-05-30 RX ADMIN — ALUMINUM HYDROXIDE, MAGNESIUM HYDROXIDE, DIMETHICONE 30 ML: 200; 200; 20 LIQUID ORAL at 04:42

## 2024-05-30 RX ADMIN — IOPAMIDOL 100 ML: 755 INJECTION, SOLUTION INTRAVENOUS at 02:54

## 2024-05-30 RX ADMIN — HYDROMORPHONE HYDROCHLORIDE 0.5 MG: 1 INJECTION, SOLUTION INTRAMUSCULAR; INTRAVENOUS; SUBCUTANEOUS at 01:02

## 2024-05-30 RX ADMIN — HALOPERIDOL LACTATE 2 MG: 5 INJECTION, SOLUTION INTRAMUSCULAR at 04:42

## 2024-05-30 ASSESSMENT — ENCOUNTER SYMPTOMS
DIARRHEA: 0
NAUSEA: 1
BLOOD IN STOOL: 0
VOMITING: 1
CONSTIPATION: 0
ABDOMINAL PAIN: 1

## 2024-05-30 NOTE — ED NOTES
Patient mobility status  with no difficulty. Provider aware     I have reviewed discharge instructions with the patient.  The patient verbalized understanding.    Patient left ED via Discharge Method: ambulatory to Home with Significant Other.    Opportunity for questions and clarification provided.     Patient given 3 scripts.

## 2024-05-30 NOTE — ED TRIAGE NOTES
Pt arrives with complaints of abdominal pain, nausea and vomiting. Pt states that she has hx of pancreatitis.

## 2024-06-13 ENCOUNTER — ANESTHESIA (OUTPATIENT)
Dept: ENDOSCOPY | Age: 59
End: 2024-06-13
Payer: COMMERCIAL

## 2024-06-13 ENCOUNTER — HOSPITAL ENCOUNTER (OUTPATIENT)
Age: 59
Setting detail: OUTPATIENT SURGERY
Discharge: HOME | End: 2024-06-13
Attending: STUDENT IN AN ORGANIZED HEALTH CARE EDUCATION/TRAINING PROGRAM | Admitting: STUDENT IN AN ORGANIZED HEALTH CARE EDUCATION/TRAINING PROGRAM
Payer: COMMERCIAL

## 2024-06-13 ENCOUNTER — PREP FOR PROCEDURE (OUTPATIENT)
Dept: GASTROENTEROLOGY | Age: 59
End: 2024-06-13

## 2024-06-13 ENCOUNTER — ANESTHESIA EVENT (OUTPATIENT)
Dept: ENDOSCOPY | Age: 59
End: 2024-06-13
Payer: COMMERCIAL

## 2024-06-13 VITALS
OXYGEN SATURATION: 99 % | TEMPERATURE: 97.7 F | WEIGHT: 112 LBS | HEIGHT: 60 IN | DIASTOLIC BLOOD PRESSURE: 86 MMHG | BODY MASS INDEX: 21.99 KG/M2 | RESPIRATION RATE: 16 BRPM | HEART RATE: 60 BPM | SYSTOLIC BLOOD PRESSURE: 147 MMHG

## 2024-06-13 DIAGNOSIS — Z86.010 HX OF COLONIC POLYPS: ICD-10-CM

## 2024-06-13 PROBLEM — Z86.0100 HX OF COLONIC POLYPS: Status: ACTIVE | Noted: 2024-06-13

## 2024-06-13 PROCEDURE — 3609010200 HC COLONOSCOPY ABLATION TUMOR POLYP/OTHER LES: Performed by: STUDENT IN AN ORGANIZED HEALTH CARE EDUCATION/TRAINING PROGRAM

## 2024-06-13 PROCEDURE — 6360000002 HC RX W HCPCS: Performed by: NURSE ANESTHETIST, CERTIFIED REGISTERED

## 2024-06-13 PROCEDURE — 2500000003 HC RX 250 WO HCPCS: Performed by: NURSE ANESTHETIST, CERTIFIED REGISTERED

## 2024-06-13 PROCEDURE — 3700000000 HC ANESTHESIA ATTENDED CARE: Performed by: STUDENT IN AN ORGANIZED HEALTH CARE EDUCATION/TRAINING PROGRAM

## 2024-06-13 PROCEDURE — 2580000003 HC RX 258: Performed by: STUDENT IN AN ORGANIZED HEALTH CARE EDUCATION/TRAINING PROGRAM

## 2024-06-13 PROCEDURE — 7100000010 HC PHASE II RECOVERY - FIRST 15 MIN: Performed by: STUDENT IN AN ORGANIZED HEALTH CARE EDUCATION/TRAINING PROGRAM

## 2024-06-13 PROCEDURE — 7100000011 HC PHASE II RECOVERY - ADDTL 15 MIN: Performed by: STUDENT IN AN ORGANIZED HEALTH CARE EDUCATION/TRAINING PROGRAM

## 2024-06-13 PROCEDURE — 2580000003 HC RX 258: Performed by: NURSE ANESTHETIST, CERTIFIED REGISTERED

## 2024-06-13 PROCEDURE — 88305 TISSUE EXAM BY PATHOLOGIST: CPT

## 2024-06-13 PROCEDURE — 2709999900 HC NON-CHARGEABLE SUPPLY: Performed by: STUDENT IN AN ORGANIZED HEALTH CARE EDUCATION/TRAINING PROGRAM

## 2024-06-13 PROCEDURE — 3700000001 HC ADD 15 MINUTES (ANESTHESIA): Performed by: STUDENT IN AN ORGANIZED HEALTH CARE EDUCATION/TRAINING PROGRAM

## 2024-06-13 RX ORDER — SODIUM CHLORIDE, SODIUM LACTATE, POTASSIUM CHLORIDE, CALCIUM CHLORIDE 600; 310; 30; 20 MG/100ML; MG/100ML; MG/100ML; MG/100ML
INJECTION, SOLUTION INTRAVENOUS CONTINUOUS
Status: DISCONTINUED | OUTPATIENT
Start: 2024-06-13 | End: 2024-06-13 | Stop reason: HOSPADM

## 2024-06-13 RX ORDER — PROPOFOL 10 MG/ML
INJECTION, EMULSION INTRAVENOUS PRN
Status: DISCONTINUED | OUTPATIENT
Start: 2024-06-13 | End: 2024-06-13 | Stop reason: SDUPTHER

## 2024-06-13 RX ORDER — SODIUM CHLORIDE, SODIUM LACTATE, POTASSIUM CHLORIDE, CALCIUM CHLORIDE 600; 310; 30; 20 MG/100ML; MG/100ML; MG/100ML; MG/100ML
INJECTION, SOLUTION INTRAVENOUS CONTINUOUS PRN
Status: DISCONTINUED | OUTPATIENT
Start: 2024-06-13 | End: 2024-06-13 | Stop reason: SDUPTHER

## 2024-06-13 RX ORDER — LIDOCAINE HYDROCHLORIDE 20 MG/ML
INJECTION, SOLUTION EPIDURAL; INFILTRATION; INTRACAUDAL; PERINEURAL PRN
Status: DISCONTINUED | OUTPATIENT
Start: 2024-06-13 | End: 2024-06-13 | Stop reason: SDUPTHER

## 2024-06-13 RX ADMIN — LIDOCAINE HYDROCHLORIDE 40 MG: 20 INJECTION, SOLUTION EPIDURAL; INFILTRATION; INTRACAUDAL; PERINEURAL at 12:31

## 2024-06-13 RX ADMIN — PROPOFOL 60 MG: 10 INJECTION, EMULSION INTRAVENOUS at 12:31

## 2024-06-13 RX ADMIN — PROPOFOL 120 MCG/KG/MIN: 10 INJECTION, EMULSION INTRAVENOUS at 12:32

## 2024-06-13 RX ADMIN — SODIUM CHLORIDE, SODIUM LACTATE, POTASSIUM CHLORIDE, AND CALCIUM CHLORIDE: 600; 310; 30; 20 INJECTION, SOLUTION INTRAVENOUS at 12:28

## 2024-06-13 RX ADMIN — SODIUM CHLORIDE, POTASSIUM CHLORIDE, SODIUM LACTATE AND CALCIUM CHLORIDE: 600; 310; 30; 20 INJECTION, SOLUTION INTRAVENOUS at 12:17

## 2024-06-13 ASSESSMENT — LIFESTYLE VARIABLES: SMOKING_STATUS: 1

## 2024-06-13 ASSESSMENT — PAIN - FUNCTIONAL ASSESSMENT: PAIN_FUNCTIONAL_ASSESSMENT: 0-10

## 2024-06-13 NOTE — H&P
Enedelia Handley (:  1965) is a 59 y.o. female new patient referred to our office for evaluation of the following chief complaint(s):  Follow up pancreatitis            ASSESSMENT/PLAN:  1) Abnormal imaging/Abd pain- patient seen for abnormal appearing pancreas on imaging and underwent an EUS which showed resolving pancreatitis and some sludge in the gallbladder. No identifiable mass or other concerning lesion, no evidence for chronic pancreatitis. CA 19-9, CARMELA, as well as IgG 4 all normal. No alarming symptoms at this time. Upper GI tract not evaluated and mucosal disease (PUD, gastritis, H pylori) should be ruled out. Some issues with constipation, was taking Dulcolax but this was discontinued. States some upper abdominal pain. There was sludge on patients EUS which could ultimately be  the cause for patients issues (symptomatic cholelithiasis)      Plan   Schedule upper endoscopy with biopsies as well as a surveillance colonoscopy, if negative then would have patient follow up with surgery clinic to consider cholecystectomy given gallbladder sludge on EUS.      Smaller, more frequent, lower residue, lower fat meals     Limit Mobic use and other NSAIDs, take Prilosec 20mg over the counter daily if on long term NSAID use.      Take Miralax 17gm (1 scoop daily), Benefiber daily, and drink plenty of water    Colace 100mg oral 2x per day and Dulcolax suppository 10mg at night      Subjective   SUBJECTIVE/OBJECTIVE  Enedelia Handley is a 59 y.o. year old female with PMH is pertinent for asthma presents to the GI clinic after recent hospitalization for pancreatitis.      As per recent consultation note on 3-1  \"60 yo M f with history of prior acute pancreatitis and asthma presents to the ED with complaints of ongoing abdominal pain since . Imaging at that time showed findings that were suggestive of acute pancreatitis, and now returns with worsening pain and elevated lipase. Appears that he had an episode

## 2024-06-13 NOTE — DISCHARGE INSTRUCTIONS
Gastrointestinal Colonoscopy/Flexible Sigmoidoscopy - Lower Exam Discharge Instructions  Call Dr. Dr. Huizar at [phone] for any problems or questions.  Contact the doctor’s office for follow up appointment as directed  Medication may cause drowsiness for several hours, therefore, do not drive or operate machinery for remainder of the day.  No alcohol today.  Do not make any important decisions such signing legal paperwork.  Ordinarily, you may resume regular diet and activity after exam unless otherwise specified by your physician.  Because of air put into your colon during exam, you may experience some abdominal distension, relieved by the passage of gas, for several hours.  Contact your physician if you have any of the following:  Excessive amount of bleeding - large amount when having a bowel movement.  Occasional specks of blood with bowel movement would not be unusual.  Severe abdominal pain  Fever or Chills  Polyp Removal - follow these additional instructions  Soft diet for 24 hours, then resume regular diet   Take Metamucil - 1 tablespoon in juice every morning for 3 days, if needed.  No Aspirin, Advil, Aleve, Nuprin, Ibuprofen, or medications that contain these drugs for 2 weeks, unless taken for a heart condition.    Any additional instructions:   Interval of the next colonoscopy will be determined by pending pathology, likely 5 years)  Await for biopsy results, you will receive a pathology letter within 2 weeks.         Instructions given to Enedelia Handley and other family members.

## 2024-06-13 NOTE — OP NOTE
Operative Report    Patient: Enedelia Handley MRN: [id number]      YOB: 1965  Age: 59 y.o.  Sex: female            Indications:  Screening    Preoperative Evaluation: The patient was evaluated prior to the procedure in the GI lab admission area, the patient ASA was recorded .  Consent was obtained from the patient with the risk of perforation bleeding and aspiration.    Anesthesia: DEVONTE-per anesthesia    Complications: None; patient tolerated the procedure well.    EBL -insignificant      Procedure: The patient was sedated in the left lateral decubitus position.  Scope was advanced from the rectum to the cecum.  Per gastroenterology society guideline recommendations, right side of the colon was examined twice. The scope was withdrawn to the rectum, retroflexed view was performed.  The rectal exam was normal.  Preparation was adequate. [place] score of 9 .    Findings:    3 polyps scattered throughout the colon, 5 to 8 mm in size, removed via cold snare pulpectomy.  Mild sigmoid diverticulosis  Internal hemorrhoids    Postoperative Diagnosis:   3 polyps  Diverticulosis  Internal hemorrhoids    Recommendations:   Interval of the next colonoscopy will be determined by pending pathology, likely 5 years)  Await for biopsy results, you will receive a pathology letter within 2 weeks.     Signed By:  Duke Huizar MD     June 13, 2024

## 2024-06-13 NOTE — ANESTHESIA PRE PROCEDURE
arthritis:..                 Abdominal:             Vascular:          Other Findings:             Anesthesia Plan      TIVA     ASA 3       Induction: intravenous.      Anesthetic plan and risks discussed with patient.      Plan discussed with CRNA.                    King Reese MD   6/13/2024

## 2024-06-13 NOTE — ANESTHESIA POSTPROCEDURE EVALUATION
Department of Anesthesiology  Postprocedure Note    Patient: Enedelia Handley  MRN: [id number]  YOB: 1965  Date of evaluation: 6/13/2024    Procedure Summary       Date: 06/13/24 Room / Location: [facility] 04 / [facility] ENDOSCOPY    Anesthesia Start: 1228 Anesthesia Stop: 1259    Procedure: COLORECTAL CANCER SCREENING POLYPECTOMY Diagnosis:       Hx of colonic polyps      Upper abdominal pain      (Hx of colonic polyps [Z86.010])      (Upper abdominal pain [R10.10])    Surgeons: Duke Huizar MD Responsible Provider: King Reese MD    Anesthesia Type: TIVA ASA Status: 3            Anesthesia Type: TIVA    Joselo Phase I: Joselo Score: 10    Joselo Phase II: Joselo Score: 10    Anesthesia Post Evaluation    Patient location during evaluation: PACU  Patient participation: complete - patient participated  Level of consciousness: awake  Airway patency: patent  Nausea & Vomiting: no nausea and no vomiting  Cardiovascular status: blood pressure returned to baseline  Respiratory status: acceptable  Hydration status: euvolemic  Comments: --------------------            06/13/24               1330     --------------------   BP:     (!) 147/86   Pulse:      60       Resp:       16       Temp:                SpO2:      99%      --------------------    Pain management: adequate    No notable events documented.

## 2024-06-14 ENCOUNTER — TELEPHONE (OUTPATIENT)
Dept: GASTROENTEROLOGY | Age: 59
End: 2024-06-14

## 2024-06-14 NOTE — TELEPHONE ENCOUNTER
Called patient to see how she was doing after her procedure. Spoke with mom, states patient has a little bit of pain but was out at the time of the call. Let her know to stay hydrated, walking and taking tylenol will help with her discomfort. Let patients mother know if she has any questions to please  give me a call.

## 2024-06-17 ENCOUNTER — TELEPHONE (OUTPATIENT)
Dept: GASTROENTEROLOGY | Age: 59
End: 2024-06-17

## (undated) DEVICE — SYR LR LCK 1ML GRAD NSAF 30ML --

## (undated) DEVICE — SINGLE PORT MANIFOLD: Brand: NEPTUNE 2

## (undated) DEVICE — STRYKER PERFORMANCE SERIES SAGITTAL BLADE: Brand: STRYKER PERFORMANCE SERIES

## (undated) DEVICE — 3M™ STERI-DRAPE™ INCISE DRAPE, XL 1051: Brand: STERI-DRAPE™

## (undated) DEVICE — MOUTHPIECE ENDOSCP L CTRL OPN AND SIDE PORTS DISP

## (undated) DEVICE — CONNECTOR TBNG OD5-7MM O2 END DISP

## (undated) DEVICE — FOAM BUMP, LARGE: Brand: MEDLINE INDUSTRIES, INC.

## (undated) DEVICE — DRAPE,HIP,W/POUCHES,STERILE: Brand: MEDLINE

## (undated) DEVICE — STOCKINETTE,IMPERVIOUS,12X48,STERILE: Brand: MEDLINE

## (undated) DEVICE — SYRINGE, LUER SLIP, STERILE, 60ML: Brand: MEDLINE

## (undated) DEVICE — SUTURE PDS II SZ 1 L96IN ABSRB VLT TP-1 L65MM 1/2 CIR Z880G

## (undated) DEVICE — GAUZE,SPONGE,4"X4",12PLY,WOVEN,NS,LF: Brand: MEDLINE

## (undated) DEVICE — STERILE PVP: Brand: MEDLINE INDUSTRIES, INC.

## (undated) DEVICE — STOCKINETTE TUBE 6X48 -- MEDICHOICE

## (undated) DEVICE — SYRINGE MED 10ML LUERLOCK TIP W/O SFTY DISP

## (undated) DEVICE — T4 HOOD

## (undated) DEVICE — BIPOLAR SEALER 23-112-1 AQM 6.0: Brand: AQUAMANTYS ®

## (undated) DEVICE — COVER,MAYO STAND,XL,STERILE: Brand: MEDLINE

## (undated) DEVICE — KENDALL RADIOLUCENT FOAM MONITORING ELECTRODE RECTANGULAR SHAPE: Brand: KENDALL

## (undated) DEVICE — NEEDLE SYR 18GA L1.5IN RED PLAS HUB S STL BLNT FILL W/O

## (undated) DEVICE — Device: Brand: BALLOON3

## (undated) DEVICE — SNARE POLYP SM W13MMXL240CM SHTH DIA2.4MM OVL FLX DISP

## (undated) DEVICE — TOTAL HIP DR KAVOLUS: Brand: MEDLINE INDUSTRIES, INC.

## (undated) DEVICE — CONTAINER FORMALIN PREFILLED 10% NBF 60ML

## (undated) DEVICE — GOWN,AURORA,FABRIC-REINFORCED,2XL: Brand: MEDLINE

## (undated) DEVICE — HANDPIECE SET WITH COAXIAL HIGH FLOW TIP AND SUCTION TUBE: Brand: INTERPULSE

## (undated) DEVICE — CANNULA NSL ORAL AD FOR CAPNOFLEX CO2 O2 AIRLFE

## (undated) DEVICE — 3M™ STERI-DRAPE™ INSTRUMENT POUCH 1018: Brand: STERI-DRAPE™

## (undated) DEVICE — SUTURE PDS II SZ 1 L36IN ABSRB VLT L48MM CTX 1/2 CIR Z371T

## (undated) DEVICE — SOLUTION IV 250ML 0.9% SOD CHL CLR INJ FLX BG CONT PRT CLSR

## (undated) DEVICE — SOLUTION IRRIG 1000ML 0.9% SOD CHL USP POUR PLAS BTL

## (undated) DEVICE — ENDOSCOPIC KIT 1.1+ OP4 CA DE 2 GWN AAMI LEVEL 3

## (undated) DEVICE — SUTURE MCRYL SZ 2-0 L27IN ABSRB UD CP-1 1 L36MM 1/2 CIR REV Y266H

## (undated) DEVICE — AIRLIFE™ OXYGEN TUBING 7 FEET (2.1 M) CRUSH RESISTANT OXYGEN TUBING, VINYL TIPPED: Brand: AIRLIFE™

## (undated) DEVICE — DRESSING HYDROFIBER AQUACEL AG ADVANTAGE 3.5X12 IN

## (undated) DEVICE — LUBE JELLY FOIL PACK 1.4 OZ: Brand: MEDLINE INDUSTRIES, INC.

## (undated) DEVICE — Z DISCONTINUED PER MEDLINE USE 2741944 DRESSING AQUACEL 12 IN SURG W9XL30CM SIL CVR WTRPRF VIR BACT BARR ANTIMIC

## (undated) DEVICE — DRESSING HYDROFIBER AQUACEL AG ADVANTAGE 3.5X10 IN

## (undated) DEVICE — PENCIL ES L3M BTTN SWCH HOLSTER W/ BLDE ELECTRD EDGE

## (undated) DEVICE — SUTURE ETHBND EXCEL SZ 5 L30IN NONABSORBABLE GRN L40MM V-37 MB66G

## (undated) DEVICE — SOLUTION IRRIG 3000ML 0.9% SOD CHL USP UROMATIC PLAS CONT

## (undated) DEVICE — SOLUTION IRRIG 3000ML 0.9% SOD CHL FLX CONT 0797208] ICU MEDICAL INC]

## (undated) DEVICE — YANKAUER,BULB TIP,W/O VENT,RIGID,STERILE: Brand: MEDLINE

## (undated) DEVICE — FORCEPS BX L240CM JAW DIA2.8MM L CAP W/ NDL MIC MESH TOOTH

## (undated) DEVICE — TRAY PREP DRY W/ PREM GLV 2 APPL 6 SPNG 2 UNDPD 1 OVERWRAP

## (undated) DEVICE — SOLUTION IRRIG 1000ML STRL H2O USP PLAS POUR BTL

## (undated) DEVICE — SYRINGE MEDICAL 3ML CLEAR PLASTIC STANDARD NON CONTROL LUERLOCK TIP DISPOSABLE

## (undated) DEVICE — HEWSON SUTURE RETRIEVER: Brand: HEWSON SUTURE RETRIEVER

## (undated) DEVICE — BLOCK BITE AD 60FR W/ VELC STRP ADDRESSES MOST PT AND

## (undated) DEVICE — SOLUTION IV 250ML 0.9% SOD CHL PH 5 INJ USP VIAFLX PLAS

## (undated) DEVICE — YANKAUER,FLEXIBLE HANDLE,REGLR CAPACITY: Brand: MEDLINE INDUSTRIES, INC.

## (undated) DEVICE — TRAP SPEC POLYP REM STRNR CLN DSGN MAGNIFYING WIND DISP

## (undated) DEVICE — REM POLYHESIVE ADULT PATIENT RETURN ELECTRODE: Brand: VALLEYLAB

## (undated) DEVICE — DRAPE,TOP,102X53,STERILE: Brand: MEDLINE

## (undated) DEVICE — SOLUTION IV 1000ML 0.9% SOD CHL

## (undated) DEVICE — SYR 50ML LR LCK 1ML GRAD NSAF --

## (undated) DEVICE — (D)PREP SKN CHLRAPRP APPL 26ML -- CONVERT TO ITEM 371833

## (undated) DEVICE — Device

## (undated) DEVICE — SYRINGE MED 50ML LUERLOCK TIP

## (undated) DEVICE — DRAPE,U/SHT,SPLIT,FILM,60X84,STERILE: Brand: MEDLINE

## (undated) DEVICE — 3M™ IOBAN™ 2 ANTIMICROBIAL INCISE DRAPE 6651EZ: Brand: IOBAN™ 2